# Patient Record
Sex: MALE | Race: AMERICAN INDIAN OR ALASKA NATIVE | NOT HISPANIC OR LATINO | URBAN - METROPOLITAN AREA
[De-identification: names, ages, dates, MRNs, and addresses within clinical notes are randomized per-mention and may not be internally consistent; named-entity substitution may affect disease eponyms.]

---

## 2020-01-01 ENCOUNTER — RECORDS - HEALTHEAST (OUTPATIENT)
Dept: MEDSURG UNIT | Facility: CLINIC | Age: 75
End: 2020-01-01

## 2020-01-01 ENCOUNTER — ANESTHESIA - HEALTHEAST (OUTPATIENT)
Dept: MEDSURG UNIT | Facility: CLINIC | Age: 75
End: 2020-01-01

## 2020-01-01 DIAGNOSIS — J12.82 PNEUMONIA DUE TO 2019 NOVEL CORONAVIRUS: ICD-10-CM

## 2020-01-01 DIAGNOSIS — N17.9 ACUTE KIDNEY INJURY (H): ICD-10-CM

## 2020-01-01 DIAGNOSIS — U07.1 PNEUMONIA DUE TO 2019 NOVEL CORONAVIRUS: ICD-10-CM

## 2020-01-01 DIAGNOSIS — U07.1 ACUTE RESPIRATORY DISTRESS SYNDROME (ARDS) DUE TO COVID-19 VIRUS (H): ICD-10-CM

## 2020-01-01 DIAGNOSIS — U07.1 2019 NOVEL CORONAVIRUS DISEASE (COVID-19): ICD-10-CM

## 2020-01-01 DIAGNOSIS — T14.8XXA ABRASION: ICD-10-CM

## 2020-01-01 DIAGNOSIS — J96.01 ACUTE RESPIRATORY FAILURE WITH HYPOXIA (H): ICD-10-CM

## 2020-01-01 DIAGNOSIS — J80 ACUTE RESPIRATORY DISTRESS SYNDROME (ARDS) DUE TO COVID-19 VIRUS (H): ICD-10-CM

## 2020-01-01 DIAGNOSIS — Z91.89 AT HIGH RISK FOR IMPAIRED SKIN INTEGRITY: ICD-10-CM

## 2020-01-01 LAB
ABO/RH(D): NORMAL
ABO/RH(D): NORMAL
AEROBIC BLOOD CULTURE BOTTLE: ABNORMAL
AEROBIC BLOOD CULTURE BOTTLE: NO GROWTH
ALBUMIN SERPL-MCNC: 1.9 G/DL (ref 3.5–5)
ALBUMIN SERPL-MCNC: 2 G/DL (ref 3.5–5)
ALBUMIN SERPL-MCNC: 2.2 G/DL (ref 3.5–5)
ALBUMIN SERPL-MCNC: 2.4 G/DL (ref 3.5–5)
ALBUMIN SERPL-MCNC: 2.5 G/DL (ref 3.5–5)
ALBUMIN SERPL-MCNC: 2.8 G/DL (ref 3.5–5)
ALBUMIN SERPL-MCNC: 3 G/DL (ref 3.5–5)
ALBUMIN UR-MCNC: NEGATIVE MG/DL
ALP SERPL-CCNC: 103 U/L (ref 45–120)
ALP SERPL-CCNC: 109 U/L (ref 45–120)
ALP SERPL-CCNC: 47 U/L (ref 45–120)
ALP SERPL-CCNC: 49 U/L (ref 45–120)
ALP SERPL-CCNC: 61 U/L (ref 45–120)
ALP SERPL-CCNC: 67 U/L (ref 45–120)
ALP SERPL-CCNC: 75 U/L (ref 45–120)
ALP SERPL-CCNC: 86 U/L (ref 45–120)
ALP SERPL-CCNC: 99 U/L (ref 45–120)
ALT SERPL W P-5'-P-CCNC: 182 U/L (ref 0–45)
ALT SERPL W P-5'-P-CCNC: 234 U/L (ref 0–45)
ALT SERPL W P-5'-P-CCNC: 268 U/L (ref 0–45)
ALT SERPL W P-5'-P-CCNC: 32 U/L (ref 0–45)
ALT SERPL W P-5'-P-CCNC: 37 U/L (ref 0–45)
ALT SERPL W P-5'-P-CCNC: 51 U/L (ref 0–45)
ALT SERPL W P-5'-P-CCNC: 62 U/L (ref 0–45)
ALT SERPL W P-5'-P-CCNC: 77 U/L (ref 0–45)
ALT SERPL W P-5'-P-CCNC: 84 U/L (ref 0–45)
AMMONIA PLAS-SCNC: 28 UMOL/L (ref 11–35)
ANAEROBIC BLOOD CULTURE BOTTLE: NO GROWTH
ANAEROBIC BLOOD CULTURE BOTTLE: NORMAL
ANION GAP SERPL CALCULATED.3IONS-SCNC: 10 MMOL/L (ref 5–18)
ANION GAP SERPL CALCULATED.3IONS-SCNC: 11 MMOL/L (ref 5–18)
ANION GAP SERPL CALCULATED.3IONS-SCNC: 12 MMOL/L (ref 5–18)
ANION GAP SERPL CALCULATED.3IONS-SCNC: 13 MMOL/L (ref 5–18)
ANION GAP SERPL CALCULATED.3IONS-SCNC: 14 MMOL/L (ref 5–18)
ANION GAP SERPL CALCULATED.3IONS-SCNC: 15 MMOL/L (ref 5–18)
ANION GAP SERPL CALCULATED.3IONS-SCNC: 16 MMOL/L (ref 5–18)
ANION GAP SERPL CALCULATED.3IONS-SCNC: 17 MMOL/L (ref 5–18)
ANION GAP SERPL CALCULATED.3IONS-SCNC: 7 MMOL/L (ref 5–18)
ANION GAP SERPL CALCULATED.3IONS-SCNC: 7 MMOL/L (ref 5–18)
ANION GAP SERPL CALCULATED.3IONS-SCNC: 8 MMOL/L (ref 5–18)
ANION GAP SERPL CALCULATED.3IONS-SCNC: 9 MMOL/L (ref 5–18)
ANTIBODY SCREEN: NEGATIVE
AORTIC VALVE MEAN VELOCITY: 124 CM/S
APPEARANCE UR: CLEAR
APTT PPP: 36 SECONDS (ref 24–37)
APTT PPP: 36 SECONDS (ref 24–37)
APTT PPP: 38 SECONDS (ref 24–37)
ARUP MISCELLANEOUS TEST: NORMAL
ARUP MISCELLANEOUS TEST: NORMAL
ASCENDING AORTA: 4.1 CM
ASCENDING AORTA: 4.2 CM
AST SERPL W P-5'-P-CCNC: 123 U/L (ref 0–40)
AST SERPL W P-5'-P-CCNC: 14 U/L (ref 0–40)
AST SERPL W P-5'-P-CCNC: 16 U/L (ref 0–40)
AST SERPL W P-5'-P-CCNC: 213 U/L (ref 0–40)
AST SERPL W P-5'-P-CCNC: 216 U/L (ref 0–40)
AST SERPL W P-5'-P-CCNC: 34 U/L (ref 0–40)
AST SERPL W P-5'-P-CCNC: 39 U/L (ref 0–40)
AST SERPL W P-5'-P-CCNC: 41 U/L (ref 0–40)
AST SERPL W P-5'-P-CCNC: 62 U/L (ref 0–40)
AT III ACT/NOR PPP CHRO: 96 % (ref 85–135)
ATRIAL RATE - MUSE: 101 BPM
ATRIAL RATE - MUSE: 124 BPM
ATRIAL RATE - MUSE: 147 BPM
ATRIAL RATE - MUSE: 52 BPM
ATRIAL RATE - MUSE: 58 BPM
ATRIAL RATE - MUSE: 80 BPM
ATRIAL RATE - MUSE: 96 BPM
AV DIMENSIONLESS INDEX VTI: 0.6
AV MEAN GRADIENT: 8 MMHG
AV PEAK GRADIENT: 15.1 MMHG
AV VELOCITY RATIO: 0.7
BACTERIA #/AREA URNS HPF: ABNORMAL HPF
BACTERIA SPEC CULT: ABNORMAL
BACTERIA SPEC CULT: NO GROWTH
BACTERIA SPEC CULT: NO GROWTH
BASE EXCESS BLDA CALC-SCNC: -0.8 MMOL/L
BASE EXCESS BLDA CALC-SCNC: -1 MMOL/L
BASE EXCESS BLDA CALC-SCNC: -1.4 MMOL/L
BASE EXCESS BLDA CALC-SCNC: -1.5 MMOL/L
BASE EXCESS BLDA CALC-SCNC: -2.1 MMOL/L
BASE EXCESS BLDA CALC-SCNC: -2.2 MMOL/L
BASE EXCESS BLDA CALC-SCNC: -2.4 MMOL/L
BASE EXCESS BLDA CALC-SCNC: -3.2 MMOL/L
BASE EXCESS BLDA CALC-SCNC: -3.5 MMOL/L
BASE EXCESS BLDA CALC-SCNC: -3.8 MMOL/L
BASE EXCESS BLDA CALC-SCNC: -3.8 MMOL/L
BASE EXCESS BLDA CALC-SCNC: -4 MMOL/L
BASE EXCESS BLDA CALC-SCNC: -4 MMOL/L
BASE EXCESS BLDA CALC-SCNC: -4.1 MMOL/L
BASE EXCESS BLDA CALC-SCNC: -4.1 MMOL/L
BASE EXCESS BLDA CALC-SCNC: -4.6 MMOL/L
BASE EXCESS BLDA CALC-SCNC: -4.7 MMOL/L
BASE EXCESS BLDA CALC-SCNC: -4.9 MMOL/L
BASE EXCESS BLDA CALC-SCNC: -5 MMOL/L
BASE EXCESS BLDA CALC-SCNC: -5 MMOL/L
BASE EXCESS BLDA CALC-SCNC: -5.3 MMOL/L
BASE EXCESS BLDA CALC-SCNC: -5.8 MMOL/L
BASE EXCESS BLDA CALC-SCNC: -5.9 MMOL/L
BASE EXCESS BLDA CALC-SCNC: -6.2 MMOL/L
BASE EXCESS BLDA CALC-SCNC: -6.4 MMOL/L
BASE EXCESS BLDA CALC-SCNC: -6.5 MMOL/L
BASE EXCESS BLDA CALC-SCNC: -6.6 MMOL/L
BASE EXCESS BLDA CALC-SCNC: -6.6 MMOL/L
BASE EXCESS BLDA CALC-SCNC: -6.7 MMOL/L
BASE EXCESS BLDA CALC-SCNC: -6.7 MMOL/L
BASE EXCESS BLDA CALC-SCNC: -6.8 MMOL/L
BASE EXCESS BLDA CALC-SCNC: -7.2 MMOL/L
BASE EXCESS BLDA CALC-SCNC: -7.3 MMOL/L
BASE EXCESS BLDA CALC-SCNC: -7.4 MMOL/L
BASE EXCESS BLDA CALC-SCNC: -7.5 MMOL/L
BASE EXCESS BLDA CALC-SCNC: -7.6 MMOL/L
BASE EXCESS BLDA CALC-SCNC: -7.7 MMOL/L
BASE EXCESS BLDA CALC-SCNC: -7.9 MMOL/L
BASE EXCESS BLDA CALC-SCNC: -8 MMOL/L
BASE EXCESS BLDA CALC-SCNC: -8.6 MMOL/L
BASE EXCESS BLDA CALC-SCNC: -8.6 MMOL/L
BASE EXCESS BLDA CALC-SCNC: -8.8 MMOL/L
BASE EXCESS BLDA CALC-SCNC: -9.5 MMOL/L
BASE EXCESS BLDA CALC-SCNC: 0 MMOL/L
BASE EXCESS BLDA CALC-SCNC: 1.6 MMOL/L
BASE EXCESS BLDA CALC-SCNC: 2.4 MMOL/L
BASE EXCESS BLDA CALC-SCNC: 2.8 MMOL/L
BASE EXCESS BLDA CALC-SCNC: 2.8 MMOL/L
BASE EXCESS BLDA CALC-SCNC: 3.3 MMOL/L
BASE EXCESS BLDA CALC-SCNC: 5.2 MMOL/L
BASE EXCESS BLDV CALC-SCNC: 4.1 MMOL/L
BASE EXCESS BLDV CALC-SCNC: 7.4 MMOL/L
BASO STIPL BLD QL SMEAR: ABNORMAL
BASOPHILS # BLD AUTO: 0 THOU/UL (ref 0–0.2)
BASOPHILS NFR BLD AUTO: 0 % (ref 0–2)
BILIRUB DIRECT SERPL-MCNC: 0.2 MG/DL
BILIRUB DIRECT SERPL-MCNC: 0.5 MG/DL
BILIRUB DIRECT SERPL-MCNC: 0.6 MG/DL
BILIRUB DIRECT SERPL-MCNC: 0.6 MG/DL
BILIRUB DIRECT SERPL-MCNC: 1.3 MG/DL
BILIRUB SERPL-MCNC: 0.5 MG/DL (ref 0–1)
BILIRUB SERPL-MCNC: 0.7 MG/DL (ref 0–1)
BILIRUB SERPL-MCNC: 0.7 MG/DL (ref 0–1)
BILIRUB SERPL-MCNC: 0.8 MG/DL (ref 0–1)
BILIRUB SERPL-MCNC: 0.9 MG/DL (ref 0–1)
BILIRUB SERPL-MCNC: 1 MG/DL (ref 0–1)
BILIRUB SERPL-MCNC: 1.8 MG/DL (ref 0–1)
BILIRUB UR QL STRIP: NEGATIVE
BSA FOR ECHO PROCEDURE: 2.35 M2
BSA FOR ECHO PROCEDURE: 2.39 M2
BUN SERPL-MCNC: 109 MG/DL (ref 8–28)
BUN SERPL-MCNC: 124 MG/DL (ref 8–28)
BUN SERPL-MCNC: 24 MG/DL (ref 8–28)
BUN SERPL-MCNC: 24 MG/DL (ref 8–28)
BUN SERPL-MCNC: 26 MG/DL (ref 8–28)
BUN SERPL-MCNC: 26 MG/DL (ref 8–28)
BUN SERPL-MCNC: 27 MG/DL (ref 8–28)
BUN SERPL-MCNC: 27 MG/DL (ref 8–28)
BUN SERPL-MCNC: 28 MG/DL (ref 8–28)
BUN SERPL-MCNC: 28 MG/DL (ref 8–28)
BUN SERPL-MCNC: 29 MG/DL (ref 8–28)
BUN SERPL-MCNC: 30 MG/DL (ref 8–28)
BUN SERPL-MCNC: 30 MG/DL (ref 8–28)
BUN SERPL-MCNC: 31 MG/DL (ref 8–28)
BUN SERPL-MCNC: 35 MG/DL (ref 8–28)
BUN SERPL-MCNC: 35 MG/DL (ref 8–28)
BUN SERPL-MCNC: 38 MG/DL (ref 8–28)
BUN SERPL-MCNC: 41 MG/DL (ref 8–28)
BUN SERPL-MCNC: 46 MG/DL (ref 8–28)
BUN SERPL-MCNC: 46 MG/DL (ref 8–28)
BUN SERPL-MCNC: 51 MG/DL (ref 8–28)
BUN SERPL-MCNC: 52 MG/DL (ref 8–28)
BUN SERPL-MCNC: 55 MG/DL (ref 8–28)
BUN SERPL-MCNC: 57 MG/DL (ref 8–28)
BUN SERPL-MCNC: 61 MG/DL (ref 8–28)
BUN SERPL-MCNC: 68 MG/DL (ref 8–28)
BUN SERPL-MCNC: 68 MG/DL (ref 8–28)
BUN SERPL-MCNC: 76 MG/DL (ref 8–28)
BUN SERPL-MCNC: 81 MG/DL (ref 8–28)
BUN SERPL-MCNC: 90 MG/DL (ref 8–28)
C DIFF TOX B STL QL: NEGATIVE
C REACTIVE PROTEIN LHE: 15.9 MG/DL (ref 0–0.8)
C REACTIVE PROTEIN LHE: 16.5 MG/DL (ref 0–0.8)
C REACTIVE PROTEIN LHE: 7.9 MG/DL (ref 0–0.8)
C REACTIVE PROTEIN LHE: 8.3 MG/DL (ref 0–0.8)
C REACTIVE PROTEIN LHE: 8.8 MG/DL (ref 0–0.8)
C REACTIVE PROTEIN LHE: 9 MG/DL (ref 0–0.8)
C REACTIVE PROTEIN LHE: 9.6 MG/DL (ref 0–0.8)
CALCIUM SERPL-MCNC: 7.3 MG/DL (ref 8.5–10.5)
CALCIUM SERPL-MCNC: 7.6 MG/DL (ref 8.5–10.5)
CALCIUM SERPL-MCNC: 7.9 MG/DL (ref 8.5–10.5)
CALCIUM SERPL-MCNC: 8 MG/DL (ref 8.5–10.5)
CALCIUM SERPL-MCNC: 8 MG/DL (ref 8.5–10.5)
CALCIUM SERPL-MCNC: 8.1 MG/DL (ref 8.5–10.5)
CALCIUM SERPL-MCNC: 8.2 MG/DL (ref 8.5–10.5)
CALCIUM SERPL-MCNC: 8.3 MG/DL (ref 8.5–10.5)
CALCIUM SERPL-MCNC: 8.4 MG/DL (ref 8.5–10.5)
CALCIUM SERPL-MCNC: 8.5 MG/DL (ref 8.5–10.5)
CALCIUM SERPL-MCNC: 8.6 MG/DL (ref 8.5–10.5)
CALCIUM SERPL-MCNC: 8.7 MG/DL (ref 8.5–10.5)
CALCIUM SERPL-MCNC: 8.8 MG/DL (ref 8.5–10.5)
CALCIUM SERPL-MCNC: 9.4 MG/DL (ref 8.5–10.5)
CALCIUM, IONIZED MEASURED: 1.03 MMOL/L (ref 1.11–1.3)
CALCIUM, IONIZED MEASURED: 1.03 MMOL/L (ref 1.11–1.3)
CALCIUM, IONIZED MEASURED: 1.04 MMOL/L (ref 1.11–1.3)
CALCIUM, IONIZED MEASURED: 1.05 MMOL/L (ref 1.11–1.3)
CALCIUM, IONIZED MEASURED: 1.06 MMOL/L (ref 1.11–1.3)
CALCIUM, IONIZED MEASURED: 1.07 MMOL/L (ref 1.11–1.3)
CALCIUM, IONIZED MEASURED: 1.07 MMOL/L (ref 1.11–1.3)
CALCIUM, IONIZED MEASURED: 1.08 MMOL/L (ref 1.11–1.3)
CALCIUM, IONIZED MEASURED: 1.1 MMOL/L (ref 1.11–1.3)
CALCIUM, IONIZED MEASURED: 1.11 MMOL/L (ref 1.11–1.3)
CALCIUM, IONIZED MEASURED: 1.12 MMOL/L (ref 1.11–1.3)
CALCIUM, IONIZED MEASURED: 1.13 MMOL/L (ref 1.11–1.3)
CALCIUM, IONIZED MEASURED: 1.15 MMOL/L (ref 1.11–1.3)
CALCIUM, IONIZED MEASURED: 1.17 MMOL/L (ref 1.11–1.3)
CHLORIDE BLD-SCNC: 100 MMOL/L (ref 98–107)
CHLORIDE BLD-SCNC: 102 MMOL/L (ref 98–107)
CHLORIDE BLD-SCNC: 103 MMOL/L (ref 98–107)
CHLORIDE BLD-SCNC: 104 MMOL/L (ref 98–107)
CHLORIDE BLD-SCNC: 105 MMOL/L (ref 98–107)
CHLORIDE BLD-SCNC: 106 MMOL/L (ref 98–107)
CHLORIDE BLD-SCNC: 107 MMOL/L (ref 98–107)
CHLORIDE BLD-SCNC: 110 MMOL/L (ref 98–107)
CHLORIDE BLD-SCNC: 113 MMOL/L (ref 98–107)
CHLORIDE BLD-SCNC: 113 MMOL/L (ref 98–107)
CHLORIDE BLD-SCNC: 114 MMOL/L (ref 98–107)
CHLORIDE BLD-SCNC: 115 MMOL/L (ref 98–107)
CHLORIDE BLD-SCNC: 115 MMOL/L (ref 98–107)
CHLORIDE BLD-SCNC: 116 MMOL/L (ref 98–107)
CHLORIDE BLD-SCNC: 116 MMOL/L (ref 98–107)
CHLORIDE BLD-SCNC: 96 MMOL/L (ref 98–107)
CHLORIDE BLD-SCNC: 97 MMOL/L (ref 98–107)
CHLORIDE BLD-SCNC: 99 MMOL/L (ref 98–107)
CHOLEST SERPL-MCNC: 111 MG/DL
CK SERPL-CCNC: 11 U/L (ref 30–190)
CK SERPL-CCNC: 355 U/L (ref 30–190)
CK SERPL-CCNC: 38 U/L (ref 30–190)
CK SERPL-CCNC: 706 U/L (ref 30–190)
CK SERPL-CCNC: 89 U/L (ref 30–190)
CO2 SERPL-SCNC: 16 MMOL/L (ref 22–31)
CO2 SERPL-SCNC: 17 MMOL/L (ref 22–31)
CO2 SERPL-SCNC: 18 MMOL/L (ref 22–31)
CO2 SERPL-SCNC: 19 MMOL/L (ref 22–31)
CO2 SERPL-SCNC: 20 MMOL/L (ref 22–31)
CO2 SERPL-SCNC: 21 MMOL/L (ref 22–31)
CO2 SERPL-SCNC: 22 MMOL/L (ref 22–31)
CO2 SERPL-SCNC: 23 MMOL/L (ref 22–31)
CO2 SERPL-SCNC: 23 MMOL/L (ref 22–31)
CO2 SERPL-SCNC: 24 MMOL/L (ref 22–31)
CO2 SERPL-SCNC: 24 MMOL/L (ref 22–31)
CO2 SERPL-SCNC: 25 MMOL/L (ref 22–31)
CO2 SERPL-SCNC: 28 MMOL/L (ref 22–31)
COHGB MFR BLD: 81.1 % (ref 95–96)
COHGB MFR BLD: 81.2 % (ref 95–96)
COHGB MFR BLD: 87.5 % (ref 95–96)
COHGB MFR BLD: 89 % (ref 95–96)
COHGB MFR BLD: 89.3 % (ref 95–96)
COHGB MFR BLD: 89.4 % (ref 95–96)
COHGB MFR BLD: 91.1 % (ref 95–96)
COHGB MFR BLD: 91.5 % (ref 95–96)
COHGB MFR BLD: 92 % (ref 95–96)
COHGB MFR BLD: 92.7 % (ref 95–96)
COHGB MFR BLD: 93.4 % (ref 95–96)
COHGB MFR BLD: 93.5 % (ref 95–96)
COHGB MFR BLD: 93.6 % (ref 95–96)
COHGB MFR BLD: 93.7 % (ref 95–96)
COHGB MFR BLD: 94.2 % (ref 95–96)
COHGB MFR BLD: 94.7 % (ref 95–96)
COHGB MFR BLD: 94.8 % (ref 95–96)
COHGB MFR BLD: 94.9 % (ref 95–96)
COHGB MFR BLD: 95 % (ref 95–96)
COHGB MFR BLD: 95.3 % (ref 95–96)
COHGB MFR BLD: 95.4 % (ref 95–96)
COHGB MFR BLD: 95.5 % (ref 95–96)
COHGB MFR BLD: 95.6 % (ref 95–96)
COHGB MFR BLD: 95.7 % (ref 95–96)
COHGB MFR BLD: 95.8 % (ref 95–96)
COHGB MFR BLD: 96 % (ref 95–96)
COHGB MFR BLD: 96.1 % (ref 95–96)
COHGB MFR BLD: 96.1 % (ref 95–96)
COHGB MFR BLD: 96.3 % (ref 95–96)
COHGB MFR BLD: 96.3 % (ref 95–96)
COHGB MFR BLD: 96.5 % (ref 95–96)
COHGB MFR BLD: 96.6 % (ref 95–96)
COHGB MFR BLD: 96.8 % (ref 95–96)
COHGB MFR BLD: 97 % (ref 95–96)
COHGB MFR BLD: 97.1 % (ref 95–96)
COHGB MFR BLD: 97.2 % (ref 95–96)
COHGB MFR BLD: 97.4 % (ref 95–96)
COHGB MFR BLD: 97.5 % (ref 95–96)
COHGB MFR BLD: 97.6 % (ref 95–96)
COHGB MFR BLD: 97.6 % (ref 95–96)
COHGB MFR BLD: 98.1 % (ref 95–96)
COHGB MFR BLD: 98.4 % (ref 95–96)
COHGB MFR BLD: 98.5 % (ref 95–96)
COHGB MFR BLD: 98.6 % (ref 95–96)
COHGB MFR BLD: 98.8 % (ref 95–96)
COHGB MFR BLD: 98.9 % (ref 95–96)
COHGB MFR BLD: 99 % (ref 95–96)
COHGB MFR BLD: 99.1 % (ref 95–96)
COHGB MFR BLD: 99.2 % (ref 95–96)
COHGB MFR BLD: 99.3 % (ref 95–96)
COHGB MFR BLD: 99.9 % (ref 95–96)
COHGB MFR BLD: >100 % (ref 95–96)
COLOR UR AUTO: ABNORMAL
CREAT SERPL-MCNC: 0.8 MG/DL (ref 0.7–1.3)
CREAT SERPL-MCNC: 0.85 MG/DL (ref 0.7–1.3)
CREAT SERPL-MCNC: 0.85 MG/DL (ref 0.7–1.3)
CREAT SERPL-MCNC: 0.91 MG/DL (ref 0.7–1.3)
CREAT SERPL-MCNC: 0.93 MG/DL (ref 0.7–1.3)
CREAT SERPL-MCNC: 0.94 MG/DL (ref 0.7–1.3)
CREAT SERPL-MCNC: 0.95 MG/DL (ref 0.7–1.3)
CREAT SERPL-MCNC: 0.95 MG/DL (ref 0.7–1.3)
CREAT SERPL-MCNC: 1 MG/DL (ref 0.7–1.3)
CREAT SERPL-MCNC: 1 MG/DL (ref 0.7–1.3)
CREAT SERPL-MCNC: 1.01 MG/DL (ref 0.7–1.3)
CREAT SERPL-MCNC: 1.02 MG/DL (ref 0.7–1.3)
CREAT SERPL-MCNC: 1.1 MG/DL (ref 0.7–1.3)
CREAT SERPL-MCNC: 1.11 MG/DL (ref 0.7–1.3)
CREAT SERPL-MCNC: 1.18 MG/DL (ref 0.7–1.3)
CREAT SERPL-MCNC: 1.22 MG/DL (ref 0.7–1.3)
CREAT SERPL-MCNC: 1.33 MG/DL (ref 0.7–1.3)
CREAT SERPL-MCNC: 1.48 MG/DL (ref 0.7–1.3)
CREAT SERPL-MCNC: 1.76 MG/DL (ref 0.7–1.3)
CREAT SERPL-MCNC: 1.96 MG/DL (ref 0.7–1.3)
CREAT SERPL-MCNC: 2.29 MG/DL (ref 0.7–1.3)
CREAT SERPL-MCNC: 2.59 MG/DL (ref 0.7–1.3)
CREAT SERPL-MCNC: 2.74 MG/DL (ref 0.7–1.3)
CREAT SERPL-MCNC: 2.9 MG/DL (ref 0.7–1.3)
CREAT SERPL-MCNC: 3.41 MG/DL (ref 0.7–1.3)
CREAT SERPL-MCNC: 3.82 MG/DL (ref 0.7–1.3)
CREAT SERPL-MCNC: 3.87 MG/DL (ref 0.7–1.3)
CREAT SERPL-MCNC: 4.15 MG/DL (ref 0.7–1.3)
CREAT SERPL-MCNC: 4.49 MG/DL (ref 0.7–1.3)
CREAT SERPL-MCNC: 5.21 MG/DL (ref 0.7–1.3)
CV BLOOD PRESSURE: NORMAL MMHG
CV BLOOD PRESSURE: NORMAL MMHG
CV ECHO HEIGHT: 74 IN
CV ECHO HEIGHT: 74 IN
CV ECHO WEIGHT: 201 LBS
CV ECHO WEIGHT: 240 LBS
D DIMER PPP FEU-MCNC: 0.61 FEU UG/ML
D DIMER PPP FEU-MCNC: 0.61 FEU UG/ML
D DIMER PPP FEU-MCNC: 0.64 FEU UG/ML
D DIMER PPP FEU-MCNC: 0.74 FEU UG/ML
D DIMER PPP FEU-MCNC: 0.76 FEU UG/ML
D DIMER PPP FEU-MCNC: 0.77 FEU UG/ML
D DIMER PPP FEU-MCNC: 0.77 FEU UG/ML
D DIMER PPP FEU-MCNC: 1.13 FEU UG/ML
DIASTOLIC BLOOD PRESSURE - MUSE: 68 MMHG
DIASTOLIC BLOOD PRESSURE - MUSE: NORMAL
DOP CALC AO PEAK VEL: 194 CM/S
DOP CALC AO VTI: 25.3 CM
DOP CALC LVOT PEAK VEL: 141 CM/S
DOP CALCLVOT PEAK VEL VTI: 14.1 CM
EJECTION FRACTION: 63 % (ref 55–75)
EJECTION FRACTION: 71 % (ref 55–75)
EOSINOPHIL # BLD AUTO: 0 THOU/UL (ref 0–0.4)
EOSINOPHIL COUNT (ABSOLUTE): 0 THOU/UL (ref 0–0.4)
EOSINOPHIL COUNT (ABSOLUTE): 0 THOU/UL (ref 0–0.4)
EOSINOPHIL NFR BLD AUTO: 0 % (ref 0–6)
ERYTHROCYTE [DISTWIDTH] IN BLOOD BY AUTOMATED COUNT: 12.3 % (ref 11–14.5)
ERYTHROCYTE [DISTWIDTH] IN BLOOD BY AUTOMATED COUNT: 12.4 % (ref 11–14.5)
ERYTHROCYTE [DISTWIDTH] IN BLOOD BY AUTOMATED COUNT: 12.4 % (ref 11–14.5)
ERYTHROCYTE [DISTWIDTH] IN BLOOD BY AUTOMATED COUNT: 12.5 % (ref 11–14.5)
ERYTHROCYTE [DISTWIDTH] IN BLOOD BY AUTOMATED COUNT: 12.6 % (ref 11–14.5)
ERYTHROCYTE [DISTWIDTH] IN BLOOD BY AUTOMATED COUNT: 12.8 % (ref 11–14.5)
ERYTHROCYTE [DISTWIDTH] IN BLOOD BY AUTOMATED COUNT: 13.1 % (ref 11–14.5)
ERYTHROCYTE [DISTWIDTH] IN BLOOD BY AUTOMATED COUNT: 13.1 % (ref 11–14.5)
ERYTHROCYTE [DISTWIDTH] IN BLOOD BY AUTOMATED COUNT: 13.2 % (ref 11–14.5)
ERYTHROCYTE [DISTWIDTH] IN BLOOD BY AUTOMATED COUNT: 14 % (ref 11–14.5)
ERYTHROCYTE [DISTWIDTH] IN BLOOD BY AUTOMATED COUNT: 14.3 % (ref 11–14.5)
ERYTHROCYTE [DISTWIDTH] IN BLOOD BY AUTOMATED COUNT: 14.5 % (ref 11–14.5)
ERYTHROCYTE [DISTWIDTH] IN BLOOD BY AUTOMATED COUNT: 14.5 % (ref 11–14.5)
ERYTHROCYTE [DISTWIDTH] IN BLOOD BY AUTOMATED COUNT: 14.6 % (ref 11–14.5)
ERYTHROCYTE [DISTWIDTH] IN BLOOD BY AUTOMATED COUNT: 14.6 % (ref 11–14.5)
ERYTHROCYTE [DISTWIDTH] IN BLOOD BY AUTOMATED COUNT: 14.7 % (ref 11–14.5)
ERYTHROCYTE [DISTWIDTH] IN BLOOD BY AUTOMATED COUNT: 14.7 % (ref 11–14.5)
ERYTHROCYTE [DISTWIDTH] IN BLOOD BY AUTOMATED COUNT: 14.8 % (ref 11–14.5)
ERYTHROCYTE [DISTWIDTH] IN BLOOD BY AUTOMATED COUNT: 14.8 % (ref 11–14.5)
ERYTHROCYTE [DISTWIDTH] IN BLOOD BY AUTOMATED COUNT: 15.1 % (ref 11–14.5)
ERYTHROCYTE [DISTWIDTH] IN BLOOD BY AUTOMATED COUNT: 16 % (ref 11–14.5)
ERYTHROCYTE [DISTWIDTH] IN BLOOD BY AUTOMATED COUNT: 17.2 % (ref 11–14.5)
ERYTHROCYTE [DISTWIDTH] IN BLOOD BY AUTOMATED COUNT: 17.6 % (ref 11–14.5)
ERYTHROCYTE [DISTWIDTH] IN BLOOD BY AUTOMATED COUNT: 18.8 % (ref 11–14.5)
ERYTHROCYTE [DISTWIDTH] IN BLOOD BY AUTOMATED COUNT: 19.2 % (ref 11–14.5)
ERYTHROCYTE [DISTWIDTH] IN BLOOD BY AUTOMATED COUNT: 19.3 % (ref 11–14.5)
ERYTHROCYTE [DISTWIDTH] IN BLOOD BY AUTOMATED COUNT: 19.7 % (ref 11–14.5)
FASTING STATUS PATIENT QL REPORTED: NO
FASTING STATUS PATIENT QL REPORTED: NO
FERRITIN SERPL-MCNC: 440 NG/ML (ref 27–300)
FERRITIN SERPL-MCNC: 554 NG/ML (ref 27–300)
FERRITIN SERPL-MCNC: 677 NG/ML (ref 27–300)
FIBRINOGEN PPP-MCNC: 475 MG/DL (ref 170–410)
FIBRINOGEN PPP-MCNC: 616 MG/DL (ref 170–410)
FIBRINOGEN PPP-MCNC: 665 MG/DL (ref 170–410)
FIBRINOGEN PPP-MCNC: 678 MG/DL (ref 170–410)
FIBRINOGEN PPP-MCNC: 690 MG/DL (ref 170–410)
FIBRINOGEN PPP-MCNC: 735 MG/DL (ref 170–410)
FRACTIONAL SHORTENING: 51.5 % (ref 28–44)
GALACTOMANNAN AG SERPL QL IA: NEGATIVE
GALACTOMANNAN AG SPEC IA-ACNC: 0.04
GFR SERPL CREATININE-BSD FRML MDRD: 11 ML/MIN/1.73M2
GFR SERPL CREATININE-BSD FRML MDRD: 13 ML/MIN/1.73M2
GFR SERPL CREATININE-BSD FRML MDRD: 14 ML/MIN/1.73M2
GFR SERPL CREATININE-BSD FRML MDRD: 15 ML/MIN/1.73M2
GFR SERPL CREATININE-BSD FRML MDRD: 15 ML/MIN/1.73M2
GFR SERPL CREATININE-BSD FRML MDRD: 18 ML/MIN/1.73M2
GFR SERPL CREATININE-BSD FRML MDRD: 21 ML/MIN/1.73M2
GFR SERPL CREATININE-BSD FRML MDRD: 23 ML/MIN/1.73M2
GFR SERPL CREATININE-BSD FRML MDRD: 24 ML/MIN/1.73M2
GFR SERPL CREATININE-BSD FRML MDRD: 28 ML/MIN/1.73M2
GFR SERPL CREATININE-BSD FRML MDRD: 33 ML/MIN/1.73M2
GFR SERPL CREATININE-BSD FRML MDRD: 38 ML/MIN/1.73M2
GFR SERPL CREATININE-BSD FRML MDRD: 46 ML/MIN/1.73M2
GFR SERPL CREATININE-BSD FRML MDRD: 52 ML/MIN/1.73M2
GFR SERPL CREATININE-BSD FRML MDRD: 58 ML/MIN/1.73M2
GFR SERPL CREATININE-BSD FRML MDRD: 60 ML/MIN/1.73M2
GFR SERPL CREATININE-BSD FRML MDRD: >60 ML/MIN/1.73M2
GLUCOSE BLD-MCNC: 117 MG/DL (ref 70–125)
GLUCOSE BLD-MCNC: 124 MG/DL (ref 70–125)
GLUCOSE BLD-MCNC: 126 MG/DL (ref 70–125)
GLUCOSE BLD-MCNC: 128 MG/DL (ref 70–125)
GLUCOSE BLD-MCNC: 133 MG/DL (ref 70–125)
GLUCOSE BLD-MCNC: 154 MG/DL (ref 70–125)
GLUCOSE BLD-MCNC: 154 MG/DL (ref 70–125)
GLUCOSE BLD-MCNC: 155 MG/DL (ref 70–125)
GLUCOSE BLD-MCNC: 155 MG/DL (ref 70–125)
GLUCOSE BLD-MCNC: 170 MG/DL (ref 70–125)
GLUCOSE BLD-MCNC: 170 MG/DL (ref 70–125)
GLUCOSE BLD-MCNC: 174 MG/DL (ref 70–125)
GLUCOSE BLD-MCNC: 186 MG/DL (ref 70–125)
GLUCOSE BLD-MCNC: 189 MG/DL (ref 70–125)
GLUCOSE BLD-MCNC: 195 MG/DL (ref 70–125)
GLUCOSE BLD-MCNC: 198 MG/DL (ref 70–125)
GLUCOSE BLD-MCNC: 204 MG/DL (ref 70–125)
GLUCOSE BLD-MCNC: 205 MG/DL (ref 70–125)
GLUCOSE BLD-MCNC: 211 MG/DL (ref 70–125)
GLUCOSE BLD-MCNC: 216 MG/DL (ref 70–125)
GLUCOSE BLD-MCNC: 217 MG/DL (ref 70–125)
GLUCOSE BLD-MCNC: 219 MG/DL (ref 70–125)
GLUCOSE BLD-MCNC: 227 MG/DL (ref 70–125)
GLUCOSE BLD-MCNC: 258 MG/DL (ref 70–125)
GLUCOSE BLD-MCNC: 269 MG/DL (ref 70–125)
GLUCOSE BLD-MCNC: 275 MG/DL (ref 70–125)
GLUCOSE BLD-MCNC: 282 MG/DL (ref 70–125)
GLUCOSE BLD-MCNC: 291 MG/DL (ref 70–125)
GLUCOSE BLD-MCNC: 333 MG/DL (ref 70–125)
GLUCOSE BLD-MCNC: 387 MG/DL (ref 70–125)
GLUCOSE BLDC GLUCOMTR-MCNC: 101 MG/DL (ref 70–139)
GLUCOSE BLDC GLUCOMTR-MCNC: 101 MG/DL (ref 70–139)
GLUCOSE BLDC GLUCOMTR-MCNC: 102 MG/DL (ref 70–139)
GLUCOSE BLDC GLUCOMTR-MCNC: 102 MG/DL (ref 70–139)
GLUCOSE BLDC GLUCOMTR-MCNC: 105 MG/DL (ref 70–139)
GLUCOSE BLDC GLUCOMTR-MCNC: 107 MG/DL (ref 70–139)
GLUCOSE BLDC GLUCOMTR-MCNC: 107 MG/DL (ref 70–139)
GLUCOSE BLDC GLUCOMTR-MCNC: 110 MG/DL (ref 70–139)
GLUCOSE BLDC GLUCOMTR-MCNC: 110 MG/DL (ref 70–139)
GLUCOSE BLDC GLUCOMTR-MCNC: 111 MG/DL (ref 70–139)
GLUCOSE BLDC GLUCOMTR-MCNC: 113 MG/DL (ref 70–139)
GLUCOSE BLDC GLUCOMTR-MCNC: 113 MG/DL (ref 70–139)
GLUCOSE BLDC GLUCOMTR-MCNC: 115 MG/DL (ref 70–139)
GLUCOSE BLDC GLUCOMTR-MCNC: 115 MG/DL (ref 70–139)
GLUCOSE BLDC GLUCOMTR-MCNC: 117 MG/DL (ref 70–139)
GLUCOSE BLDC GLUCOMTR-MCNC: 117 MG/DL (ref 70–139)
GLUCOSE BLDC GLUCOMTR-MCNC: 118 MG/DL (ref 70–139)
GLUCOSE BLDC GLUCOMTR-MCNC: 118 MG/DL (ref 70–139)
GLUCOSE BLDC GLUCOMTR-MCNC: 119 MG/DL (ref 70–139)
GLUCOSE BLDC GLUCOMTR-MCNC: 121 MG/DL (ref 70–139)
GLUCOSE BLDC GLUCOMTR-MCNC: 123 MG/DL (ref 70–139)
GLUCOSE BLDC GLUCOMTR-MCNC: 124 MG/DL (ref 70–139)
GLUCOSE BLDC GLUCOMTR-MCNC: 125 MG/DL (ref 70–139)
GLUCOSE BLDC GLUCOMTR-MCNC: 126 MG/DL (ref 70–139)
GLUCOSE BLDC GLUCOMTR-MCNC: 127 MG/DL (ref 70–139)
GLUCOSE BLDC GLUCOMTR-MCNC: 127 MG/DL (ref 70–139)
GLUCOSE BLDC GLUCOMTR-MCNC: 128 MG/DL (ref 70–139)
GLUCOSE BLDC GLUCOMTR-MCNC: 129 MG/DL (ref 70–139)
GLUCOSE BLDC GLUCOMTR-MCNC: 130 MG/DL (ref 70–139)
GLUCOSE BLDC GLUCOMTR-MCNC: 131 MG/DL (ref 70–139)
GLUCOSE BLDC GLUCOMTR-MCNC: 131 MG/DL (ref 70–139)
GLUCOSE BLDC GLUCOMTR-MCNC: 133 MG/DL (ref 70–139)
GLUCOSE BLDC GLUCOMTR-MCNC: 134 MG/DL (ref 70–139)
GLUCOSE BLDC GLUCOMTR-MCNC: 135 MG/DL (ref 70–139)
GLUCOSE BLDC GLUCOMTR-MCNC: 136 MG/DL (ref 70–139)
GLUCOSE BLDC GLUCOMTR-MCNC: 137 MG/DL (ref 70–139)
GLUCOSE BLDC GLUCOMTR-MCNC: 138 MG/DL (ref 70–139)
GLUCOSE BLDC GLUCOMTR-MCNC: 139 MG/DL (ref 70–139)
GLUCOSE BLDC GLUCOMTR-MCNC: 140 MG/DL (ref 70–139)
GLUCOSE BLDC GLUCOMTR-MCNC: 141 MG/DL (ref 70–139)
GLUCOSE BLDC GLUCOMTR-MCNC: 142 MG/DL (ref 70–139)
GLUCOSE BLDC GLUCOMTR-MCNC: 142 MG/DL (ref 70–139)
GLUCOSE BLDC GLUCOMTR-MCNC: 143 MG/DL (ref 70–139)
GLUCOSE BLDC GLUCOMTR-MCNC: 144 MG/DL (ref 70–139)
GLUCOSE BLDC GLUCOMTR-MCNC: 145 MG/DL (ref 70–139)
GLUCOSE BLDC GLUCOMTR-MCNC: 146 MG/DL (ref 70–139)
GLUCOSE BLDC GLUCOMTR-MCNC: 147 MG/DL (ref 70–139)
GLUCOSE BLDC GLUCOMTR-MCNC: 148 MG/DL (ref 70–139)
GLUCOSE BLDC GLUCOMTR-MCNC: 149 MG/DL (ref 70–139)
GLUCOSE BLDC GLUCOMTR-MCNC: 150 MG/DL (ref 70–139)
GLUCOSE BLDC GLUCOMTR-MCNC: 151 MG/DL (ref 70–139)
GLUCOSE BLDC GLUCOMTR-MCNC: 152 MG/DL (ref 70–139)
GLUCOSE BLDC GLUCOMTR-MCNC: 154 MG/DL (ref 70–139)
GLUCOSE BLDC GLUCOMTR-MCNC: 155 MG/DL (ref 70–139)
GLUCOSE BLDC GLUCOMTR-MCNC: 156 MG/DL (ref 70–139)
GLUCOSE BLDC GLUCOMTR-MCNC: 157 MG/DL (ref 70–139)
GLUCOSE BLDC GLUCOMTR-MCNC: 158 MG/DL (ref 70–139)
GLUCOSE BLDC GLUCOMTR-MCNC: 159 MG/DL (ref 70–139)
GLUCOSE BLDC GLUCOMTR-MCNC: 161 MG/DL (ref 70–139)
GLUCOSE BLDC GLUCOMTR-MCNC: 161 MG/DL (ref 70–139)
GLUCOSE BLDC GLUCOMTR-MCNC: 163 MG/DL (ref 70–139)
GLUCOSE BLDC GLUCOMTR-MCNC: 163 MG/DL (ref 70–139)
GLUCOSE BLDC GLUCOMTR-MCNC: 164 MG/DL (ref 70–139)
GLUCOSE BLDC GLUCOMTR-MCNC: 164 MG/DL (ref 70–139)
GLUCOSE BLDC GLUCOMTR-MCNC: 165 MG/DL (ref 70–139)
GLUCOSE BLDC GLUCOMTR-MCNC: 166 MG/DL (ref 70–139)
GLUCOSE BLDC GLUCOMTR-MCNC: 168 MG/DL (ref 70–139)
GLUCOSE BLDC GLUCOMTR-MCNC: 169 MG/DL (ref 70–139)
GLUCOSE BLDC GLUCOMTR-MCNC: 169 MG/DL (ref 70–139)
GLUCOSE BLDC GLUCOMTR-MCNC: 170 MG/DL (ref 70–139)
GLUCOSE BLDC GLUCOMTR-MCNC: 171 MG/DL (ref 70–139)
GLUCOSE BLDC GLUCOMTR-MCNC: 172 MG/DL (ref 70–139)
GLUCOSE BLDC GLUCOMTR-MCNC: 174 MG/DL (ref 70–139)
GLUCOSE BLDC GLUCOMTR-MCNC: 174 MG/DL (ref 70–139)
GLUCOSE BLDC GLUCOMTR-MCNC: 176 MG/DL (ref 70–139)
GLUCOSE BLDC GLUCOMTR-MCNC: 176 MG/DL (ref 70–139)
GLUCOSE BLDC GLUCOMTR-MCNC: 177 MG/DL (ref 70–139)
GLUCOSE BLDC GLUCOMTR-MCNC: 178 MG/DL (ref 70–139)
GLUCOSE BLDC GLUCOMTR-MCNC: 178 MG/DL (ref 70–139)
GLUCOSE BLDC GLUCOMTR-MCNC: 179 MG/DL (ref 70–139)
GLUCOSE BLDC GLUCOMTR-MCNC: 179 MG/DL (ref 70–139)
GLUCOSE BLDC GLUCOMTR-MCNC: 180 MG/DL (ref 70–139)
GLUCOSE BLDC GLUCOMTR-MCNC: 182 MG/DL (ref 70–139)
GLUCOSE BLDC GLUCOMTR-MCNC: 183 MG/DL (ref 70–139)
GLUCOSE BLDC GLUCOMTR-MCNC: 185 MG/DL (ref 70–139)
GLUCOSE BLDC GLUCOMTR-MCNC: 186 MG/DL (ref 70–139)
GLUCOSE BLDC GLUCOMTR-MCNC: 187 MG/DL (ref 70–139)
GLUCOSE BLDC GLUCOMTR-MCNC: 187 MG/DL (ref 70–139)
GLUCOSE BLDC GLUCOMTR-MCNC: 188 MG/DL (ref 70–139)
GLUCOSE BLDC GLUCOMTR-MCNC: 188 MG/DL (ref 70–139)
GLUCOSE BLDC GLUCOMTR-MCNC: 192 MG/DL (ref 70–139)
GLUCOSE BLDC GLUCOMTR-MCNC: 194 MG/DL (ref 70–139)
GLUCOSE BLDC GLUCOMTR-MCNC: 195 MG/DL (ref 70–139)
GLUCOSE BLDC GLUCOMTR-MCNC: 196 MG/DL (ref 70–139)
GLUCOSE BLDC GLUCOMTR-MCNC: 196 MG/DL (ref 70–139)
GLUCOSE BLDC GLUCOMTR-MCNC: 197 MG/DL (ref 70–139)
GLUCOSE BLDC GLUCOMTR-MCNC: 198 MG/DL (ref 70–139)
GLUCOSE BLDC GLUCOMTR-MCNC: 199 MG/DL (ref 70–139)
GLUCOSE BLDC GLUCOMTR-MCNC: 201 MG/DL (ref 70–139)
GLUCOSE BLDC GLUCOMTR-MCNC: 202 MG/DL (ref 70–139)
GLUCOSE BLDC GLUCOMTR-MCNC: 203 MG/DL (ref 70–139)
GLUCOSE BLDC GLUCOMTR-MCNC: 205 MG/DL (ref 70–139)
GLUCOSE BLDC GLUCOMTR-MCNC: 207 MG/DL (ref 70–139)
GLUCOSE BLDC GLUCOMTR-MCNC: 207 MG/DL (ref 70–139)
GLUCOSE BLDC GLUCOMTR-MCNC: 209 MG/DL (ref 70–139)
GLUCOSE BLDC GLUCOMTR-MCNC: 212 MG/DL (ref 70–139)
GLUCOSE BLDC GLUCOMTR-MCNC: 215 MG/DL (ref 70–139)
GLUCOSE BLDC GLUCOMTR-MCNC: 216 MG/DL (ref 70–139)
GLUCOSE BLDC GLUCOMTR-MCNC: 217 MG/DL (ref 70–139)
GLUCOSE BLDC GLUCOMTR-MCNC: 219 MG/DL (ref 70–139)
GLUCOSE BLDC GLUCOMTR-MCNC: 220 MG/DL (ref 70–139)
GLUCOSE BLDC GLUCOMTR-MCNC: 221 MG/DL (ref 70–139)
GLUCOSE BLDC GLUCOMTR-MCNC: 222 MG/DL (ref 70–139)
GLUCOSE BLDC GLUCOMTR-MCNC: 223 MG/DL (ref 70–139)
GLUCOSE BLDC GLUCOMTR-MCNC: 225 MG/DL (ref 70–139)
GLUCOSE BLDC GLUCOMTR-MCNC: 226 MG/DL (ref 70–139)
GLUCOSE BLDC GLUCOMTR-MCNC: 227 MG/DL (ref 70–139)
GLUCOSE BLDC GLUCOMTR-MCNC: 228 MG/DL (ref 70–139)
GLUCOSE BLDC GLUCOMTR-MCNC: 231 MG/DL (ref 70–139)
GLUCOSE BLDC GLUCOMTR-MCNC: 232 MG/DL (ref 70–139)
GLUCOSE BLDC GLUCOMTR-MCNC: 234 MG/DL (ref 70–139)
GLUCOSE BLDC GLUCOMTR-MCNC: 236 MG/DL (ref 70–139)
GLUCOSE BLDC GLUCOMTR-MCNC: 238 MG/DL (ref 70–139)
GLUCOSE BLDC GLUCOMTR-MCNC: 239 MG/DL (ref 70–139)
GLUCOSE BLDC GLUCOMTR-MCNC: 239 MG/DL (ref 70–139)
GLUCOSE BLDC GLUCOMTR-MCNC: 240 MG/DL (ref 70–139)
GLUCOSE BLDC GLUCOMTR-MCNC: 241 MG/DL (ref 70–139)
GLUCOSE BLDC GLUCOMTR-MCNC: 242 MG/DL (ref 70–139)
GLUCOSE BLDC GLUCOMTR-MCNC: 245 MG/DL (ref 70–139)
GLUCOSE BLDC GLUCOMTR-MCNC: 246 MG/DL (ref 70–139)
GLUCOSE BLDC GLUCOMTR-MCNC: 249 MG/DL (ref 70–139)
GLUCOSE BLDC GLUCOMTR-MCNC: 253 MG/DL (ref 70–139)
GLUCOSE BLDC GLUCOMTR-MCNC: 254 MG/DL (ref 70–139)
GLUCOSE BLDC GLUCOMTR-MCNC: 264 MG/DL (ref 70–139)
GLUCOSE BLDC GLUCOMTR-MCNC: 274 MG/DL (ref 70–139)
GLUCOSE BLDC GLUCOMTR-MCNC: 274 MG/DL (ref 70–139)
GLUCOSE BLDC GLUCOMTR-MCNC: 276 MG/DL (ref 70–139)
GLUCOSE BLDC GLUCOMTR-MCNC: 284 MG/DL (ref 70–139)
GLUCOSE BLDC GLUCOMTR-MCNC: 286 MG/DL (ref 70–139)
GLUCOSE BLDC GLUCOMTR-MCNC: 288 MG/DL (ref 70–139)
GLUCOSE BLDC GLUCOMTR-MCNC: 290 MG/DL (ref 70–139)
GLUCOSE BLDC GLUCOMTR-MCNC: 290 MG/DL (ref 70–139)
GLUCOSE BLDC GLUCOMTR-MCNC: 291 MG/DL (ref 70–139)
GLUCOSE BLDC GLUCOMTR-MCNC: 296 MG/DL (ref 70–139)
GLUCOSE BLDC GLUCOMTR-MCNC: 297 MG/DL (ref 70–139)
GLUCOSE BLDC GLUCOMTR-MCNC: 300 MG/DL (ref 70–139)
GLUCOSE BLDC GLUCOMTR-MCNC: 307 MG/DL (ref 70–139)
GLUCOSE BLDC GLUCOMTR-MCNC: 309 MG/DL (ref 70–139)
GLUCOSE BLDC GLUCOMTR-MCNC: 309 MG/DL (ref 70–139)
GLUCOSE BLDC GLUCOMTR-MCNC: 322 MG/DL (ref 70–139)
GLUCOSE BLDC GLUCOMTR-MCNC: 328 MG/DL (ref 70–139)
GLUCOSE BLDC GLUCOMTR-MCNC: 330 MG/DL (ref 70–139)
GLUCOSE BLDC GLUCOMTR-MCNC: 356 MG/DL (ref 70–139)
GLUCOSE BLDC GLUCOMTR-MCNC: 374 MG/DL (ref 70–139)
GLUCOSE BLDC GLUCOMTR-MCNC: 58 MG/DL (ref 70–139)
GLUCOSE BLDC GLUCOMTR-MCNC: 73 MG/DL (ref 70–139)
GLUCOSE BLDC GLUCOMTR-MCNC: 76 MG/DL (ref 70–139)
GLUCOSE BLDC GLUCOMTR-MCNC: 83 MG/DL (ref 70–139)
GLUCOSE BLDC GLUCOMTR-MCNC: 90 MG/DL (ref 70–139)
GLUCOSE BLDC GLUCOMTR-MCNC: 94 MG/DL (ref 70–139)
GLUCOSE BLDC GLUCOMTR-MCNC: 95 MG/DL (ref 70–139)
GLUCOSE BLDC GLUCOMTR-MCNC: 96 MG/DL (ref 70–139)
GLUCOSE BLDC GLUCOMTR-MCNC: 96 MG/DL (ref 70–139)
GLUCOSE BLDC GLUCOMTR-MCNC: 99 MG/DL (ref 70–139)
GLUCOSE UR STRIP-MCNC: NEGATIVE MG/DL
GRAM STAIN RESULT: ABNORMAL
HBA1C MFR BLD: 8.2 %
HBV SURFACE AG SERPL QL IA: NEGATIVE
HCO3, ARTERIAL CALC - HISTORICAL: 17.2 MMOL/L (ref 23–29)
HCO3, ARTERIAL CALC - HISTORICAL: 17.7 MMOL/L (ref 23–29)
HCO3, ARTERIAL CALC - HISTORICAL: 17.9 MMOL/L (ref 23–29)
HCO3, ARTERIAL CALC - HISTORICAL: 18 MMOL/L (ref 23–29)
HCO3, ARTERIAL CALC - HISTORICAL: 18.2 MMOL/L (ref 23–29)
HCO3, ARTERIAL CALC - HISTORICAL: 18.2 MMOL/L (ref 23–29)
HCO3, ARTERIAL CALC - HISTORICAL: 18.3 MMOL/L (ref 23–29)
HCO3, ARTERIAL CALC - HISTORICAL: 18.5 MMOL/L (ref 23–29)
HCO3, ARTERIAL CALC - HISTORICAL: 18.5 MMOL/L (ref 23–29)
HCO3, ARTERIAL CALC - HISTORICAL: 18.6 MMOL/L (ref 23–29)
HCO3, ARTERIAL CALC - HISTORICAL: 18.8 MMOL/L (ref 23–29)
HCO3, ARTERIAL CALC - HISTORICAL: 18.8 MMOL/L (ref 23–29)
HCO3, ARTERIAL CALC - HISTORICAL: 18.9 MMOL/L (ref 23–29)
HCO3, ARTERIAL CALC - HISTORICAL: 18.9 MMOL/L (ref 23–29)
HCO3, ARTERIAL CALC - HISTORICAL: 19 MMOL/L (ref 23–29)
HCO3, ARTERIAL CALC - HISTORICAL: 19.1 MMOL/L (ref 23–29)
HCO3, ARTERIAL CALC - HISTORICAL: 19.2 MMOL/L (ref 23–29)
HCO3, ARTERIAL CALC - HISTORICAL: 19.2 MMOL/L (ref 23–29)
HCO3, ARTERIAL CALC - HISTORICAL: 19.4 MMOL/L (ref 23–29)
HCO3, ARTERIAL CALC - HISTORICAL: 19.4 MMOL/L (ref 23–29)
HCO3, ARTERIAL CALC - HISTORICAL: 19.5 MMOL/L (ref 23–29)
HCO3, ARTERIAL CALC - HISTORICAL: 19.6 MMOL/L (ref 23–29)
HCO3, ARTERIAL CALC - HISTORICAL: 19.7 MMOL/L (ref 23–29)
HCO3, ARTERIAL CALC - HISTORICAL: 19.9 MMOL/L (ref 23–29)
HCO3, ARTERIAL CALC - HISTORICAL: 20 MMOL/L (ref 23–29)
HCO3, ARTERIAL CALC - HISTORICAL: 20 MMOL/L (ref 23–29)
HCO3, ARTERIAL CALC - HISTORICAL: 20.1 MMOL/L (ref 23–29)
HCO3, ARTERIAL CALC - HISTORICAL: 20.1 MMOL/L (ref 23–29)
HCO3, ARTERIAL CALC - HISTORICAL: 20.2 MMOL/L (ref 23–29)
HCO3, ARTERIAL CALC - HISTORICAL: 20.5 MMOL/L (ref 23–29)
HCO3, ARTERIAL CALC - HISTORICAL: 20.7 MMOL/L (ref 23–29)
HCO3, ARTERIAL CALC - HISTORICAL: 20.8 MMOL/L (ref 23–29)
HCO3, ARTERIAL CALC - HISTORICAL: 21 MMOL/L (ref 23–29)
HCO3, ARTERIAL CALC - HISTORICAL: 21 MMOL/L (ref 23–29)
HCO3, ARTERIAL CALC - HISTORICAL: 21.2 MMOL/L (ref 23–29)
HCO3, ARTERIAL CALC - HISTORICAL: 21.3 MMOL/L (ref 23–29)
HCO3, ARTERIAL CALC - HISTORICAL: 21.4 MMOL/L (ref 23–29)
HCO3, ARTERIAL CALC - HISTORICAL: 22.1 MMOL/L (ref 23–29)
HCO3, ARTERIAL CALC - HISTORICAL: 22.5 MMOL/L (ref 23–29)
HCO3, ARTERIAL CALC - HISTORICAL: 22.6 MMOL/L (ref 23–29)
HCO3, ARTERIAL CALC - HISTORICAL: 22.9 MMOL/L (ref 23–29)
HCO3, ARTERIAL CALC - HISTORICAL: 23.2 MMOL/L (ref 23–29)
HCO3, ARTERIAL CALC - HISTORICAL: 23.5 MMOL/L (ref 23–29)
HCO3, ARTERIAL CALC - HISTORICAL: 23.5 MMOL/L (ref 23–29)
HCO3, ARTERIAL CALC - HISTORICAL: 24 MMOL/L (ref 23–29)
HCO3, ARTERIAL CALC - HISTORICAL: 24.5 MMOL/L (ref 23–29)
HCO3, ARTERIAL CALC - HISTORICAL: 25.7 MMOL/L (ref 23–29)
HCO3, ARTERIAL CALC - HISTORICAL: 25.9 MMOL/L (ref 23–29)
HCO3, ARTERIAL CALC - HISTORICAL: 26.7 MMOL/L (ref 23–29)
HCO3, ARTERIAL CALC - HISTORICAL: 26.9 MMOL/L (ref 23–29)
HCO3, ARTERIAL CALC - HISTORICAL: 27.3 MMOL/L (ref 23–29)
HCO3, ARTERIAL CALC - HISTORICAL: 28.8 MMOL/L (ref 23–29)
HCO3, ARTERIAL CALC - HISTORICAL: ABNORMAL
HCO3, VENOUS, CALC - HISTORICAL: 26.6 MMOL/L (ref 24–30)
HCO3, VENOUS, CALC - HISTORICAL: 29.4 MMOL/L (ref 24–30)
HCT VFR BLD AUTO: 24.3 % (ref 40–54)
HCT VFR BLD AUTO: 25.1 % (ref 40–54)
HCT VFR BLD AUTO: 25.4 % (ref 40–54)
HCT VFR BLD AUTO: 27.7 % (ref 40–54)
HCT VFR BLD AUTO: 27.9 % (ref 40–54)
HCT VFR BLD AUTO: 29.4 % (ref 40–54)
HCT VFR BLD AUTO: 30.3 % (ref 40–54)
HCT VFR BLD AUTO: 30.8 % (ref 40–54)
HCT VFR BLD AUTO: 31.2 % (ref 40–54)
HCT VFR BLD AUTO: 31.6 % (ref 40–54)
HCT VFR BLD AUTO: 31.9 % (ref 40–54)
HCT VFR BLD AUTO: 33.3 % (ref 40–54)
HCT VFR BLD AUTO: 34.4 % (ref 40–54)
HCT VFR BLD AUTO: 34.8 % (ref 40–54)
HCT VFR BLD AUTO: 34.9 % (ref 40–54)
HCT VFR BLD AUTO: 35.3 % (ref 40–54)
HCT VFR BLD AUTO: 35.3 % (ref 40–54)
HCT VFR BLD AUTO: 35.5 % (ref 40–54)
HCT VFR BLD AUTO: 35.9 % (ref 40–54)
HCT VFR BLD AUTO: 35.9 % (ref 40–54)
HCT VFR BLD AUTO: 36.1 % (ref 40–54)
HCT VFR BLD AUTO: 36.1 % (ref 40–54)
HCT VFR BLD AUTO: 36.4 % (ref 40–54)
HCT VFR BLD AUTO: 36.6 % (ref 40–54)
HCT VFR BLD AUTO: 37.1 % (ref 40–54)
HCT VFR BLD AUTO: 38.1 % (ref 40–54)
HCT VFR BLD AUTO: 38.1 % (ref 40–54)
HCT VFR BLD AUTO: 38.2 % (ref 40–54)
HCT VFR BLD AUTO: 39.5 % (ref 40–54)
HDLC SERPL-MCNC: 25 MG/DL
HGB BLD-MCNC: 10 G/DL (ref 14–18)
HGB BLD-MCNC: 10.1 G/DL (ref 14–18)
HGB BLD-MCNC: 10.3 G/DL (ref 14–18)
HGB BLD-MCNC: 10.3 G/DL (ref 14–18)
HGB BLD-MCNC: 10.4 G/DL (ref 14–18)
HGB BLD-MCNC: 10.7 G/DL (ref 14–18)
HGB BLD-MCNC: 10.7 G/DL (ref 14–18)
HGB BLD-MCNC: 10.8 G/DL (ref 14–18)
HGB BLD-MCNC: 10.8 G/DL (ref 14–18)
HGB BLD-MCNC: 10.9 G/DL (ref 14–18)
HGB BLD-MCNC: 10.9 G/DL (ref 14–18)
HGB BLD-MCNC: 11 G/DL (ref 14–18)
HGB BLD-MCNC: 11 G/DL (ref 14–18)
HGB BLD-MCNC: 11.1 G/DL (ref 14–18)
HGB BLD-MCNC: 11.1 G/DL (ref 14–18)
HGB BLD-MCNC: 11.2 G/DL (ref 14–18)
HGB BLD-MCNC: 11.3 G/DL (ref 14–18)
HGB BLD-MCNC: 11.3 G/DL (ref 14–18)
HGB BLD-MCNC: 11.5 G/DL (ref 14–18)
HGB BLD-MCNC: 11.6 G/DL (ref 14–18)
HGB BLD-MCNC: 11.8 G/DL (ref 14–18)
HGB BLD-MCNC: 12 G/DL (ref 14–18)
HGB BLD-MCNC: 12.1 G/DL (ref 14–18)
HGB BLD-MCNC: 12.3 G/DL (ref 14–18)
HGB BLD-MCNC: 12.3 G/DL (ref 14–18)
HGB BLD-MCNC: 12.4 G/DL (ref 14–18)
HGB BLD-MCNC: 12.5 G/DL (ref 14–18)
HGB BLD-MCNC: 12.5 G/DL (ref 14–18)
HGB BLD-MCNC: 12.9 G/DL (ref 14–18)
HGB BLD-MCNC: 13.5 G/DL (ref 14–18)
HGB BLD-MCNC: 7.5 G/DL (ref 14–18)
HGB BLD-MCNC: 7.5 G/DL (ref 14–18)
HGB BLD-MCNC: 7.6 G/DL (ref 14–18)
HGB BLD-MCNC: 7.8 G/DL (ref 14–18)
HGB BLD-MCNC: 7.8 G/DL (ref 14–18)
HGB BLD-MCNC: 7.9 G/DL (ref 14–18)
HGB BLD-MCNC: 7.9 G/DL (ref 14–18)
HGB BLD-MCNC: 8.2 G/DL (ref 14–18)
HGB BLD-MCNC: 8.8 G/DL (ref 14–18)
HGB BLD-MCNC: 8.8 G/DL (ref 14–18)
HGB BLD-MCNC: 9 G/DL (ref 14–18)
HGB BLD-MCNC: 9.2 G/DL (ref 14–18)
HGB BLD-MCNC: 9.4 G/DL (ref 14–18)
HGB BLD-MCNC: 9.4 G/DL (ref 14–18)
HGB BLD-MCNC: 9.5 G/DL (ref 14–18)
HGB BLD-MCNC: 9.6 G/DL (ref 14–18)
HGB BLD-MCNC: 9.7 G/DL (ref 14–18)
HGB BLD-MCNC: 9.7 G/DL (ref 14–18)
HGB BLD-MCNC: 9.8 G/DL (ref 14–18)
HGB BLD-MCNC: 9.8 G/DL (ref 14–18)
HGB BLD-MCNC: 9.9 G/DL (ref 14–18)
HGB UR QL STRIP: ABNORMAL
IMM GRANULOCYTES # BLD: 0 THOU/UL
IMM GRANULOCYTES # BLD: 0.1 THOU/UL
IMM GRANULOCYTES NFR BLD: 1 %
IMMATURE GRANULOCYTE % - MAN (DIFF): 4 %
IMMATURE GRANULOCYTE % - MAN (DIFF): 7 %
IMMATURE GRANULOCYTE ABSOLUTE - MAN (DIFF): 1.3 THOU/UL
IMMATURE GRANULOCYTE ABSOLUTE - MAN (DIFF): 2.7 THOU/UL
INR PPP: 1.07 (ref 0.9–1.1)
INR PPP: 1.12 (ref 0.9–1.1)
INR PPP: 1.14 (ref 0.9–1.1)
INR PPP: 1.16 (ref 0.9–1.1)
INR PPP: 1.16 (ref 0.9–1.1)
INR PPP: 1.17 (ref 0.9–1.1)
INR PPP: 1.42 (ref 0.9–1.1)
INTERLEUKIN 6 BLOOD: 28.97 PG/ML
INTERLEUKIN 6 BLOOD: 77.39 PG/ML
INTERPRETATION ECG - MUSE: NORMAL
INTERVENTRICULAR SEPTUM IN END DIASTOLE: 1.3 CM (ref 0.6–1)
ION CA PH 7.4: 0.93 MMOL/L (ref 1.11–1.3)
ION CA PH 7.4: 0.97 MMOL/L (ref 1.11–1.3)
ION CA PH 7.4: 0.98 MMOL/L (ref 1.11–1.3)
ION CA PH 7.4: 1 MMOL/L (ref 1.11–1.3)
ION CA PH 7.4: 1.01 MMOL/L (ref 1.11–1.3)
ION CA PH 7.4: 1.02 MMOL/L (ref 1.11–1.3)
ION CA PH 7.4: 1.03 MMOL/L (ref 1.11–1.3)
ION CA PH 7.4: 1.05 MMOL/L (ref 1.11–1.3)
ION CA PH 7.4: 1.06 MMOL/L (ref 1.11–1.3)
ION CA PH 7.4: 1.07 MMOL/L (ref 1.11–1.3)
ION CA PH 7.4: 1.08 MMOL/L (ref 1.11–1.3)
ION CA PH 7.4: 1.12 MMOL/L (ref 1.11–1.3)
ION CA PH 7.4: 1.13 MMOL/L (ref 1.11–1.3)
IVS/PW RATIO: 1
KETONES UR STRIP-MCNC: NEGATIVE MG/DL
LA AREA 1: 17.6 CM2
LA AREA 2: 23.6 CM2
LAB AP CHARGES (HE HISTORICAL CONVERSION): NORMAL
LACTATE SERPL-SCNC: 1.1 MMOL/L (ref 0.7–2)
LACTATE SERPL-SCNC: 1.1 MMOL/L (ref 0.7–2)
LACTATE SERPL-SCNC: 1.2 MMOL/L (ref 0.7–2)
LACTATE SERPL-SCNC: 1.2 MMOL/L (ref 0.7–2)
LACTATE SERPL-SCNC: 1.3 MMOL/L (ref 0.7–2)
LACTATE SERPL-SCNC: 1.5 MMOL/L (ref 0.7–2)
LACTATE SERPL-SCNC: 1.8 MMOL/L (ref 0.7–2)
LACTATE SERPL-SCNC: 2.7 MMOL/L (ref 0.7–2)
LACTATE SERPL-SCNC: 2.7 MMOL/L (ref 0.7–2)
LDH SERPL L TO P-CCNC: 272 U/L (ref 125–220)
LDH SERPL L TO P-CCNC: 294 U/L (ref 125–220)
LDH SERPL L TO P-CCNC: 316 U/L (ref 125–220)
LDH SERPL L TO P-CCNC: 335 U/L (ref 125–220)
LDH SERPL L TO P-CCNC: 340 U/L (ref 125–220)
LDH SERPL L TO P-CCNC: 343 U/L (ref 125–220)
LDH SERPL L TO P-CCNC: 344 U/L (ref 125–220)
LDLC SERPL CALC-MCNC: 56 MG/DL
LEFT ATRIUM LENGTH: 6 CM
LEFT ATRIUM VOLUME INDEX: 24.6 ML/M2
LEFT ATRIUM VOLUME: 58.8 ML
LEFT VENTRICLE DIASTOLIC VOLUME INDEX: 32.7 CM3/M2 (ref 34–74)
LEFT VENTRICLE DIASTOLIC VOLUME INDEX: 40.1 CM3/M2 (ref 34–74)
LEFT VENTRICLE DIASTOLIC VOLUME: 78.2 CM3 (ref 62–150)
LEFT VENTRICLE DIASTOLIC VOLUME: 94.2 CM3 (ref 62–150)
LEFT VENTRICLE HEART RATE: 100 BPM
LEFT VENTRICLE HEART RATE: 109 BPM
LEFT VENTRICLE MASS INDEX: 60.2 G/M2
LEFT VENTRICLE SYSTOLIC VOLUME INDEX: 11.7 CM3/M2 (ref 11–31)
LEFT VENTRICLE SYSTOLIC VOLUME INDEX: 12.1 CM3/M2 (ref 11–31)
LEFT VENTRICLE SYSTOLIC VOLUME: 27.6 CM3 (ref 21–61)
LEFT VENTRICLE SYSTOLIC VOLUME: 28.9 CM3 (ref 21–61)
LEFT VENTRICULAR INTERNAL DIMENSION IN DIASTOLE: 3.3 CM (ref 4.2–5.8)
LEFT VENTRICULAR INTERNAL DIMENSION IN SYSTOLE: 1.6 CM (ref 2.5–4)
LEFT VENTRICULAR MASS: 141.6 G
LEFT VENTRICULAR OUTFLOW TRACT MEAN GRADIENT: 3 MMHG
LEFT VENTRICULAR OUTFLOW TRACT MEAN VELOCITY: 80.9 CM/S
LEFT VENTRICULAR OUTFLOW TRACT PEAK GRADIENT: 8 MMHG
LEFT VENTRICULAR POSTERIOR WALL IN END DIASTOLE: 1.3 CM (ref 0.6–1)
LEUKOCYTE ESTERASE UR QL STRIP: NEGATIVE
LIPASE SERPL-CCNC: 28 U/L (ref 0–52)
LIPASE SERPL-CCNC: 33 U/L (ref 0–52)
LYMPHOCYTES # BLD AUTO: 0.5 THOU/UL (ref 0.8–4.4)
LYMPHOCYTES # BLD AUTO: 0.6 THOU/UL (ref 0.8–4.4)
LYMPHOCYTES # BLD AUTO: 0.9 THOU/UL (ref 0.8–4.4)
LYMPHOCYTES # BLD AUTO: 1 THOU/UL (ref 0.8–4.4)
LYMPHOCYTES # BLD AUTO: 1.1 THOU/UL (ref 0.8–4.4)
LYMPHOCYTES NFR BLD AUTO: 10 % (ref 20–40)
LYMPHOCYTES NFR BLD AUTO: 10 % (ref 20–40)
LYMPHOCYTES NFR BLD AUTO: 3 % (ref 20–40)
LYMPHOCYTES NFR BLD AUTO: 3 % (ref 20–40)
LYMPHOCYTES NFR BLD AUTO: 4 % (ref 20–40)
LYMPHOCYTES NFR BLD AUTO: 7 % (ref 20–40)
LYMPHOCYTES NFR BLD AUTO: 9 % (ref 20–40)
MAGNESIUM SERPL-MCNC: 1.6 MG/DL (ref 1.8–2.6)
MAGNESIUM SERPL-MCNC: 1.8 MG/DL (ref 1.8–2.6)
MAGNESIUM SERPL-MCNC: 1.9 MG/DL (ref 1.8–2.6)
MAGNESIUM SERPL-MCNC: 2 MG/DL (ref 1.8–2.6)
MAGNESIUM SERPL-MCNC: 2.2 MG/DL (ref 1.8–2.6)
MAGNESIUM SERPL-MCNC: 2.3 MG/DL (ref 1.8–2.6)
MAGNESIUM SERPL-MCNC: 2.4 MG/DL (ref 1.8–2.6)
MAGNESIUM SERPL-MCNC: 2.5 MG/DL (ref 1.8–2.6)
MAGNESIUM SERPL-MCNC: 2.6 MG/DL (ref 1.8–2.6)
MAGNESIUM SERPL-MCNC: 2.6 MG/DL (ref 1.8–2.6)
MANUAL NRBC PER 100 CELLS: 5
MANUAL NRBC PER 100 CELLS: 5
MCH RBC QN AUTO: 29.4 PG (ref 27–34)
MCH RBC QN AUTO: 29.8 PG (ref 27–34)
MCH RBC QN AUTO: 29.9 PG (ref 27–34)
MCH RBC QN AUTO: 30 PG (ref 27–34)
MCH RBC QN AUTO: 30 PG (ref 27–34)
MCH RBC QN AUTO: 30.2 PG (ref 27–34)
MCH RBC QN AUTO: 30.3 PG (ref 27–34)
MCH RBC QN AUTO: 30.3 PG (ref 27–34)
MCH RBC QN AUTO: 30.4 PG (ref 27–34)
MCH RBC QN AUTO: 30.4 PG (ref 27–34)
MCH RBC QN AUTO: 30.5 PG (ref 27–34)
MCH RBC QN AUTO: 30.6 PG (ref 27–34)
MCH RBC QN AUTO: 30.7 PG (ref 27–34)
MCH RBC QN AUTO: 31.1 PG (ref 27–34)
MCH RBC QN AUTO: 31.3 PG (ref 27–34)
MCH RBC QN AUTO: 31.3 PG (ref 27–34)
MCH RBC QN AUTO: 31.4 PG (ref 27–34)
MCH RBC QN AUTO: 31.5 PG (ref 27–34)
MCH RBC QN AUTO: 31.5 PG (ref 27–34)
MCHC RBC AUTO-ENTMCNC: 29.9 G/DL (ref 32–36)
MCHC RBC AUTO-ENTMCNC: 30.4 G/DL (ref 32–36)
MCHC RBC AUTO-ENTMCNC: 30.7 G/DL (ref 32–36)
MCHC RBC AUTO-ENTMCNC: 31 G/DL (ref 32–36)
MCHC RBC AUTO-ENTMCNC: 31.2 G/DL (ref 32–36)
MCHC RBC AUTO-ENTMCNC: 31.3 G/DL (ref 32–36)
MCHC RBC AUTO-ENTMCNC: 31.5 G/DL (ref 32–36)
MCHC RBC AUTO-ENTMCNC: 31.6 G/DL (ref 32–36)
MCHC RBC AUTO-ENTMCNC: 31.7 G/DL (ref 32–36)
MCHC RBC AUTO-ENTMCNC: 31.7 G/DL (ref 32–36)
MCHC RBC AUTO-ENTMCNC: 31.8 G/DL (ref 32–36)
MCHC RBC AUTO-ENTMCNC: 31.8 G/DL (ref 32–36)
MCHC RBC AUTO-ENTMCNC: 32.1 G/DL (ref 32–36)
MCHC RBC AUTO-ENTMCNC: 32.1 G/DL (ref 32–36)
MCHC RBC AUTO-ENTMCNC: 32.3 G/DL (ref 32–36)
MCHC RBC AUTO-ENTMCNC: 32.3 G/DL (ref 32–36)
MCHC RBC AUTO-ENTMCNC: 32.5 G/DL (ref 32–36)
MCHC RBC AUTO-ENTMCNC: 32.7 G/DL (ref 32–36)
MCHC RBC AUTO-ENTMCNC: 32.7 G/DL (ref 32–36)
MCHC RBC AUTO-ENTMCNC: 32.8 G/DL (ref 32–36)
MCHC RBC AUTO-ENTMCNC: 33.4 G/DL (ref 32–36)
MCHC RBC AUTO-ENTMCNC: 33.6 G/DL (ref 32–36)
MCHC RBC AUTO-ENTMCNC: 33.7 G/DL (ref 32–36)
MCHC RBC AUTO-ENTMCNC: 33.7 G/DL (ref 32–36)
MCHC RBC AUTO-ENTMCNC: 33.9 G/DL (ref 32–36)
MCHC RBC AUTO-ENTMCNC: 33.9 G/DL (ref 32–36)
MCHC RBC AUTO-ENTMCNC: 34.1 G/DL (ref 32–36)
MCHC RBC AUTO-ENTMCNC: 34.2 G/DL (ref 32–36)
MCHC RBC AUTO-ENTMCNC: 34.3 G/DL (ref 32–36)
MCV RBC AUTO: 102 FL (ref 80–100)
MCV RBC AUTO: 89 FL (ref 80–100)
MCV RBC AUTO: 90 FL (ref 80–100)
MCV RBC AUTO: 91 FL (ref 80–100)
MCV RBC AUTO: 92 FL (ref 80–100)
MCV RBC AUTO: 95 FL (ref 80–100)
MCV RBC AUTO: 96 FL (ref 80–100)
MCV RBC AUTO: 97 FL (ref 80–100)
MCV RBC AUTO: 98 FL (ref 80–100)
MCV RBC AUTO: 98 FL (ref 80–100)
MCV RBC AUTO: 99 FL (ref 80–100)
MCV RBC AUTO: 99 FL (ref 80–100)
METAMYELOCYTES (ABSOLUTE): 0.2 THOU/UL
METAMYELOCYTES (ABSOLUTE): 1 THOU/UL
METAMYELOCYTES NFR BLD MANUAL: 1 %
METAMYELOCYTES NFR BLD MANUAL: 3 %
MISCELLANEOUS TEST DEPT. - HE HISTORICAL: NORMAL
MISCELLANEOUS TEST DEPT. - HE HISTORICAL: NORMAL
MITRAL VALVE DECELERATION SLOPE: 5970 MM/S2
MITRAL VALVE E/A RATIO: 0.7
MITRAL VALVE PRESSURE HALF-TIME: 34 MS
MONOCYTES # BLD AUTO: 0.7 THOU/UL (ref 0–0.9)
MONOCYTES # BLD AUTO: 0.8 THOU/UL (ref 0–0.9)
MONOCYTES # BLD AUTO: 0.8 THOU/UL (ref 0–0.9)
MONOCYTES # BLD AUTO: 0.9 THOU/UL (ref 0–0.9)
MONOCYTES # BLD AUTO: 1.1 THOU/UL (ref 0–0.9)
MONOCYTES # BLD AUTO: 2.6 THOU/UL (ref 0–0.9)
MONOCYTES # BLD AUTO: 2.7 THOU/UL (ref 0–0.9)
MONOCYTES NFR BLD AUTO: 10 % (ref 2–10)
MONOCYTES NFR BLD AUTO: 7 % (ref 2–10)
MONOCYTES NFR BLD AUTO: 7 % (ref 2–10)
MONOCYTES NFR BLD AUTO: 8 % (ref 2–10)
MUCOUS THREADS #/AREA URNS LPF: ABNORMAL LPF
MV AVERAGE E/E' RATIO: 10.2 CM/S
MV DECELERATION TIME: 116 MS
MV E'TISSUE VEL-LAT: 7.94 CM/S
MV E'TISSUE VEL-MED: 5.66 CM/S
MV LATERAL E/E' RATIO: 8.7
MV MEDIAL E/E' RATIO: 12.3
MV PEAK A VELOCITY: 102 CM/S
MV PEAK E VELOCITY: 69.4 CM/S
MV VALVE AREA PRESSURE 1/2 METHOD: 6.5 CM2
MYELOCYTES (ABSOLUTE): 1.1 THOU/UL
MYELOCYTES (ABSOLUTE): 1.6 THOU/UL
MYELOCYTES NFR BLD MANUAL: 3 %
MYELOCYTES NFR BLD MANUAL: 4 %
NEUTROPHILS # BLD AUTO: 7.2 THOU/UL (ref 2–7.7)
NEUTROPHILS # BLD AUTO: 7.9 THOU/UL (ref 2–7.7)
NEUTROPHILS # BLD AUTO: 8.2 THOU/UL (ref 2–7.7)
NEUTROPHILS # BLD AUTO: 8.3 THOU/UL (ref 2–7.7)
NEUTROPHILS # BLD AUTO: 9.3 THOU/UL (ref 2–7.7)
NEUTROPHILS NFR BLD AUTO: 79 % (ref 50–70)
NEUTROPHILS NFR BLD AUTO: 80 % (ref 50–70)
NEUTROPHILS NFR BLD AUTO: 82 % (ref 50–70)
NEUTROPHILS NFR BLD AUTO: 84 % (ref 50–70)
NEUTROPHILS NFR BLD AUTO: 87 % (ref 50–70)
NITRATE UR QL: NEGATIVE
NUC REST DIASTOLIC VOLUME INDEX: 3220.48 LBS
NUC REST DIASTOLIC VOLUME INDEX: 3834.24 LBS
NUC REST SYSTOLIC VOLUME INDEX: 74 IN
NUC REST SYSTOLIC VOLUME INDEX: 74 IN
O2/TOTAL GAS SETTING VFR VENT: 0.45 %
O2/TOTAL GAS SETTING VFR VENT: 0.6 %
O2/TOTAL GAS SETTING VFR VENT: 0.8 %
O2/TOTAL GAS SETTING VFR VENT: 0.8 %
O2/TOTAL GAS SETTING VFR VENT: 0.9 %
O2/TOTAL GAS SETTING VFR VENT: 0.9 %
O2/TOTAL GAS SETTING VFR VENT: 100 %
O2/TOTAL GAS SETTING VFR VENT: 40 %
O2/TOTAL GAS SETTING VFR VENT: 40 %
O2/TOTAL GAS SETTING VFR VENT: 45 %
O2/TOTAL GAS SETTING VFR VENT: 50 %
O2/TOTAL GAS SETTING VFR VENT: 55 %
O2/TOTAL GAS SETTING VFR VENT: 60 %
O2/TOTAL GAS SETTING VFR VENT: 65 %
O2/TOTAL GAS SETTING VFR VENT: 70 %
O2/TOTAL GAS SETTING VFR VENT: 75 %
O2/TOTAL GAS SETTING VFR VENT: 80 %
O2/TOTAL GAS SETTING VFR VENT: ABNORMAL %
O2/TOTAL GAS SETTING VFR VENT: ABNORMAL %
OVALOCYTES: ABNORMAL
OXYHEMOGLOBIN (VBG) - HISTORICAL: 55.3 % (ref 70–75)
OXYHEMOGLOBIN (VBG) - HISTORICAL: 62.8 % (ref 70–75)
OXYHEMOGLOBIN - HISTORICAL: 79.5 % (ref 95–96)
OXYHEMOGLOBIN - HISTORICAL: 79.7 % (ref 95–96)
OXYHEMOGLOBIN - HISTORICAL: 86.2 % (ref 95–96)
OXYHEMOGLOBIN - HISTORICAL: 87.6 % (ref 95–96)
OXYHEMOGLOBIN - HISTORICAL: 88.3 % (ref 95–96)
OXYHEMOGLOBIN - HISTORICAL: 88.3 % (ref 95–96)
OXYHEMOGLOBIN - HISTORICAL: 89.5 % (ref 95–96)
OXYHEMOGLOBIN - HISTORICAL: 90.4 % (ref 95–96)
OXYHEMOGLOBIN - HISTORICAL: 90.8 % (ref 95–96)
OXYHEMOGLOBIN - HISTORICAL: 91.6 % (ref 95–96)
OXYHEMOGLOBIN - HISTORICAL: 91.7 % (ref 95–96)
OXYHEMOGLOBIN - HISTORICAL: 91.8 % (ref 95–96)
OXYHEMOGLOBIN - HISTORICAL: 92 % (ref 95–96)
OXYHEMOGLOBIN - HISTORICAL: 92.4 % (ref 95–96)
OXYHEMOGLOBIN - HISTORICAL: 92.8 % (ref 95–96)
OXYHEMOGLOBIN - HISTORICAL: 93.1 % (ref 95–96)
OXYHEMOGLOBIN - HISTORICAL: 93.2 % (ref 95–96)
OXYHEMOGLOBIN - HISTORICAL: 93.3 % (ref 95–96)
OXYHEMOGLOBIN - HISTORICAL: 93.6 % (ref 95–96)
OXYHEMOGLOBIN - HISTORICAL: 93.7 % (ref 95–96)
OXYHEMOGLOBIN - HISTORICAL: 93.9 % (ref 95–96)
OXYHEMOGLOBIN - HISTORICAL: 94.2 % (ref 95–96)
OXYHEMOGLOBIN - HISTORICAL: 94.3 % (ref 95–96)
OXYHEMOGLOBIN - HISTORICAL: 94.4 % (ref 95–96)
OXYHEMOGLOBIN - HISTORICAL: 94.4 % (ref 95–96)
OXYHEMOGLOBIN - HISTORICAL: 94.6 % (ref 95–96)
OXYHEMOGLOBIN - HISTORICAL: 94.7 % (ref 95–96)
OXYHEMOGLOBIN - HISTORICAL: 94.7 % (ref 95–96)
OXYHEMOGLOBIN - HISTORICAL: 94.8 % (ref 95–96)
OXYHEMOGLOBIN - HISTORICAL: 94.8 % (ref 95–96)
OXYHEMOGLOBIN - HISTORICAL: 94.9 % (ref 95–96)
OXYHEMOGLOBIN - HISTORICAL: 95.1 % (ref 95–96)
OXYHEMOGLOBIN - HISTORICAL: 95.5 % (ref 95–96)
OXYHEMOGLOBIN - HISTORICAL: 95.7 % (ref 95–96)
OXYHEMOGLOBIN - HISTORICAL: 95.9 % (ref 95–96)
OXYHEMOGLOBIN - HISTORICAL: 96.1 % (ref 95–96)
OXYHEMOGLOBIN - HISTORICAL: 96.1 % (ref 95–96)
OXYHEMOGLOBIN - HISTORICAL: 96.3 % (ref 95–96)
OXYHEMOGLOBIN - HISTORICAL: 96.5 % (ref 95–96)
OXYHEMOGLOBIN - HISTORICAL: 96.6 % (ref 95–96)
OXYHEMOGLOBIN - HISTORICAL: 97 % (ref 95–96)
OXYHEMOGLOBIN - HISTORICAL: 97 % (ref 95–96)
OXYHEMOGLOBIN - HISTORICAL: 97.4 % (ref 95–96)
OXYHEMOGLOBIN - HISTORICAL: 97.5 % (ref 95–96)
OXYHEMOGLOBIN - HISTORICAL: 97.5 % (ref 95–96)
OXYHEMOGLOBIN - HISTORICAL: 97.6 % (ref 95–96)
OXYHEMOGLOBIN - HISTORICAL: 97.9 % (ref 95–96)
OXYHEMOGLOBIN - HISTORICAL: 97.9 % (ref 95–96)
OXYHEMOGLOBIN - HISTORICAL: 98 % (ref 95–96)
OXYHEMOGLOBIN - HISTORICAL: 98 % (ref 95–96)
OXYHEMOGLOBIN - HISTORICAL: 98.5 % (ref 95–96)
OXYHGB MFR BLDV: 56.2 % (ref 70–75)
OXYHGB MFR BLDV: 64 % (ref 70–75)
P AXIS - MUSE: 50 DEGREES
P AXIS - MUSE: 53 DEGREES
P AXIS - MUSE: 79 DEGREES
P AXIS - MUSE: NORMAL
PATH REPORT.COMMENTS IMP SPEC: NORMAL
PATH REPORT.COMMENTS IMP SPEC: NORMAL
PATH REPORT.FINAL DX SPEC: NORMAL
PATH REPORT.MICROSCOPIC SPEC OTHER STN: ABNORMAL
PATH REPORT.MICROSCOPIC SPEC OTHER STN: NORMAL
PATH REPORT.RELEVANT HX SPEC: NORMAL
PCO2 BLD: 30 MM HG (ref 35–45)
PCO2 BLD: 31 MM HG (ref 35–45)
PCO2 BLD: 32 MM HG (ref 35–45)
PCO2 BLD: 32 MM HG (ref 35–45)
PCO2 BLD: 33 MM HG (ref 35–45)
PCO2 BLD: 34 MM HG (ref 35–45)
PCO2 BLD: 35 MM HG (ref 35–45)
PCO2 BLD: 36 MM HG (ref 35–45)
PCO2 BLD: 37 MM HG (ref 35–45)
PCO2 BLD: 38 MM HG (ref 35–45)
PCO2 BLD: 38 MM HG (ref 35–45)
PCO2 BLD: 39 MM HG (ref 35–45)
PCO2 BLD: 40 MM HG (ref 35–45)
PCO2 BLD: 41 MM HG (ref 35–45)
PCO2 BLD: 42 MM HG (ref 35–45)
PCO2 BLD: 42 MM HG (ref 35–45)
PCO2 BLD: 43 MM HG (ref 35–45)
PCO2 BLD: 44 MM HG (ref 35–45)
PCO2 BLD: 48 MM HG (ref 35–45)
PCO2 BLD: 48 MM HG (ref 35–45)
PCO2 BLD: 49 MM HG (ref 35–45)
PCO2 BLD: 50 MM HG (ref 35–45)
PCO2 BLD: 51 MM HG (ref 35–45)
PCO2 BLD: 51 MM HG (ref 35–45)
PCO2 BLD: 52 MM HG (ref 35–45)
PCO2 BLD: 52 MM HG (ref 35–45)
PCO2 BLD: 57 MM HG (ref 35–45)
PCO2 BLD: 61 MM HG (ref 35–45)
PCO2 BLDV: 44 MM HG (ref 35–50)
PCO2 BLDV: 47 MM HG (ref 35–50)
PEEP: 10 CM H2O
PEEP: 10 CM H2O
PEEP: 12 CM H2O
PEEP: 14 CM H2O
PEEP: 16 CM H2O
PEEP: 18 CM H2O
PEEP: 18 CM H2O
PEEP: 8 CM H2O
PERFORMING LAB: NORMAL
PERFORMING LAB: NORMAL
PH BLD: 7.15 [PH] (ref 7.37–7.44)
PH BLD: 7.15 [PH] (ref 7.37–7.44)
PH BLD: 7.21 [PH] (ref 7.37–7.44)
PH BLD: 7.22 [PH] (ref 7.37–7.44)
PH BLD: 7.23 [PH] (ref 7.37–7.44)
PH BLD: 7.24 [PH] (ref 7.37–7.44)
PH BLD: 7.26 [PH] (ref 7.37–7.44)
PH BLD: 7.28 [PH] (ref 7.37–7.44)
PH BLD: 7.29 [PH] (ref 7.37–7.44)
PH BLD: 7.3 [PH] (ref 7.37–7.44)
PH BLD: 7.3 [PH] (ref 7.37–7.44)
PH BLD: 7.31 [PH] (ref 7.37–7.44)
PH BLD: 7.33 [PH] (ref 7.37–7.44)
PH BLD: 7.34 [PH] (ref 7.37–7.44)
PH BLD: 7.35 [PH] (ref 7.37–7.44)
PH BLD: 7.36 [PH] (ref 7.37–7.44)
PH BLD: 7.37 [PH] (ref 7.37–7.44)
PH BLD: 7.38 [PH] (ref 7.37–7.44)
PH BLD: 7.39 [PH] (ref 7.37–7.44)
PH BLD: 7.41 [PH] (ref 7.37–7.44)
PH BLD: 7.41 [PH] (ref 7.37–7.44)
PH BLD: 7.42 [PH] (ref 7.37–7.44)
PH BLD: 7.42 [PH] (ref 7.37–7.44)
PH BLD: 7.43 [PH] (ref 7.37–7.44)
PH BLD: 7.43 [PH] (ref 7.37–7.44)
PH BLD: 7.44 [PH] (ref 7.37–7.44)
PH BLD: 7.45 [PH] (ref 7.37–7.44)
PH BLD: 7.46 [PH] (ref 7.37–7.44)
PH BLD: 7.47 [PH] (ref 7.37–7.44)
PH BLD: 7.49 [PH] (ref 7.37–7.44)
PH BLDV: 7.4 [PH] (ref 7.35–7.45)
PH BLDV: 7.46 [PH] (ref 7.35–7.45)
PH UR STRIP: 5.5 [PH] (ref 4.5–8)
PH: 7.21 (ref 7.35–7.45)
PH: 7.21 (ref 7.35–7.45)
PH: 7.22 (ref 7.35–7.45)
PH: 7.22 (ref 7.35–7.45)
PH: 7.23 (ref 7.35–7.45)
PH: 7.23 (ref 7.35–7.45)
PH: 7.25 (ref 7.35–7.45)
PH: 7.26 (ref 7.35–7.45)
PH: 7.26 (ref 7.35–7.45)
PH: 7.28 (ref 7.35–7.45)
PH: 7.29 (ref 7.35–7.45)
PH: 7.3 (ref 7.35–7.45)
PH: 7.31 (ref 7.35–7.45)
PH: 7.31 (ref 7.35–7.45)
PH: 7.33 (ref 7.35–7.45)
PH: 7.34 (ref 7.35–7.45)
PH: 7.34 (ref 7.35–7.45)
PH: 7.35 (ref 7.35–7.45)
PH: 7.38 (ref 7.35–7.45)
PH: 7.39 (ref 7.35–7.45)
PH: 7.4 (ref 7.35–7.45)
PHOSPHATE SERPL-MCNC: 2.4 MG/DL (ref 2.5–4.5)
PHOSPHATE SERPL-MCNC: 2.8 MG/DL (ref 2.5–4.5)
PHOSPHATE SERPL-MCNC: 2.8 MG/DL (ref 2.5–4.5)
PHOSPHATE SERPL-MCNC: 2.9 MG/DL (ref 2.5–4.5)
PHOSPHATE SERPL-MCNC: 3 MG/DL (ref 2.5–4.5)
PHOSPHATE SERPL-MCNC: 3.4 MG/DL (ref 2.5–4.5)
PHOSPHATE SERPL-MCNC: 3.5 MG/DL (ref 2.5–4.5)
PHOSPHATE SERPL-MCNC: 4.2 MG/DL (ref 2.5–4.5)
PHOSPHATE SERPL-MCNC: 4.2 MG/DL (ref 2.5–4.5)
PHOSPHATE SERPL-MCNC: 4.5 MG/DL (ref 2.5–4.5)
PHOSPHATE SERPL-MCNC: 4.6 MG/DL (ref 2.5–4.5)
PHOSPHATE SERPL-MCNC: 4.8 MG/DL (ref 2.5–4.5)
PHOSPHATE SERPL-MCNC: 4.9 MG/DL (ref 2.5–4.5)
PHOSPHATE SERPL-MCNC: 5 MG/DL (ref 2.5–4.5)
PHOSPHATE SERPL-MCNC: 5.1 MG/DL (ref 2.5–4.5)
PHOSPHATE SERPL-MCNC: 5.1 MG/DL (ref 2.5–4.5)
PHOSPHATE SERPL-MCNC: 5.2 MG/DL (ref 2.5–4.5)
PHOSPHATE SERPL-MCNC: 5.3 MG/DL (ref 2.5–4.5)
PHOSPHATE SERPL-MCNC: 5.4 MG/DL (ref 2.5–4.5)
PHOSPHATE SERPL-MCNC: 5.4 MG/DL (ref 2.5–4.5)
PHOSPHATE SERPL-MCNC: 5.5 MG/DL (ref 2.5–4.5)
PHOSPHATE SERPL-MCNC: 5.6 MG/DL (ref 2.5–4.5)
PHOSPHATE SERPL-MCNC: 5.8 MG/DL (ref 2.5–4.5)
PHOSPHATE SERPL-MCNC: 5.9 MG/DL (ref 2.5–4.5)
PHOSPHATE SERPL-MCNC: 6.2 MG/DL (ref 2.5–4.5)
PLAT MORPH BLD: NORMAL
PLAT MORPH BLD: NORMAL
PLATELET # BLD AUTO: 208 THOU/UL (ref 140–440)
PLATELET # BLD AUTO: 215 THOU/UL (ref 140–440)
PLATELET # BLD AUTO: 220 THOU/UL (ref 140–440)
PLATELET # BLD AUTO: 221 THOU/UL (ref 140–440)
PLATELET # BLD AUTO: 222 THOU/UL (ref 140–440)
PLATELET # BLD AUTO: 223 THOU/UL (ref 140–440)
PLATELET # BLD AUTO: 225 THOU/UL (ref 140–440)
PLATELET # BLD AUTO: 226 THOU/UL (ref 140–440)
PLATELET # BLD AUTO: 228 THOU/UL (ref 140–440)
PLATELET # BLD AUTO: 233 THOU/UL (ref 140–440)
PLATELET # BLD AUTO: 236 THOU/UL (ref 140–440)
PLATELET # BLD AUTO: 238 THOU/UL (ref 140–440)
PLATELET # BLD AUTO: 239 THOU/UL (ref 140–440)
PLATELET # BLD AUTO: 240 THOU/UL (ref 140–440)
PLATELET # BLD AUTO: 241 THOU/UL (ref 140–440)
PLATELET # BLD AUTO: 244 THOU/UL (ref 140–440)
PLATELET # BLD AUTO: 254 THOU/UL (ref 140–440)
PLATELET # BLD AUTO: 259 THOU/UL (ref 140–440)
PLATELET # BLD AUTO: 262 THOU/UL (ref 140–440)
PLATELET # BLD AUTO: 262 THOU/UL (ref 140–440)
PLATELET # BLD AUTO: 263 THOU/UL (ref 140–440)
PLATELET # BLD AUTO: 268 THOU/UL (ref 140–440)
PLATELET # BLD AUTO: 269 THOU/UL (ref 140–440)
PLATELET # BLD AUTO: 270 THOU/UL (ref 140–440)
PLATELET # BLD AUTO: 276 THOU/UL (ref 140–440)
PLATELET # BLD AUTO: 277 THOU/UL (ref 140–440)
PLATELET # BLD AUTO: 278 THOU/UL (ref 140–440)
PLATELET # BLD AUTO: 279 THOU/UL (ref 140–440)
PLATELET # BLD AUTO: 284 THOU/UL (ref 140–440)
PLATELET # BLD AUTO: 284 THOU/UL (ref 140–440)
PLATELET # BLD AUTO: 286 THOU/UL (ref 140–440)
PLATELET # BLD AUTO: 290 THOU/UL (ref 140–440)
PLATELET # BLD AUTO: 290 THOU/UL (ref 140–440)
PLATELET # BLD AUTO: 292 THOU/UL (ref 140–440)
PLATELET # BLD AUTO: 295 THOU/UL (ref 140–440)
PLATELET # BLD AUTO: 301 THOU/UL (ref 140–440)
PLATELET # BLD AUTO: 302 THOU/UL (ref 140–440)
PLATELET # BLD AUTO: 302 THOU/UL (ref 140–440)
PLATELET # BLD AUTO: 310 THOU/UL (ref 140–440)
PLATELET # BLD AUTO: 310 THOU/UL (ref 140–440)
PLATELET # BLD AUTO: 313 THOU/UL (ref 140–440)
PLATELET # BLD AUTO: 321 THOU/UL (ref 140–440)
PLATELET # BLD AUTO: 330 THOU/UL (ref 140–440)
PLATELET # BLD AUTO: 335 THOU/UL (ref 140–440)
PLATELET # BLD AUTO: 336 THOU/UL (ref 140–440)
PLATELET # BLD AUTO: 343 THOU/UL (ref 140–440)
PLATELET # BLD AUTO: 347 THOU/UL (ref 140–440)
PLATELET # BLD AUTO: 369 THOU/UL (ref 140–440)
PLATELET # BLD AUTO: 377 THOU/UL (ref 140–440)
PMV BLD AUTO: 10 FL (ref 8.5–12.5)
PMV BLD AUTO: 10.1 FL (ref 8.5–12.5)
PMV BLD AUTO: 10.1 FL (ref 8.5–12.5)
PMV BLD AUTO: 10.3 FL (ref 8.5–12.5)
PMV BLD AUTO: 10.4 FL (ref 8.5–12.5)
PMV BLD AUTO: 10.4 FL (ref 8.5–12.5)
PMV BLD AUTO: 10.5 FL (ref 8.5–12.5)
PMV BLD AUTO: 10.5 FL (ref 8.5–12.5)
PMV BLD AUTO: 10.7 FL (ref 8.5–12.5)
PMV BLD AUTO: 10.7 FL (ref 8.5–12.5)
PMV BLD AUTO: 11 FL (ref 8.5–12.5)
PMV BLD AUTO: 11.2 FL (ref 8.5–12.5)
PMV BLD AUTO: 11.4 FL (ref 8.5–12.5)
PMV BLD AUTO: 11.6 FL (ref 8.5–12.5)
PMV BLD AUTO: 11.8 FL (ref 8.5–12.5)
PMV BLD AUTO: 11.8 FL (ref 8.5–12.5)
PMV BLD AUTO: 11.9 FL (ref 8.5–12.5)
PMV BLD AUTO: 12 FL (ref 8.5–12.5)
PMV BLD AUTO: 12 FL (ref 8.5–12.5)
PMV BLD AUTO: 12.1 FL (ref 8.5–12.5)
PMV BLD AUTO: 12.3 FL (ref 8.5–12.5)
PMV BLD AUTO: 12.3 FL (ref 8.5–12.5)
PMV BLD AUTO: 12.4 FL (ref 8.5–12.5)
PMV BLD AUTO: 12.5 FL (ref 8.5–12.5)
PMV BLD AUTO: 9.9 FL (ref 8.5–12.5)
PO2 BLD: 107 MM HG (ref 75–85)
PO2 BLD: 108 MM HG (ref 75–85)
PO2 BLD: 108 MM HG (ref 75–85)
PO2 BLD: 117 MM HG (ref 75–85)
PO2 BLD: 118 MM HG (ref 75–85)
PO2 BLD: 121 MM HG (ref 75–85)
PO2 BLD: 124 MM HG (ref 75–85)
PO2 BLD: 132 MM HG (ref 75–85)
PO2 BLD: 145 MM HG (ref 75–85)
PO2 BLD: 147 MM HG (ref 75–85)
PO2 BLD: 163 MM HG (ref 75–85)
PO2 BLD: 179 MM HG (ref 75–85)
PO2 BLD: 259 MM HG (ref 75–85)
PO2 BLD: 46 MM HG (ref 75–85)
PO2 BLD: 47 MM HG (ref 75–85)
PO2 BLD: 54 MM HG (ref 75–85)
PO2 BLD: 54 MM HG (ref 75–85)
PO2 BLD: 55 MM HG (ref 75–85)
PO2 BLD: 58 MM HG (ref 75–85)
PO2 BLD: 59 MM HG (ref 75–85)
PO2 BLD: 59 MM HG (ref 75–85)
PO2 BLD: 62 MM HG (ref 75–85)
PO2 BLD: 64 MM HG (ref 75–85)
PO2 BLD: 64 MM HG (ref 75–85)
PO2 BLD: 66 MM HG (ref 75–85)
PO2 BLD: 66 MM HG (ref 75–85)
PO2 BLD: 70 MM HG (ref 75–85)
PO2 BLD: 70 MM HG (ref 75–85)
PO2 BLD: 71 MM HG (ref 75–85)
PO2 BLD: 72 MM HG (ref 75–85)
PO2 BLD: 74 MM HG (ref 75–85)
PO2 BLD: 76 MM HG (ref 75–85)
PO2 BLD: 77 MM HG (ref 75–85)
PO2 BLD: 78 MM HG (ref 75–85)
PO2 BLD: 78 MM HG (ref 75–85)
PO2 BLD: 79 MM HG (ref 75–85)
PO2 BLD: 81 MM HG (ref 75–85)
PO2 BLD: 82 MM HG (ref 75–85)
PO2 BLD: 84 MM HG (ref 75–85)
PO2 BLD: 84 MM HG (ref 75–85)
PO2 BLD: 85 MM HG (ref 75–85)
PO2 BLD: 86 MM HG (ref 75–85)
PO2 BLD: 87 MM HG (ref 75–85)
PO2 BLD: 90 MM HG (ref 75–85)
PO2 BLD: 92 MM HG (ref 75–85)
PO2 BLD: 97 MM HG (ref 75–85)
PO2 BLD: 97 MM HG (ref 75–85)
PO2 BLD: 99 MM HG (ref 75–85)
PO2 BLDV: 29 MM HG (ref 25–47)
PO2 BLDV: 33 MM HG (ref 25–47)
POLYCHROMASIA BLD QL SMEAR: ABNORMAL
POLYCHROMASIA BLD QL SMEAR: ABNORMAL
POTASSIUM BLD-SCNC: 3.5 MMOL/L (ref 3.5–5)
POTASSIUM BLD-SCNC: 3.6 MMOL/L (ref 3.5–5)
POTASSIUM BLD-SCNC: 3.8 MMOL/L (ref 3.5–5)
POTASSIUM BLD-SCNC: 4 MMOL/L (ref 3.5–5)
POTASSIUM BLD-SCNC: 4.1 MMOL/L (ref 3.5–5)
POTASSIUM BLD-SCNC: 4.2 MMOL/L (ref 3.5–5)
POTASSIUM BLD-SCNC: 4.2 MMOL/L (ref 3.5–5)
POTASSIUM BLD-SCNC: 4.3 MMOL/L (ref 3.5–5)
POTASSIUM BLD-SCNC: 4.4 MMOL/L (ref 3.5–5)
POTASSIUM BLD-SCNC: 4.5 MMOL/L (ref 3.5–5)
POTASSIUM BLD-SCNC: 4.6 MMOL/L (ref 3.5–5)
POTASSIUM BLD-SCNC: 4.7 MMOL/L (ref 3.5–5)
POTASSIUM BLD-SCNC: 4.8 MMOL/L (ref 3.5–5)
POTASSIUM BLD-SCNC: 4.9 MMOL/L (ref 3.5–5)
POTASSIUM BLD-SCNC: 4.9 MMOL/L (ref 3.5–5)
POTASSIUM BLD-SCNC: 5 MMOL/L (ref 3.5–5)
POTASSIUM BLD-SCNC: 5.1 MMOL/L (ref 3.5–5)
POTASSIUM BLD-SCNC: 5.1 MMOL/L (ref 3.5–5)
PR INTERVAL - MUSE: 272 MS
PR INTERVAL - MUSE: 314 MS
PR INTERVAL - MUSE: 334 MS
PR INTERVAL - MUSE: 338 MS
PR INTERVAL - MUSE: NORMAL
PROCALCITONIN SERPL-MCNC: 0.05 NG/ML (ref 0–0.49)
PROCALCITONIN SERPL-MCNC: 0.12 NG/ML (ref 0–0.49)
PROCALCITONIN SERPL-MCNC: 0.25 NG/ML (ref 0–0.49)
PROCALCITONIN SERPL-MCNC: 0.27 NG/ML (ref 0–0.49)
PROT SERPL-MCNC: 5.8 G/DL (ref 6–8)
PROT SERPL-MCNC: 6 G/DL (ref 6–8)
PROT SERPL-MCNC: 6 G/DL (ref 6–8)
PROT SERPL-MCNC: 6.2 G/DL (ref 6–8)
PROT SERPL-MCNC: 6.2 G/DL (ref 6–8)
PROT SERPL-MCNC: 6.7 G/DL (ref 6–8)
PROT SERPL-MCNC: 7.2 G/DL (ref 6–8)
PSV (LAB BLOOD GAS): 12 CM H2O
QRS DURATION - MUSE: 104 MS
QRS DURATION - MUSE: 110 MS
QRS DURATION - MUSE: 110 MS
QRS DURATION - MUSE: 114 MS
QRS DURATION - MUSE: 116 MS
QRS DURATION - MUSE: 116 MS
QRS DURATION - MUSE: 132 MS
QT - MUSE: 272 MS
QT - MUSE: 366 MS
QT - MUSE: 366 MS
QT - MUSE: 380 MS
QT - MUSE: 396 MS
QT - MUSE: 430 MS
QT - MUSE: 466 MS
QTC - MUSE: 390 MS
QTC - MUSE: 422 MS
QTC - MUSE: 422 MS
QTC - MUSE: 433 MS
QTC - MUSE: 475 MS
QTC - MUSE: 495 MS
QTC - MUSE: 528 MS
R AXIS - MUSE: -28 DEGREES
R AXIS - MUSE: -29 DEGREES
R AXIS - MUSE: -32 DEGREES
R AXIS - MUSE: -51 DEGREES
R AXIS - MUSE: -62 DEGREES
R AXIS - MUSE: -68 DEGREES
R AXIS - MUSE: 254 DEGREES
RATE: 12 RR/MIN
RATE: 22 RR/MIN
RATE: 22 RR/MIN
RATE: 24 RR/MIN
RATE: 26 RR/MIN
RATE: 28 RR/MIN
RATE: 30 RR/MIN
RATE: 30 RR/MIN
RATE: 32 RR/MIN
RBC # BLD AUTO: 2.48 MILL/UL (ref 4.4–6.2)
RBC # BLD AUTO: 2.52 MILL/UL (ref 4.4–6.2)
RBC # BLD AUTO: 2.6 MILL/UL (ref 4.4–6.2)
RBC # BLD AUTO: 2.83 MILL/UL (ref 4.4–6.2)
RBC # BLD AUTO: 2.87 MILL/UL (ref 4.4–6.2)
RBC # BLD AUTO: 3.02 MILL/UL (ref 4.4–6.2)
RBC # BLD AUTO: 3.07 MILL/UL (ref 4.4–6.2)
RBC # BLD AUTO: 3.17 MILL/UL (ref 4.4–6.2)
RBC # BLD AUTO: 3.27 MILL/UL (ref 4.4–6.2)
RBC # BLD AUTO: 3.29 MILL/UL (ref 4.4–6.2)
RBC # BLD AUTO: 3.36 MILL/UL (ref 4.4–6.2)
RBC # BLD AUTO: 3.48 MILL/UL (ref 4.4–6.2)
RBC # BLD AUTO: 3.58 MILL/UL (ref 4.4–6.2)
RBC # BLD AUTO: 3.64 MILL/UL (ref 4.4–6.2)
RBC # BLD AUTO: 3.69 MILL/UL (ref 4.4–6.2)
RBC # BLD AUTO: 3.7 MILL/UL (ref 4.4–6.2)
RBC # BLD AUTO: 3.74 MILL/UL (ref 4.4–6.2)
RBC # BLD AUTO: 3.77 MILL/UL (ref 4.4–6.2)
RBC # BLD AUTO: 3.8 MILL/UL (ref 4.4–6.2)
RBC # BLD AUTO: 3.9 MILL/UL (ref 4.4–6.2)
RBC # BLD AUTO: 3.97 MILL/UL (ref 4.4–6.2)
RBC # BLD AUTO: 4.02 MILL/UL (ref 4.4–6.2)
RBC # BLD AUTO: 4.04 MILL/UL (ref 4.4–6.2)
RBC # BLD AUTO: 4.05 MILL/UL (ref 4.4–6.2)
RBC # BLD AUTO: 4.07 MILL/UL (ref 4.4–6.2)
RBC # BLD AUTO: 4.16 MILL/UL (ref 4.4–6.2)
RBC # BLD AUTO: 4.17 MILL/UL (ref 4.4–6.2)
RBC # BLD AUTO: 4.21 MILL/UL (ref 4.4–6.2)
RBC # BLD AUTO: 4.41 MILL/UL (ref 4.4–6.2)
RBC #/AREA URNS AUTO: ABNORMAL HPF
RETICS # AUTO: 0.04 MILL/UL (ref 0.01–0.11)
RETICS # AUTO: 0.05 MILL/UL (ref 0.01–0.11)
RETICS/RBC NFR AUTO: 0.95 % (ref 0.8–2.7)
RETICS/RBC NFR AUTO: 0.98 % (ref 0.8–2.7)
RETICS/RBC NFR AUTO: 1.01 % (ref 0.8–2.7)
RETICS/RBC NFR AUTO: 1.16 % (ref 0.8–2.7)
RETICS/RBC NFR AUTO: 1.18 % (ref 0.8–2.7)
RIBOTYPE 027/NAP1/BI: NORMAL
SODIUM SERPL-SCNC: 132 MMOL/L (ref 136–145)
SODIUM SERPL-SCNC: 133 MMOL/L (ref 136–145)
SODIUM SERPL-SCNC: 134 MMOL/L (ref 136–145)
SODIUM SERPL-SCNC: 135 MMOL/L (ref 136–145)
SODIUM SERPL-SCNC: 136 MMOL/L (ref 136–145)
SODIUM SERPL-SCNC: 137 MMOL/L (ref 136–145)
SODIUM SERPL-SCNC: 138 MMOL/L (ref 136–145)
SODIUM SERPL-SCNC: 139 MMOL/L (ref 136–145)
SODIUM SERPL-SCNC: 140 MMOL/L (ref 136–145)
SODIUM SERPL-SCNC: 140 MMOL/L (ref 136–145)
SODIUM SERPL-SCNC: 141 MMOL/L (ref 136–145)
SODIUM SERPL-SCNC: 142 MMOL/L (ref 136–145)
SODIUM SERPL-SCNC: 142 MMOL/L (ref 136–145)
SODIUM SERPL-SCNC: 143 MMOL/L (ref 136–145)
SODIUM SERPL-SCNC: 144 MMOL/L (ref 136–145)
SODIUM SERPL-SCNC: 146 MMOL/L (ref 136–145)
SODIUM SERPL-SCNC: 146 MMOL/L (ref 136–145)
SODIUM SERPL-SCNC: 147 MMOL/L (ref 136–145)
SP GR UR STRIP: 1.01 (ref 1–1.03)
SPECIMEN STATUS: NORMAL
SPECIMEN STATUS: NORMAL
SQUAMOUS #/AREA URNS AUTO: ABNORMAL LPF
SYSTOLIC BLOOD PRESSURE - MUSE: 153 MMHG
SYSTOLIC BLOOD PRESSURE - MUSE: NORMAL
T AXIS - MUSE: 100 DEGREES
T AXIS - MUSE: 106 DEGREES
T AXIS - MUSE: 49 DEGREES
T AXIS - MUSE: 62 DEGREES
T AXIS - MUSE: 83 DEGREES
T AXIS - MUSE: 85 DEGREES
T AXIS - MUSE: 90 DEGREES
TEMPERATURE: 37 DEGREES C
TEST NAME: NORMAL
TEST NAME: NORMAL
TOTAL NEUTROPHILS-ABS(DIFF): 32.5 THOU/UL (ref 2–7.7)
TOTAL NEUTROPHILS-ABS(DIFF): 34.6 THOU/UL (ref 2–7.7)
TOTAL NEUTROPHILS-REL(DIFF): 85 % (ref 50–70)
TOTAL NEUTROPHILS-REL(DIFF): 87 % (ref 50–70)
TRICUSPID REGURGITATION PEAK PRESSURE GRADIENT: 38.7 MMHG
TRICUSPID VALVE ANULAR PLANE SYSTOLIC EXCURSION: 1.8 CM
TRICUSPID VALVE PEAK REGURGITANT VELOCITY: 311 CM/S
TRIGL SERPL-MCNC: 136 MG/DL
TRIGL SERPL-MCNC: 148 MG/DL
TROPONIN I SERPL-MCNC: 0.01 NG/ML (ref 0–0.29)
TROPONIN I SERPL-MCNC: 0.02 NG/ML (ref 0–0.29)
TROPONIN I SERPL-MCNC: 0.1 NG/ML (ref 0–0.29)
TROPONIN I SERPL-MCNC: 0.32 NG/ML (ref 0–0.29)
TROPONIN I SERPL-MCNC: 0.34 NG/ML (ref 0–0.29)
TROPONIN I SERPL-MCNC: 0.37 NG/ML (ref 0–0.29)
TROPONIN I SERPL-MCNC: 0.38 NG/ML (ref 0–0.29)
TROPONIN I SERPL-MCNC: <0.01 NG/ML (ref 0–0.29)
TSH SERPL DL<=0.005 MIU/L-ACNC: 0.97 UIU/ML (ref 0.3–5)
UFH PPP CHRO-ACNC: 0.15 IU/ML
UFH PPP CHRO-ACNC: 0.2 IU/ML
UFH PPP CHRO-ACNC: 0.22 IU/ML
UFH PPP CHRO-ACNC: 0.25 IU/ML
UFH PPP CHRO-ACNC: 0.26 IU/ML
UFH PPP CHRO-ACNC: 0.31 IU/ML
UFH PPP CHRO-ACNC: 0.32 IU/ML
UFH PPP CHRO-ACNC: 0.34 IU/ML
UFH PPP CHRO-ACNC: 0.34 IU/ML
UFH PPP CHRO-ACNC: 0.35 IU/ML
UFH PPP CHRO-ACNC: 0.36 IU/ML
UFH PPP CHRO-ACNC: 0.37 IU/ML
UFH PPP CHRO-ACNC: 0.4 IU/ML
UFH PPP CHRO-ACNC: 0.42 IU/ML
UFH PPP CHRO-ACNC: 0.51 IU/ML
UFH PPP CHRO-ACNC: 0.51 IU/ML
UFH PPP CHRO-ACNC: 0.53 IU/ML
UFH PPP CHRO-ACNC: 0.56 IU/ML
UFH PPP CHRO-ACNC: 0.56 IU/ML
UFH PPP CHRO-ACNC: 0.57 IU/ML
UFH PPP CHRO-ACNC: 0.63 IU/ML
UFH PPP CHRO-ACNC: 0.67 IU/ML
UFH PPP CHRO-ACNC: 0.73 IU/ML
UFH PPP CHRO-ACNC: 0.74 IU/ML
UFH PPP CHRO-ACNC: 0.77 IU/ML
UFH PPP CHRO-ACNC: 0.78 IU/ML
UFH PPP CHRO-ACNC: 0.83 IU/ML
UFH PPP CHRO-ACNC: <0.1 IU/ML
UROBILINOGEN UR STRIP-ACNC: ABNORMAL
VANCOMYCIN SERPL-MCNC: 25.2 UG/ML
VENTILATION MODE: ABNORMAL
VENTILATION MODE: AC
VENTILATOR TIDAL VOLUME: 480 ML
VENTILATOR TIDAL VOLUME: 500 ML
VENTILATOR TIDAL VOLUME: 520 ML
VENTILATOR TIDAL VOLUME: 610 ML
VENTRICULAR RATE- MUSE: 124 BPM
VENTRICULAR RATE- MUSE: 125 BPM
VENTRICULAR RATE- MUSE: 52 BPM
VENTRICULAR RATE- MUSE: 58 BPM
VENTRICULAR RATE- MUSE: 80 BPM
VENTRICULAR RATE- MUSE: 94 BPM
VENTRICULAR RATE- MUSE: 94 BPM
WBC #/AREA URNS AUTO: ABNORMAL HPF
WBC: 10.3 THOU/UL (ref 4–11)
WBC: 10.3 THOU/UL (ref 4–11)
WBC: 10.7 THOU/UL (ref 4–11)
WBC: 10.9 THOU/UL (ref 4–11)
WBC: 12.9 THOU/UL (ref 4–11)
WBC: 13 THOU/UL (ref 4–11)
WBC: 14.1 THOU/UL (ref 4–11)
WBC: 14.8 THOU/UL (ref 4–11)
WBC: 16 THOU/UL (ref 4–11)
WBC: 16.3 THOU/UL (ref 4–11)
WBC: 16.6 THOU/UL (ref 4–11)
WBC: 16.7 THOU/UL (ref 4–11)
WBC: 17.2 THOU/UL (ref 4–11)
WBC: 18.9 THOU/UL (ref 4–11)
WBC: 19.3 THOU/UL (ref 4–11)
WBC: 20 THOU/UL (ref 4–11)
WBC: 20.7 THOU/UL (ref 4–11)
WBC: 20.9 THOU/UL (ref 4–11)
WBC: 22.2 THOU/UL (ref 4–11)
WBC: 24.2 THOU/UL (ref 4–11)
WBC: 28.6 THOU/UL (ref 4–11)
WBC: 32.2 THOU/UL (ref 4–11)
WBC: 35.1 THOU/UL (ref 4–11)
WBC: 35.8 THOU/UL (ref 4–11)
WBC: 37.6 THOU/UL (ref 4–11)
WBC: 37.8 THOU/UL (ref 4–11)
WBC: 40.9 THOU/UL (ref 4–11)
WBC: 8.6 THOU/UL (ref 4–11)
WBC: 9.6 THOU/UL (ref 4–11)

## 2020-01-01 RX ORDER — ALBUTEROL SULFATE 90 UG/1
AEROSOL, METERED RESPIRATORY (INHALATION)
Status: SHIPPED | COMMUNITY
Start: 2020-01-01

## 2020-01-01 RX ORDER — GLIPIZIDE 10 MG/1
10 TABLET ORAL
Status: SHIPPED | COMMUNITY
Start: 2020-01-01

## 2020-01-01 RX ORDER — AMLODIPINE BESYLATE 10 MG/1
10 TABLET ORAL DAILY
Status: SHIPPED | COMMUNITY
Start: 2020-01-01

## 2020-01-01 RX ORDER — POTASSIUM CHLORIDE 750 MG/1
10 TABLET, EXTENDED RELEASE ORAL
Status: SHIPPED | COMMUNITY
Start: 2020-01-01

## 2020-01-01 RX ORDER — ZINC GLUCONATE 50 MG
50 TABLET ORAL DAILY
Status: SHIPPED | COMMUNITY
Start: 2020-01-01

## 2020-01-01 RX ORDER — HYDROCODONE BITARTRATE AND ACETAMINOPHEN 10; 325 MG/1; MG/1
1 TABLET ORAL 4 TIMES DAILY PRN
Status: SHIPPED | COMMUNITY
Start: 2020-01-01

## 2020-01-01 RX ORDER — FINASTERIDE 5 MG/1
5 TABLET, FILM COATED ORAL DAILY
Status: SHIPPED | COMMUNITY
Start: 2019-06-11

## 2020-01-01 RX ORDER — DOXAZOSIN 8 MG/1
8 TABLET ORAL AT BEDTIME
Status: SHIPPED | COMMUNITY
Start: 2020-01-01

## 2020-01-01 RX ORDER — ATENOLOL 100 MG/1
100 TABLET ORAL DAILY
Status: SHIPPED | COMMUNITY
Start: 2020-01-01

## 2020-01-01 RX ORDER — LISINOPRIL 20 MG/1
1 TABLET ORAL EVERY EVENING
Status: SHIPPED | COMMUNITY
Start: 2020-01-01

## 2020-01-01 RX ORDER — LISINOPRIL AND HYDROCHLOROTHIAZIDE 20; 25 MG/1; MG/1
1 TABLET ORAL EVERY MORNING
Status: SHIPPED | COMMUNITY
Start: 2020-01-01

## 2020-01-01 ASSESSMENT — MIFFLIN-ST. JEOR
SCORE: 1838.75
SCORE: 1893.75
SCORE: 1832.75
SCORE: 1707.75
SCORE: 1737.75
SCORE: 1833.75
SCORE: 1742.75
SCORE: 1931.29
SCORE: 1739.75
SCORE: 1893.75
SCORE: 1704.75
SCORE: 1737.75
SCORE: 1833.75
SCORE: 1855.88
SCORE: 1951.42
SCORE: 1787.75
SCORE: 1958.75
SCORE: 1915.75
SCORE: 1663.75
SCORE: 1915.75
SCORE: 1860.42
SCORE: 1953.69
SCORE: 1953.24
SCORE: 1881.75
SCORE: 1857.75
SCORE: 1796.75
SCORE: 1742.75
SCORE: 1871.75
SCORE: 1857.75
SCORE: 1693.75
SCORE: 1798.75
SCORE: 1881.75
SCORE: 1707.75
SCORE: 1832.75
SCORE: 1890.75
SCORE: 1871.75

## 2021-01-01 ENCOUNTER — RECORDS - HEALTHEAST (OUTPATIENT)
Dept: ADMINISTRATIVE | Facility: OTHER | Age: 76
End: 2021-01-01

## 2021-01-01 ENCOUNTER — SURGERY - HEALTHEAST (OUTPATIENT)
Dept: GASTROENTEROLOGY | Facility: HOSPITAL | Age: 76
End: 2021-01-01

## 2021-01-01 LAB
ABO/RH(D): NORMAL
ALBUMIN SERPL-MCNC: 1.8 G/DL (ref 3.5–5)
ALP SERPL-CCNC: 48 U/L (ref 45–120)
ALT SERPL W P-5'-P-CCNC: <9 U/L (ref 0–45)
ANION GAP SERPL CALCULATED.3IONS-SCNC: 13 MMOL/L (ref 5–18)
ANION GAP SERPL CALCULATED.3IONS-SCNC: 14 MMOL/L (ref 5–18)
ANION GAP SERPL CALCULATED.3IONS-SCNC: 14 MMOL/L (ref 5–18)
ANION GAP SERPL CALCULATED.3IONS-SCNC: 15 MMOL/L (ref 5–18)
ANION GAP SERPL CALCULATED.3IONS-SCNC: 15 MMOL/L (ref 5–18)
ANION GAP SERPL CALCULATED.3IONS-SCNC: 17 MMOL/L (ref 5–18)
ANTIBODY SCREEN: NEGATIVE
APTT PPP: 25 SECONDS (ref 24–37)
AST SERPL W P-5'-P-CCNC: 17 U/L (ref 0–40)
BASE EXCESS BLDA CALC-SCNC: -1.4 MMOL/L
BASE EXCESS BLDA CALC-SCNC: -2.3 MMOL/L
BASE EXCESS BLDA CALC-SCNC: -2.4 MMOL/L
BASE EXCESS BLDA CALC-SCNC: -4 MMOL/L
BASE EXCESS BLDA CALC-SCNC: -4.6 MMOL/L
BASE EXCESS BLDA CALC-SCNC: -5.9 MMOL/L
BASE EXCESS BLDA CALC-SCNC: -6.3 MMOL/L
BASE EXCESS BLDA CALC-SCNC: -6.9 MMOL/L
BASE EXCESS BLDA CALC-SCNC: -7.8 MMOL/L
BASE EXCESS BLDA CALC-SCNC: -8.9 MMOL/L
BILIRUB DIRECT SERPL-MCNC: 0.2 MG/DL
BILIRUB SERPL-MCNC: 0.5 MG/DL (ref 0–1)
BLD PROD TYP BPU: NORMAL
BLOOD TYPE: 6200
BUN SERPL-MCNC: 102 MG/DL (ref 8–28)
BUN SERPL-MCNC: 110 MG/DL (ref 8–28)
BUN SERPL-MCNC: 117 MG/DL (ref 8–28)
BUN SERPL-MCNC: 136 MG/DL (ref 8–28)
BUN SERPL-MCNC: 145 MG/DL (ref 8–28)
BUN SERPL-MCNC: 166 MG/DL (ref 8–28)
CALCIUM SERPL-MCNC: 7.3 MG/DL (ref 8.5–10.5)
CALCIUM SERPL-MCNC: 7.5 MG/DL (ref 8.5–10.5)
CALCIUM SERPL-MCNC: 7.6 MG/DL (ref 8.5–10.5)
CALCIUM SERPL-MCNC: 7.7 MG/DL (ref 8.5–10.5)
CHLORIDE BLD-SCNC: 101 MMOL/L (ref 98–107)
CHLORIDE BLD-SCNC: 102 MMOL/L (ref 98–107)
CHLORIDE BLD-SCNC: 102 MMOL/L (ref 98–107)
CHLORIDE BLD-SCNC: 104 MMOL/L (ref 98–107)
CHLORIDE BLD-SCNC: 104 MMOL/L (ref 98–107)
CHLORIDE BLD-SCNC: 97 MMOL/L (ref 98–107)
CK SERPL-CCNC: 45 U/L (ref 30–190)
CO2 SERPL-SCNC: 16 MMOL/L (ref 22–31)
CO2 SERPL-SCNC: 17 MMOL/L (ref 22–31)
CO2 SERPL-SCNC: 18 MMOL/L (ref 22–31)
CO2 SERPL-SCNC: 19 MMOL/L (ref 22–31)
CO2 SERPL-SCNC: 19 MMOL/L (ref 22–31)
CO2 SERPL-SCNC: 20 MMOL/L (ref 22–31)
CODING SYSTEM: NORMAL
COHGB MFR BLD: 95.2 % (ref 95–96)
COHGB MFR BLD: 97.4 % (ref 95–96)
COHGB MFR BLD: 97.4 % (ref 95–96)
COHGB MFR BLD: 98.6 % (ref 95–96)
COHGB MFR BLD: 98.7 % (ref 95–96)
COHGB MFR BLD: 99 % (ref 95–96)
COHGB MFR BLD: 99.2 % (ref 95–96)
COHGB MFR BLD: 99.2 % (ref 95–96)
COHGB MFR BLD: 99.3 % (ref 95–96)
COHGB MFR BLD: 99.6 % (ref 95–96)
COMPONENT (HISTORICAL CONVERSION): NORMAL
CREAT SERPL-MCNC: 4.67 MG/DL (ref 0.7–1.3)
CREAT SERPL-MCNC: 4.77 MG/DL (ref 0.7–1.3)
CREAT SERPL-MCNC: 5 MG/DL (ref 0.7–1.3)
CREAT SERPL-MCNC: 5.75 MG/DL (ref 0.7–1.3)
CREAT SERPL-MCNC: 6.3 MG/DL (ref 0.7–1.3)
CREAT SERPL-MCNC: 6.59 MG/DL (ref 0.7–1.3)
CROSSMATCH: NORMAL
ERYTHROCYTE [DISTWIDTH] IN BLOOD BY AUTOMATED COUNT: 19.6 % (ref 11–14.5)
ERYTHROCYTE [DISTWIDTH] IN BLOOD BY AUTOMATED COUNT: 19.8 % (ref 11–14.5)
ERYTHROCYTE [DISTWIDTH] IN BLOOD BY AUTOMATED COUNT: 19.9 % (ref 11–14.5)
ERYTHROCYTE [DISTWIDTH] IN BLOOD BY AUTOMATED COUNT: 20.4 % (ref 11–14.5)
ERYTHROCYTE [DISTWIDTH] IN BLOOD BY AUTOMATED COUNT: 21 % (ref 11–14.5)
ERYTHROCYTE [DISTWIDTH] IN BLOOD BY AUTOMATED COUNT: 21.1 % (ref 11–14.5)
ERYTHROCYTE [DISTWIDTH] IN BLOOD BY AUTOMATED COUNT: 21.2 % (ref 11–14.5)
FIBRINOGEN PPP-MCNC: 312 MG/DL (ref 170–410)
GFR SERPL CREATININE-BSD FRML MDRD: 10 ML/MIN/1.73M2
GFR SERPL CREATININE-BSD FRML MDRD: 11 ML/MIN/1.73M2
GFR SERPL CREATININE-BSD FRML MDRD: 12 ML/MIN/1.73M2
GFR SERPL CREATININE-BSD FRML MDRD: 12 ML/MIN/1.73M2
GFR SERPL CREATININE-BSD FRML MDRD: 8 ML/MIN/1.73M2
GFR SERPL CREATININE-BSD FRML MDRD: 9 ML/MIN/1.73M2
GLUCOSE BLD-MCNC: 120 MG/DL (ref 70–125)
GLUCOSE BLD-MCNC: 128 MG/DL (ref 70–125)
GLUCOSE BLD-MCNC: 134 MG/DL (ref 70–125)
GLUCOSE BLD-MCNC: 174 MG/DL (ref 70–125)
GLUCOSE BLD-MCNC: 249 MG/DL (ref 70–125)
GLUCOSE BLD-MCNC: 76 MG/DL (ref 70–125)
GLUCOSE BLDC GLUCOMTR-MCNC: 100 MG/DL (ref 70–139)
GLUCOSE BLDC GLUCOMTR-MCNC: 107 MG/DL (ref 70–139)
GLUCOSE BLDC GLUCOMTR-MCNC: 116 MG/DL (ref 70–139)
GLUCOSE BLDC GLUCOMTR-MCNC: 118 MG/DL (ref 70–139)
GLUCOSE BLDC GLUCOMTR-MCNC: 118 MG/DL (ref 70–139)
GLUCOSE BLDC GLUCOMTR-MCNC: 120 MG/DL (ref 70–139)
GLUCOSE BLDC GLUCOMTR-MCNC: 121 MG/DL (ref 70–139)
GLUCOSE BLDC GLUCOMTR-MCNC: 123 MG/DL (ref 70–139)
GLUCOSE BLDC GLUCOMTR-MCNC: 126 MG/DL (ref 70–139)
GLUCOSE BLDC GLUCOMTR-MCNC: 128 MG/DL (ref 70–139)
GLUCOSE BLDC GLUCOMTR-MCNC: 131 MG/DL (ref 70–139)
GLUCOSE BLDC GLUCOMTR-MCNC: 132 MG/DL (ref 70–139)
GLUCOSE BLDC GLUCOMTR-MCNC: 133 MG/DL (ref 70–139)
GLUCOSE BLDC GLUCOMTR-MCNC: 146 MG/DL (ref 70–139)
GLUCOSE BLDC GLUCOMTR-MCNC: 149 MG/DL (ref 70–139)
GLUCOSE BLDC GLUCOMTR-MCNC: 154 MG/DL (ref 70–139)
GLUCOSE BLDC GLUCOMTR-MCNC: 154 MG/DL (ref 70–139)
GLUCOSE BLDC GLUCOMTR-MCNC: 157 MG/DL (ref 70–139)
GLUCOSE BLDC GLUCOMTR-MCNC: 159 MG/DL (ref 70–139)
GLUCOSE BLDC GLUCOMTR-MCNC: 181 MG/DL (ref 70–139)
GLUCOSE BLDC GLUCOMTR-MCNC: 189 MG/DL (ref 70–139)
GLUCOSE BLDC GLUCOMTR-MCNC: 191 MG/DL (ref 70–139)
GLUCOSE BLDC GLUCOMTR-MCNC: 192 MG/DL (ref 70–139)
GLUCOSE BLDC GLUCOMTR-MCNC: 200 MG/DL (ref 70–139)
GLUCOSE BLDC GLUCOMTR-MCNC: 225 MG/DL (ref 70–139)
GLUCOSE BLDC GLUCOMTR-MCNC: 232 MG/DL (ref 70–139)
GLUCOSE BLDC GLUCOMTR-MCNC: 241 MG/DL (ref 70–139)
GLUCOSE BLDC GLUCOMTR-MCNC: 243 MG/DL (ref 70–139)
GLUCOSE BLDC GLUCOMTR-MCNC: 266 MG/DL (ref 70–139)
GLUCOSE BLDC GLUCOMTR-MCNC: 55 MG/DL (ref 70–139)
GLUCOSE BLDC GLUCOMTR-MCNC: 57 MG/DL (ref 70–139)
GLUCOSE BLDC GLUCOMTR-MCNC: 75 MG/DL (ref 70–139)
GLUCOSE BLDC GLUCOMTR-MCNC: 87 MG/DL (ref 70–139)
GLUCOSE BLDC GLUCOMTR-MCNC: 94 MG/DL (ref 70–139)
GLUCOSE BLDC GLUCOMTR-MCNC: 97 MG/DL (ref 70–139)
HCO3, ARTERIAL CALC - HISTORICAL: 17.7 MMOL/L (ref 23–29)
HCO3, ARTERIAL CALC - HISTORICAL: 18.5 MMOL/L (ref 23–29)
HCO3, ARTERIAL CALC - HISTORICAL: 19.3 MMOL/L (ref 23–29)
HCO3, ARTERIAL CALC - HISTORICAL: 19.7 MMOL/L (ref 23–29)
HCO3, ARTERIAL CALC - HISTORICAL: 20 MMOL/L (ref 23–29)
HCO3, ARTERIAL CALC - HISTORICAL: 21.1 MMOL/L (ref 23–29)
HCO3, ARTERIAL CALC - HISTORICAL: 21.4 MMOL/L (ref 23–29)
HCO3, ARTERIAL CALC - HISTORICAL: 22.7 MMOL/L (ref 23–29)
HCO3, ARTERIAL CALC - HISTORICAL: 22.8 MMOL/L (ref 23–29)
HCO3, ARTERIAL CALC - HISTORICAL: 23.7 MMOL/L (ref 23–29)
HCT VFR BLD AUTO: 20.2 % (ref 40–54)
HCT VFR BLD AUTO: 21.7 % (ref 40–54)
HCT VFR BLD AUTO: 22.6 % (ref 40–54)
HCT VFR BLD AUTO: 23.2 % (ref 40–54)
HCT VFR BLD AUTO: 24.4 % (ref 40–54)
HCT VFR BLD AUTO: 24.7 % (ref 40–54)
HCT VFR BLD AUTO: 25.8 % (ref 40–54)
HGB BLD-MCNC: 6.7 G/DL (ref 14–18)
HGB BLD-MCNC: 7 G/DL (ref 14–18)
HGB BLD-MCNC: 7.2 G/DL (ref 14–18)
HGB BLD-MCNC: 7.4 G/DL (ref 14–18)
HGB BLD-MCNC: 7.6 G/DL (ref 14–18)
HGB BLD-MCNC: 7.7 G/DL (ref 14–18)
HGB BLD-MCNC: 7.8 G/DL (ref 14–18)
HGB BLD-MCNC: 8 G/DL (ref 14–18)
HGB BLD-MCNC: 8.6 G/DL (ref 14–18)
HGB BLD-MCNC: 8.7 G/DL (ref 14–18)
HGB BLD-MCNC: 9.2 G/DL (ref 14–18)
INR PPP: 1.09 (ref 0.9–1.1)
ISSUE DATE AND TIME: NORMAL
LACTATE SERPL-SCNC: 0.7 MMOL/L (ref 0.7–2)
MAGNESIUM SERPL-MCNC: 1.8 MG/DL (ref 1.8–2.6)
MAGNESIUM SERPL-MCNC: 2 MG/DL (ref 1.8–2.6)
MAGNESIUM SERPL-MCNC: 2.2 MG/DL (ref 1.8–2.6)
MAGNESIUM SERPL-MCNC: 2.3 MG/DL (ref 1.8–2.6)
MCH RBC QN AUTO: 31 PG (ref 27–34)
MCH RBC QN AUTO: 31.2 PG (ref 27–34)
MCH RBC QN AUTO: 31.6 PG (ref 27–34)
MCH RBC QN AUTO: 31.7 PG (ref 27–34)
MCH RBC QN AUTO: 31.7 PG (ref 27–34)
MCH RBC QN AUTO: 32.1 PG (ref 27–34)
MCH RBC QN AUTO: 32.1 PG (ref 27–34)
MCHC RBC AUTO-ENTMCNC: 32 G/DL (ref 32–36)
MCHC RBC AUTO-ENTMCNC: 32.3 G/DL (ref 32–36)
MCHC RBC AUTO-ENTMCNC: 32.4 G/DL (ref 32–36)
MCHC RBC AUTO-ENTMCNC: 32.7 G/DL (ref 32–36)
MCHC RBC AUTO-ENTMCNC: 33.2 G/DL (ref 32–36)
MCHC RBC AUTO-ENTMCNC: 33.2 G/DL (ref 32–36)
MCHC RBC AUTO-ENTMCNC: 33.7 G/DL (ref 32–36)
MCV RBC AUTO: 100 FL (ref 80–100)
MCV RBC AUTO: 92 FL (ref 80–100)
MCV RBC AUTO: 96 FL (ref 80–100)
MCV RBC AUTO: 97 FL (ref 80–100)
MCV RBC AUTO: 97 FL (ref 80–100)
MCV RBC AUTO: 98 FL (ref 80–100)
MCV RBC AUTO: 98 FL (ref 80–100)
MYCO F BLOOD CULTURE BOTTLE - HISTORICAL: NO GROWTH
MYCO F BLOOD CULTURE BOTTLE - HISTORICAL: NO GROWTH
O2/TOTAL GAS SETTING VFR VENT: 0.4 %
O2/TOTAL GAS SETTING VFR VENT: 0.45 %
O2/TOTAL GAS SETTING VFR VENT: 40 %
O2/TOTAL GAS SETTING VFR VENT: 50 %
OXYHEMOGLOBIN - HISTORICAL: 92.7 % (ref 95–96)
OXYHEMOGLOBIN - HISTORICAL: 95.1 % (ref 95–96)
OXYHEMOGLOBIN - HISTORICAL: 95.2 % (ref 95–96)
OXYHEMOGLOBIN - HISTORICAL: 96.3 % (ref 95–96)
OXYHEMOGLOBIN - HISTORICAL: 96.3 % (ref 95–96)
OXYHEMOGLOBIN - HISTORICAL: 96.5 % (ref 95–96)
OXYHEMOGLOBIN - HISTORICAL: 96.7 % (ref 95–96)
OXYHEMOGLOBIN - HISTORICAL: 96.8 % (ref 95–96)
OXYHEMOGLOBIN - HISTORICAL: 96.9 % (ref 95–96)
OXYHEMOGLOBIN - HISTORICAL: 97.4 % (ref 95–96)
PCO2 BLD: 29 MM HG (ref 35–45)
PCO2 BLD: 30 MM HG (ref 35–45)
PCO2 BLD: 31 MM HG (ref 35–45)
PCO2 BLD: 32 MM HG (ref 35–45)
PCO2 BLD: 33 MM HG (ref 35–45)
PCO2 BLD: 34 MM HG (ref 35–45)
PCO2 BLD: 35 MM HG (ref 35–45)
PCO2 BLD: 36 MM HG (ref 35–45)
PEEP: 10 CM H2O
PEEP: 8 CM H2O
PH BLD: 7.29 [PH] (ref 7.37–7.44)
PH BLD: 7.34 [PH] (ref 7.37–7.44)
PH BLD: 7.36 [PH] (ref 7.37–7.44)
PH BLD: 7.4 [PH] (ref 7.37–7.44)
PH BLD: 7.41 [PH] (ref 7.37–7.44)
PH BLD: 7.41 [PH] (ref 7.37–7.44)
PH BLD: 7.42 [PH] (ref 7.37–7.44)
PH BLD: 7.43 [PH] (ref 7.37–7.44)
PH BLD: 7.44 [PH] (ref 7.37–7.44)
PH BLD: 7.48 [PH] (ref 7.37–7.44)
PHOSPHATE SERPL-MCNC: 4.2 MG/DL (ref 2.5–4.5)
PHOSPHATE SERPL-MCNC: 5.4 MG/DL (ref 2.5–4.5)
PHOSPHATE SERPL-MCNC: 5.6 MG/DL (ref 2.5–4.5)
PHOSPHATE SERPL-MCNC: 5.9 MG/DL (ref 2.5–4.5)
PHOSPHATE SERPL-MCNC: 5.9 MG/DL (ref 2.5–4.5)
PHOSPHATE SERPL-MCNC: 6.9 MG/DL (ref 2.5–4.5)
PLATELET # BLD AUTO: 206 THOU/UL (ref 140–440)
PLATELET # BLD AUTO: 236 THOU/UL (ref 140–440)
PLATELET # BLD AUTO: 243 THOU/UL (ref 140–440)
PLATELET # BLD AUTO: 253 THOU/UL (ref 140–440)
PLATELET # BLD AUTO: 264 THOU/UL (ref 140–440)
PLATELET # BLD AUTO: 266 THOU/UL (ref 140–440)
PLATELET # BLD AUTO: 272 THOU/UL (ref 140–440)
PMV BLD AUTO: 11.2 FL (ref 8.5–12.5)
PMV BLD AUTO: 11.4 FL (ref 8.5–12.5)
PMV BLD AUTO: 11.5 FL (ref 8.5–12.5)
PMV BLD AUTO: 11.5 FL (ref 8.5–12.5)
PMV BLD AUTO: 11.8 FL (ref 8.5–12.5)
PMV BLD AUTO: 11.9 FL (ref 8.5–12.5)
PMV BLD AUTO: 12 FL (ref 8.5–12.5)
PO2 BLD: 100 MM HG (ref 75–85)
PO2 BLD: 100 MM HG (ref 75–85)
PO2 BLD: 107 MM HG (ref 75–85)
PO2 BLD: 113 MM HG (ref 75–85)
PO2 BLD: 75 MM HG (ref 75–85)
PO2 BLD: 77 MM HG (ref 75–85)
PO2 BLD: 85 MM HG (ref 75–85)
PO2 BLD: 90 MM HG (ref 75–85)
PO2 BLD: 94 MM HG (ref 75–85)
PO2 BLD: 98 MM HG (ref 75–85)
POTASSIUM BLD-SCNC: 3.3 MMOL/L (ref 3.5–5)
POTASSIUM BLD-SCNC: 3.8 MMOL/L (ref 3.5–5)
POTASSIUM BLD-SCNC: 3.8 MMOL/L (ref 3.5–5)
POTASSIUM BLD-SCNC: 4.1 MMOL/L (ref 3.5–5)
POTASSIUM BLD-SCNC: 4.3 MMOL/L (ref 3.5–5)
POTASSIUM BLD-SCNC: 4.7 MMOL/L (ref 3.5–5)
PROT SERPL-MCNC: 4 G/DL (ref 6–8)
RATE: 28 RR/MIN
RBC # BLD AUTO: 2.09 MILL/UL (ref 4.4–6.2)
RBC # BLD AUTO: 2.18 MILL/UL (ref 4.4–6.2)
RBC # BLD AUTO: 2.34 MILL/UL (ref 4.4–6.2)
RBC # BLD AUTO: 2.43 MILL/UL (ref 4.4–6.2)
RBC # BLD AUTO: 2.5 MILL/UL (ref 4.4–6.2)
RBC # BLD AUTO: 2.52 MILL/UL (ref 4.4–6.2)
RBC # BLD AUTO: 2.81 MILL/UL (ref 4.4–6.2)
SODIUM SERPL-SCNC: 131 MMOL/L (ref 136–145)
SODIUM SERPL-SCNC: 134 MMOL/L (ref 136–145)
SODIUM SERPL-SCNC: 135 MMOL/L (ref 136–145)
SODIUM SERPL-SCNC: 135 MMOL/L (ref 136–145)
SODIUM SERPL-SCNC: 136 MMOL/L (ref 136–145)
SODIUM SERPL-SCNC: 136 MMOL/L (ref 136–145)
STATUS (HISTORICAL CONVERSION): NORMAL
TEMPERATURE: 37 DEGREES C
UFH PPP CHRO-ACNC: 0.2 IU/ML
UFH PPP CHRO-ACNC: 0.21 IU/ML
UFH PPP CHRO-ACNC: 0.21 IU/ML
UFH PPP CHRO-ACNC: 0.29 IU/ML
UFH PPP CHRO-ACNC: 0.32 IU/ML
UFH PPP CHRO-ACNC: 0.34 IU/ML
UFH PPP CHRO-ACNC: 0.38 IU/ML
UFH PPP CHRO-ACNC: 0.38 IU/ML
UFH PPP CHRO-ACNC: 0.48 IU/ML
UFH PPP CHRO-ACNC: 0.55 IU/ML
UFH PPP CHRO-ACNC: 0.64 IU/ML
UFH PPP CHRO-ACNC: <0.1 IU/ML
UNIT ABO/RH (HISTORICAL CONVERSION): NORMAL
UNIT NUMBER: NORMAL
VENTILATION MODE: AC
VENTILATOR TIDAL VOLUME: 520 ML
WBC: 10 THOU/UL (ref 4–11)
WBC: 10.7 THOU/UL (ref 4–11)
WBC: 11.8 THOU/UL (ref 4–11)
WBC: 12.7 THOU/UL (ref 4–11)
WBC: 12.8 THOU/UL (ref 4–11)
WBC: 14.4 THOU/UL (ref 4–11)
WBC: 14.8 THOU/UL (ref 4–11)

## 2021-01-01 ASSESSMENT — MIFFLIN-ST. JEOR
SCORE: 1816.75
SCORE: 1838.02
SCORE: 1840.75
SCORE: 1748.75
SCORE: 1836.75
SCORE: 1823.75
SCORE: 1840.75
SCORE: 1819.75
SCORE: 1829.75
SCORE: 1840.75
SCORE: 1807.75
SCORE: 1851.63
SCORE: 1845.75
SCORE: 1811.71
SCORE: 1841.75
SCORE: 1981.75
SCORE: 1835.75
SCORE: 1797.2
SCORE: 1813.08
SCORE: 1710.75
SCORE: 1776.75
SCORE: 1807.75
SCORE: 1854.75
SCORE: 1844.75
SCORE: 1845.75
SCORE: 1847.75
SCORE: 1981.75
SCORE: 1971.75
SCORE: 1790.85
SCORE: 1944.75
SCORE: 1713.75
SCORE: 1860.7

## 2021-06-05 VITALS — HEIGHT: 74 IN | BODY MASS INDEX: 32.54 KG/M2 | WEIGHT: 253.53 LBS

## 2021-06-05 VITALS — BODY MASS INDEX: 29.62 KG/M2 | WEIGHT: 230.82 LBS | HEIGHT: 74 IN

## 2021-06-13 NOTE — PLAN OF CARE
Neuro: Patient currently paralyzed and sedated on nimbex, fentanyl and versed. See flowsheet for titrations, titrated for RASS -5. Pupils equal and reactive, CPOT 0. Unable to move extremities purposefully related to paralytic and sedation. Plan to wean as tolerating.      Resp: 8.5 ET tube, 26 at the teeth. PEEP 16, , FiO2 at 65%. Suction and mouth care q 2 hourly, moderate amounts tan secretions suctioned orally and nasally. Moderate thick, tan secretions from ET tube. Bicarb gtt continues.      CV: Afib with PVCs, heartrate variable, BP 90-150s systolic, titrating Levophed  for MAPs >65. Extremities cool, with weak palpable pulses throughout. Vasopressin maxed at 2.4 units/min, Phenylepherine started this morning for additional blood pressure support per MD orders.      GI: Enteroflex remains in right nare, bridle for securement. Tube feeds currently on hold due to gastric retention. OGT placed on previous shift and to suction. BS hypoactive in all quadrants, bowel regimen in place. [facility] in place.     : Patient anuric. CRRT continues. HD catheter remains positional. See CRRT flowsheet for details. Filter changed at beginning of shift due to filter clogging alarm. At 0620, CRRT alarmed filter clotted. Blood unable to be returned to patient and set changed. MD declined further coagulation labs at this time.      Endo:  -150s,  Acu-checks q1H, insulin gtt continues.     Skin: Face edematous due to proning. Areas of denuded skin on left cheek. Wound care consulted. Turn and reposition for comfort as patient tolerates. Mepilex remains in place to coccyx area.      Problem: Pain  Goal: Patient's pain/discomfort is manageable  Outcome: Progressing     Problem: Safety  Goal: Patient will be injury free during hospitalization  Outcome: Progressing     Problem: Daily Care  Goal: Daily care needs are met  Outcome: Progressing     Problem: Psychosocial Needs  Goal: Demonstrates ability to cope with  hospitalization/illness  Outcome: Progressing     Problem: Glucose Imbalance  Goal: Achieve optimal glucose control  Outcome: Progressing     Problem: Potential for Compromised Skin Integrity  Goal: Skin integrity is maintained or improved  Outcome: Progressing     Problem: Urinary Incontinence  Goal: Perineal skin integrity is maintained or improved  Outcome: Progressing     Problem: Inadequate Gas Exchange  Goal: Patient will achieve/maintain normal respiratory rate/effort  Outcome: Progressing     Problem: Ineffective Airway Clearance  Goal: Maintain airway patency  Outcome: Progressing     Problem: Impaired Gas Exchange  Goal: Demonstrate improved ventilation and adequate oxygenation of tissues as evidenced by absence of respiratory distress  Outcome: Progressing     Problem: Breathing  Goal: Patient will maintain patent airway  Outcome: Progressing     Problem: Risk for Infection  Goal: Identify and demonstrate techniques, lifestyle changes to prevent/reduce risk of infection and promote safe environment  Outcome: Progressing     Problem: Potential for Falls  Goal: Patient will remain free of falls  Outcome: Progressing     Problem: Decreased Mental Status Causing Increased Need for Safety  Goal: Provide a safe environment for patient (Implement appropriate elements in plan of care)  Outcome: Progressing  Goal: Decrease patient's symptoms  Outcome: Progressing  Goal: To ensure patient safety, patient will abstain from any threats or actions to harm self or others  Outcome: Progressing     Problem: Mechanical Ventilation  Goal: Patient will maintain patent airway  Outcome: Progressing  Goal: Oral health is maintained or improved  Outcome: Progressing  Goal: Respiratory status - ventilation  Description: Movement of air in and out of the lungs.    Liberate from ventilator  Outcome: Progressing  Goal: ET tube will be managed safely  Outcome: Progressing  Goal: Mobility/activity is maintained at optimum level for  patient  Outcome: Progressing     Problem: Potential for Compromised Skin Integrity  Goal: Nutritional status is improving  Outcome: Not Progressing     Problem: Breathing  Goal: Patient will utilize incentive spirometer  Outcome: Not Progressing     Problem: Cognitive Impairment or Disorientation  Goal: Patient will maintain or return to normal baseline cognitive function  Outcome: Not Progressing     Problem: Mechanical Ventilation  Goal: Ability to express needs and understand communication  Outcome: Not Progressing     Problem: Psychosocial Needs  Goal: Collaborate with patient/family/caregiver to identify patient specific goals for this hospitalization  Outcome: Not Applicable this Shift     Problem: Discharge Barriers  Goal: Patient's discharge needs are met  Outcome: Not Applicable this Shift     Problem: Knowledge Deficit  Goal: Patient/family/caregiver demonstrates understanding of disease process, treatment plan, medications, and discharge instructions  Outcome: Not Applicable this Shift     Problem: Knowlegde Deficit  Goal: Verbalize understanding of condition/disease process and treatment, participate in lifestyle changes and treatment regimen  Outcome: Not Applicable this Shift

## 2021-06-13 NOTE — PLAN OF CARE
Problem: Pain  Goal: Patient's pain/discomfort is manageable  Outcome: Progressing     Problem: Safety  Goal: Patient will be injury free during hospitalization  Outcome: Progressing     Problem: Daily Care  Goal: Daily care needs are met  Outcome: Progressing     Problem: Glucose Imbalance  Goal: Achieve optimal glucose control  Outcome: Progressing     Problem: Potential for Compromised Skin Integrity  Goal: Skin integrity is maintained or improved  Outcome: Progressing  Goal: Nutritional status is improving  Outcome: Progressing     Problem: Urinary Incontinence  Goal: Perineal skin integrity is maintained or improved  Outcome: Progressing     Problem: Ineffective Airway Clearance  Goal: Maintain airway patency  Outcome: Progressing     Problem: Breathing  Goal: Patient will maintain patent airway  Outcome: Progressing     Problem: Potential for Falls  Goal: Patient will remain free of falls  Outcome: Progressing     Problem: Decreased Mental Status Causing Increased Need for Safety  Goal: Provide a safe environment for patient (Implement appropriate elements in plan of care)  Outcome: Progressing     Problem: Mechanical Ventilation  Goal: Patient will maintain patent airway  Outcome: Progressing  Goal: Oral health is maintained or improved  Outcome: Progressing  Goal: ET tube will be managed safely  Outcome: Progressing

## 2021-06-13 NOTE — PROGRESS NOTES
12/07/20 2230   Oxygen Therapy/Pulse Ox   O2 Device OxyMask   O2 Flow Rate (L/min) 9 L/min   O2 Therapy Oxygen   $ Oxygen Charge / Day Yes   SpO2 93 %   SpO2 Activity  O2 at rest   Pt has dry nonproductive cough, BS clear to dim, pt awake and alert sf in bed RR 24 on 9L oximask, HHNC on stand by outside of room will cont to monitor to wean O2 down if jp well     Rosangela Peterson, LRT

## 2021-06-13 NOTE — PROCEDURES
PIGTAIL CATHETER INSERTION  12/20/2020    The procedure was done as an emergency as the patient had a small right pneumothorax, on high PPV and hypoxic.   A time was performed prior to the procedure.   The right site was marked, prepped and draped in usual sterile fashion. 4 ml of 1% Lidocaine was used to anesthetize the skin down to the rib and along the proposed insertion path for the tube.  An 18 gauge needle with syringe attached was inserted into the pleural space with aspiration of air  to verify placement. A guide wire was advanced into the pleural space and the needle was withdrawn. A 0.5cm incision was made through the skin and the subcutaneous tissues were dilated. The 14Fr Arrow pigtail chest drain was inserted into the pleural space. The trochar and wire were subsequently removed. The drain was then immediately connected to a Pleur-evac. However, CXR reveals a pigtail in the subcutaneous tissue and was thus removed. On second attempt, the above name steps were repeated and adequate placement was confirmed by CXR.   The tube was sutured in place and dressing applied. The patient tolerated the procedure well.       EBL 5 ccs.     CXR imaging was ordered and confirmed adequate placement.     Mary De La Mater

## 2021-06-13 NOTE — PLAN OF CARE
Per chart review and discussion with OT and RN, Plan to Hold PT eval and treat order at this time d/t patient's increased O2 needs. OT will continue to check with RN and medical team in order to perform evaluation when medically stable.

## 2021-06-13 NOTE — PLAN OF CARE
Pt supinated around 1145, ABGs improved with 1545 draw.  Remains paralyzed, however paralytic switched from jarod to nimbex for worsening renal function.  Train 0/4, bis in 10s-30s.  No neurological response obtained and pt is synchronis with the vent.    Rectal tube inserted for multiple loose bowel movements between 5966-9155.  UO 200mL for 12 hours shift.  Funk flushed and bladder scan shows 0 mL.  MD aware.  Neph consulted today, see note.  TF started at 1330 per RD recommendations.  Tolerating at this time.  Heparin gtt started today for VTE/DVT prevention.  Started on insulin gtt due to hyperglycemia.  Restraints dc'd.

## 2021-06-13 NOTE — PROGRESS NOTES
12/11/20 1950   Patient Data   Vt Exp (mL) 472 mL   Minute Ventilation (L/min) 11.9 L/min   Total Resp Rate  24 BPM   PIP Observed (cm H2O) 26 cm H2O   MAP (cm H2O) 18   Plateau Pressure (cm H2O) 25 cm H2O   SpO2 97 %

## 2021-06-13 NOTE — ED NOTES
IV fluids running without difficulty, pt. Repositioned, resp. Even/unlabored, updated on plan. WCTM

## 2021-06-13 NOTE — PROGRESS NOTES
12/18/20 0726   Patient Data   Vt Exp (mL) 491 mL   Minute Ventilation (L/min) 11.6 L/min   Total Resp Rate  24 BPM   PIP Observed (cm H2O) 30 cm H2O   MAP (cm H2O) 19   Auto/Intrinsic PEEP Observed (cm H2O) 0.8 cm H2O   Plateau Pressure (cm H2O) 26 cm H2O   Dynamic Compliance (L/cm H2O) 30 L/cm H2O   Airway Resistance 11   SpO2 95 %   Heart Rate 87

## 2021-06-13 NOTE — PROGRESS NOTES
Care Management Follow Up Note    Length of Stay (days) 3     Patient plan of care discussed at Interdisciplinary Rounds: yes                Expected Discharge Date: (TBD)  Concerns to be Addressed / Barriers to Discharge:  on High flow O2    Anticipated Discharge Disposition:  TBD  Anticipated Discharge Services:      Selected Continued Care - Admitted Since 12/6/2020    No services have been selected for the patient.        Anticipated Discharge DME:      Plan:  Chart review.  Patient is  and lives independent.  CM will continue to follow.    MARCI Arias LICSW 12/9/2020 9:58 AM

## 2021-06-13 NOTE — PROGRESS NOTES
PM ABG with PF ratio of 132 so will prone.,  But also acute rise in Cr to 2.3 from 0.9 this morning associated with decrease in uo from 2.9 L to < 1 L today. Bicarb lower also.  K ok      P: prone with vecuronium   1.5 L LR over 2 hrs and assess uo and BMP in AM  Cont versed at 8.  Plus fentanyl.    Also glucose 280--if does not improve with sliding scale insulin will start drip.      Also, will stop enoxaparin b/c NICKY and start low intensity heparin drip but last dose of enoxaparin was at 10 pm so doesn't need drip started for several hours and will check Xa level to guide start time.,

## 2021-06-13 NOTE — PROGRESS NOTES
12/08/20 1914   Oxygen Therapy/Pulse Ox   O2 Device High-flow NC   O2 Flow Rate (L/min) 50 L/min   FiO2 (%) 80 %   SpO2 95 %

## 2021-06-13 NOTE — PROGRESS NOTES
12/10/20 0148   Non-Invasive    Mode S/T   IPAP 10 cmH2O   EPAP 5 cmH2O   Resp Rate (Set) 10   FiO2 (%) 60 %   Monitoring   Resp Rate Observed 26   Tidal (Observed) 631 mL   Minute Ventilation (L/min) 14.3 L/min   PIP Observed (cm H2O) 11 cm H2O   Ti/Ttot 31   NPPV Other   SpO2 96 %   Heart Rate (!) 54

## 2021-06-13 NOTE — ED NOTES
Blood work sent to lab, pt. Ready for XR. 2nd set of bloods cultures done via straight stick by Writer to VENKATESH Bird, pt. Tolerated well, x 1 attempt. Site is dry and intact.

## 2021-06-13 NOTE — PROGRESS NOTES
Attempted to draw arterial blood gas from right radial artery twice but was unable to achieve blood return. Patient did not tolerate the stick well and was unable to hold still. Patient has significant scarring on both wrists from previous surgical procedures.     ABG successfully drawn by RT Li. Blood sent to lab.

## 2021-06-13 NOTE — PROGRESS NOTES
12/09/20 0154   Non-Invasive    Mode S/T   IPAP 10 cmH2O   EPAP 5 cmH2O   Resp Rate (Set) 10   Insp Time (sec) 1 sec   FiO2 (%) 60 %   Monitoring   Resp Rate Observed 32   Tidal (Observed) 672 mL   Minute Ventilation (L/min) 22.1 L/min   PIP Observed (cm H2O) 11 cm H2O   Ti/Ttot 40   NPPV Other   SpO2 94 %   Heart Rate (!) 55

## 2021-06-13 NOTE — PROCEDURES
FIBEROPTIC BRONCHOSCOPY PROCEDURE NOTE (NON-OR)    Date of Procedure: 12/19/2020  Performing Provider: Mary Dove  Pre-Procedure Diagnosis:   Septic shock, ARDS  Post-Procedure Diagnosis:  Same as Pre-Procedure Diagnosis  Procedure:  Diagnostic Flexible Fiberoptic Bronchoscopy   Indications:  Felipe Cruz is a 74 y.o. male with COVID-19 ARDS and septic shock, with new persistent hypothermia  Preop evaluation:  Procedure:  Intravenous Sedation.    Expected level:  Moderate sedation  ASA Class:  ASA 4 - Patient with severe systemic disease that is a constant threat to life  Mallampati:  intubated.  Anesthesia:  Ongoing Midazolam, Fentanyl and Cisatracurium infusions  Specimen:  BAL  Estimated Blood Loss:  Minimal ml  Complications:  none    Findings:    Distal Trachea  Normal    Nishi  Normal    Right Bronchial Tree  Normal    Left Bronchial Tree  Normal    Clear airways with minimal secretions  BAL  Obtained from lingula, blood tinged    Procedure Details:   The patient was seen and the risks, benefits, complications, treatment options, and expected outcomes were discussed with WIFE   . The risks and potential complications of their problem and proposed treatment include but are not limited to infection, bleeding, pain, adverse drug reaction, pulmonary aspiration, the need for additional procedures, failure to diagnose a condition, creating a complication requiring transfusion or operation, and complication secondary to the anesthetic.  The patient/alternate (see above) concurred with the proposed plan, giving informed consent.  The patient was identified as Felipe Cruz with Date of Birth 1945 and the procedure verified as Diagnostic Flexible Fiberoptic Bronchoscopy .  A Time Out was held and the above information confirmed.    The patient was hyperoxygenated prior to the procedure, Vts 450 and PIP 28 noted. The patient was placed in the supine position and the bronchoscope was passed through the ETT.     Findings and specimen details recorded above.  The scope was advanced into the right main bronchus and then into the RUL, RML, and RLL bronchi. The scope was then retracted and sequentially passed into the left main and then left upper and lower bronchi. A BAL specimen was obtained from the lingula, 40 ml injected, 10 ml returned to trap.   Findings and specimen details recorded above.     Additional Procedures: None    The patient tolerated the procedure well. SpO2 remained 100%, PIP 27 and VTs 470  Post procedure.       Maryoralia Dove, 12/19/2020, 2:00 PM  Dr. Massey supervised the procedure from insertion to removal of the scope.     Referring Physician: * No referring provider recorded for this case *  Attending Physician: Nicci Gilbert MD  Primary Care Physician: Wero Nolasco MD

## 2021-06-13 NOTE — PROGRESS NOTES
"Clinical Nutrition Therapy follow Up Note    Reason for Assessment:   Felipe Cruz is a 74 y.o. male assessed by the registered Dietitian for protocol/pathway order ICU  Presents with NICKY  Hx DM2. Chronic pain, HTN    Pt intermittently on Bipap/Highflow  Current Nutrition Prescription:   Diet: Diabetic  Supplement: 1 boost GC daily  IV dextrose or Fluids:      Current Nutrition Intake:  Pt unable to tolerate high flow long enough to eat breakfast this am. Ate 100% and 50% yesterday. Eating % overall since admit    Pt is meeting < 75% of his estimated needs x 5 days during stay    Anthropometrics:  Height: 6' 2\" (188 cm)  Admission weight: 255 lb-stated  Weight: 218 lb 14.7 oz (99.3 kg) 12/10, trending down, 3 lb past 2 days  Weight discrepancy 255 to 221 lb 12/8- New bed with ICU transfer vs bed not zeroed  BMI: 32.7  BMI indication: 30-34.9 obesity (class 1)  Ideal body weight 190 lb  Weight History:  Wt Readings from Last 10 Encounters:   12/10/20 218 lb 14.7 oz (99.3 kg)     All weights from the last year are 252 lb, so likely stated. His wife said he has been steadily losing weight over the last three years. Some of it has been intentional and some has occurred due to decreased PO in older age. She thinks he has lost weight in the last 2-3 weeks but is not sure how much or what his UBW is. He did talk about having to tighten his belt PTA.    RD Nutrition Focused Physical Exam:  The patient has the following physical signs which could indicate malnutrition: Deferred due to social distancing policy    GI Status/Output:   BM 12/8 per chart.    Skin/Wound:   Mode Scale Score: 19    Medications:  SSI, lantus  K+ replacement, iv lasix today. Vit C 1000 mg, vit D, miralax daily, Zn-guconate added today. Remdesivir dc'd. Decadron, lantus bid and carb dosed insulin added.     Labs:  Lab Results   Component Value Date/Time    ALBUMIN 3.0 (L) 12/06/2020 04:36 PM     12/10/2020 03:52 AM    K 3.5 12/10/2020 " 03:52 AM    BUN 27 12/10/2020 03:52 AM    CREATININE 0.85 12/10/2020 03:52 AM     (H) 12/10/2020 03:52 AM    PHOS 2.8 12/10/2020 03:52 AM    MG 1.8 12/10/2020 03:52 AM   Na improved  BG  mg/dl past 24 hours. More elevated today on steroid. Insulin was added in response      Estimated Nutrition Needs:- May need to reassess estimated needs pending accurate weight  Assessment weight is 94 kg, adjusted weight    Energy Needs: 7436-7011 kcals daily, 25-30 kcal/kg  Protein Needs: 110-140 g daily, 1.2-1.5 g/kg  Fluid Needs: 8108-5865 mls daily, 25-30 mls/kg    Malnutrition: Patient does not meet 2 diagnostic criteria for malnutrition     Nutrition Risk Level: high risk    Nutrition dx:   Increased nutrient needs r/t illness as evidenced by Covid.   Inadequate oral intake r/t increased respiratory needs evidenced by dependence on bipap    Goal:   Maintain weight- unable to determine d/t weight discrepancies  PO intake > 75% of estimated nutrition needs - not progressing d/t increased O2 needs    Intervention:   Request bed to be zeroed  Add mvi with minerals to help meet micronutrient needs d/t intermittent ability to take p.o and prior decreased intake    Monitoring/Evaluation:   PO, weight, labs    Nutrition History:  Information from family/caregiver and chart.  Diet prior to admission: Regular. He was eating ~50% of usual intake for 1-2 days PTA and slightly less than normal for a few days before that. He typically eats foods like sandwiches, eggs and soup with crackers and drinks diet pop, Gatorade and water.  Recent food/fluid intake: SHAD-no PO recorded  COVID GI sx of occasional nausea and poor appetite    Electronically signed by:  Elli Hurley, SELINA

## 2021-06-13 NOTE — PROGRESS NOTES
Clinical Nutrition Therapy Follow Up Note      Current Nutrition Prescription:   Diet: Tube Feeding, No Tray  Formula: Peptamen Intense VHP @ 70ml/hr  Flushes: 60ml water every 4 hours  Diet Supplements: 1 pkt prosource daily  IV dextrose or Fluids:     dexmedetomidine infusion orderable (PRECEDEX), Last Rate: 0.599 mcg/kg/hr (12/13/20 1600)       dextrose 10%       fentaNYL 2500 mcg/50 mL (50 mcg/mL), Last Rate: 200 mcg/hr (12/13/20 1600)      Current Nutrition Intake:  Enteral nutrition access is a nasal duodenal tube, with a placement date of 12/11/20. The current tube feeding order will provide 1680 kcals, 155 grams protein, 7 grams fiber, 128 grams carbohydrate, 1411mls free water from formula, 360 mls from fluid flush, for a total of 1771 mls free water daily.     Prosource brings daily totals to 1740kcals and 170g protein daily    Anthropometrics:  Admission weight: 255 lb-stated  Weight: (!) 247 lb 2.2 oz (112.1 kg) nonpitting edema LEs, per I/Os fluid down 4.2L (about 9.2lbs)    GI Status/Output:   GI symptoms include: Diarrhea per nurse  Bowel Sounds present per nurse  Last BM: 12/13/20 per nurse    Skin/Wound:  Shin abrasion and bruising was noted.    Medications:  Medications reviewed: Prosource, 1000 mg vitamin c (toxicity may cause diarrhea), famotidine, pepcid, novolog, lantus, miralax, phos-nak, decadron     Labs:  Labs reviewed: -307, Bun 55, Phos 2.4, rbc 3.80, hgb 11.6, hematocrit 34.4    Estimated Nutrition Needs:     Energy Needs: 0617-4070 kcals daily, 14-17kcal/kg current weight of 115.9 kg  Protein Needs: >172 g daily, >2 g/kg IBW 86.3 kg  Fluid Needs: 1 ml/kcal or per Md    Malnutrition: Not noted    Nutrition Risk Level: high risk    Nutrition dx:  Impaired swallow function r/t increased respiratory needs evidenced by NPO/intubation    Goal Status:  Regular bowels - progressing  Nutrition intake > 75% of estimated needs- met  jp TF at goal. - progressing    Intervention:  Increased  TF to 80ml/hr to better meet protein needs    At goal, Peptamen Intense VHP will provide 1920kcals, 177g protein, 146g CHO, 73g fat, 8g fiber, 1613ml free water, 360ml water from flushes for a total of 1973ml fluid daily.     Monitoring/Evaluation:  TF tolerance, wt, labs, stooling

## 2021-06-13 NOTE — ED TRIAGE NOTES
Pt reports developing COVID symptoms last weekend and last Monday he went to his clinic. Pt was prescribed Z-pack, prednisone and albuterol MDI for a respiratory infection and was tested for COVID, which resulted positive on Wednesday. Pt's wife also has COVID. Pt reports ongoing cough, fever, shortness of breath and today his O2 saturation at home was in the mid-80s so he called his clinic and was referred to ED.

## 2021-06-13 NOTE — PLAN OF CARE
Neuro: Patient currently paralyzed and sedated on veceronium, fentanyl and versed. See flowsheet for titrations, titrated for RASS -5. Pupils equal and reactive, CPOT 0. Unable to move extremities purposefully related to paralytic and sedation. Plan to wean as tolerating.      Resp: 8.5 ET tube, 26 at the teeth, tube withdrawn back from 28 early in shift per MD orders. PEEP 16, , FiO2 at 80%. Suction and mouth care q 2 hourly, moderate amounts tan secretions suctioned orally and nasally. Moderate thick, tan secretions from ET tube. Arterial blood gases remain throughout shift had critical results. MD notified. 3 amps of sodium bicarbonate given over shift. This am Bicarb gtt started. See MAR for times and dosages.     CV: Afib with PVCs, heartrate variable ,BP 90-150s systolic, titrating Levophed  for MAPs >65. Extremities cool, with weak palpable pulses throughout. Vasopressin maxed at 2.4 units/min, Phenylepherine started this morning for additional blood pressure support per MD orders.      GI: Enteroflex remains in right nare, bridle for securement. Tube feeds currently at goal of 35mL/hr. BS hypoactive in all quadrants, bowel regimen in place. [facility] in place.     : Patient anuric. CRRT continues. HD catheter remains positional. See CRRT flowsheet for details. Currently removing no additional fluid per MD orders.     Endo:  -150s,  Acu-checks q1H, insulin gtt continues.     Skin: Face edematous due to proning. Areas of denuded skin on left cheek. Wound care consulted. Turn and reposition for comfort as able. Mepilex remains in place to coccyx area.      Problem: Pain  Goal: Patient's pain/discomfort is manageable  Outcome: Progressing     Problem: Safety  Goal: Patient will be injury free during hospitalization  Outcome: Progressing     Problem: Daily Care  Goal: Daily care needs are met  Outcome: Progressing     Problem: Psychosocial Needs  Goal: Demonstrates ability to cope with  hospitalization/illness  Outcome: Progressing  Goal: Collaborate with patient/family/caregiver to identify patient specific goals for this hospitalization  Outcome: Progressing     Problem: Glucose Imbalance  Goal: Achieve optimal glucose control  Outcome: Progressing     Problem: Potential for Compromised Skin Integrity  Goal: Nutritional status is improving  Outcome: Progressing     Problem: Urinary Incontinence  Goal: Perineal skin integrity is maintained or improved  Outcome: Progressing     Problem: Ineffective Airway Clearance  Goal: Maintain airway patency  Outcome: Progressing     Problem: Breathing  Goal: Patient will maintain patent airway  Outcome: Progressing     Problem: Knowlegde Deficit  Goal: Verbalize understanding of condition/disease process and treatment, participate in lifestyle changes and treatment regimen  Outcome: Progressing     Problem: Risk for Infection  Goal: Identify and demonstrate techniques, lifestyle changes to prevent/reduce risk of infection and promote safe environment  Outcome: Progressing     Problem: Potential for Falls  Goal: Patient will remain free of falls  Outcome: Progressing     Problem: Decreased Mental Status Causing Increased Need for Safety  Goal: Provide a safe environment for patient (Implement appropriate elements in plan of care)  Outcome: Progressing  Goal: Decrease patient's symptoms  Outcome: Progressing  Goal: To ensure patient safety, patient will abstain from any threats or actions to harm self or others  Outcome: Progressing     Problem: Mechanical Ventilation  Goal: Patient will maintain patent airway  Outcome: Progressing  Goal: Oral health is maintained or improved  Outcome: Progressing  Goal: ET tube will be managed safely  Outcome: Progressing  Goal: Mobility/activity is maintained at optimum level for patient  Outcome: Progressing     Problem: Potential for Compromised Skin Integrity  Goal: Skin integrity is maintained or improved  Outcome: Not  Progressing     Problem: Inadequate Gas Exchange  Goal: Patient will achieve/maintain normal respiratory rate/effort  Outcome: Not Progressing     Problem: Impaired Gas Exchange  Goal: Demonstrate improved ventilation and adequate oxygenation of tissues as evidenced by absence of respiratory distress  Outcome: Not Progressing     Problem: Cognitive Impairment or Disorientation  Goal: Patient will maintain or return to normal baseline cognitive function  Outcome: Not Progressing     Problem: Mechanical Ventilation  Goal: Respiratory status - ventilation  Description: Movement of air in and out of the lungs.    Liberate from ventilator  Outcome: Not Progressing  Goal: Ability to express needs and understand communication  Outcome: Not Progressing     Problem: Discharge Barriers  Goal: Patient's discharge needs are met  Outcome: Not Applicable this Shift     Problem: Knowledge Deficit  Goal: Patient/family/caregiver demonstrates understanding of disease process, treatment plan, medications, and discharge instructions  Outcome: Not Applicable this Shift     Problem: Breathing  Goal: Patient will utilize incentive spirometer  Outcome: Not Applicable this Shift

## 2021-06-13 NOTE — PROGRESS NOTES
12/06/20 2134   Oxygen Therapy/Pulse Ox   O2 Device OxyMask   O2 Flow Rate (L/min) 10 L/min   O2 Therapy Oxygen   $ Oxygen Start (RT) Yes   $ Oxygen Charge / Day Yes   SpO2 92 %   SpO2 Activity  O2 at rest     Oxygen need increased but respiratory distress not note. Deep breath encouraged but pt is shallow breathing due to spontaneous coughing. RT will monitor.    ROBER JohnsonT

## 2021-06-13 NOTE — PROGRESS NOTES
"Clinical Nutrition Therapy Follow Up Note      Current Nutrition Prescription:   Diet:TF: Replete No fiber restarted 12/17, titrating to goal 80 ml/hr. Current rate at 40 ml/hr   Flush: 100 ml H20 q 4 hours  IV dextrose or Fluids:     cisatracurium (NIMBEX) infusion, Last Rate: 1 mcg/kg/min (12/18/20 0753)       dextrose 10%       epoprostenol, Last Rate: 20 ng/kg/min (12/18/20 0312)       fentaNYL citrate (PF), Last Rate: 100 mcg/hr (12/18/20 0700)       heparin, Last Rate: 900 Units/hr (12/18/20 0700)       insulin infusion (1 unit/mL), Last Rate: 4 Units/hr (12/18/20 0857)       midazolam, Last Rate: 5 mg/hr (12/18/20 0700)       norepinephrine, Last Rate: 0.04 mcg/kg/min (12/18/20 0858)       sodium chloride 0.9%, Last Rate: 10 mL/hr (12/18/20 0700)       vasopressin, Last Rate: Stopped (12/18/20 0640)      Current Nutrition Intake:  Enteral nutrition access is a nasal gastric tube, with a placement date of 12/11/20.   Enteral nutrition at goal provides: 1920 ml/kcal, 119 g protein, 238 g cho, 0 fiber, 1792 ml H20 total.     Received 1320 ml in IVF carriers yesterday including 500 ml LR bolus    Pt had been NPO x 3 days prior to TF restart  Pt is now meeting 50% of estimated nutrition needs from all sources with TF at current rate.     Anthropometrics:  Height: 6'2\"  Admission weight: 255 lb  Weight: (!) 237 lb 7 oz (107.7 kg)(1 pillow, 1 sheet, 1chux. )12/18, trending down, stable with day prior, trending down 21 lb x 1 week.   +1 generalized edema per nsg, increased. I>O yesterday, net neg 3.6L with improved UOP  Bed zeroed at 256 lb 12/10  BMI: 30.5  BMI indication: 30-34.9 obesity (class 1)  Ideal body weight 190 lb  7% weight loss x 1 week as of 12/18     GI Status/Output:   Bowel Sounds hypoactive per nurse  Rectal tube placed last night, 50 ml OP- mucous, green  On 1-2 pressors    Physical Assessment:   Seen through the window: Mild temporal and orbital fat and muscle loss    Skin/Wound:  No wound noted " per WOC     Medications:  Medications reviewed:  1000 mg vitamin c , prilosec/pantoprazole,  phos-nak, oxycodone q 4 hours, colace bid, senna bid  Iv albumin added today. Iv abx, iv lasix q 8 hr, NaHCO3 tid  added. K+ replacement yesterday. Miralax, phos-nak dc'd. SSI and lantus changed to insulin drip    Labs:  Lab Results   Component Value Date/Time    ALBUMIN 1.9 (L) 2020 09:14 PM     (H) 2020 05:19 AM    K 4.3 2020 05:19 AM    BUN 81 (H) 2020 05:19 AM    CREATININE 4.49 (H) 2020 05:19 AM     (H) 2020 05:19 AM    PHOS 4.8 (H) 2020 05:19 AM    MG 2.3 2020 05:19 AM   BUN/CR increasing  Na, phos improved  B-152 mg/dl past 24 hours.  In good control    Estimated Nutrition Needs:     Energy Needs: 4854-0303 kcals daily, 14-17kcal/kg current weight of 107.7 kg  Protein Needs: 103-129g daily, 1.2-1.5 g/kg IBW 86.3 kg- recalculated for NICKY  Fluid Needs: 1 ml/kcal or per Md     Malnutrition: Patient meets diagnostic criteria for moderate protein calorie malnutrition in the context of acute illness as evidenced by  unintentional weight loss, subcutaneous fat loss, muscle loss and fluid accumulation    Nutrition Risk Level: high risk    Nutrition dx:  Impaired swallow function r/t increased respiratory needs evidenced by NPO/intubation    Malnutrition r/t acute illness evidenced by 7% weight loss x 1 week, mild fluid accumulation, mild fat and muscle loss    Altered nutrient utilization r/t NICKY evidenced by increased renal labs, fluid retention, decrease UOP    Goal Status:  Regular bowels - progressing  Nutrition intake > 75% of estimated needs- progressing  jp TF at goal. -progressing  No increase in renal labs d/t intake - new    Intervention:  Change TF formula to Novasource renal to meet needs with increasing renal failure @35 ml/hr + 2 no carb prosource = 840 ml, 1800 kcal, 106 g protein, 0 fiber, 153 g cho, 1196 ml H20 total.      Monitoring/Evaluation:  TF tolerance, wt, labs, stooling    Nutrition History:  Information from family/caregiver and chart.  Diet prior to admission: Regular. He was eating ~50% of usual intake for 1-2 days PTA and slightly less than normal for a few days before that. He typically eats foods like sandwiches, eggs and soup with crackers and drinks diet pop, Gatorade and water.  Recent food/fluid intake: SHAD-no PO recorded  COVID GI sx of occasional nausea and poor appetite

## 2021-06-13 NOTE — PROGRESS NOTES
12/22/20 0736   Patient Data   Vt Exp (mL) 515 mL   Minute Ventilation (L/min) 14.6 L/min   Total Resp Rate  32 BPM   PIP Observed (cm H2O) 32 cm H2O   MAP (cm H2O) 22   Auto/Intrinsic PEEP Observed (cm H2O) 0.9 cm H2O   Plateau Pressure (cm H2O) 28 cm H2O   Dynamic Compliance (L/cm H2O) 39.2 L/cm H2O   Airway Resistance 7.5   SpO2 98 %

## 2021-06-13 NOTE — PROGRESS NOTES
[facility]    ICU PROGRESS NOTE:  DOS:  12/21/2020    Patient Summary:   Felipe Cruz is a 74 year old male with a PMH of CAD, HTN, HLD, DM2, and BPH who was admitted to [facility] on 12/6/2020.  He was admitted to the general care floor. He had increased oxygenation needs, started on HFNC, and transferred to the ICU on 12/8. He was intubated for worsening hypoxia on 12/10. He self extubated on 12/10, required reintubation, and again self-extubated on 12/14. He was reintubated 12/16 for worsening hypoxia. Course complicated by anuric NICKY requiring CRRT and now profound shock requiring three vasopressors.    Major Changes for Today    Add on hepatic profile  Give additional 1 gram Calcium Chloride  Decrease enteral feedings to trophic  Clarify GOC with spouse    Assessment/Plan:    NEUROLOGIC:  # acute pain  # agitation/anxiety  # delirium  # encephalopathy   Did not tolerate Precedex and Propofol previously   - Fentanyl and versed drips with PRN boluses available  - Paralyzed with Cisatracurium. Leave on given tenuous status   - Seroquel 50mg two times a day.    -RASS goal -5 while paralyzed.     HEMODYNAMICS/CV:  # Shock, septic  12/16: Echo: EF > 63%, Normal RV And LV fn, normal valves. No Tamponade   Escalating doses of vasopressors overnight. CRRT I=O  - Continue norepinephrine, vasopressin and phenylephrine for MAP > 65  - Add on lactate  - Stress dose steroids restarted 12/20     RESPIRATORY:  # Acute hypoxic respiratory failure   # ARDS 2nd to COVID19  # Klebsiella PNA   # Right apical pneumothorax s/p pigtail insertion 12/20  Inhaled Velitri discontinued 12/20  - intubated 12/10, self-extubated 12/14, reintubated 12/16  - VC 32/480/14/   - Titrate FiO2 to maintain SpO2 >92%  - Ventilator bundle  - Leave prone this AM given tenuous status (unable to even tolerate head turns)  - Leave CT to suction, continuous air leak noted    GI:  # Severe protein calorie  malnutrition  # Constipation, now resolved   Last BM 12/20  - RD following. RD managing TF's- decrease to trophic given high pressor requirement  - protein BID  - bowel regimen: miralax, senna, docusate sodium daily     RENAL:  # Anuric NICKY   # Mixed metabolic and respiratory acidosis  Started on CRRT 12/18  - Continue to keep I&O even today given profound hypotension.   - Electrolytes balanced  - Persistently acidemic, bicarb drip started overnight, continue  - On enteral soium bicarb  - Bladder scan PRN and straight cath if PVR > 250.      ID:  # COVID 19   # CAP with COVID19  # Klebsiella VAP  # Septic shock  # Severe Hypothermia  - WBC:   16-->22K-->28K  - Hypothermia 12/19 93 degrees--> now normothermic  - 12/19 fungitell, galactomannan antigen, fungal BC, BC x2 pending. A BAL with GS, AFB, KOH, fungal culture were ordered; however, the microbiology lab does not currently have the sample. Very minimal secretions on bronch 12/19 (see my note), will not repeat at this time.   - ABX: started zosyn 12/16, changed to Ceftriaxone 12/17. 12/19 Vancomycin, Meropenem and Fluconazole started.   - cultures:               - 12/6 blood: staph epi, contaminant                - 12/11 blood cultures: NGTD                - 12/11 urine: NGTD               - 12/12 sputum: Klebsiella pneumoniae               - 12/12  Blood cultures- NGTD  - completed remdesivir course   - decadron 6 mg IV X 10 days      HEME:  # anemia of critical illness  # coagulopathy due to COVID -19  # Leukocytosis WBC:   16-->22K-->28K  - Hemoglobin stable 10.8  - Continue with Heparin gtt at low intensity   - ID management as above     ENDOCRINE:  # DM II  # Stress and steroid induced hyperglycemia  HgbA1C on admit 8.2  - PTA Lantus and oral agents on hold  - goal glucose < 180 for optimal healing  - continue with insulin infusion    MSK:  # acute critical care weakness/deconditioning   - hold OOB activity while prone/paralyzed. ROM     SOCIAL:  Discussed  "patient's condition and grave prognosis with wife over phone today. Would like to proceed with aggressive cares including full code at this time at the direction of their previous discussions prior to hospitalization. Palliative care offered and was declined. Will continue to have ongoing discussions.    PROPHYLAXIS:   DVT proph:Yes. Heparin gtt  GI proph: Yes.  Requires solorzano for strict I&O: Yes  Restraints required for safety: Yes     DISPOSITION:  - ICU    LINES/TUBES/DRAINS:  - R PICC, placed 12/9  - L art line, placed 12/10  - R fem dialysis line 12/19  - Right pigtail chest tube     Total Critical Care Time : 45 Minutes exclusive of procedures     Morenita Barahona    Overnight events:   Physical Exam:  Vent settings for last 24 hours:  Vent Mode: AC  FiO2 (%):  [75 %-100 %] 75 %  S RR:  [32] 32  S VT:  [80 mL-520 mL] 520 mL  PEEP/CPAP (cm H2O):  [14 cm H2O-18 cm H2O] 16 cm H2O  Minute Ventilation (L/min):  [15 L/min-15.8 L/min] 15.1 L/min  PIP:  [32 cm H2O-36 cm H2O] 36 cm H2O  MAP (cm H2O):  [22-29] 29    /50 (Patient Position: Semi-mueller)   Pulse (!) 126   Temp 96.9  F (36.1  C) (Esophageal)   Resp (!) 29   Ht 6' 2\" (1.88 m)   Wt (!) 234 lb 5.6 oz (106.3 kg)   SpO2 98%   BMI 30.09 kg/m      Intake/Output last 3 shifts:  I/O last 3 completed shifts:  In: 5470.2 [I.V.:3860.2; NG/GT:1310; IV Piggyback:300]  Out: 4952 [Other:4852; Stool:50; Chest Tube:50]  Intake/Output this shift:  I/O this shift:  In: 1009.4 [I.V.:839.4; NG/GT:170]  Out: 1162 [Other:1077; Chest Tube:85]    Physical Exam  Neuro: chemically sedated, paralyzed.  HEENT:  PERRLA. Pupils 2. ETT present and secured. NJ present and TF's infusing   RESP: BBS, lungs diminished throughout, no wheezes. Synchronous with ventilator  CV: extremities warm, pulses palpable. Prone.   GI: prone, not examined  : no urine seen  Extremities: trace edema, pulses 2+ Cap refill brisk.     LAB:  Results from last 7 days   Lab Units 12/21/20  0754 " 12/21/20  0405   LN-WHITE BLOOD CELL COUNT thou/uL  --  28.6*   LN-HEMOGLOBIN g/dL 10.9* 10.8*   LN-HEMATOCRIT %  --  35.5*   LN-PLATELET COUNT thou/uL 302 301     Results from last 7 days   Lab Units 12/21/20  0754 12/21/20  0405 12/20/20  2355 12/20/20  0557 12/20/20  0557 12/19/20  0758 12/19/20  0758 12/19/20  0430 12/16/20 2114 12/16/20 2114   LN-SODIUM mmol/L 140 137 138   < > 137  137   < > 140 141   < > 147*   LN-POTASSIUM mmol/L 4.6 5.1* 4.7   < > 5.0  5.1*   < > 4.4 4.3   < > 4.9   LN-CHLORIDE mmol/L 102 103 103   < > 105  105   < > 107 107   < > 113*   LN-CO2 mmol/L 22 18* 19*   < > 18*  18*   < > 19* 20*   < > 17*   LN-BLOOD UREA NITROGEN mg/dL  --  35*  --   --  38*  --   --  57*   < > 61*   LN-CREATININE mg/dL  --  1.96*  --   --  2.59*  --   --  3.41*   < > 2.29*   LN-CALCIUM mg/dL 8.3* 8.3* 8.2*   < > 8.4*  8.4*   < > 8.3* 8.4*   < > 8.6   LN-PROTEIN TOTAL g/dL  --   --   --   --  6.7  --  6.7  --   --  6.2   LN-BILIRUBIN TOTAL mg/dL  --   --   --   --  0.8  --  0.9  --   --  1.8*   LN-ALKALINE PHOSPHATASE U/L  --   --   --   --  75  --  61  --   --  67   LN-ALT (SGPT) U/L  --   --   --   --  84*  --  62*  --   --  51*   LN-AST (SGOT) U/L  --   --   --   --  39  --  34  --   --  16    < > = values in this interval not displayed.

## 2021-06-13 NOTE — PROGRESS NOTES
0034:  Patient proned. Sedation bolus and drips titrated and paralytic initiated prior to proning

## 2021-06-13 NOTE — PROGRESS NOTES
[facility] COVID RT PROGRESS NOTE    DATA:    Patient was placed on a pressure support trial this morning. He was appearing comfortable on the weaning trial until he self-extubated in the early afternoon. He was placed on bipap which is he still currently wearing. Settings have been IPAP- 12 cmh2o and EPAP - 8 cmh2o. Latest ABG was obtained on 65% fio2 on bipap :    Results for GISELLE VILLANUEVA (MRN [id number]) as of 12/14/2020 17:19   Ref. Range 12/14/2020 16:15   pH, Arterial Latest Ref Range: 7.37 - 7.44  7.44   pCO2, Arterial Latest Ref Range: 35 - 45 mm Hg 34 (L)   pO2, Arterial Latest Ref Range: 75 - 85 mm Hg 54 (L)   Bicarbonate, Arterial Calc Latest Ref Range: 23.0 - 29.0 mmol/L 23.5   O2 Sat, Arterial Latest Ref Range: 95.0 - 96.0 % 89.0 (L)       Fio2 was then increased to 80%. Oxygen sats on the monitor are high 90's. Breath sounds are coarse. Respiratory will continue to monitor.

## 2021-06-13 NOTE — PLAN OF CARE
Neuro: Patient currently sedated on fentanyl and versed. See flowsheet for titrations, titrated for RASS -1/2. Pupils equal and reactive, CPOT 0. Able to move extremities purposefully. Plan to wean as tolerating.      Resp: 8.0 ET tube, 24 at the teeth. PEEP 10, , Fi02 continues at 40%, weaning as tolerating.  See blood gas for results. Suction and mouth care q 2 hourly, small thin secretions noted. Scant tan secretions from ET tube.     CV: SR with 1st degree AVB 50-60s, -150s systolic, goal for MAPs 65-75. Extremities warm and well perfused, palpable pulses throughout.      GI: Enteroflex remains in right nare, bridle for securement. Tube feeds currently running at 70cc, which is goal. 30cc free water q 4 hourly. BS active in all quadrants, multiple liquid stools today.      : Funk patent with judith urine. UO adequate. Funk care provided.     Endo:  BS 100s-200s,  Acu-checks q 4, insulin given according to sliding scale.  Lantus given per order.      ID: Afebrile with WBC trending down. At beginning of shift sputum and blood cultures obtained.     Skin: Turn and reposition q 2 hourly.       Problem: Pain  Goal: Patient's pain/discomfort is manageable  Outcome: Progressing     Problem: Safety  Goal: Patient will be injury free during hospitalization  Outcome: Progressing     Problem: Daily Care  Goal: Daily care needs are met  Outcome: Progressing     Problem: Psychosocial Needs  Goal: Demonstrates ability to cope with hospitalization/illness  Outcome: Progressing  Goal: Collaborate with patient/family/caregiver to identify patient specific goals for this hospitalization  Outcome: Progressing     Problem: Glucose Imbalance  Goal: Achieve optimal glucose control  Outcome: Progressing     Problem: Potential for Compromised Skin Integrity  Goal: Skin integrity is maintained or improved  Outcome: Progressing  Goal: Nutritional status is improving  Outcome: Progressing     Problem: Urinary  Incontinence  Goal: Perineal skin integrity is maintained or improved  Outcome: Progressing     Problem: Inadequate Gas Exchange  Goal: Patient will achieve/maintain normal respiratory rate/effort  Outcome: Progressing     Problem: Ineffective Airway Clearance  Goal: Maintain airway patency  Outcome: Progressing     Problem: Impaired Gas Exchange  Goal: Demonstrate improved ventilation and adequate oxygenation of tissues as evidenced by absence of respiratory distress  Outcome: Progressing     Problem: Breathing  Goal: Patient will maintain patent airway  Outcome: Progressing     Problem: Risk for Infection  Goal: Identify and demonstrate techniques, lifestyle changes to prevent/reduce risk of infection and promote safe environment  Outcome: Progressing     Problem: Potential for Falls  Goal: Patient will remain free of falls  Outcome: Progressing     Problem: Risk of Injury Due to Unsafe Behavior  Goal: Patient will remain safe while in restraint; physical/psychological needs will be met  Outcome: Progressing  Goal: Alternatives to restraint will be continually assessed with use of least restrictive device and discontinuation as soon as possible  Outcome: Progressing  Goal: Patient/Family will be able to communicate reason for restraint and steps for restraint application and removal  Outcome: Progressing     Problem: Knowledge Deficit  Goal: Patient/family/caregiver demonstrates understanding of disease process, treatment plan, medications, and discharge instructions  Outcome: Not Applicable this Shift     Problem: Breathing  Goal: Patient will utilize incentive spirometer  Outcome: Not Applicable this Shift     Problem: Knowlegde Deficit  Goal: Verbalize understanding of condition/disease process and treatment, participate in lifestyle changes and treatment regimen  Outcome: Not Applicable this Shift     Problem: Breathing  Goal: Patient is able to maintain stable respiratory status with long term   tracheostomy  Outcome: Completed     Problem: Knowlegde Deficit  Goal: Demonstrate technique for CPAP/BiPAP  Outcome: Completed

## 2021-06-13 NOTE — PROGRESS NOTES
"[facility]    Hospitalist Progress Note    Name: Felipe Cruz    MRN: [id number]  YOB: 1945    Age: 74 y.o.  Date of Admission:  12/6/2020  Primary Provider:  Wero Nolasco MD, [phone]  Physician:  Raúl Machado    Assessment and Plan:  Felipe Cruz is a 74 y.o. male  With PMH of OA, DM, HTN, BPH who presented to the [facility] ED for evaluation for hypoxia on 12/6/2020.  Pt reported onset of symptoms on Thanksgiving day, including severe cough, shortness of breath with activity, poor appetite, fatigue and occasional nausea. He went to his clinic at [facility] 11/30/2020 and was prescribed prednisone, azithromycin and albuterol for treatment of URI.  He was tested for COVID, which was resulted positive on 12/2/2020.  He presented to [facility] ED 12/6/2020 with worsening symptoms and hypoxemia at home, and was subsequently admitted.     Confirmed COVID-19 infection  Duration of illness PTA:  1 week  Date of diagnosis on 11/30/2020.      Study drugs/protocol: None     Acute Hypoxic Respiratory Failure secondary to COVID-19 infection  Viral Pneumonia secondary to COVID-19 infection    Initial CXR showed bilateral interstitial and ground-glass opacities.   He has required fairly high oxygen flow since admission, 9 to 10 L per oxygen mask until early this morning, at which point he was changed to high flow nasal cannula.  This morning, his high flow nasal cannula flow was 50 L/min, and FiO2 0.70.  Spite worsening oxygen needs, he does not have worsening dyspnea.  His dyspnea at rest is only mild.  He has not been up and out of bed much to test dyspnea on exertion.  Cough is his most bothersome symptom.  He says that he coughs \"constantly\".  Cough is nonproductive.  He has not had fever.  Orders were written to transfer the patient to the ICU this morning due to his increasing oxygen needs.  However, he is in no distress, and I think that we might have some room to decrease " FiO2, so we are going to continue treating him on the floor and observing how he does for the next few hours.     -There is not convincing evidence of concurrent bacterial superinfection.  Vancomycin and Zosyn were started empirically 12/7/2020.  However, the patient is afebrile, has a plausible explanation for pneumonia (COVID-19), and has a procalcitonin of 0.22.  As a result, I am to stop empiric antibiotics.  - Continue care on the medical floor with continuous pulse oximetry under droplet precautions, minimized lab draws, and care consolidation  - Oxygen support with HFNC 50 L/min and FiO2 0.70, titrate to keep SPO2 > 90% and no higher than 96%  - Labs: CRP 16.5 --> 15.9 --> 9.6,  --> 316 -> 272, total , ferritin 554, D-dimer 0.76 --> 0.61 --> 0.61, fibrinogen 735 --> 690 --> 616.  The trend is toward improvement in inflammatory markers.  Will trend labs until patient reaches clinical stability.   - No imaging necessary at this time  - Avoid nebulizers in favor of MDIs;   - IV fluids are not needed as PO intake is good.   - Dexamethasone 6 mg po daily, first dose 12/7/2020, plan for 10 day course or until hospital discharge, and Remdesevir first dose 12/6/2020, final dose 12/10/2020.  - Research study protocol:  none.     Mild acute kidney injury:  Baseline GFR is greater than 60.  Admission GFR was 52.  He received some IV fluids, for which GFR normalized.    -Resolved.     Nausea without emesis  Secondary to COVID-19.  This has improved, and the patient is tolerating a regular diet without difficulty.  -Resolved.     Elevated lactate  Lactate was 2.7 on admission, likely due to an element of volume depletion.  The patient received IV fluids, and lactic acid was normal within 4 or 5 hours.  -Resolved.    Hypomagnesemia  Replaced with protocol, normal 12/8/2020.     Diabetes mellitus type 2  Managed prior to admission with metformin 1000 mg twice daily, glipizide 10 mg daily, and glargine 35 units  subcu nightly.  Here, glipizide and Metformin have not been resumed.  He continues on arginine 35 units subcu nightly, as well as a NovoLog low resistant sliding scale and NovoLog prandial 1 unit per 15 g carbohydrate 3 times daily AC.  This regimen, the patient is consistently hyperglycemic, glucose 236-290 over the last 4 checks.  Hyperglycemia is exacerbated by dexamethasone.  Hemoglobin A1c was 8.2 on 12/7/2020.  -Resume preadmission metformin as renal function is now normal and oral intake is good.  -Will not resume glipizide due to the risk of hypoglycemia if a meal is missed.  -Increase glargine to 45 units nightly.  -Continue prandial Novolog 1:15 carbs three times a day AC.  -Increase NovoLog sliding scale to medium resistance.  -Continue diabetic diet.     Essential hypertension  Managed prior to admission with lisinopril/hydrochlorothiazide, amlodipine 10 mg daily, and atenolol 100 mg daily.  Amlodipine and atenolol have continued, the lisinopril/hydrochlorothiazide was stopped upon admission due to acute kidney injury, and has not been resumed.  Blood pressure control has been good on amlodipine and atenolol alone.  -Continue amlodipine and atenolol.  -Will not resume lisinopril/hydrochlorothiazide until needed for hypertension.     Mild hyponatremia  Sodium was 133 upon admission.  The patient has received IV fluid, and preadmission lisinopril/hydrochlorothiazide remains on hold.  Sodium has normalized.  -Resolved.     Chronic, stable Medical Problems:    BPH  Uses doxazosin and finasteride as an outpatient.    -Continue doxazosin and finasteride.     FEN:  - PO as tolerated. Avoid volume overload.  We will discontinue IV fluids which were started overnight.    DVT Prophylaxis:   At high risk of thrombotic complications due to COVID-19 disease. D-dimer on admission is pending  - enoxaparin 40 mg subcutaneous Q24H.     At discharge consider one of the following for 30 days and until the patient has  "returned to normal mobility:  - apixaban (Eliquis) 2.5 mg two times a day  - rivaroxaban (Xarelto) 10 mg once daily  All COVID-19  patients should be educated about the symptoms of DVT (leg swelling, pain, redness, warmth) and PE (dyspnea, chest pain, palpitations, cough, hemoptysis).     Advanced care planning:  Patient identifies   Name Home Phone Work Phone Mobile Phone Relationship Lgl GIANNI Blackmon     [phone] Spouse      as health care proxy.   -Code Status: FULL CODE. Discussed with Patient.     Disposition: Expected discharge in 4 to 5 days to home once oxygen needs stabilized, home oxygen arranged if needed, and symptoms are manageable.    Raúl Machado MD  [facility] Hospitalist  =============================   Subjective:  Care team notes reviewed.     Reports only mild dyspnea at rest.  Dyspnea becomes more severe after a cough paroxysm.  He has not been out of bed much to test dyspnea on exertion.  Cough is his most bothersome symptom.  He says that he coughs \"constantly\".  Cough is nonproductive.  He has no respiratory chest pain.  Admission nausea has resolved, and he is tolerating a general diet well.  He has no abdominal pain or diarrhea.  Denies chest pain or palpitations.  Denies musculoskeletal pain.    Bedside iPad was not used as part of this encounter because it was not medically appropriate to do so.    Exam:  Vital Signs with Ranges  Temp:  [97.6  F (36.4  C)-98.9  F (37.2  C)] 97.8  F (36.6  C)  Heart Rate:  [58-70] 65  Resp:  [20-28] 24  BP: (105-146)/(49-75) 105/49  FiO2 (%):  [60 %-70 %] 70 %    /49 (Patient Position: Semi-mueller)   Pulse 65   Temp 97.8  F (36.6  C) (Oral)   Resp 24   Ht 6' 2\" (1.88 m)   Wt (!) 255 lb 6.4 oz (115.8 kg)   SpO2 93%   BMI 32.79 kg/m    Vitals:    12/06/20 1613 12/07/20 1741 12/08/20 0423   Weight: (!) 255 lb (115.7 kg) (!) 255 lb (115.7 kg) (!) 255 lb 6.4 oz (115.8 kg)       GENERAL: Pleasant man, lying propped up in bed.  He " appears comfortable.  EYES: Eyes grossly normal to inspection, extraocular movements intact  HENT: Deferred, as the patient was wearing a paper mask for my visit.  NECK: Trachea midline, no stridor.   RESP: No accessory muscle use.  Frequent cough throughout visit.  Lungs clear throughout on inspiration and expiration.  Expiration not prolonged, no wheeze.  CV: Regular rate and rhythm, non-tachycardic.  Normal S1 S2, no murmur or extra sound.  No lower extremity edema.  ABDOMEN: Soft, non-tender, no guarding.  Liver and spleen not enlarged, no masses palpable.  Bowel sounds positive.  MS: No red or inflamed joints.  SKIN: Warm and dry, no rashes.  NEURO: Alert, oriented, conversant.  Cranial nerves III - XII grossly intact.  No gross motor or sensory deficits.    PSYCH: Calm, alert, conversant.  Able to articulate logical thoughts, no tangential thoughts, no hallucinations or delusions.  Affect normal.        Medications:  Were Reviewed    amLODIPine  10 mg Oral DAILY     aspirin  81 mg Oral DAILY     atenoloL  100 mg Oral DAILY     dexAMETHasone  6 mg Oral DAILY     doxazosin  8 mg Oral QHS     enoxaparin ANTICOAGULANT  0.5 mg/kg Subcutaneous BID     finasteride  5 mg Oral DAILY     insulin aspart (NovoLOG) injection   Subcutaneous TID with meals     insulin aspart (NovoLOG) injection   Subcutaneous TID with meals     insulin glargine  35 Units Subcutaneous QHS     piperacillin-tazobactam  3.375 g Intravenous Q8H     remdesivir  100 mg Intravenous Q24H     sodium chloride  10 mL Intravenous Line Care     sodium chloride  2.5 mL Intravenous Line Care     vancomycin  1,750 mg Intravenous Q12H       Data reviewed today: I reviewed all new labs and imaging reports over the last 24 hours.    Raúl Machado MD  [facility] Hospitalist

## 2021-06-13 NOTE — PROGRESS NOTES
12/11/20 2343   Vent Settings   FiO2 (%) 45 %   Heater Temperature 98.4  F (36.9  C)   Resp Rate (Set) 24   Insp Time (sec) 0.9 sec   Vt (Set, mL) 500 mL   PEEP/CPAP (cm H2O) 14 cm H2O   Trigger Sensitivity Flow (L/min) 2 L/min   I:E Ratio 1:1.8   Humidification Heater   Patient Data   Vt Exp (mL) 486 mL   Minute Ventilation (L/min) 11.9 L/min   Total Resp Rate  24 BPM   PIP Observed (cm H2O) 26 cm H2O   MAP (cm H2O) 18   Plateau Pressure (cm H2O) 23 cm H2O   SpO2 99 %

## 2021-06-13 NOTE — PROGRESS NOTES
Vent Mode: AC  FiO2 (%):  [40 %-60 %] 40 %  S RR:  [24] 24  S VT:  [500 mL] 500 mL  PEEP/CPAP (cm H2O):  [12 cm H2O-14 cm H2O] 12 cm H2O  Minute Ventilation (L/min):  [11.5 L/min-13.2 L/min] 13.2 L/min  PIP:  [26 cm H2O-27 cm H2O] 27 cm H2O  MAP (cm H2O):  [17-18] 17   PLAT: 20

## 2021-06-13 NOTE — PROGRESS NOTES
12/21/20 1125   Patient Data   Vt Exp (mL) 520 mL   Minute Ventilation (L/min) 15.2 L/min   Total Resp Rate  32 BPM   PIP Observed (cm H2O) 30 cm H2O   Plateau Pressure (cm H2O) 27 cm H2O   SpO2 98 %   Heart Rate (!) 122

## 2021-06-13 NOTE — PROGRESS NOTES
"Clinical Nutrition Therapy Assessment Note    Reason for Assessment:   Felipe Cruz is a 74 y.o. male assessed by the registered Dietitian for nutrition risk screen, positive for weight loss (2-13 lbs) and decreased PO.  Hx of Covid, pneumonia, NICKY, DM2, HTN. MD noted occasional nausea and poor appetite as Covid symptoms.    Nutrition History:  Information from family/caregiver and chart.  Diet prior to admission: Regular. He was eating ~50% of usual intake for 1-2 days PTA and slightly less than normal for a few days before that. He typically eats foods like sandwiches, eggs and soup with crackers and drinks diet pop, Gatorade and water.  Recent food/fluid intake: SHAD-no PO recorded  Food allergies or intolerances: NKFA    Current Nutrition Prescription:   Diet: Diabetic  IV dextrose or Fluids:     sodium chloride 0.9%, Last Rate: 75 mL/hr (12/07/20 1000)    Current Nutrition Intake:  SHAD-no PO recorded.    NaCl at 75 mL/hour provides 1800 mL fluid per day.    Anthropometrics:  Height: 6' 2\" (188 cm)  Admission weight: 255 lb-stated  Weight: (!) 255 lb (115.7 kg)  BMI: 32.7  BMI indication: 30-34.9 obesity (class 1)  Ideal body weight 190 lb  % Ideal body weight 134%  Weight History:  Wt Readings from Last 10 Encounters:   12/06/20 (!) 255 lb (115.7 kg)     All weights from the last year are 252 lb, so likely stated. His wife said he has been steadily losing weight over the last three years. Some of it has been intentional and some has occurred due to decreased PO in older age. She thinks he has lost weight in the last 2-3 weeks but is not sure how much or what his UBW is. He did talk about having to tighten his belt PTA.    RD Nutrition Focused Physical Exam:  The patient has the following physical signs which could indicate malnutrition: Deferred due to social distancing policy    GI Status/Output:   BM 12/7 per chart.    Skin/Wound:   Mode Scale Score: 18    No wound was noted.    Medications:  Novolog, lantus, " remdesivir, Mg replacement protocol    Labs:  Na 133  Mg 1.6  , 236    Estimated Nutrition Needs:  Assessment weight is 94 kg, adjusted weight    Energy Needs: 1388-8028 kcals daily, 25-30 kcal/kg  Protein Needs: 110-140 g daily, 1.2-1.5 g/kg  Fluid Needs: 7993-4223 mls daily, 25-30 mls/kg    Malnutrition: Patient does not meet 2 diagnostic criteria for malnutrition     Nutrition Risk Level: moderate risk    Nutrition dx:   Increased nutrient needs r/t illness as evidenced by Covid.     Goal:   Maintain weight, Meet estimated nutrition needs and PO intake > 75%    Intervention:   Continue current diet    Ordered daily weights since only stated weight is available    Will write note to nursing to record all PO    Will order Boost GC daily with meal to help meet needs.    Monitoring/Evaluation:   PO, weight, labs    Electronically signed by:  Alycia Hernández RD

## 2021-06-13 NOTE — PROGRESS NOTES
[facility] COVID RT PROGRESS NOTE    DATA:    VENT DAY#  5    CURRENT VENT SETTINGS      Vent Mode: AC  FiO2 (%):  [40 %-50 %] 45 %  S RR:  [24] 24  S VT:  [500 mL] 500 mL  PEEP/CPAP (cm H2O):  [8 cm H2O-10 cm H2O] 8 cm H2O  Minute Ventilation (L/min):  [10.1 L/min-15.4 L/min] 15.4 L/min  PIP:  [21 cm H2O-32 cm H2O] 32 cm H2O  VT SUP:  [12 cm H20] 12 cm H20  MAP (cm H2O):  [12-16] 16      PATIENT PARAMETERS:    PIP 32  Pplat:  29  Pmean:  16  SBT: N/A  SECRETIONS:  Tan and Yellow  02 SATS:  93%    ETT SIZE 8.0 ,Secured at  23 cm at teeth    Respiratory Medications: None     ABG:   Results for GISELLE VILLANUEVA (MRN [id number]) as of 12/14/2020 05:09   Ref. Range 12/14/2020 05:00   pH, Arterial Latest Ref Range: 7.37 - 7.44  7.38   pCO2, Arterial Latest Ref Range: 35 - 45 mm Hg 40   pO2, Arterial Latest Ref Range: 75 - 85 mm Hg 62 (L)   Bicarbonate, Arterial Calc Latest Ref Range: 23.0 - 29.0 mmol/L 23.2   O2 Sat, Arterial Latest Ref Range: 95.0 - 96.0 % 92.0 (L)     NOTE / PLAN:  Patient remain on full vent support. Respiratory will continue to monitor.

## 2021-06-13 NOTE — PROGRESS NOTES
[facility] COVID RT PROGRESS NOTE     DATA:     VENT DAY# 8     CURRENT SETTINGS:  VENT SETTINGS   AC 30/480/+14              FIO2:  65%    PATIENT PARAMETERS:     PIP 28  Pplat:  24  Pmean: 20   SBT: NO  SECRETIONS: small pink tinged tan  02 SATS:  97%     ETT SIZE  8.5 @ 26     Securing device changed / tube re-taped date 2020     Respiratory Medications: Veletri         AB     NOTE / PLAN:   Pt still requiring full ventilatory support. Supinated at 1130. [place] placed and prepped for bedside bronch. Veletri weaned to half strenth. Now running at 2.5ml per hour. Assisted with bedside bronchoscopy without any complications. Sample sent to lab. RR increased to 30. Pt proned at 1740 w/o complications.

## 2021-06-13 NOTE — PLAN OF CARE
Problem: Ineffective Airway Clearance  Goal: Maintain airway patency  Outcome: Not Progressing     Problem: Impaired Gas Exchange  Goal: Demonstrate improved ventilation and adequate oxygenation of tissues as evidenced by absence of respiratory distress  Outcome: Not Progressing     Problem: Breathing  Goal: Patient will maintain patent airway  12/19/2020 0849 by Miladis Machado LRT  Outcome: Not Progressing  12/19/2020 0848 by Miladis Machado LRT  Outcome: Not Progressing  Goal: Patient will utilize incentive spirometer  Outcome: Not Applicable this Shift  Goal: Patient performs or directs assisted coughing  Outcome: Not Applicable this Shift     Problem: Mechanical Ventilation  Goal: Patient will maintain patent airway  12/19/2020 0849 by Miladis Machado LRT  Outcome: Not Progressing  12/19/2020 0848 by Miladis Machado LRT  Outcome: Not Progressing  Goal: Oral health is maintained or improved  Outcome: Progressing  Goal: Respiratory status - ventilation  Description: Movement of air in and out of the lungs.    Liberate from ventilator  Outcome: Not Progressing  Goal: ET tube will be managed safely  Outcome: Progressing  Goal: Ability to express needs and understand communication  Outcome: Not Applicable this Shift  Goal: Mobility/activity is maintained at optimum level for patient  Outcome: Not Applicable this Shift

## 2021-06-13 NOTE — PROGRESS NOTES
Results for GISELLE VILLANUEVA (MRN [id number]) as of 12/16/2020 22:53   Ref. Range 12/16/2020 21:14   Creatinine Latest Ref Range: 0.70 - 1.30 mg/dL 2.29 (H)     MD notified of result.

## 2021-06-13 NOTE — PLAN OF CARE
Problem: Psychosocial Needs  Goal: Demonstrates ability to cope with hospitalization/illness  Outcome: Progressing     Problem: Inadequate Gas Exchange  Goal: Patient will achieve/maintain normal respiratory rate/effort  Outcome: Progressing     Red was alert, oriented, and able to make his needs known.  VSS.  Afebrile.  No shortness of breath.  10 LPM via NC.  Lower 90s saturations. Frequent non-productive cough.  He was involved in cares and had questions about his progression.

## 2021-06-13 NOTE — PROGRESS NOTES
[facility] COVID RT PROGRESS NOTE    DATA:    VENT DAY#  3    CURRENT VENT SETTINGS     Vent Mode: AC  FiO2 (%):  [45 %-60 %] 45 %  S RR:  [22-24] 24  S VT:  [500 mL] 500 mL  PEEP/CPAP (cm H2O):  [12 cm H2O-14 cm H2O] 14 cm H2O  Minute Ventilation (L/min):  [11.5 L/min-11.9 L/min] 11.9 L/min  PIP:  [26 cm H2O-28 cm H2O] 27 cm H2O  MAP (cm H2O):  [18-19] 18      PATIENT PARAMETERS:    PIP 27  Pplat:  22  Pmean:  18  SBT: N/A  SECRETIONS:  Clear  02 SATS:  99%    ETT SIZE 8.0  , Secured at  24 cm at teeth,    Respiratory Medications: None     ABG:   Results for GISELLE VILLANUEVA (MRN [id number]) as of 12/12/2020 06:07   Ref. Range 12/12/2020 06:05   pH, Arterial Latest Ref Range: 7.37 - 7.44  7.42   pCO2, Arterial Latest Ref Range: 35 - 45 mm Hg 40   pO2, Arterial Latest Ref Range: 75 - 85 mm Hg 86 (H)   Bicarbonate, Arterial Calc Latest Ref Range: 23.0 - 29.0 mmol/L 25.7   O2 Sat, Arterial Latest Ref Range: 95.0 - 96.0 % 97.2 (H)     NOTE / PLAN:  Patient remain on full vent support and tolerated well throughout the night. Respiratory will continue to monitor.

## 2021-06-13 NOTE — PROGRESS NOTES
"Pharmacy Consult: Vancomycin Dosing    Pharmacist consulted to dose vancomycin for Felipe Cruz, a 74 y.o. male.    Ordering provider:  Mary Harrell CNP (Critical Care)    Indication for vancomycin therapy: Sepsis    Goal Trough Range:  15-20 mcg/mL based on indication    Other current antimicrobials              meropenem 1 g in NaCl 0.9 % 50 mL (MINI-BAG Plus) (MERREM)  Every 8 hours          vancomycin 1,000 mg in NaCl 0.9 % 100 mL (MINI-BAG Plus) (VANCOCIN)  Every 12 hours                   Subjective/Objective:    Patient was admitted for Pneumonia due to 2019 novel coronavirus on 12/6/2020    Height: 6' 2\" (1.88 m)    Actual Body Weight (ABW): (!) 106.6 kg (234 lb 15 oz)    Ideal body weight: 82.2 kg (181 lb 3.5 oz)  Adjusted ideal body weight: 91.9 kg (202 lb 11.3 oz)    BMI: Body mass index is 30.16 kg/m .    No Known Allergies    Patient Active Problem List   Diagnosis     Pneumonia due to 2019 novel coronavirus     Acute respiratory failure with hypoxia (H)     Acute kidney injury (H)     BPH with urinary obstruction     Chronic pain disorder     Diabetes mellitus (H)     Hypertension     Primary osteoarthritis of right hip     Primary osteoarthritis of right knee     Acute respiratory distress syndrome (ARDS) due to COVID-19 virus (H)    History reviewed. No pertinent past medical history.     Temp Readings from Current Encounter:     12/18/20 0400 12/18/20 0615 12/18/20 0800   Temp: 97.7  F (36.5  C) 98  F (36.7  C) 98.3  F (36.8  C)     Net Intake/Output (last 24 hours):  I/O last 3 completed shifts:  In: 2151.8 [I.V.:798.8; NG/GT:1253; IV Piggyback:100]  Out: 3726 [Urine:985; Other:2641; Stool:100]    Recent Labs     12/16/20  1826 12/16/20  2114 12/17/20  0451 12/17/20  0930 12/17/20  1951 12/18/20  0519 12/19/20  0430 12/19/20  0458 12/19/20  0758 12/19/20  0906   WBC  --   --  20.9* 18.9*  --  16.3*  --  16.6*  --   --    LACTICACID 1.8  --   --   --   --   --   --   --   --  1.2   PROCAL 0.27 "  --   --   --   --   --   --   --   --   --    CRP  --   --   --   --   --   --   --   --  7.9*  --    BUN  --  61* 68*  --  76* 81* 57*  --   --   --    CREATININE  --  2.29* 2.90*  --  3.82* 4.49* 3.41*  --   --   --      Estimated Creatinine Clearance: 24 mL/min (A) (by C-G formula based on SCr of 3.41 mg/dL (H)).    Recent Labs     12/16/20 2114 12/16/20 2236   CULTURE No Growth Usual Funmilayo  1+ Yeast*       Results for orders placed or performed during the hospital encounter of 12/06/20   Sputum culture    Collection Time: 12/16/20 10:36 PM    Specimen: Respiratory   Result Value Status    Culture Usual Funmilayo Final    Culture 1+ Yeast (!) Final   Urine culture    Collection Time: 12/16/20  9:14 PM    Specimen: Urine   Result Value Status    Culture No Growth Final   Sputum culture    Collection Time: 12/12/20  8:45 PM    Specimen: Respiratory   Result Value Status    Culture 4+ Klebsiella pneumoniae (!) Final   Urine culture    Collection Time: 12/11/20  8:55 AM    Specimen: Urine   Result Value Status    Culture No Growth Final   Culture, Blood Positive Work-up    Collection Time: 12/06/20  4:36 PM    Specimen: Vein, Peripheral; Blood   Result Value Status    Culture Staphylococcus epidermidis (!) Final       No results for input(s): VANCOMYCIN in the last 168 hours.    Vancomycin administrations: (last 120 hours)     None          Assessment/Plan:    Pharmacist consulted to dose vancomycin for Sepsis, goal trough range 15-20 mcg/mL.  1. Initiate vancomycin 1,500mg (15 mg/kg loading dose), followed by 1,000 IV every 12 hours (10 mg/kg actual body weight).   - dose is 10mg/kg with a frequency of Q12H for CRRT (CVVHDF)  2. No vancomycin level available for assessment.  3. Pharmacist will plan to check a vancomycin trough level at steady state or sooner if clinically indicated.   4. Pharmacist will continue to follow.    Thank you for the consult.  Elli Alcocer, PharmD 12/19/2020 11:13 AM

## 2021-06-13 NOTE — PLAN OF CARE
Neuro: Patient  oriented to self only. Patient intermittently requires sedation and becomes very agitated and restless with stimulation. Precedex gtt continues. Pupils equal and reactive, Speech unclear. Able to move extremities purposefully. Plan to wean as tolerating.      Resp: Bipap  at 12/8, FIO2 at 90%. See blood gas for results. Tolerating Bipap. Suction and mouth care q 2 hourly, small amount of thick white/clear secretions suctioned orally.     CV: SR/SB with 1st degree AVB, heart rate 60s, Extremities warm and well perfused, palpable pulses throughout. Patient intermittently hypertensive through shift, receiving scheduled and PRN medications.      GI: Enteroflex remains in right nare, bridle for securement. Tube feeds currently on hold. BS active in all quadrants. Bowel regimen continues, given PRN Miralax     : Funk patent with judith urine. UO adequate. Funk care provided.      Endo:  BS controlled with subcutaneous insulin sliding scale.      ID: Afebrile with temperatures 97-98. Face noted to be flushed.     Skin: Turn and reposition q 2 hourly. No new breakdown noted. Hand mitts in place to prevent patient from pulling on lines and tubes. Skin assessed a minimum of every 2 hours and ROM provided.      Problem: Pain  Goal: Patient's pain/discomfort is manageable  Outcome: Progressing     Problem: Safety  Goal: Patient will be injury free during hospitalization  Outcome: Progressing     Problem: Daily Care  Goal: Daily care needs are met  Outcome: Progressing     Problem: Glucose Imbalance  Goal: Achieve optimal glucose control  Outcome: Progressing     Problem: Potential for Compromised Skin Integrity  Goal: Skin integrity is maintained or improved  Outcome: Progressing     Problem: Urinary Incontinence  Goal: Perineal skin integrity is maintained or improved  Outcome: Progressing     Problem: Inadequate Gas Exchange  Goal: Patient will achieve/maintain normal respiratory rate/effort  Outcome:  Progressing     Problem: Ineffective Airway Clearance  Goal: Maintain airway patency  Outcome: Progressing     Problem: Breathing  Goal: Patient will maintain patent airway  Outcome: Progressing     Problem: Risk for Infection  Goal: Identify and demonstrate techniques, lifestyle changes to prevent/reduce risk of infection and promote safe environment  Outcome: Progressing     Problem: Potential for Falls  Goal: Patient will remain free of falls  Outcome: Progressing     Problem: Risk of Injury Due to Unsafe Behavior  Goal: Patient will remain safe while in restraint; physical/psychological needs will be met  Outcome: Progressing  Goal: Alternatives to restraint will be continually assessed with use of least restrictive device and discontinuation as soon as possible  Outcome: Progressing  Goal: Patient/Family will be able to communicate reason for restraint and steps for restraint application and removal  Outcome: Progressing     Problem: Cognitive Impairment or Disorientation  Goal: Patient will maintain or return to normal baseline cognitive function  Outcome: Progressing     Problem: Decreased Mental Status Causing Increased Need for Safety  Goal: Provide a safe environment for patient (Implement appropriate elements in plan of care)  Outcome: Progressing  Goal: Decrease patient's symptoms  Outcome: Progressing  Goal: To ensure patient safety, patient will abstain from any threats or actions to harm self or others  Outcome: Progressing     Problem: Psychosocial Needs  Goal: Demonstrates ability to cope with hospitalization/illness  Outcome: Not Progressing     Problem: Potential for Compromised Skin Integrity  Goal: Nutritional status is improving  Outcome: Not Progressing     Problem: Impaired Gas Exchange  Goal: Demonstrate improved ventilation and adequate oxygenation of tissues as evidenced by absence of respiratory distress  Outcome: Not Progressing     Problem: Breathing  Goal: Patient will utilize  incentive spirometer  Outcome: Not Progressing     Problem: Psychosocial Needs  Goal: Collaborate with patient/family/caregiver to identify patient specific goals for this hospitalization  Outcome: Not Applicable this Shift     Problem: Discharge Barriers  Goal: Patient's discharge needs are met  Outcome: Not Applicable this Shift     Problem: Knowledge Deficit  Goal: Patient/family/caregiver demonstrates understanding of disease process, treatment plan, medications, and discharge instructions  Outcome: Not Applicable this Shift     Problem: Knowlegde Deficit  Goal: Verbalize understanding of condition/disease process and treatment, participate in lifestyle changes and treatment regimen  Outcome: Not Applicable this Shift

## 2021-06-13 NOTE — PROGRESS NOTES
Pt. Extubated self, restraints on, pt awakend suddenly and bent over to restrained hands and pulled tube and feeding tube out. Sedation stopped,  And CRNA notified Oxy  Mask on 0115 Pt intubated 8.5, 24 at the teeth. See flow sheet.

## 2021-06-13 NOTE — PLAN OF CARE
No acute changes noted. VSS. See flowsheets and MAR for events of the shift will continue to monitor and report to oncoming shift.    Problem: Pain  Goal: Patient's pain/discomfort is manageable  Outcome: Progressing     Problem: Safety  Goal: Patient will be injury free during hospitalization  Outcome: Progressing     Problem: Daily Care  Goal: Daily care needs are met  Outcome: Progressing     Problem: Psychosocial Needs  Goal: Demonstrates ability to cope with hospitalization/illness  Outcome: Progressing  Goal: Collaborate with patient/family/caregiver to identify patient specific goals for this hospitalization  Outcome: Progressing     Problem: Discharge Barriers  Goal: Patient's discharge needs are met  Outcome: Progressing     Problem: Glucose Imbalance  Goal: Achieve optimal glucose control  Outcome: Progressing     Problem: Knowledge Deficit  Goal: Patient/family/caregiver demonstrates understanding of disease process, treatment plan, medications, and discharge instructions  Outcome: Progressing     Problem: Potential for Compromised Skin Integrity  Goal: Skin integrity is maintained or improved  Outcome: Progressing  Goal: Nutritional status is improving  Outcome: Progressing     Problem: Urinary Incontinence  Goal: Perineal skin integrity is maintained or improved  Outcome: Progressing     Problem: Inadequate Gas Exchange  Goal: Patient will achieve/maintain normal respiratory rate/effort  Outcome: Progressing     Problem: Ineffective Airway Clearance  Goal: Maintain airway patency  Outcome: Progressing     Problem: Impaired Gas Exchange  Goal: Demonstrate improved ventilation and adequate oxygenation of tissues as evidenced by absence of respiratory distress  Outcome: Progressing     Problem: Breathing  Goal: Patient will maintain patent airway  Outcome: Progressing  Goal: Patient is able to maintain stable respiratory status with long term  tracheostomy  Outcome: Progressing  Goal: Patient will utilize  incentive spirometer  Outcome: Progressing     Problem: Knowlegde Deficit  Goal: Verbalize understanding of condition/disease process and treatment, participate in lifestyle changes and treatment regimen  Outcome: Progressing  Goal: Demonstrate technique for CPAP/BiPAP  Outcome: Progressing     Problem: Risk for Infection  Goal: Identify and demonstrate techniques, lifestyle changes to prevent/reduce risk of infection and promote safe environment  Outcome: Progressing     Problem: Potential for Falls  Goal: Patient will remain free of falls  Outcome: Progressing

## 2021-06-13 NOTE — PROGRESS NOTES
12/13/20 0234   Patient Data   Vt Exp (mL) 499 mL   Minute Ventilation (L/min) 12 L/min   Total Resp Rate  24 BPM   PIP Observed (cm H2O) 23 cm H2O   MAP (cm H2O) 14   Plateau Pressure (cm H2O) 19 cm H2O   SpO2 98 %   Heart Rate 62   PEEP 10 40%

## 2021-06-13 NOTE — PROGRESS NOTES
[facility] COVID RT PROGRESS NOTE    DATA:    VENT DAY#  4    CURRENT SETTINGS:  VENT SETTINGS   AC 24 500 +10         FIO2:   40    PATIENT PARAMETERS:    PIP 23-25  Pplat:  19-21  Pmean:  14-15  SECRETIONS:  Small pale yellow sputum cx sent to lab  02 SATS:  High 90s    ETT SIZE 8  Secured at  24 cm at teeth    Securing device changed / tube re-taped date 12/11/20     ABG:Results for GISELLE VILLANUEVA (MRN [id number]) as of 12/13/2020 05:03   Ref. Range 12/13/2020 04:14   pH, Arterial Latest Ref Range: 7.37 - 7.44  7.47 (H)   pCO2, Arterial Latest Ref Range: 35 - 45 mm Hg 37   pO2, Arterial Latest Ref Range: 75 - 85 mm Hg 70 (L)   Bicarbonate, Arterial Calc Latest Ref Range: 23.0 - 29.0 mmol/L 26.9   O2 Sat, Arterial Latest Ref Range: 95.0 - 96.0 % 95.7   Oxyhemoglobin Latest Ref Range: 95.0 - 96.0 % 94.2 (L)   POC Base Excess Calc Latest Units: mmol/L 2.8        NOTE / PLAN:   Respiratory Care will continue to follow and monitor this patient.   Erin Graff, GERALDO, RRT, RRT-NPS  12/13/20

## 2021-06-13 NOTE — PROGRESS NOTES
12/21/20 1415   Patient Data   Vt Exp (mL) 511 mL   Minute Ventilation (L/min) 15 L/min   Total Resp Rate  32 BPM   PIP Observed (cm H2O) 31 cm H2O   Plateau Pressure (cm H2O) 27 cm H2O   Dynamic Compliance (L/cm H2O) 42 L/cm H2O   Airway Resistance 9   SpO2 97 %  (Simultaneous filing. User may not have seen previous data.)   Heart Rate (!) 121  (Simultaneous filing. User may not have seen previous data.)

## 2021-06-13 NOTE — PROGRESS NOTES
Care Management Follow Up Note    Length of Stay (days) 10     Patient plan of care discussed at Interdisciplinary Rounds: yes                Expected Discharge Date: TBD  Concerns to be Addressed / Barriers to Discharge:  on Bipap    Anticipated Discharge Disposition:  TBD  Anticipated Discharge Services:      Selected Continued Care - Admitted Since 12/6/2020    No services have been selected for the patient.        Anticipated Discharge DME:      Plan:  Chart review. Called and spoke with wife Yuridia.  She has questions about FMLA.  Told her the best person for her to get the correct answer about it is to contact her employer.  She is also concern about patient returning home since they live in apartment building with no elevators, and has three flights of steps to go up.  Explain to her that while he is here, and when he is able to participate PT would see him to get a better idea of what services he would need and if he could go up the stairs.  We discussed home care and TCU as options.  Wife also has concerns about that they only have one bathroom. Explained to her that if patient does go home that should not be a problem.  CM to continue to follow.    MARCI [facility] 12/16/2020 9:36 AM

## 2021-06-13 NOTE — CONSULTS
NEPHROLOGY CONSULTATION    CC: ARF    REASON FOR CONSULTATION: We are asked to see pt by Dr. Gilbert    HISTORY OF PRESENT ILLNESS:74 y.o. male with normal underlying renal function; here being treated for COVID 19 infection with resp failure; CR noted to be elevated the last two days; no toxic meds or contrast  Pt was undergoing some diuresis and had some lower bp; also had been given ACE-I for a couple of days  Admitted on 12/6; on vent and has been extubated twice      REVIEW OF SYSTEMS:  ROS was completely reviewed and otherwise negative and non-contributory    History reviewed. No pertinent past medical history.    Social History     Socioeconomic History     Marital status:      Spouse name: Not on file     Number of children: Not on file     Years of education: Not on file     Highest education level: Not on file   Occupational History     Not on file   Social Needs     Financial resource strain: Not on file     Food insecurity     Worry: Not on file     Inability: Not on file     Transportation needs     Medical: Not on file     Non-medical: Not on file   Tobacco Use     Smoking status: Not on file   Substance and Sexual Activity     Alcohol use: Not on file     Drug use: Not on file     Sexual activity: Not on file   Lifestyle     Physical activity     Days per week: Not on file     Minutes per session: Not on file     Stress: Not on file   Relationships     Social connections     Talks on phone: Not on file     Gets together: Not on file     Attends Worship service: Not on file     Active member of club or organization: Not on file     Attends meetings of clubs or organizations: Not on file     Relationship status: Not on file     Intimate partner violence     Fear of current or ex partner: Not on file     Emotionally abused: Not on file     Physically abused: Not on file     Forced sexual activity: Not on file   Other Topics Concern     Not on file   Social History Narrative     Not on file        History reviewed. No pertinent family history.    No Known Allergies    MEDICATIONS:    ascorbic acid (vitamin C)  1,000 mg Enteral Tube DAILY     aspirin  81 mg Enteral Tube DAILY     cefTRIAXone  2 g Intravenous Q24H     chlorhexidine  15 mL Topical Q12H     docusate sodium (COLACE) 50 mg/5 mL oral liquid  100 mg Oral BID     metoclopramide  10 mg Intravenous Once     omeprazole  20 mg Oral QAM AC    Or     omeprazole  20 mg Enteral Tube QAM AC    Or     pantoprazole  40 mg Intravenous QAM AC     QUEtiapine  50 mg Oral Q12H     senna (SENOKOT) syrup  8.8 mg Oral BID     sodium chloride  10-30 mL Intravenous Q8H FIXED TIMES     vecuronium         white petrolatum-mineral oiL  1 application Both Eyes Q8H         PHYSICAL EXAM    Vitals:    12/17/20 1145   BP:    Pulse: 100   Resp: 24   Temp: 98.1  F (36.7  C)   SpO2: 100%         Intake/Output Summary (Last 24 hours) at 12/17/2020 1157  Last data filed at 12/17/2020 1048  Gross per 24 hour   Intake 1879.46 ml   Output 660 ml   Net 1219.46 ml     Intubated and sedated; some facial edema  HEENT NC/AT; perrla; OP clear without lesions; mmm  Neck supple without LAD, TM  CV; RRR without rub or murmur  Lung: coarse anteriorly  Ab: soft and NT; not distended; normal bs  Ext: some edema and well perfused  Skin; no rash  Neuro; grossly intact    LABORATORIES    Results from last 7 days   Lab Units 12/17/20  0930 12/17/20  0451 12/16/20  0421   LN-WHITE BLOOD CELL COUNT thou/uL 18.9* 20.9* 14.1*   LN-HEMOGLOBIN g/dL 11.0* 11.1* 12.5*   LN-HEMATOCRIT % 35.3* 34.9* 38.1*   LN-PLATELET COUNT thou/uL 336 377 290     Results from last 7 days   Lab Units 12/17/20  0451 12/16/20  2114 12/16/20  0421 12/12/20  0559 12/12/20  0559   LN-SODIUM mmol/L 146* 147* 147*   < > 137   LN-POTASSIUM mmol/L 4.6 4.9 3.6   < > 4.1   LN-CHLORIDE mmol/L 116* 113* 115*   < > 106   LN-CO2 mmol/L 19* 17* 22   < > 24   LN-BLOOD UREA NITROGEN mg/dL 68* 61* 46*   < > 41*   LN-CREATININE mg/dL 2.90*  2.29* 0.91   < > 1.01   LN-CALCIUM mg/dL 7.9* 8.6 8.7   < > 8.2*   LN-PROTEIN TOTAL g/dL  --  6.2  --   --  6.2   LN-BILIRUBIN TOTAL mg/dL  --  1.8*  --   --  0.5   LN-ALKALINE PHOSPHATASE U/L  --  67  --   --  47   LN-ALT (SGPT) U/L  --  51*  --   --  32   LN-AST (SGOT) U/L  --  16  --   --  14    < > = values in this interval not displayed.     Results from last 7 days   Lab Units 12/17/20  0930 12/10/20  1813   LN-INR   --  1.42*   LN-PARTIAL THROMBOPLASTIN TIME seconds 36 36     Results from last 7 days   Lab Units 12/10/20  1813   LN-FERRITIN ng/mL 440*           I reviewed all labs    ASSESSMENT/PLAN:  74 y.o. male    1. ARF: normal underlying renal function; renal function has been normal here until last two days; seems related to hemodynamics with lower bp and ACE -I and diuresis; up further today but not anephric and making some urine; no indication for HD today; manage expectantly  2. Volume status; some overload but not as much in vascular space; will not aggressive diurese now; use prn diuretics based on poor urine output  3. COVID 19 infection with resp failure; on vent with full support; on abx to cover any bacterial infection (klebsiella in sputum); on veletri  4. Hypernatremia; mild; provide more free water  5. Acidosis; primarily metabolic today; will give some oral bicarb  6. Anemia; no active loss; following hgb  7. HoTN; on pressors  8. Nutrition; on TF  9. DVT prophylaxis; on heparin gtt  10. CAD  11. hyperlipid  12. DM; on insulin gtt  13. BPH      Krystal Licea MD  Nephrology

## 2021-06-13 NOTE — PROGRESS NOTES
[facility] COVID RT PROGRESS NOTE     DATA:     VENT DAY# 11     CURRENT SETTINGS:  VENT SETTINGS   AC 32 520 +16              FIO2:  65%     PATIENT PARAMETERS:     PIP: 33   Pplat: 26   Pmean: 22   SBT: NO  SECRETIONS: large, thick, yellow, tan  02 SATS: 96%     ETT SIZE  8.5 @ 26     Securing device changed / tube re-taped date 12/21/2020     Respiratory Medications: Veletri. Syringe changed @ 2157     ABG:     Results for GISELLE VILLANUEVA (MRN [id number]) as of 12/22/2020 05:15   Ref. Range 12/22/2020 04:14   pH, Arterial Latest Ref Range: 7.37 - 7.44  7.28 (L)   pCO2, Arterial Latest Ref Range: 35 - 45 mm Hg 49 (H)   pO2, Arterial Latest Ref Range: 75 - 85 mm Hg 84   Bicarbonate, Arterial Calc Latest Ref Range: 23.0 - 29.0 mmol/L 21.0 (L)   O2 Sat, Arterial Latest Ref Range: 95.0 - 96.0 % 96.3 (H)   Oxyhemoglobin Latest Ref Range: 95.0 - 96.0 % 94.7 (L)   POC Base Excess Calc Latest Units: mmol/L -4.0   Ventilation Mode Unknown AC   Peep Latest Units: cm H2O 16   Sample Stabilized Temperature Latest Units: degrees C 37.0   Rate Latest Units: rr/min 32   FIO2 Unknown 65.00          12/22/20 0345   Vent Settings   FiO2 (%) 65 %   Heater Temperature 98.6  F (37  C)   Resp Rate (Set) 32   Insp Time (sec) 0.7 sec   Vt (Set, mL) 520 mL   PEEP/CPAP (cm H2O) 16 cm H2O   Trigger Sensitivity Flow (L/min) 2 L/min   I:E Ratio 1:1.7   Humidification Heater   Patient Data   Vt Exp (mL) 561 mL   Minute Ventilation (L/min) 15.1 L/min   Total Resp Rate  32 BPM   PIP Observed (cm H2O) 33 cm H2O   MAP (cm H2O) 22   Auto/Intrinsic PEEP Observed (cm H2O) 0.9 cm H2O   Plateau Pressure (cm H2O) 26 cm H2O   Dynamic Compliance (L/cm H2O) 43.1 L/cm H2O   Airway Resistance 10          NOTE / PLAN:       Patient proned. Head turns done as tolerated. Will continue current therapy and continue to monitor.

## 2021-06-13 NOTE — PROGRESS NOTES
[facility] COVID RT PROGRESS NOTE     DATA:     VENT DAY# 7     CURRENT SETTINGS:  VENT SETTINGS   AC 24/500/+14              FIO2:  70%    PATIENT PARAMETERS:     PIP 29  Pplat:  27  Pmean: 19   SBT: NO  SECRETIONS: moderate tan   02 SATS:  92%     ETT SIZE  8.5@26     Securing device changed / tube re-taped date 2020     Respiratory Medications: Veletri 4.9ml per hour     AB.29/44/71/20.1/94.2  PF ratio 101     NOTE / PLAN:   Pt continues to require full ventilatory support.  Fio2 increased to 70%.  Pt re-proned at 1430.

## 2021-06-13 NOTE — ED NOTES
Pt. Was evaluated by ED Provider, Pt. VSS at this time, lying in bed, HOB elevated, resp. Even/unlabored, skin w/p/d, call bell at hand. WCTM.

## 2021-06-13 NOTE — PROGRESS NOTES
Patient arrived via stretcher to room 4722. On O2 at 4L via nc. Patient is awake and alert and is able to stand for transfer from stretcher to bed. Currently sitting on edge of bed. Weight, height, vs obtained and orientation to room, call light , and remotes and also visitation. Patient is oriented and able to verbalize understanding. O2 is 90% when patient is not coughing. 87% during his coughing. Denies pain at this time.

## 2021-06-13 NOTE — PROGRESS NOTES
Events of day include: remains intubated, sedated, paralyzed, and on CRRT.    Tolerated sedation of  Fentanyl and Versed. Paralyzed on Nimbex. Neuro exam per flowsheets.     Respiratory status progressing with vent support AC PEEP 16/FiO2 75% , and RR 32.     Vital signs tenuous. Drips weaned per specified parameters, see eMar.     TF on hold due to emesis. OG placed to LIS with 1L output thus far.    Family updated and all questions answered; wife able to video conference with patient.

## 2021-06-13 NOTE — PROGRESS NOTES
12/19/20 0400   Vitals   Temperature Probe 93.2  F (34  C)  (warming blanke replacedt, line warmer remains in place)   blanket warmer flow alarm, unable to rectify with trouble shooting, unit replaced

## 2021-06-13 NOTE — PROGRESS NOTES
Vent Mode: AC  FiO2 (%):  [45 %-60 %] 45 %  S RR:  [22-24] 24  S VT:  [500 mL] 500 mL  PEEP/CPAP (cm H2O):  [12 cm H2O-14 cm H2O] 14 cm H2O  Minute Ventilation (L/min):  [11.5 L/min-11.9 L/min] 11.9 L/min  PIP:  [26 cm H2O-28 cm H2O] 27 cm H2O  MAP (cm H2O):  [18-19] 18   PLAT: 25

## 2021-06-13 NOTE — PROGRESS NOTES
12/19/20 0750   Patient Data   Vt Exp (mL) 502 mL   Minute Ventilation (L/min) 12.1 L/min   Total Resp Rate  24 BPM   PIP Observed (cm H2O) 29 cm H2O   MAP (cm H2O) 20   Auto/Intrinsic PEEP Observed (cm H2O) 0.6 cm H2O   Plateau Pressure (cm H2O) 27 cm H2O   Dynamic Compliance (L/cm H2O) 38.2 L/cm H2O   Airway Resistance 12.1   SpO2 99 %   Heart Rate 97

## 2021-06-13 NOTE — PROGRESS NOTES
Care Management Follow Up Note    Length of Stay (days) 2    Patient plan of care discussed at Interdisciplinary Rounds: yes                Expected Discharge Date: (TBD)      Concerns to be Addressed / Barriers to Discharge: Alteration in respiratory status due to hypoxic respiratory failure, secondary to COVID-19, requiring IV Remdesivir and supplemental oxygen at 80% and 50 liters.       Follow up from Rounds: Not medically ready for transfer or discharge at this time.     Anticipated Discharge Disposition: Discharge goal is home pending response to treatment, medical needs and mobility closer to discharge.   Anticipated Discharge Services:  To be determined by destination, patient/family preferences, medical needs and mobility status closer to the time of discharge.  Anticipated Discharge DME: To be determined.     Plan:  Patient is  and independent with activities of daily living at baseline. CM will continue to monitor progression of care, review team recommendations and provide discharge planning assist as needed.      Ana Duncan RN    Abbreviation  Code:  Material [facility] Wheelchair ([facility] WC), [facility] Stretcher ([facility] STR), Patient (pt), Transitional Care Unit (TCU), Skilled Nursing Facility (SNF), Long Term Care (LTC), Assisted Living ([facility] or AL), Care Management (CM), Physical Therapy (PT), Occupational Therapy (OT), Speech Therapy (ST), Respiratory Therapy (RT).

## 2021-06-13 NOTE — PROGRESS NOTES
[facility] COVID RT PROGRESS NOTE    DATA:    Patient has been on bipap all shift at IPAP- 12 , EPAP- 8 and 80-90 % fio2. His oxygen saturation has been around 95%. Breath sounds are coarse. He has been agitated at times and taken his bipap mask off. He is quick to desat during these episodes. When wearing the mask his respiratory rate and volumes are stable. Respiratory will continue to follow.

## 2021-06-13 NOTE — PROGRESS NOTES
12/08/20 0256   Oxygen Therapy/Pulse Ox   O2 Device High-flow NC   O2 Flow Rate (L/min) 30 L/min  (lowered flow for pt comft, jp well)   FiO2 (%) 60 %   O2 Therapy Oxygen humidified   SpO2 95 %   Patient transferred from 4700 earlier in the shift on 10L oxymask.  Shortness of breath and dry cough noted.  BS diminished with expiratory wheezing.  After desaturation episodes a verbal order was given by the hospitalist to place patient on HFNC.  Initially the patient was placed on 50L 60%.  He requested the flow be decreased dramatically and it was changed to 30L.  Additionally, inhaler instruct with a spacer was performed and PRN albuterol was given.  Increased aeration after the inhaler.  Will continue to monitor.

## 2021-06-13 NOTE — PLAN OF CARE
Pt denies nor expressed pain and on oxymask with shortness of breath with activities. Pt alert and oriented x4 and able to make needs known. Pt cooperated with care and treatment and no behavior concern during the evening shift and pt transferred to 3500 and reported given to receiving nurse.

## 2021-06-13 NOTE — PROGRESS NOTES
12/17/20 0707   Patient Data   Vt Exp (mL) 459 mL   Minute Ventilation (L/min) 11.7 L/min   Total Resp Rate  24 BPM   PIP Observed (cm H2O) 28 cm H2O   MAP (cm H2O) 19   Auto/Intrinsic PEEP Observed (cm H2O) 1 cm H2O   Plateau Pressure (cm H2O) 25 cm H2O   Dynamic Compliance (L/cm H2O) 42 L/cm H2O   Airway Resistance 8   SpO2 100 %   Heart Rate 81

## 2021-06-13 NOTE — PROGRESS NOTES
[facility]    ICU PROGRESS NOTE:  DOS:  12/18/2020    Patient Summary:   Felipe Cruz is a 74 year old male with a PMH of CAD, HTN, HLD, DM2, and BPH who was admitted to [facility] on 12/6/2020.  He was admitted to the general care floor. He had increased oxygenation needs, started on HFNC, and transferred to the ICU on 12/8. He was intubated for worsening hypoxia on 12/10. He self extubated on 12/10, required reintubation, and again self-extubated on 12/14. He was reintubated 12/16 for worsening hypoxia on despite bipap use.     Major Changes for Today    Place HD line today   Plan for CRRT per discussions with Nephrology   Recheck gas this am, per RT-- am gas? Accuracy   RD to change TF to Renal formula   KEEP insulin gtt for now, transition to subcutaneous insulin when TF's are at goal and able to determine pt's 24 hour needs.     13:57: Wife called and updated. Questions answered     Assessment/Plan:  HEMODYNAMICS/CV:  # Shock, distributive infection vs. vasoplegia secondary to sedation   # CAD, HTN, HLD  # Tachycardia, new onset following intubation.   - discontinue scheduled antihypertensives   - 12/16: Echo: EF > 63%, Normal RV And LV fn, normal valves. No Tamponade     Pressors:   - Norepinephrine infusion   - Vasopression if Norepi needs > 0.2  - Goal to keep MAP > 65     NEUROLOGIC:  # acute pain  # agitation/anxiety  # delirium  # encephalopathy      pain/sedation infusions:  - Trialled precedex without response  - did not tolerate propofol due to hypotension.   - Fentanyl and versed drips with PRN boluses available  - delirium: scheduled seroquel 50 two times a day,  PRN Haldol available for agitation.    -RASS goal -4 while proning therapy      RESPIRATORY:  # Acute hypoxic respiratory failure   # ARDS 2nd to COVID19  # Klebsiella PNA   - intubated 12/10, self-extubated 12/14, reintubated 12/16  - Full strength veletri for now      Vent Mode: AC  FiO2 (%):  [80 %-100 %] 80 %  S  RR:  [24] 24  S VT:  [500 mL] 500 mL  PEEP/CPAP (cm H2O):  [14 cm H2O] 14 cm H2O  Minute Ventilation (L/min):  [11 L/min-14.9 L/min] 11.7 L/min  PIP:  [28 cm H2O-35 cm H2O] 28 cm H2O  MAP (cm H2O):  [19-20] 19   - Vent and HOB elevation.      RENAL:  # NICKY   # Metabolic acidosis  # Hypernatremia Na 146, free water deficit ~ 1Liter.   - Creat worsening. Creat 2.9 today  - Renal consult.   - stop D5 infusion-- previously going at 10cc/hr   - increase free water enterally. Free water increase to 60 every 4 hours       ID:  # COVID 19   # CAP with COVID19  # HAP with klebsiella pneumonia   - WBC:  20.9 (14.1K)   - Repeat cultures 12/16-->pending   - cultures:               - 12/6 blood: staph epi, contaminant                - 12/11 blood cultures: NGTD                - 12/11 urine: NGTD               - 12/12 sputum: Klebsiella pneumoniae               - 12/12  Blood cultures- NGTD               - PCT L 0.12 on 12/14, procal 12/16 -->0.27  - completed remdesivir course   - decadron 6 mg IV X 10 days   - ABX: started zosyn 12/16, changed to Ceftriaxone 12/17 plan for 8 days today of therapy        HEME:  # anemia of critical illness  # coagulopathy due to COVID   # leukocytosis   - no concerns for bleeding  - Continue with Heparin gtt at low intensity given NICKY and monitoring labs      GI:  # severe protein calorie malnutrition  # Constipation, now resolved.   - RD following. RD managing TF's.   - protein BID  - bowel regimen: miralax, senna, docusate sodium daily     ENDOCRINE:  # DM 2 with superimposed hyperglycemia   - PTA Lantus and oral agents   - goal glucose < 180 for optimal healing  - HgbA1C on admit 8.2  - continue with insulin infusion. Keep insulin gtt for now until TF's at goal and BG's stable.      MSK:  # acute critical care weakness/deconditioning   - OT/PT consulted   - hold OOB activity while proning      PROPHYLAXIS:   DVT proph:Yes. Heparin gtt  GI proph: Yes.  Requires solorzano for strict  "I&O: Yes  Restraints required for safety: Yes     DISPOSITION:  - ICU    LINES/TUBES/DRAINS:  - R PICC, placed 12/9  - L art line, placed 12/10     Total Critical Care Time : 45 Minutes     Oscar Holley PA-C    Overnight events: Course reviewed. Worsening kidney function overnight. Creatinine continues to increase overnight, UOP with diuresis. Not able to perform ROS due to chemical sedation and paralysis.     Physical Exam:  Vent settings for last 24 hours:  Vent Mode: AC  FiO2 (%):  [50 %-70 %] 70 %  S RR:  [24] 24  S VT:  [500 mL] 500 mL  PEEP/CPAP (cm H2O):  [14 cm H2O] 14 cm H2O  Minute Ventilation (L/min):  [11.6 L/min-12 L/min] 11.6 L/min  PIP:  [26 cm H2O-30 cm H2O] 30 cm H2O  TX SUP:  [8 cm H20] 8 cm H20  MAP (cm H2O):  [18-19] 19    /54   Pulse 94   Temp 98.3  F (36.8  C) (Axillary)   Resp (!) 0   Ht 6' 2\" (1.88 m)   Wt (!) 237 lb 7 oz (107.7 kg) Comment: 1 pillow, 1 sheet, 1chux.   SpO2 93%   BMI 30.48 kg/m      Intake/Output last 3 shifts:  I/O last 3 completed shifts:  In: 2205.1 [I.V.:1320.1; NG/GT:885]  Out: 1350 [Urine:1300; Stool:50]  Intake/Output this shift:  I/O this shift:  In: 298 [NG/GT:298]  Out: 225 [Urine:225]    Physical Exam  Neuro: chemically sedated. Not able to perform commands  HEENT:  PERRLA. Pupils 3. ETT present and secured. NJ present and TF's infusing   RESP: BBS, lungs coarse, no wheezes.   CV: S1, S2  GI: abdomen soft and compressible. No masses  : voiding per solorzano, urine clear and yellow   MSK: Extremities: calves soft and compressible, no swelling, pulses 2+ Cap refill brisk.     LAB:  Results from last 7 days   Lab Units 12/18/20  0519   LN-WHITE BLOOD CELL COUNT thou/uL 16.3*   LN-HEMOGLOBIN g/dL 11.2*   LN-HEMATOCRIT % 35.3*   LN-PLATELET COUNT thou/uL 310     Results from last 7 days   Lab Units 12/18/20  0519 12/17/20  1951 12/17/20  0451 12/16/20  2114 12/12/20  0559 12/12/20  0559   LN-SODIUM mmol/L 146* 147* 146* 147*   < > 137   LN-POTASSIUM mmol/L " 4.3 3.6 4.6 4.9   < > 4.1   LN-CHLORIDE mmol/L 115* 116* 116* 113*   < > 106   LN-CO2 mmol/L 20* 20* 19* 17*   < > 24   LN-BLOOD UREA NITROGEN mg/dL 81* 76* 68* 61*   < > 41*   LN-CREATININE mg/dL 4.49* 3.82* 2.90* 2.29*   < > 1.01   LN-CALCIUM mg/dL 8.1* 8.1* 7.9* 8.6   < > 8.2*   LN-PROTEIN TOTAL g/dL  --   --   --  6.2  --  6.2   LN-BILIRUBIN TOTAL mg/dL  --   --   --  1.8*  --  0.5   LN-ALKALINE PHOSPHATASE U/L  --   --   --  67  --  47   LN-ALT (SGPT) U/L  --   --   --  51*  --  32   LN-AST (SGOT) U/L  --   --   --  16  --  14    < > = values in this interval not displayed.

## 2021-06-13 NOTE — PROGRESS NOTES
"Clinical Nutrition Therapy Follow Up Note    Per Care Rounds: Pt on increasing doses of pressors - decrease TF rate to trophic 10 ml/hr per CNP. Started on CRRT    Current Nutrition Prescription:   Diet:TF: Novasource renal goal 35 ml/hr. Has been at goal rate - reduced to trophic for increased pressors now.   Modular: 1 no carb prosource bid  Flush: 100 ml H20 q 4 hours  IV dextrose or Fluids:     amiodarone 900 mg/500 ml standard 24 hour infusion, Last Rate: 0.5 mg/min (12/21/20 1007)       cisatracurium (NIMBEX) infusion, Last Rate: 4.995 mcg/kg/min (12/21/20 1000)       Phoxillum BK 4/2.5, Last Rate: 1,700 mL/hr (12/21/20 0806)       Phoxillum BK 4/2.5, Last Rate: 300 mL/hr (12/20/20 1930)       Phoxillum BK 4/2.5, Last Rate: 1,500 mL/hr (12/21/20 0844)       dextrose 10%       epoprostenol, Last Rate: 20 ng/kg/min (12/21/20 1127)       fentaNYL citrate (PF), Last Rate: 100 mcg/hr (12/21/20 1000)       heparin, Last Rate: 1,350 Units/hr (12/21/20 1000)       insulin infusion (1 unit/mL), Last Rate: 7 Units/hr (12/21/20 1114)       midazolam, Last Rate: 4 mg/hr (12/21/20 1000)       norepinephrine, Last Rate: 0.37 mcg/kg/min (12/21/20 1139)       phenylephrine, Last Rate: 1.5 mcg/kg/min (12/21/20 1200)       sodium bicarbonate IV infusion in D5W, Last Rate: 150 mL/hr at 12/21/20 1000       sodium chloride 0.9%, Last Rate: 10 mL/hr (12/21/20 1000)       vasopressin, Last Rate: 2.4 Units/hr (12/21/20 1000)      Current Nutrition Intake:  Enteral nutrition access is a nasal gastric tube, with a placement date of 12/11/20.   Enteral nutrition at goal provides: 840 ml, 1800 kcal, 106 g protein, 0 fiber, 153 g cho, 1196 ml H20 total.     Received 4160 ml in IVF carriers yesterday    Pt had been meeting estimated nutrition needs when TF at goal. Currently meeting 30% of estimated needs while running trophic    Anthropometrics:  Height: 6'2\"  Admission weight: 255 lb  Weight: (!) 234 lb 5.6 oz (106.3 kg)12/21, stable x 3 " days.  +1 extremity edema per nsg, stable. I>O yesterday, net +4.7L  Bed zeroed at 256 lb 12/10  BMI: 30.5  BMI indication: 30-34.9 obesity (class 1)  Ideal body weight 190 lb  8% weight loss x 1 week as of      GI Status/Output:   Bowel Sounds hypoactive per nurse  Rectal tube OP: 50 ml yesterday, watery mucous  On 3 pressors    Physical Assessment:   Seen through the window: Mild temporal and orbital fat and muscle loss    Medications:  Medications reviewed:  1000 mg vitamin c , prilosec/pantoprazole,   oxycodone q 4 hours, colace bid, senna bid, insulin drip, iv abx, iv lasix 8 hr, NaHCO3 tid  Iv hydrocortisone added    Labs:  Lab Results   Component Value Date/Time    ALBUMIN 2.5 (L) 2020 05:57 AM     2020 07:54 AM    K 4.6 2020 07:54 AM    BUN 35 (H) 2020 04:05 AM    CREATININE 1.96 (H) 2020 04:05 AM     (H) 2020 04:05 AM    PHOS 6.2 (H) 2020 07:54 AM    MG 2.3 2020 07:54 AM   K+, phos increased  B-179 mg/dl past 24 hours.  In good control on insulin drip and steroid    Estimated Nutrition Needs:     Energy Needs: 3798-6535 kcals daily, 14-17kcal/kg current weight of 107.7 kg  Protein Needs: 103-129g daily, 1.2-1.5 g/kg IBW 86.3 kg- recalculated for NICKY  Fluid Needs: 1 ml/kcal or per Md     Malnutrition: Noted previously Moderate    Nutrition Risk Level: high risk    Nutrition dx:  Impaired swallow function r/t increased respiratory needs evidenced by NPO/intubation    Malnutrition r/t acute illness evidenced by 7% weight loss x 1 week, mild fluid accumulation, mild fat and muscle loss    Altered nutrient utilization r/t NICKY evidenced by increased renal labs, fluid retention, decrease UOP    Goal Status:  Regular bowels - progressing  Nutrition intake > 75% of estimated needs- not progressing - increased pressor use limiting ability to provide adequate nutrition via gut  jp TF at goal. -met -was tolerating at goal   No increase in renal  labs d/t intake - progressing    Intervention:  Add nephrovite to help meet micronutrient needs while TF at trophic and increased needs r/t COVId    Monitoring/Evaluation:  TF tolerance, wt, labs, stooling, pressor use and ability to increase TF back to goal rate    Nutrition History:  Information from family/caregiver and chart.  Diet prior to admission: Regular. He was eating ~50% of usual intake for 1-2 days PTA and slightly less than normal for a few days before that. He typically eats foods like sandwiches, eggs and soup with crackers and drinks diet pop, Gatorade and water.  Recent food/fluid intake: SHAD-no PO recorded  COVID GI sx of occasional nausea and poor appetite

## 2021-06-13 NOTE — PROGRESS NOTES
[facility] COVID RT PROGRESS NOTE    VENT DAY #1    CURRENT SETTINGS:     Vent Mode: AC  FiO2 (%):  [60 %-100 %] 100 %  S RR:  [20-22] 22  S VT:  [380 mL-500 mL] 500 mL  PEEP/CPAP (cm H2O):  [10 cm H2O-12 cm H2O] 12 cm H2O  Minute Ventilation (L/min):  [6.4 L/min] 6.4 L/min  PIP:  [22 cm H2O] 22 cm H2O  MAP (cm H2O):  [13] 13    PATIENT PARAMETERS:     PIP - 22  PLAT - 19  MEAN - 13  SBT - N/A   O2 SATS - 95%    8.5 ETT 24 @ Teeth     ABG Pending     RESPIRATORY MEDICATIONS: Albuterol PRN     NOTE: Patient was intubated and placed on the ventilator today. No signs of respiratory distress. RT will continue to follow.     Jaylyn Lester RRT

## 2021-06-13 NOTE — PLAN OF CARE
Problem: Ineffective Airway Clearance  Goal: Maintain airway patency  Outcome: Not Progressing     Problem: Impaired Gas Exchange  Goal: Demonstrate improved ventilation and adequate oxygenation of tissues as evidenced by absence of respiratory distress  Outcome: Not Progressing     Problem: Breathing  Goal: Patient will maintain patent airway  Outcome: Not Progressing  Goal: Patient will utilize incentive spirometer  Outcome: Not Applicable this Shift  Goal: Patient performs or directs assisted coughing  Outcome: Not Applicable this Shift

## 2021-06-13 NOTE — PROGRESS NOTES
Care Management Follow Up Note    Length of Stay (days) 12     Patient plan of care discussed at Interdisciplinary Rounds: yes                Expected Discharge Date: (TBD Intubated)  Concerns to be Addressed / Barriers to Discharge: on   Bipap    Anticipated Discharge Disposition:  TBD  Anticipated Discharge Services:    TBD  Selected Continued Care - Admitted Since 12/6/2020    No services have been selected for the patient.        Anticipated Discharge DME:    Plan  Chat reviewed 12/18/2020. Pt intubated in ICU. Pt from home with spouse Yuridia,  independent at  Baseline. Pt spouse goal to return home. Spouse  Likely to  Transport.  Spouse Yuridia phone number is  [phone] and she is  main . MARCI HILL called Pt spouse and  Spoke with her and she states  she is stay at home and work, she  thinking about FMLA but not now when Pt will be home that time she will be FMLA apply bu not  now.MARCI HILL to continue  follow     MARCI Calhoun /BREANNA 12/18/2020

## 2021-06-13 NOTE — PROGRESS NOTES
12/09/20 2245   Non-Invasive    Mode S/T   IPAP 10 cmH2O   EPAP 5 cmH2O   Resp Rate (Set) 10   FiO2 (%) 60 %   Monitoring   Resp Rate Observed 21   Tidal (Observed) 966 mL   Minute Ventilation (L/min) 20.6 L/min   PIP Observed (cm H2O) 11 cm H2O   Ti/Ttot 25

## 2021-06-13 NOTE — PROGRESS NOTES
[facility] COVID RT PROGRESS NOTE    VENT DAY #3     CURRENT SETTINGS:     Vent Mode: AC  FiO2 (%):  [40 %-45 %] 40 %  S RR:  [24] 24  S VT:  [500 mL] 500 mL  PEEP/CPAP (cm H2O):  [10 cm H2O-14 cm H2O] 10 cm H2O  Minute Ventilation (L/min):  [11.9 L/min-13.2 L/min] 12.2 L/min  PIP:  [26 cm H2O-27 cm H2O] 26 cm H2O  MAP (cm H2O):  [15-18] 15     PATIENT PARAMETERS:    PIP - 26  PLAT - 22  MEAN - 15  SBT - N/A  SECRETIONS - small normal clear   O2 SATS - 98%    8.0 ETT 24 @ Teeth     RESPIRATORY MEDICATIONS: None    12/12/2020 0605 -- Arterial Blood Gas result:  pH 7.42; pCO2 40; pO2 86; HCO3 25.7, %O2 Sat 97.2.    PLAN: Patient remains mechanically ventilated. PEEP weaned throughout the day and he tolerated very well. RT will continue to monitor.     Jaylyn Lester RRT

## 2021-06-13 NOTE — PROGRESS NOTES
[facility] COVID RT PROGRESS NOTE    DATA:    VENT DAY#  1    CURRENT SETTINGS:  VENT SETTINGS  . 24. Peep 14        FIO2:  100    PATIENT PARAMETERS:    PIP 35  Pplat:  29  Pmean:  20    SECRETIONS:  None  02 SATS:  96    ETT SIZE  8 Secured at  25 cm at teeth    Securing device changed / tube re-taped date 12/16    Respiratory Medications: Veletri    ABG:   Results for GISELLE VILLANUEVA (MRN [id number]) as of 12/16/2020 15:47   Ref. Range 12/16/2020 15:00   pH, Arterial Latest Ref Range: 7.37 - 7.44  7.28 (L)   pCO2, Arterial Latest Ref Range: 35 - 45 mm Hg 43   pO2, Arterial Latest Ref Range: 75 - 85 mm Hg 92 (H)   Bicarbonate, Arterial Calc Latest Ref Range: 23.0 - 29.0 mmol/L 19.2 (L)   O2 Sat, Arterial Latest Ref Range: 95.0 - 96.0 % 96.3 (H)   Oxyhemoglobin Latest Ref Range: 95.0 - 96.0 % 94.7 (L)   POC Base Excess Calc Latest Units: mmol/L -6.6   Ventilation Mode Unknown AC   Peep Latest Units: cm H2O 14   Sample Stabilized Temperature Latest Units: degrees C 37.0   Rate Latest Units: rr/min 26   FIO2 Unknown 100.00       NOTE / PLAN:  Patient declined today from Bipap to HFNC to intubation. No problems occurred. Bilateral breath sounds with a positive entitle Co2. Veletri is running full strength. Waiting on ABG to make vent setting. Sats remain 96%. Contiunue to monitor on full vent support.

## 2021-06-13 NOTE — PROGRESS NOTES
Renal progress note  CC: ARF  Assessment and Plan:  74 y.o. male     1. ARF: pt has normal underlying renal function; now hemodynamic ARF/ATN and has progressive rise in CR; continues to be acidotic and renal function is not leveling off as hoped so will pursue dialysis; for this critically ill pt with HoTN and variable fluid status, started CRRT yesterday  2. Volume status; has been problematic; on diuretics and then giving fluid; will be able to keep more on the dry side to optimize his lungs once on CRRT; will try some gentle UF; continue diuretics for now  3. COVID 19 infection with resp failure; on vent with full support; on abx to cover any bacterial infection (klebsiella in sputum); on veletri  4. Hypernatremia; mild; stable  5. Acidosis; primarily metabolic today;onoral bicarb--HD/CRRT will correct  6. Anemia; no active loss; following hgb  7. HoTN; on pressors  8. Nutrition; on TF; renal formula  9. DVT prophylaxis; on heparin gtt  10. CAD, hyperlipid; stable co-morbitdities  11. DM; on insulin gtt      Subjective  On CRRT for about 24 hours and that is been going well.  Metabolic parameters significantly improved.  Still requiring low-dose norepinephrine    Acidosis with pH 7.24 Which is essentially stable.  Will give CRRT another day but adjust formula if not improving        Objective    Vital signs in last 24 hours  Temp:  [96.6  F (35.9  C)] 96.6  F (35.9  C)  Heart Rate:  [] 104  Resp:  [24-28] 28  Arterial Line BP: ()/(41-74) 115/48  FiO2 (%):  [65 %-70 %] 65 %  Weight:   (!) 234 lb 15 oz (106.6 kg)    Intake/Output last 3 shifts  I/O last 3 completed shifts:  In: 2721.8 [I.V.:1161; NG/GT:1371; IV Piggyback:189.8]  Out: 4625 [Urine:500; Other:4125]  Intake/Output this shift:  No intake/output data recorded.    Physical Exam  Viewed through a doorway but not examined  Exams of other providers reviewed discussed with CRRT nurse      Pertinent Labs   Lab Results   Component Value Date    WBC  16.6 (H) 12/19/2020    HGB 11.3 (L) 12/19/2020    HCT 36.4 (L) 12/19/2020    MCV 97 12/19/2020     12/19/2020     Lab Results   Component Value Date    CREATININE 3.41 (H) 12/19/2020    BUN 57 (H) 12/19/2020     12/19/2020    K 4.4 12/19/2020     12/19/2020    CO2 19 (L) 12/19/2020       Lab Results   Component Value Date    ALBUMIN 2.8 (L) 12/19/2020     Lab Results   Component Value Date    CALCIUM 8.3 (L) 12/19/2020    PHOS 5.4 (H) 12/19/2020     I reviewed all lab results  Eren Hutchison

## 2021-06-13 NOTE — PROGRESS NOTES
Renal progress note  CC: ARF  Assessment and Plan:  74 y.o. male     1. ARF: pt has normal underlying renal function; now hemodynamic ARF/ATN and has progressive rise in CR; continues to be acidotic and renal function is not leveling off as hoped so will pursue dialysis; for this critically ill pt with HoTN and variable fluid status, started CRRT yesterday  2. Volume status; has been problematic; on diuretics and then giving fluid; will be able to keep more on the dry side to optimize his lungs once on CRRT; will try some gentle UF; continue diuretics for now  3. COVID 19 infection with resp failure; on vent with full support; on abx to cover any bacterial infection (klebsiella in sputum); on veletri  4. Hypernatremia; mild; stable  5. Acidosis; primarily metabolic today;onoral bicarb--HD/CRRT will correct  6. Anemia; no active loss; following hgb  7. HoTN; on pressors  8. Nutrition; on TF; renal formula  9. DVT prophylaxis; on heparin gtt  10. CAD, hyperlipid; stable co-morbitdities  11. DM; on insulin gtt      Subjective    Remains on ventilator. Requiring some norepinephrine for pressure support    CRRT has been going well technically.  UF of about 4 L past 24 hours.  However continues to have significant acidemia with pH in the low 7.2's.  Today I increased his dialysate flow to 1500 mils per hour.  He is already on pretty high pre and post filter infusion rates but we potentially could go higher.  I am also giving him 100 mEq of sodium bicarb to get his pH up and hopefully we can maintain it there with CRRT      Objective    Vital signs in last 24 hours  Temp:  [96.6  F (35.9  C)-99.4  F (37.4  C)] 97.5  F (36.4  C)  Heart Rate:  [] 120  Resp:  [28-32] 32  Arterial Line BP: ()/(35-63) 102/47  FiO2 (%):  [65 %] 65 %  Weight:   (!) 233 lb 15 oz (106.1 kg)    Intake/Output last 3 shifts  I/O last 3 completed shifts:  In: 4183.5 [I.V.:2048.7; NG/GT:1495; IV Piggyback:639.8]  Out: 4207 [Urine:5;  Other:4192; Stool:10]  Intake/Output this shift:  I/O this shift:  In: 1591.3 [I.V.:1151.3; NG/GT:440]  Out: 1281 [Other:1281]    Physical Exam  Viewed through a doorway but not examined  Exams of other providers reviewed discussed with CRRT nurse      Pertinent Labs   Lab Results   Component Value Date    WBC 22.2 (H) 12/20/2020    HGB 11.1 (L) 12/20/2020    HCT 37.1 (L) 12/20/2020    MCV 98 12/20/2020     12/20/2020     Lab Results   Component Value Date    CREATININE 2.59 (H) 12/20/2020    BUN 38 (H) 12/20/2020     12/20/2020     12/20/2020    K 5.1 (H) 12/20/2020    K 5.0 12/20/2020     12/20/2020     12/20/2020    CO2 18 (L) 12/20/2020    CO2 18 (L) 12/20/2020       Lab Results   Component Value Date    ALBUMIN 2.5 (L) 12/20/2020     Lab Results   Component Value Date    CALCIUM 8.4 (L) 12/20/2020    CALCIUM 8.4 (L) 12/20/2020    PHOS 4.8 (H) 12/20/2020     I reviewed all lab results  Eren Hutchison

## 2021-06-13 NOTE — PROGRESS NOTES
Care Management Follow Up Note    Length of Stay (days) 4     Patient plan of care discussed at Interdisciplinary Rounds: yes                Expected Discharge Date: (TBD)  Concerns to be Addressed / Barriers to Discharge:  Bipap    Anticipated Discharge Disposition:  Home  Anticipated Discharge Services:      Selected Continued Care - Admitted Since 12/6/2020    No services have been selected for the patient.        Anticipated Discharge DME:      Plan:  Chart review.  Called and spoke with wife.  She is concern about him being able to go home. They live in 2 bedroom apartment.  She was worried that it might not be clean enough for him to come home to and that they share a bed.   She also had questions about the FMLA, she still works.  She plans on returning to work soon, but want to be able to help patient when needed.  Explained to her that she will need to talk to her employer about the steps.  Patient does have 2 adult children.  Wife needed to end call, to take another call.  CM to continue to follow.    MARCI Arias LICSW 12/10/2020 11:46 AM

## 2021-06-13 NOTE — PROGRESS NOTES
Pt self extubated around 0100, oxymask was applied at 15LPM.  Pt kept alert to maintain sats in low 80's until anesthesia came to reintubated.  Intubation went well.  8.5 ETT was placed 24 at the teeth.  Equal diminished bilateral breath sounds, good color change on ETCO2 colormetrics.

## 2021-06-13 NOTE — PROGRESS NOTES
"Pharmacy Consult: Vancomycin Dosing    Pharmacist consulted to dose vancomycin for Felipe Cruz, a 74 y.o. male.    Ordering provider: Nunu Umanzor CNP (Critical Care)     Indication for vancomycin therapy: Sepsis    Goal Trough Range:  15-20 mcg/mL based on indication    Other current antimicrobials              vancomycin 1,750 mg in sodium chloride 0.9% 500 mL 1,750 mg (VANCOCIN)  Every 18 hours          piperacillin-tazobactam 3.375 g in NaCl 0.9 % 50 mL (MINI-BAG Plus) (ZOSYN)  Every 8 hours                   Subjective/Objective:    Patient was admitted for Pneumonia due to 2019 novel coronavirus on 12/6/2020    Height: 6' 2\" (1.88 m)    Actual Body Weight (ABW): (!) 108.7 kg (239 lb 10.2 oz)    Ideal body weight: 82.2 kg (181 lb 3.5 oz)  Adjusted ideal body weight: 92.8 kg (204 lb 9.4 oz)    BMI: Body mass index is 30.77 kg/m .    No Known Allergies    Patient Active Problem List   Diagnosis     Pneumonia due to 2019 novel coronavirus     Acute respiratory failure with hypoxia (H)     Acute kidney injury (H)     BPH with urinary obstruction     Chronic pain disorder     Diabetes mellitus (H)     Hypertension     Primary osteoarthritis of right hip     Primary osteoarthritis of right knee     Acute respiratory distress syndrome (ARDS) due to COVID-19 virus (H)    History reviewed. No pertinent past medical history.     Temp Readings from Current Encounter:     12/16/20 1200 12/16/20 1600 12/16/20 1844   Temp: 97.9  F (36.6  C) 99.3  F (37.4  C) (!) 102.7  F (39.3  C)     Net Intake/Output (last 24 hours):  I/O last 3 completed shifts:  In: 1961 [I.V.:1261; NG/GT:550; IV Piggyback:150]  Out: 1850 [Urine:1850]    Recent Labs     12/14/20  0457 12/14/20  0903 12/15/20  0357 12/16/20  0421 12/16/20  1826   WBC 16.0*  --  17.2* 14.1*  --    LACTICACID  --   --   --   --  1.8   PROCAL  --  0.12  --   --  0.27   BUN 52*  --  46* 46*  --    CREATININE 1.00  --  0.85 0.91  --      Estimated Creatinine Clearance: 90.6 " mL/min (by C-G formula based on SCr of 0.91 mg/dL).    No results for input(s): CULTURE in the last 72 hours.    Results for orders placed or performed during the hospital encounter of 12/06/20   Sputum culture    Collection Time: 12/12/20  8:45 PM    Specimen: Respiratory   Result Value Status    Culture 4+ Klebsiella pneumoniae (!) Final   Urine culture    Collection Time: 12/11/20  8:55 AM    Specimen: Urine   Result Value Status    Culture No Growth Final   Culture, Blood Positive Work-up    Collection Time: 12/06/20  4:36 PM    Specimen: Vein, Peripheral; Blood   Result Value Status    Culture Staphylococcus epidermidis (!) Final       No results for input(s): VANCOMYCIN in the last 168 hours.    Vancomycin administrations: (last 120 hours)     None          Assessment/Plan:    Pharmacist consulted to dose vancomycin for Sepsis, goal trough range 15-20 mcg/mL.  1. Initiate vancomycin 1,750mg IV every 18 hours (~15mg/kg actual body weight).  2. No vancomycin level available for assessment.  3. Pharmacist will plan to check a vancomycin trough level at steady state or sooner if clinically indicated.  4. Pharmacist will continue to follow.    Thank you for the consult.  Elli Alcocer, PharmD 12/16/2020 8:02 PM

## 2021-06-13 NOTE — PROGRESS NOTES
Care Management Follow Up Note    Length of Stay (days) 8     Patient plan of care discussed at Interdisciplinary Rounds: yes     Expected Discharge Date: (TBD)     Concerns to be Addressed / Barriers to Discharge:  COVID-19 infection, ICU cares, Intubated (day 5), IV medications, increasing WBC, worsening oxygenation     Anticipated Discharge Disposition:  TBD- pend therapy evals    Anticipated Discharge Services:      Selected Continued Care - Admitted Since 12/6/2020    No services have been selected for the patient.        Anticipated Discharge DME:      Plan:  Chart Reviewed 12/14, no discharge updates to provide family. Medical team will provide medical update. From home with spouse in two bedroom apartment; independent at baseline. Main family contact is spouse who was last updated on 12/10. Per previous care manager note patient has concerns about patient returning home as it might not be clean enough. Goal is home at discharge if patient is able. Will watch for therapy evals when appropriate. Does have 2 adult children. Self extubated and was then reintubated.     Care manager to follow.     Rosangela Spear RN Care Manager

## 2021-06-13 NOTE — PROGRESS NOTES
12/12/20 0246   Vent Settings   FiO2 (%) 45 %   Heater Temperature 94.8  F (34.9  C)   Resp Rate (Set) 24   Insp Time (sec) 0.9 sec   Vt (Set, mL) 500 mL   PEEP/CPAP (cm H2O) 14 cm H2O   Trigger Sensitivity Flow (L/min) 2 L/min   I:E Ratio 1:1.8   Humidification Heater   Patient Data   Vt Exp (mL) 514 mL   Minute Ventilation (L/min) 11.9 L/min   Total Resp Rate  24 BPM   PIP Observed (cm H2O) 27 cm H2O   MAP (cm H2O) 18   Plateau Pressure (cm H2O) 22 cm H2O   SpO2 99 %

## 2021-06-13 NOTE — PROGRESS NOTES
[facility] COVID RT PROGRESS NOTE     DATA:     VENT DAY# 7     CURRENT SETTINGS:  VENT SETTINGS   AC 24/500/+14              FIO2:  70%     PATIENT PARAMETERS:     PIP 29  Pplat:  25  Pmean: 19   SBT: NO  SECRETIONS: Thick tan  02 SATS:  93%     ETT SIZE  8.5 @ 26     Securing device changed / tube re-taped date 12/17/2020     Respiratory Medications: Veletri and Albuterol Q6PRN       PLAN:     Will continue current therapy and continue to monitor.

## 2021-06-13 NOTE — PROCEDURES
Insert arterial line    Date/Time: 12/10/2020 5:34 PM  Performed by: Shaggy Benavidez MD  Authorized by: Shaggy Benavidez MD       Universal Protocol    Site marked: NA    Prior images obtained and reviewed: NA    Required items: required blood products, implants, devices, and special equipment available    Patient identity confirmed: arm band    Reevaluation: Patient was reevaluated immediately before administering moderate or deep sedation or anesthesia    Confirmation checklist: patient's identity using two indicators, procedure was appropriate and matched the consent or emergent situation and correct equipment/implants were available    Time out: Immediately prior to procedure a time out was called to verify the correct patient, procedure, equipment, support staff and site/side marked as required    Universal Protocol: Joint Commission Universal Protocol was followed    Preparation: Patient was prepped and draped in the usual sterile fashion    ESBL (mL): 1    Indications: multiple ABGs, respiratory failure and hemodynamic monitoring  Location: left axillary         Anesthesia  Local anesthesia used?: No    Sedation    Patient sedation: Yes    Sedation: see MAR for details    Vital signs: Vital signs monitored during sedation    Procedure Details   Seldinger technique: Seldinger technique used  Number of attempts: 1  Post-procedure: line sutured and dressing applied  Post-procedure CMS: normal      Post-procedure    Patient tolerance: patient tolerated the procedure well with no immediate complications   Length of time physician present for 1:1 monitoring during sedation: 10

## 2021-06-13 NOTE — PROGRESS NOTES
12/14/20 1330   Visit Specifics   Bed/Tabs/Pad Alarm Applied Not applicable   General   Onset date 12/06/20   Additional Pertinent History per MD note: 74 year old male with a PMH of CAD, HTN, HLD, DM2, and BPH who was admitted to [facility] on 12/6/2020.  He was admitted to the general care floor.  He had increased oxygenation needs, started on HFNC, and transferred to the ICU on 12/8.  He was intubated for worsening hypoxia on 12/10.     Chart Reviewed Yes   PT/OT Patient/Caregiver Stated Goals did not state   Family/Caregiver Present No   Home Living   Additional Comments Pt unable to provide information. Limited information in medical chart at this time.    Prior Status   Lives With Spouse   ADL   Grooming All grooming   All Grooming Dependent/Total assist   Bathing Upper body   Upper Body Dependent/Total assist   Balance   Sitting Balance Dependent/total assist   Bed Mobility   Bed Mobility Rolling   Rolling Dependent   Cognition   Overall Cognitive Status Impaired   Arousal/Alertness Delayed responses to stimuli;Inconsistent responses to stimuli   Attention Span <1 min   Following Commands Inconsistently;Unable to follow commands   RUE Assessment   Comments AAROM limited at shld, elbow flex/exten WFL, MMT 1-2/5   LUE Assessment   Comments AAROM limited at shld, elbow flex/exten WFL, MMT 1-2/5   Hand Function   Gross Grasp Impaired   Gross Motor Coordination Impaired   Fine Motor Coordination Impaired   Assessment   Assessment Decreased ADL status;Decreased funtional mobility;Decreased cognition;Decreased UE ROM;Decreased UE strength;Decreased endurance   Prognosis Fair   Treatment/Interventions ADL training;Functional transfer training;Cognitive training  (activity tolerance building)   OT Frequency 5x/week   Goal Formulation Other (comment)  (pt unable to state)   Recommendations   OT Discharge Recommendation Acute rehab facility-patient can tolerate 3 hours of therapy  daily;TCU  (pending progress)   Treatment Suggestions for Next Session 12/14: cotx w/PT- ana to EOB-sitting tolerance, ana to chair as able, following directions, RICHIE   OT Summary Pt seen for OT eval this date. Pt had extubated self prior to OT arrival. Now on BiPAP at 75%, SATs 94%, //65. Limited following directions, noting some resistance with shld AAROM, no tracking, completed face wash with hand over hand (R only), dep bed mobility. Approp for skilled OT to see for early mobilization and ADL skills.    Pt to meet goals by: 12/30/20 to maximize IND for safe d/c to next level of care    -Pt to demo UB dress with min A  -Pt to demo basic trf skills with min A, VSS  -Pt to demo bed mobility, from flat bed, with CGA w/demo PLB  -Pt to demo grooming task from sitting EOB for 5 minutes with CGA  -Pt to participate in 8 minutes of intermittent exercise/activity with VSS  -Pt to participate in cognitive assessments to assist with treatment and safe d/c planning.

## 2021-06-13 NOTE — PROGRESS NOTES
[facility] COVID RT PROGRESS NOTE    VENT DAY #2    CURRENT SETTINGS:     Vent Mode: AC  FiO2 (%):  [45 %-70 %] 45 %  S RR:  [22-24] 24  S VT:  [500 mL] 500 mL  PEEP/CPAP (cm H2O):  [12 cm H2O-14 cm H2O] 14 cm H2O  Minute Ventilation (L/min):  [10.5 L/min-11.6 L/min] 11.5 L/min  PIP:  [24 cm H2O-28 cm H2O] 27 cm H2O  MAP (cm H2O):  [16-19] 18    PATIENT PARAMETERS:     PIP - 27  PLAT - 26  MEAN - 18  SBT - N/A  SECRETIONS - small clear  O2 SATS - 98%  PF RATIO - 178    8.5 ETT 24 @ Teeth     RESPIRATORY MEDICATIONS: Albuterol PRN     12/11/2020 0800 -- Arterial Blood Gas result: pH 7.46; pCO2 38; pO2 107; HCO3 27.3, %O2 Sat 98.8.    PLAN: Patient remains mechanically ventilated. No weaning trials at this time. RT will continue to follow.     Jaylyn Lester RRT

## 2021-06-13 NOTE — PROGRESS NOTES
12/20/20 0737   Patient Data   PIP Observed (cm H2O) 29 cm H2O   MAP (cm H2O) 19   Plateau Pressure (cm H2O) 26 cm H2O

## 2021-06-13 NOTE — PROGRESS NOTES
12/20/20 1438   Patient Data   PIP Observed (cm H2O) 32 cm H2O   MAP (cm H2O) 22   Plateau Pressure (cm H2O) 30 cm H2O

## 2021-06-13 NOTE — PLAN OF CARE
Problem: Breathing  Goal: Patient will maintain patent airway  Outcome: Not Progressing     Problem: Mechanical Ventilation  Goal: Patient will maintain patent airway  Outcome: Not Progressing  Goal: Oral health is maintained or improved  Outcome: Progressing  Goal: Respiratory status - ventilation  Description: Movement of air in and out of the lungs.    Liberate from ventilator  Outcome: Not Progressing  Goal: ET tube will be managed safely  Outcome: Progressing  Goal: Ability to express needs and understand communication  Outcome: Not Applicable this Shift  Goal: Mobility/activity is maintained at optimum level for patient  Outcome: Not Applicable this Shift

## 2021-06-13 NOTE — PLAN OF CARE
Problem: Ineffective Airway Clearance  Goal: Maintain airway patency  Outcome: Not Progressing

## 2021-06-13 NOTE — PROGRESS NOTES
RN to RN report received and pt arrived to unit @1830 via wheelchair on oxy mask 15L. RT switched pt back over to HFNC 50L/80% with sats > 94%. Stable gait noted when pt voided in the bathroom toilet with standby assist. Denies having shortness of breath with activity. Dry cough noted. Pt states he's had the dry cough for a week now. A/o x4. Able to use the urinal at the bedside. MD say patient at the bed side. Initial orders not yet received at time of note.

## 2021-06-13 NOTE — PROGRESS NOTES
Pt, remains on ventilator, ABG's complete PO2 78, PCO2 48, Versed titrated 10 mg/hr. Increase in Fentanyl gtt 200 mcg. Nurse in close attendance,

## 2021-06-13 NOTE — PROGRESS NOTES
12/18/20 1427   Patient Data   Vt Exp (mL) 497 mL   Minute Ventilation (L/min) 11.4 L/min   Total Resp Rate  24 BPM   PIP Observed (cm H2O) 29 cm H2O   MAP (cm H2O) 19   Auto/Intrinsic PEEP Observed (cm H2O) 0.6 cm H2O   Plateau Pressure (cm H2O) 27 cm H2O   Dynamic Compliance (L/cm H2O) 37 L/cm H2O   Airway Resistance 9   SpO2 92 %   Heart Rate (!) 124     Pt proned w/o complicattions

## 2021-06-13 NOTE — PROGRESS NOTES
"Wound Ostomy  [facility] Assessment - Virtual Visit        Allergies:  No Known Allergies    Diagnosis:   Patient Active Problem List    Diagnosis Date Noted     Acute respiratory distress syndrome (ARDS) due to COVID-19 virus (H) 12/10/2020     Acute respiratory failure with hypoxia (H)      Acute kidney injury (H)      Pneumonia due to 2019 novel coronavirus 12/06/2020     Diabetes mellitus (H) 06/24/2019     Hypertension 06/24/2019     Chronic pain disorder 03/27/2019     Primary osteoarthritis of right hip 01/26/2018     Primary osteoarthritis of right knee 01/26/2018     BPH with urinary obstruction 11/15/2017       Height:  6' 2\" (1.88 m)    Weight:   (!) 239 lb 10.2 oz (108.7 kg)    Labs:  Recent Labs     12/16/20 2114 12/16/20 2114 12/17/20  0930   HGB  --    < > 11.0*   ALBUMIN 1.9*  --   --     < > = values in this interval not displayed.       Mode:  Mode Scale Score: 10    Specialty Bed:  Sekiu    Wound culture obtained: No    Edema:  Not assessed today    Anatomic Site/Laterality: Body    Reason for ongoing care:   Skin integrity and plan of care    Encounter Type:  Initial Wound Type:   Body    Chart reviewed. Patient is being placed in prone position prn. No wound LDA noted and skin assessment shows bruising on abdomen and blanchable erythema to scrotum.    Prone Positioning recommendations:    Prior  Apply Mepilex sacral dressings for protection if pt has bony prominences (shoulders, iliac crest) - may leave in place when not proned.  Ensure tongue is in the mouth, remove bite block for pressure injury prevention.  Ensure no lines/tubes/drains (as much as able) will be under patient when prone.    Prone position  Use pillows or Z maxx pads to off load pressure to bony prominences.  Position head so that ears are not folded and so that head/neck are aligned.  Reposition head every one hour.  Use swim position per protocol for positioning when supine.    Prone Skin Folds  Use Interdry under " pannus/breasts prior to placing patient in prone position. This needs to lay flat (do not bunch up under fold)   If an antifungal is needed: obtain order for miconazole powder from provider and use flat pillowcase in place of Interdry.     Mepilex dressings to cheeks when in prone position - may leave in place when not proned. Change every 5 days or sooner if soiled.    Prevention measures for mucosal integrity:  1. No bite block when proning. (rationale - the hard plastic will leave the patient at greater risk for pressure injury)  2. Hourly head reposition and moisturizer to exposed portion of tongue (moisturizer which comes with the oral care kits). (rationale - remove pressure to tongue/lips and face and maintain mucosal integrity for the tongue)  3. Use tongue blade when repositioning ETT, so tongue doesn t move along with the tube. (rationale - to ensure pressure is not on the same area of the tongue, even though the ETT is technically repositioned)     Nursing care provided was - virtual visit.    Discussed plan of care with - virtual visit.    Outcomes and treatment recommendations are to promote skin integrity.    Actions taken by WOC RN: Chart reviewed and orders entered.    Planned Follow Up: Weekly.    Plan for next visit: Reassess skin integrity.

## 2021-06-13 NOTE — PROGRESS NOTES
Discussed K of 3.6, diminished urine output, and ordered diuretics with MD. Replacing K with 40 MEQ.

## 2021-06-13 NOTE — PROGRESS NOTES
Pt remained on BiPAP 12/8 and 90% overnight with no weaning. He appeared to be breathing comfortably for most of the night and tolerating BiPAP well, however when agitated his breathing is much more labored and the BiPAP interface is difficult to maintain. ABG 7.42 37 64 24.5.

## 2021-06-13 NOTE — PLAN OF CARE
Problem: Occupational Therapy  Goal: OT Goals  Note: Pt to meet goals by: 12/30/20 to maximize IND for safe d/c to next level of care    -Pt to demo UB dress with min A  -Pt to demo basic trf skills with min A, VSS  -Pt to demo bed mobility, from flat bed, with CGA w/demo PLB  -Pt to demo grooming task from sitting EOB for 5 minutes with CGA  -Pt to participate in 8 minutes of intermittent exercise/activity with VSS  -Pt to participate in cognitive assessments to assist with treatment and safe d/c planning.     Brianna Erazo, OTRL/WENDYIS

## 2021-06-13 NOTE — PROGRESS NOTES
12/21/20 0714   Patient Data   Vt Exp (mL) 480 mL   Minute Ventilation (L/min) 15.1 L/min   Total Resp Rate  32 BPM   PIP Observed (cm H2O) 36 cm H2O   MAP (cm H2O) 29   Auto/Intrinsic PEEP Observed (cm H2O) 5.3 cm H2O   Plateau Pressure (cm H2O) 34 cm H2O   Dynamic Compliance (L/cm H2O) 43 L/cm H2O   Airway Resistance 8   SpO2 97 %   Heart Rate (!) 109

## 2021-06-13 NOTE — PROGRESS NOTES
Arrived in the Patients room with his ETT pulled out. Bagged the patient on 100% Fio2. Patient was not following commands. No distress noted. Placed on Bipap. Will continue to monitor for decompensation.

## 2021-06-13 NOTE — PROGRESS NOTES
"Clinical Nutrition Therapy Follow Up Note      Current Nutrition Prescription:   Diet: NPO day 2 today - Pt self extubated and tenuous for reintubation  IV dextrose or Fluids:     dexmedetomidine infusion orderable (PRECEDEX), Last Rate: 1.401 mcg/kg/hr (12/15/20 0800)       dextrose 10%       sodium chloride 0.9%, Last Rate: 10 mL/hr (12/15/20 0800)      Current Nutrition Intake:  Enteral nutrition access is a nasal gastric tube, with a placement date of 20.     Received 1200 ml in IVF carriers yesterday    Pt is not meeting his estimated nutrition needs x 24 hours    Anthropometrics:  Height: 6'2\"  Admission weight: 255 lb  Weight: (!) 242 lb 4.6 oz (109.9 kg)12/15, stable with day prior, trending down from admit.  nonpitting edema LEs. I=O yesterday, net neg 4.6L  Bed zeroed at 256 lb 12/10  BMI: 31.1  BMI indication: 30-34.9 obesity (class 1)  Ideal body weight 190 lb  5% weight loss x 1 week as of 12/15 -consistent with fluid weight loss    GI Status/Output:   GI symptoms include: Diarrhea per nurse  Bowel Sounds present per nurse  Last BM: 1 small BM today. 3 small yesterday    Skin/Wound:  Shin abrasion and bruising was noted.    Medications:  Medications reviewed:  1000 mg vitamin c , famotidine/ pepcid, SSI, lantus, miralax, phos-nak  Decadron completed. Colace BID, senna bid added yesterday. Iv lasix yesterday  Oxycodone q 4 hr added today. Miralax dc'd    Labs:  Lab Results   Component Value Date/Time    ALBUMIN 2.2 (L) 2020 05:59 AM     12/15/2020 03:57 AM    K 4.1 12/15/2020 03:57 AM    BUN 46 (H) 12/15/2020 03:57 AM    CREATININE 0.85 12/15/2020 03:57 AM     (H) 12/15/2020 03:57 AM    PHOS 2.9 12/15/2020 03:57 AM    MG 2.0 12/15/2020 03:57 AM     B-143 mg/dl past 24 hours with 1 at 264 at 2341. In good control    Estimated Nutrition Needs:     Energy Needs: 4178-0094 kcals daily, 14-17kcal/kg current weight of 115.9 kg  Protein Needs: >172 g daily, >2 g/kg IBW 86.3 " kg  Fluid Needs: 1 ml/kcal or per Md    Malnutrition: Not noted    Nutrition Risk Level: high risk    Nutrition dx:  Impaired swallow function r/t increased respiratory needs evidenced by NPO/intubation    Inadequate nutrition intake r/t tenuous respiratory needs evidenced by recent self extubation, dependence on Bipap    Goal Status:  Regular bowels - progressing  Nutrition intake > 75% of estimated needs- not progressing  jp TF at goal. -on hold    Intervention:  Monitor POC  If unable to start a diet, recommend restarting TF by 12/19 to prevent malnutrition    Monitoring/Evaluation:  TF tolerance, wt, labs, stooling

## 2021-06-13 NOTE — PROGRESS NOTES
CRITICAL CARE  PROGRESS NOTE:  Date of service: 12/14/2020    Felipe Cruz is a 74 year old male with a PMH of CAD, HTN, HLD, DM2, and BPH who was admitted to [facility] on 12/6/2020.  He was admitted to the general care floor.  He had increased oxygenation needs, started on HFNC, and transferred to the ICU on 12/8.  He was intubated for worsening hypoxia on 12/10.  He remains intubated and sedated.     MAIN CHANGES FOR TODAY:  - SBT  - change sedation to precedex    PLAN BY SYSTEMS:    HEMODYNAMICS/CV:  # CAD, HTN, HLD     - continue ASA 81 mg PO daily, metoprolol 12.5 mg PO two times a day  - labetalol PRN, hydralazine PRN for SBP > 160  - holding home amlodipine, atenolol, lisinopril  PLAN: no changes     NEUROLOGIC:  # acute pain  # agitation/anxiety  # delirium  # encephalopathy      - pain/sedation infusions: versed 8, fentanyl 200   - delirium: no current pharmalogical interventions ordered, delirium prevention, daily SAT  - 12/12 triglycerides: 136  PLAN: daily sedation vacation, add precedex    RESPIRATORY:  # acute hypoxic respiratory failure   # ARDS 2nd to COVID19  # CAP    -  Vent Mode: CPAP/PSV  FiO2 (%):  [45 %-75 %] 75 %  S RR:  [24] 24  S VT:  [500 mL] 500 mL  PEEP/CPAP (cm H2O):  [8 cm H2O] 8 cm H2O  Minute Ventilation (L/min):  [12.2 L/min-15.4 L/min] 13.4 L/min  PIP:  [18 cm H2O-32 cm H2O] 18 cm H2O  VT SUP:  [8 cm H20] 8 cm H20  MAP (cm H2O):  [11-16] 11    - intubated 12/10  - Self extubated this am. Currently on bipap 12/8 75%  - continue aggressive pulmonary hygiene  PLAN: ABG on bipap, monitoring closely.      RENAL:  # NICKY, improved     - I/O: - 4.4 L sine admission; -70 mL over the past 24 hours  - UOP: 3.3 L over the last 24 hours  - patient appears euvolemic, goal is net even today   PLAN: maintain euvolemia, follow urine output, electrolytes, and renal function closely      ID:  # COVID 19   # CAP with COVID19  - Tmax: 98.4  - WBC: 16.0 increased from 10 on 12/13  -  "cultures:               - 12/6 blood: staph epi, contaminant                - 12/11 blood cultures: pending               - 12/11 urine: NGTD    -12/12 sputum: Gram stain with GPC and GNB, yeast    - 12/12  Blood cultures- pending   - PCT  0.12  - ABX: none  - completed remdesivir course   PLAN: continue to follow fever curve and WBC, follow up cultures results, hold on antibiotics for now, continue steroid course     HEME:  # anemia of critical illness  # coagulopathy   # leukocytosis      - Hgb: 12.9  - Plts: 313  - no concerns for bleeding  - continue enoxaparin 50 mg two times a day  PLAN: follow CBC, continue prophylaxis      GI:  # severe protein calorie malnutrition     - nutrition: tube feeds @ 70 (goal)  - protein BID  - bowel regimen: miralax daily     ENDOCRINE:  # hyperglycemia 2nd to critical illness  # DM 2     - insulin drip initiated overnight   - goal glucose < 180 for optimal healing  - holding home oral DM 2 meds  - HgbA1C on admit 8.2     MSK:  # acute critical care weakness/deconditioning      - mobilize  - OT/PT consulted      PROPHYLAXIS:   DVT proph:Yes.  GI proph: Yes.  Requires solorzano for strict I&O: Yes  Restraints required for safety: Yes     DISPOSITION:  - ICU  - decision maker: wife, Yuridia  - family updated via phone by myself today      LINES/TUBES/DRAINS:  - R PICC, placed 12/9  - L art line, placed 12/10    SUBJECTIVE/INTERVAL EVENTS:  - patient seen and examined, chart reviewed  - discussed patient on multidisciplinary rounds  - no acute events overnight    OBJECTIVE:  /55 (Patient Position: Semi-mueller)   Pulse (!) 104   Temp 97.7  F (36.5  C) (Oral)   Resp (!) 38   Ht 6' 2\" (1.88 m)   Wt (!) 241 lb 10 oz (109.6 kg)   SpO2 (!) 71%   BMI 31.02 kg/m      Intake/Output last 3 shifts:  I/O last 3 completed shifts:  In: 3229.9 [I.V.:889.9; NG/GT:2340]  Out: 3300 [Urine:3300]  Intake/Output this shift:  I/O this shift:  In: 944.4 [I.V.:284.4; NG/GT:660]  Out: 850 " [Urine:850]    ABG:  Recent Labs     12/14/20  0500   PHART 7.38   OXYHB 90.8*   BEARTCALC -1.4   POCPEEP 8   TEMP 37.0   POCRATE 24     GEN: no acute distress, intubated, sedated   NEURO: sedated but followed simple commands, POLLOCK, no focal deficit  HEENT: ETT secure, normocephalic, atraumatic   PULM: diminished without rhonchi, crackles or wheeze  CV/COR: diminished RRR  no murmur  GI: obese, round, soft, nontender,  no guarding  :  Funk, urine yellow and clear  EXT:  peripheral pulses 2 +, no edema noted  SKIN: no obvious rash, warm and dry    Total Critical Care Time : 30 Minutes    DANIEL Freire, CNP

## 2021-06-13 NOTE — PROGRESS NOTES
S[facility] COVID RT PROGRESS NOTE     DATA:     VENT DAY# 6     CURRENT SETTINGS:  VENT SETTINGS   AC 24/500/+14              FIO2:  50%    PATIENT PARAMETERS:     PIP 26  Pplat:  24  Pmean: 18   SBT: NO  SECRETIONS: Thick urbina  02 SATS:  100%     ETT SIZE  8.5 @ 26     Securing device changed / tube re-taped date 2020     Respiratory Medications: VELETRI and Albuterol Q6PRN     AB.30/40/179/19.4/99%     NOTE / PLAN:   Pt continues to require full ventilatory support. Proning and paralyzed. Weaned FIO2 to 50%.

## 2021-06-13 NOTE — PLAN OF CARE
Problem: Ineffective Airway Clearance  Goal: Maintain airway patency  Outcome: Not Progressing     Problem: Impaired Gas Exchange  Goal: Demonstrate improved ventilation and adequate oxygenation of tissues as evidenced by absence of respiratory distress  Outcome: Not Progressing     Problem: Breathing  Goal: Patient will maintain patent airway  Outcome: Not Progressing  Goal: Patient will utilize incentive spirometer  Outcome: Not Applicable this Shift  Goal: Patient performs or directs assisted coughing  Outcome: Not Applicable this Shift     Problem: Mechanical Ventilation  Goal: Patient will maintain patent airway  Outcome: Not Progressing

## 2021-06-13 NOTE — PROGRESS NOTES
12/13/20 0713   Vent Information   Vent Mode    Patient Ventilator Status On   Respiratory Assessment   Respiratory Pattern Regular   Bilateral Breath Sounds Inspiratory wheezes   Vent Settings   FiO2 (%) 40 %   Resp Rate (Set) 24   Insp Time (sec) 0.9 sec   Vt (Set, mL) 500 mL   PEEP/CPAP (cm H2O) 10 cm H2O   Trigger Sensitivity Flow (L/min) 2 L/min   I:E Ratio 1:1.8   Patient Data   Vt Exp (mL) 483 mL   Minute Ventilation (L/min) 11.9 L/min   Total Resp Rate  24 BPM   PIP Observed (cm H2O) 25 cm H2O   MAP (cm H2O) 15   Auto/Intrinsic PEEP Observed (cm H2O) 0.4 cm H2O   Plateau Pressure (cm H2O) 21 cm H2O   Dynamic Compliance (L/cm H2O) 42 L/cm H2O   Airway Resistance 10   SpO2 95 %   Heart Rate 63   Airway Suctioning/Secretions   Suction Device  Inline   Secretion Amount Small   Secretion Color White;Yellow   Secretion Consistency Thick

## 2021-06-13 NOTE — PLAN OF CARE
Patient arrived to unit and was on O2 at 4L via NC. 90% O2 sat when at rest and 87% when coughing or exerting self. Increased O2 to 5L and sat increased to 91-92%, again dropping with exertion to 86-87%. RT arrived and increased to 6L. Gradual increase to oximask at 10L.   Patient denies pain. Strong non-productive cough.  Denies N/V/D.  Standby assist to use urinal and Bedside commode. Bilateral lower extremity weakness.  Received first dose of Remdesivir and receiving IV fluids of normal saline at 75ml/hr via peripheral line.   Refused Lantus insulin tonight.   Problem: Glucose Imbalance  Goal: Achieve optimal glucose control  Outcome: Progressing     Problem: Pain  Goal: Patient's pain/discomfort is manageable  Outcome: Progressing     Problem: Safety  Goal: Patient will be injury free during hospitalization  Outcome: Progressing

## 2021-06-13 NOTE — PROGRESS NOTES
Nutrition update:    Pt re-intubated and approved to re start TF per CNP.     Lab Results   Component Value Date/Time    ALBUMIN 1.9 (L) 12/16/2020 09:14 PM     (H) 12/17/2020 04:51 AM    K 4.6 12/17/2020 04:51 AM    BUN 68 (H) 12/17/2020 04:51 AM    CREATININE 2.90 (H) 12/17/2020 04:51 AM     (H) 12/17/2020 04:51 AM    PHOS 5.3 (H) 12/17/2020 04:51 AM    MG 2.2 12/17/2020 04:51 AM     Rapid increase in renal labs and decreased UOP with no improvement after IVF    Estimated Nutrition Needs:     Energy Needs: 9669-4722 kcals daily, 14-17kcal/kg current weight of 115.9 kg  Protein Needs: 103-129g daily, 1.2-1.5 g/kg IBW 86.3 kg- recalculated for NICKY  Fluid Needs: 1 ml/kcal or per Md    Pt was NPO x 3 days - mild to moderate refeeding risk    Will Initiate Replete No fiber at 20 ml/hr, increase by 10 ml/hr q 8 hr to goal 80 ml/hr and 30 ml H20 q 4 hr = 1920 ml/kcal, 119 g protein, 238 g cho, 0 fiber, 1792 ml H20 total.     Monitor renal function for potential need for more concentrated formula.   Order phos/mag for tomorrow to monitor for s/s refeeding  Add nephrovite to help replete micronutrient needs with prior NPO status

## 2021-06-13 NOTE — PROGRESS NOTES
[facility] COVID RT PROGRESS NOTE     DATA:     VENT DAY# 9     CURRENT SETTINGS:  VENT SETTINGS   AC 32/480/+14              FIO2:  65%     PATIENT PARAMETERS:     PIP: 28  Pplat: 26    Pmean: 19   SBT: NO  SECRETIONS: small, pink, thk  02 SATS:  99%     ETT SIZE  8.5 @ 26     Securing device changed / tube re-taped date 12/19/2020     Respiratory Medications: Veletri and Albuterol Q6PRN       PLAN:     Patient was proned 12/19 during the day shift. Patient not tolerating head turns at this time. Will continue current therapy and continue to monitor.

## 2021-06-13 NOTE — PROGRESS NOTES
Critical Care Progress Note      12/11/2020    Name: Felipe Cruz MRN#: [id number]   Age: 74 y.o. YOB: 1945     Hsptl Day# 5  ICU DAY # 3    MV DAY # 2             Problem List:   Principal Problem:    Pneumonia due to 2019 novel coronavirus  Active Problems:    Acute respiratory failure with hypoxia (H)    Acute kidney injury (H)    Acute respiratory distress syndrome (ARDS) due to COVID-19 virus (H)           Summary/Hospital Course:     Unable to obtain history directly from patient due to intubated/ventilated status.    Felipe Cruz is a 74 y.o. male with a PMH significant for hypertension, diabetes mellitus, BPH, arthritis and chronic pain syndrome who presented to [facility] with shortness of breath and hypoxia on 12/6. He was admitted to the general medicine floor on supplemental oxygen via oxymask, which progressed to HFNC on 12/8, prompting his transfer to the ICU later that evening. He was intubated for worsening hypoxia on 12/10.           Assessment and plan :     Felipe Cruz IS a 74 y.o. male admitted on 12/6/2020 for COVID19 infection.   I have personally reviewed the daily labs, imaging studies, cultures and discussed the case with referring physician and consulting physicians.   My assessment and plan by system for this patient is as follows:    NEURO:  # Sedation and analgesia needs with mechanical ventilation  # acute pain, myalgias   # hx of chronic pain from OA (joints and low back)   # Hx of anxiety  - Fentanyl drip for pain with PRN boluses available   - Propofol, versed added as an adjunct to achieve adequate vent synchrony  - Goal RASS -3 to -4   - PTA medications include Norco for osteoarthritis      CVS:  #  Hx of hypertension   #  Shock, distributive,  likely secondary to vasoplegia from sedation   # Bradycardia, intermittent   - Levophed to maintain MAP > 65  - EKG   - Metoprolol PO 12.5 two times a day, hold for HR < 60.  - PRN hydralazine, labetalol  for SBP >  160  PTA medications: lisinopril/hydrochlorothiazide, amlodipine 10 mg daily, and atenolol 100 mg daily, 8mg doxazosin     RESP:  # Acute Respiratory Distress Syndrome (ARDS) 2/2 COVID  # Acute hypoxic respiratory failure    # Viral pneumonia due to COVID 19   # History of childhood asthma   - Intubated on 12/10  - Vent Mode: AC  FiO2 (%):  [60 %-100 %] 60 %  S RR:  [20-24] 24  S VT:  [380 mL-500 mL] 500 mL  PEEP/CPAP (cm H2O):  [10 cm H2O-14 cm H2O] 14 cm H2O  Minute Ventilation (L/min):  [6.4 L/min-11.6 L/min] 11.6 L/min  PIP:  [22 cm H2O-28 cm H2O] 28 cm H2O  MAP (cm H2O):  [13-19] 19   - PEEP increased 12-> 14  - Repeat ABG  - consider prone this afternoon pending ABG   -PRN albuterol available for dyspnea, wheezing   - CXR 12/10 shows worsening bilateral infiltrates      GI:  # Concern for Protein Calorie Malnutrition   # Peptic ulcer prophylaxis   - Feeding tube placed overnight post pyloric  - Initiate tube feeds per RD recommendations  - Daily omeprazole  - Scheduled bowel regimen. Miralax, senna, colace. Last BM 12/10     RENAL :  # Volume status, appears euvolemic  #hyponatremia, resolved. Na 137 today  # BPH  # Acute kidney injury, Creatinine 1.1 today from 0.8 on 12/10  - Daily evaluation for diuretic   - monitor I/O's closely  - BMP daily   - continue with BPH medications   - Avoid nephrotoxic medications as able   - Electrolytes replaced per protocol      ID:  #  COVID  19  # Concern for secondary bacterial infection   - COVID Dx on 11/30, confirmed on 12/2.   - Empiric abx stopped 12/7/20.  12/6/20: Procal 0.25. WBC normal.             -12/6: 1/2 Blood cultures coag (-) staph  - Repeat blood cultures sent 12/9 NGTD  - PCT 0.05 this am  - Cont with Decadron for total of 10 days.   - Completed Remdesivir  5 days      HEMATOLOGIC:  # Coagulopathy due to COVID-19  #Anemia in setting of critical illness Hgb stable  # Leukocytosis. Mild. Suspect elevated in the setting of steroid therapy   - Lovenox 50 two  times a day for category B prophylaxis   - Daily CBC   - Inflammatory markers mildly elevated. Trend      ENDOCRINE:  # Diabetes mellitus   - Lantus 25 units q am, expect needs will increase as tube feeds are started.   - Continue s/s insulin correction scale.   - goal to keep BG <180   -Hold PTA medications: metformin 1000 mg twice daily, glipizide 10 mg daily, and glargine 35 units subcu nightly.  - hgb A1C on admit 8.2    MSK:  # hx of OA  # hx of low back pain with Right sided sciatica and joint pains   # Weakness/Deconditioning   - prn medications   - frequent turns/repositioning for comfort   - PT/OT consults     ICU Prophylaxis:   1. DVT: Lovenox Sub-q SCDs  2. VAP: HOB 30 degrees, chlorhexidine rinse  3. Stress Ulcer: PPI   4. Restraints: Yes   5. Wound care - per unit routine   6. Feeding - TF per RD recommendations   7. Family Update:spoke with spouse Yuridia per phone   8. Disposition - ICU    Interval Events:  Intubated last evening. Pt self-extubated overnight requiring re-intubation.     Lines:  PIV X 1  PICC placed 12/9  Arterial line placed 12/10  Funk catheter placed 12/10      Plan of care discussed at rounds with interdisciplinary team and Dr. Benavidez.        Physical Examination:   Temp:  [97.4  F (36.3  C)-100  F (37.8  C)] 97.4  F (36.3  C)  Heart Rate:  [52-85] 54  Resp:  [8-45] 24  BP: (141-167)/(64-77) 141/64  Arterial Line BP: ()/() 149/60  FiO2 (%):  [60 %-100 %] 60 %    Intake/Output Summary (Last 24 hours) at 12/11/2020 1100  Last data filed at 12/11/2020 1027  Gross per 24 hour   Intake 287.56 ml   Output 1315 ml   Net -1027.44 ml     Wt Readings from Last 4 Encounters:   12/11/20 (!) 255 lb 8 oz (115.9 kg)        Vent Mode: AC  FiO2 (%):  [60 %-100 %] 60 %  S RR:  [20-24] 24  S VT:  [380 mL-500 mL] 500 mL  PEEP/CPAP (cm H2O):  [10 cm H2O-14 cm H2O] 14 cm H2O  Minute Ventilation (L/min):  [6.4 L/min-11.6 L/min] 11.6 L/min  PIP:  [22 cm H2O-28 cm H2O] 28 cm H2O  MAP (cm H2O):   [13-19] 19    GEN: Lying in bed, intubated, sedated, NAD    HEENT: head ncat, PERRL  sclera anicteric, neck supple, without lymphadenopathy  PULM: Coarse and diminished in bases anteriorly, no wheezes, no crackles, equal chest rise   CV/COR: RRR on monitor  S1S2 no gallop, No rub, no murmur  ABD: Round, soft, nontender, hypoactive bowel sounds, no mass  EXT:  warm x4, brisk cap refill, 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   SKIN: no obvious rash  LINES: clean, dry intact         Data:     Labs personally reviewed/interpreted: see a/p.          Billing: This patient is critically ill: Yes. Total critical care time today 45 min, excluding procedures.     Histories:    Current Medications              ascorbic acid (vitamin C) tablet 1,000 mg (VITAMIN C)  DAILY          metoprolol tartrate tablet 12.5 mg (LOPRESSOR)  2 times daily          insulin glargine injection 25 Units (LANTUS)  Every morning          polyethylene glycol packet 17 g (MIRALAX)  2 times daily          aspirin chewable tablet 81 mg  DAILY          insulin aspart U-100 injection pen (NovoLOG)  Every 4 hours scheduled     Question Answer Comment   Scale? Standard    BG < 70 mg/dL Treat, and call MD    BG  mg/dL None - 0 Units    -180 mg/dL 2 units    -220 mg/dL 4 units    -260 mg/dL 6 units    -300 mg/dL 8 units    -340 mg/dL 10 units    -400 mg/dL 12 units    BG > 400 mg/dL 14 units and call MD            omeprazole capsule 20 mg (PriLOSEC)  Daily before breakfast          omeprazole suspension 20 mg (PriLOSEC)  Daily before breakfast          pantoprazole 40 mg injection  Daily before breakfast          chlorhexidine 0.12 % solution 15 mL (PERIDEX)  Every 12 hours          midazolam 100 mg/100 mL (1 mg/mL) infusion (VERSED)  Continuous          norepinephrine 4 mg/250 mL in NS (16 mcg/mL) - CENTRAL   Continuous          midazolam (PF) injection 2 mg (VERSED)  Every 2 hours PRN          fentaNYL 2500 mcg/50 mL CADD infusion (50 mcg/mL)  Continuous          famotidine 20 mg injection  2 times daily          famotidine tablet 20 mg (PEPCID)  2 times daily          propofoL injection (DIPRIVAN)  Continuous          fentaNYL pf injection 25-50 mcg (SUBLIMAZE)  Every 1 hour prn          bisacodyL suppository 10 mg (DULCOLAX)  Daily PRN          magnesium hydroxide suspension 30 mL (MILK OF MAG)  Daily PRN          polyvinyl alcohol 1.4 % ophthalmic solution 1-2 drop (LIQUIFILM TEARS)  Every 1 hour prn          dexAMETHasone injection 6 mg (DECADRON)  DAILY          alteplase injection 1 mg (CATHFLO; TPA)  Once     Question:  type of catheter?  Answer:  PICC        alteplase injection 1 mg (CATHFLO; TPA)  Once     Question:  type of catheter?  Answer:  PICC        alteplase injection 1 mg (CATHFLO; TPA)  Once     Question:  type of catheter?  Answer:  PICC        labetaloL injection 20 mg (NORMODYNE,TRANDATE)  Every 6 hours PRN          HYDROmorphone injection 0.2 mg (DILAUDID)  Every 6 hours PRN          hydrALAZINE injection 10-20 mg (APRESOLINE)  Every 6 hours PRN          sodium chloride flush 10-30 mL (NS)  Every 8 hours scheduled          bacitracin ointment packet 1 packet  Once as needed     Question:  Apply to:  Answer:  PICC insertion site        sodium chloride flush 20 mL (NS)  As needed          sodium chloride flush 10-30 mL (NS)  As needed          sodium chloride bacteriostatic 0.9 % injection 1-5 mL  Once as needed          enoxaparin ANTICOAGULANT syringe 50 mg (LOVENOX)  2 times daily          naloxone injection 0.2 mg (NARCAN)  Every 2 min PRN          naloxone injection 0.2 mg (NARCAN)  Every 2 min PRN          naloxone injection 0.4 mg (NARCAN)  Every 2 min PRN          naloxone injection 0.4 mg (NARCAN)  Every 2 min PRN          dextrose 50 % (D50W) syringe 20-50 mL  Every 15 min PRN           glucagon (human recombinant) injection 1 mg  Every 15 min PRN          ondansetron injection 4 mg (ZOFRAN)  Every 4 hours PRN          ondansetron tablet 8 mg (ZOFRAN)  Every 8 hours PRN          bisacodyL suppository 10 mg (DULCOLAX)  Daily PRN          albuterol inhaler 2 puff (PROAIR HFA;PROVENTIL HFA;VENTOLIN HFA)  Every 4 hours PRN                     ROS:  Unable to obtain secondary to intubation, sedation.

## 2021-06-13 NOTE — ANESTHESIA PROCEDURE NOTES
Emergent Intubation    Date/Time: 12/16/2020 12:30 PM    CRNA: Linn Castellanos CRNA  Indications: respiratory distress  Medication administration time:12/16/2020 12:30 PMTechnique: fiberoptic assisted  Laryngoscope size: glidescope 4.  Tube size: 8.5 mm  Tube type: cuffed  Cuff inflated: yes  Level of Difficulty: 0ETT to lip: 25 cm  Tube secured with: ETT arellano  ETCO2 = Yes  Breath sounds: equal and reduced on right    Sign out given. CXR and sedation per primary care team.

## 2021-06-13 NOTE — PROGRESS NOTES
[facility]    ICU PROGRESS NOTE:  DOS:  12/22/2020    Patient Summary:   Felipe Cruz is a 74 year old male with a PMH of CAD, HTN, HLD, DM2, and BPH who was admitted to [facility] on 12/6/2020.  He was admitted to the general care floor. He had increased oxygenation needs, started on HFNC, and transferred to the ICU on 12/8. He was intubated for worsening hypoxia on 12/10. He self extubated on 12/10, required reintubation, and again self-extubated on 12/14. He was reintubated 12/16 for worsening hypoxia. Course complicated by anuric NICKY requiring CRRT and now profound shock requiring two to three vasopressors.    Major Changes for Today    Pan culture  Send c.diff  Place new central line  Supinate  CXR/abdominal xray  Increase heparin to high intensity drip    Assessment/Plan:    NEUROLOGIC:  # acute pain  # agitation/anxiety  # delirium  # encephalopathy   Did not tolerate Precedex and Propofol previously   - Fentanyl and versed drips with PRN boluses available  - Paralyzed with Cisatracurium. Consider holiday if tolerating supination  - Seroquel 50mg two times a day.    -RASS goal -5 while paralyzed.     HEMODYNAMICS/CV:  # Shock, septic  12/16: Echo: EF > 63%, Normal RV And LV fn, normal valves. No Tamponade   Off of phenylephrine late yesterday, tolerating some fluid removal overnight  I=O for now  - Continue norepinephrine, vasopressin for MAP > 65   -Phenylephrine also available  - Stress dose steroids restarted 12/20  - ID as below     RESPIRATORY:  # Acute hypoxic respiratory failure and ARDS 2/2 COVID19  # Klebsiella PNA   # Right apical pneumothorax s/p pigtail insertion 12/20  Inhaled Velitri discontinued 12/20  - intubated 12/10, self-extubated 12/14, reintubated 12/16  - VC 32/520/+16   - Titrate FiO2 to maintain SpO2 >92%  - Ventilator bundle  - Supinate today and obtain CXR  - Leave CT to suction, continuous air leak noted  - CXR today  - ABG q6h    GI:  # Severe  protein calorie malnutrition  # Constipation, now resolved   # Diarrhea  # Concern for ileus  Last BM 12/22  Emesis afternoon 12/21- OG placed with 1.5 liters returned; enteral feeds held  - RD following. RD managing TF's- resume at trophic given high pressor requirement after abdominal xray  - protein BID  - bowel regimen: miralax, senna, docusate sodium daily  - Obtain cdiff given marked leukocytosis  - Abdominal xray     RENAL:  # Anuric NICKY   # Mixed metabolic and respiratory acidosis  Started on CRRT 12/18  - Continue to keep I&O even today given hypotension.   - Electrolytes balanced  - Persistently acidemic, improving, continue bicarb drip  - On enteral soium bicarb  - Bladder scan PRN and straight cath if PVR > 250.      ID:  # COVID 19   # CAP with COVID19  # Klebsiella VAP  # Septic shock  # Severe Hypothermia  - WBC:   16-->22K-->28K-->35K  - Hypothermia 12/19 93 degrees--> now normothermic  - 12/19 fungitell, galactomannan antigen, fungal BC, BC x2 pending. A BAL with GS, AFB, KOH, fungal culture were ordered; however, the microbiology lab does not currently have the sample. Very minimal secretions on bronch 12/19 (see my note), will not repeat at this time.   - ABX: started zosyn 12/16, changed to Ceftriaxone 12/17. 12/19 Vancomycin, Meropenem and Fluconazole started.   - cultures:               - 12/6 blood: staph epi, contaminant                - 12/11 blood cultures: NGTD                - 12/11 urine: NGTD               - 12/12 sputum: Klebsiella pneumoniae               - 12/12  Blood cultures- NGTD  - completed remdesivir course   - decadron 6 mg IV X 10 days   - Pan culture today including c.diff given marked leukocytosis     HEME:  # anemia of critical illness  # coagulopathy due to COVID -19  # Leukocytosis WBC:   16-->22K-->28K-->35K  Clotted CRRT filter twice overnight as well as PICC line  - Hemoglobin stable 10.7  - Continue with Heparin gtt, increase to high intensity  - ID management as  "above     ENDOCRINE:  # DM II  # Stress and steroid induced hyperglycemia  HgbA1C on admit 8.2  - PTA Lantus and oral agents on hold  - goal glucose < 180 for optimal healing  - continue with insulin infusion    MSK:  # acute critical care weakness/deconditioning   - hold OOB activity while prone/paralyzed. ROM     SOCIAL:  Discussed patient's condition and grave prognosis with wife over phone 12/21. Would like to proceed with aggressive cares including full code at this time at the direction of their previous discussions prior to hospitalization. Palliative care offered and was declined. Will continue to have ongoing discussions.    PROPHYLAXIS:   DVT proph:Yes. Heparin gtt  GI proph: Yes.  Requires solorzano for strict I&O: Yes  Restraints required for safety: Yes     DISPOSITION:  - ICU    LINES/TUBES/DRAINS:  - Right internal jugular TLC 12/22  - L art line, placed 12/10  - R fem dialysis line 12/19  - Right pigtail chest tube     Total Critical Care Time : 45 Minutes exclusive of procedures     Morenita Barahona    Overnight events: Course reviewed. PICC line clotted overnight as well as CRRT circuit x2.      Physical Exam:  Vent settings for last 24 hours:  Vent Mode: AC  FiO2 (%):  [65 %-75 %] 65 %  S RR:  [32] 32  S VT:  [520 mL] 520 mL  PEEP/CPAP (cm H2O):  [16 cm H2O] 16 cm H2O  Minute Ventilation (L/min):  [14.7 L/min-15.2 L/min] 15.1 L/min  PIP:  [30 cm H2O-33 cm H2O] 33 cm H2O  MAP (cm H2O):  [22-27] 22    /49 (Patient Position: Lying) Comment (Patient Position): prone  Pulse 97   Temp 97  F (36.1  C)   Resp (!) 32   Ht 6' 2\" (1.88 m)   Wt (!) 230 lb 2.6 oz (104.4 kg)   SpO2 99%   BMI 29.55 kg/m      Intake/Output last 3 shifts:  I/O last 3 completed shifts:  In: 7068 [I.V.:6238; NG/GT:480; IV Piggyback:350]  Out: 9378 [Emesis/NG output:1550; Other:7565; Stool:100; Chest Tube:163]  Intake/Output this shift:  No intake/output data recorded.    Physical Exam  Neuro: chemically sedated, " paralyzed.  HEENT:  PERRLA. Pupils 2. ETT present and secured. NJ and OG present with bile output  RESP: BBS, lungs diminished throughout, no wheezes. Synchronous with ventilator  CV: extremities warm, pulses palpable. Prone.   GI: prone, not examined; rectal tube in place  : no urine seen  Extremities: mild edema, pulses 2+ Cap refill brisk.     LAB:  Results from last 7 days   Lab Units 12/22/20  0450   LN-WHITE BLOOD CELL COUNT thou/uL 35.8*   LN-HEMOGLOBIN g/dL 10.7*   LN-HEMATOCRIT % 33.3*   LN-PLATELET COUNT thou/uL 277     Results from last 7 days   Lab Units 12/22/20  0450 12/22/20  0007 12/21/20  1524 12/21/20  1122 12/21/20  0405 12/21/20  0405 12/20/20  0557 12/20/20  0557 12/19/20  0758 12/19/20  0758   LN-SODIUM mmol/L 136  135* 135* 139  138  --    < > 137   < > 137  137   < > 140   LN-POTASSIUM mmol/L 4.3  4.3 4.3 4.3  4.2  --    < > 5.1*   < > 5.0  5.1*   < > 4.4   LN-CHLORIDE mmol/L 100  100 99 103  103  --    < > 103   < > 105  105   < > 107   LN-CO2 mmol/L 20*  20* 21* 22  23  --    < > 18*   < > 18*  18*   < > 19*   LN-BLOOD UREA NITROGEN mg/dL 26  --   --   --   --  35*  --  38*  --   --    LN-CREATININE mg/dL 1.48*  --   --   --   --  1.96*  --  2.59*  --   --    LN-CALCIUM mg/dL 8.6  8.6 8.2* 9.4  --    < > 8.3*   < > 8.4*  8.4*   < > 8.3*   LN-PROTEIN TOTAL g/dL  --   --   --  6.7  --   --   --  6.7  --  6.7   LN-BILIRUBIN TOTAL mg/dL  --   --   --  0.8  --   --   --  0.8  --  0.9   LN-ALKALINE PHOSPHATASE U/L  --   --   --  86  --   --   --  75  --  61   LN-ALT (SGPT) U/L  --   --   --  77*  --   --   --  84*  --  62*   LN-AST (SGOT) U/L  --   --   --  62*  --   --   --  39  --  34    < > = values in this interval not displayed.

## 2021-06-13 NOTE — PLAN OF CARE
Neuro: Patient self-extubated on previous shift. Increased agitation despite multiple medications given, see MAR for times and dosages. Patient  oriented to self only. Patient calmed and able to rest and tolerate BiPap following dose of Ativan. Patient continues to become agitated with stimulation. Precedex gtt continues. Pupils equal and reactive, CPOT 0-1. Able to move extremities purposefully. Plan to wean as tolerating.      Resp: Bipap  at 12/8, FIO2 at 80%. See blood gas for results on pms. After patient adequately sedated, SPO2 improved as well as tolerance of Bipap. Suction and mouth care q 2 hourly, small amount of thick white/clear secretions suctioned orally.     CV: SR with 1st degree AVB, heart rate 60s, Extremities warm and well perfused, palpable pulses throughout. Patient intermittently hypertensive through shift, receiving scheduled and PRN medications.      GI: Enteroflex remains in right nare, bridle for securement. Tube feeds currently on hold. BS active in all quadrants.     : Funk patent with judith urine. UO adequate. Funk care provided.      Endo:  BS controlled with subcutaneous insulin sliding scale. Insulin gtt stopped by previous shift.      ID: Afebrile.     Skin: Turn and reposition q 2 hourly. No new breakdown noted. Hand mitts in place to prevent patient from pulling on lines and tubes. Skin assessed a minimum of every 2 hours and ROM provided.      Problem: Pain  Goal: Patient's pain/discomfort is manageable  Outcome: Progressing     Problem: Safety  Goal: Patient will be injury free during hospitalization  Outcome: Progressing     Problem: Daily Care  Goal: Daily care needs are met  Outcome: Progressing     Problem: Psychosocial Needs  Goal: Collaborate with patient/family/caregiver to identify patient specific goals for this hospitalization  Outcome: Progressing     Problem: Glucose Imbalance  Goal: Achieve optimal glucose control  Outcome: Progressing     Problem: Potential for  Compromised Skin Integrity  Goal: Skin integrity is maintained or improved  Outcome: Progressing     Problem: Urinary Incontinence  Goal: Perineal skin integrity is maintained or improved  Outcome: Progressing     Problem: Inadequate Gas Exchange  Goal: Patient will achieve/maintain normal respiratory rate/effort  Outcome: Progressing     Problem: Ineffective Airway Clearance  Goal: Maintain airway patency  Outcome: Progressing     Problem: Impaired Gas Exchange  Goal: Demonstrate improved ventilation and adequate oxygenation of tissues as evidenced by absence of respiratory distress  Outcome: Progressing     Problem: Breathing  Goal: Patient will maintain patent airway  Outcome: Progressing     Problem: Risk for Infection  Goal: Identify and demonstrate techniques, lifestyle changes to prevent/reduce risk of infection and promote safe environment  Outcome: Progressing     Problem: Potential for Falls  Goal: Patient will remain free of falls  Outcome: Progressing     Problem: Risk of Injury Due to Unsafe Behavior  Goal: Patient will remain safe while in restraint; physical/psychological needs will be met  Outcome: Progressing  Goal: Alternatives to restraint will be continually assessed with use of least restrictive device and discontinuation as soon as possible  Outcome: Progressing  Goal: Patient/Family will be able to communicate reason for restraint and steps for restraint application and removal  Outcome: Progressing     Problem: Decreased Mental Status Causing Increased Need for Safety  Goal: Provide a safe environment for patient (Implement appropriate elements in plan of care)  Outcome: Progressing  Goal: Decrease patient's symptoms  Outcome: Progressing  Goal: To ensure patient safety, patient will abstain from any threats or actions to harm self or others  Outcome: Progressing     Problem: Psychosocial Needs  Goal: Demonstrates ability to cope with hospitalization/illness  Outcome: Not Progressing      Problem: Potential for Compromised Skin Integrity  Goal: Nutritional status is improving  Outcome: Not Progressing     Problem: Breathing  Goal: Patient will utilize incentive spirometer  Outcome: Not Progressing     Problem: Cognitive Impairment or Disorientation  Goal: Patient will maintain or return to normal baseline cognitive function  Outcome: Not Progressing     Problem: Discharge Barriers  Goal: Patient's discharge needs are met  Outcome: Not Applicable this Shift     Problem: Knowledge Deficit  Goal: Patient/family/caregiver demonstrates understanding of disease process, treatment plan, medications, and discharge instructions  Outcome: Not Applicable this Shift     Problem: Knowlegde Deficit  Goal: Verbalize understanding of condition/disease process and treatment, participate in lifestyle changes and treatment regimen  Outcome: Not Applicable this Shift

## 2021-06-13 NOTE — PROGRESS NOTES
Brief ICU Note     1420  Called to the bedside to evaluate Mr. Cruz for acute desaturation low mid 80s, tachycardia and hypotension. MAP >65 on moderate dose of norepinephrine, oxygen saturations now recovered to 100% on 100% FiO2, weaned down again to 70%. Veletri off since 0900, Supine since 1100.     General: Remains paralyzed, sedated, afebrile  S1 S2: atrial fibrillation, rate 130s, extremities warm  Resp: rhonchus breath sounds, more diminished in the apices.   Abdomen: round and soft    Bedside US:  Lung: Sliding lung signs present x4 points bilaterally  CV: No pericardial effusion, no RV enlargement, EF appears normal. IVC 2.0 with minimal variation.     - Loaded with 150 mg Amiodarone to be followed by gtt per protocol  - Add Vasopressin with intent to decrease  Norepinephrine requirements and in turn beta agonism  - ABG 7.28/51/85/22 (65%)  - Plan to reprone this evening  - Still reasonable to proceed with trial off paralytic    Mary De la Mater  Additional 20 minutes of critical care time

## 2021-06-13 NOTE — PROGRESS NOTES
12/11/20 0134   Vent Information   Interface Invasive   Patient Data   Vt Exp (mL) 467 mL   Minute Ventilation (L/min) 10.8 L/min   Total Resp Rate  22 BPM   PIP Observed (cm H2O) 24 cm H2O   MAP (cm H2O) 16   Plateau Pressure (cm H2O) 21 cm H2O

## 2021-06-13 NOTE — PROGRESS NOTES
[facility] COVID RT PROGRESS NOTE     DATA:     VENT DAY# 9     CURRENT SETTINGS:  VENT SETTINGS   AC 32/300/+12              FIO2:  60%    PATIENT PARAMETERS:     PIP 31  Pplat:  27  Pmean: 27   SBT: no  SECRETIONS: Large yellow tan green  02 SATS:  97%     ETT SIZE  8.5 @ 26     Securing device changed / tube re-taped date 2020     Respiratory Medications: Veletri full strengh     AB.26/52/90/21.2/97%     NOTE / PLAN:   Pt continues to require full ventilatory support. Pt remained proned this AM. Head turns put on hold by MD Barahona. One head turn done today. Hernando placed to secure ETT. Copius amounts of oral and nasal secretions. Tape not holding.

## 2021-06-13 NOTE — PROGRESS NOTES
12/09/20 2022   Oxygen Therapy/Pulse Ox   O2 Device High-flow NC   O2 Flow Rate (L/min) 50 L/min   FiO2 (%) 60 %   SpO2 96 %

## 2021-06-13 NOTE — PROGRESS NOTES
"CLINICAL UPDATE - HIGH O2 NEEDS    Advised by the patient's RN that his oxygen needs have increased a bit since morning.  He remains on 50 L/min per high flow cannula, but FiO2 has been titrated up to 0.80.  Patient does not have any worsened dyspnea.  He is not labored, with a respiratory rate of 20-22.  Remaining vital signs are stable.    At charge nurse request, I contacted the intensivist and the [facility] ICU, Dr Holley.  She and I discussed the case in detail.  She is agreeable to accept the patient in ICU transfer, though at present there are no beds available.  There are likely to be some ICU stepdowns later in the day, so a bed is likely to open up.    In the meantime, we will retain the patient in his present room.  Should he become more distressed or require additional significant increases in oxygen flows, we will arrange for ICU transfer.    I discussed this with the patient's RN.  She says that the patient had a good urine response to furosemide 40 mg IV x1 this morning, and said that he felt \"better\" after receiving it.  Will repeat furosemide 40 milligrams x1 now.    Raúl Machado MD  [facility] Hospitalist  "

## 2021-06-13 NOTE — PLAN OF CARE
Problem: Daily Care  Goal: Daily care needs are met  Outcome: Progressing     Problem: Inadequate Gas Exchange  Goal: Patient will achieve/maintain normal respiratory rate/effort  Outcome: Progressing   Pt is alert and oriented x4. Pt was on 10L of O2 with oxymask. Pt is weaned off to 7L oxymask with O2 sat of 92-95%. Using incentive spirometer upto 1500. Pt denied pain. Continue to monitor.

## 2021-06-13 NOTE — PROGRESS NOTES
12/16/20 0734   Monitoring   Resp Rate Observed 20   Tidal (Observed) 881 mL   Minute Ventilation (L/min) 17 L/min   PIP Observed (cm H2O) 12 cm H2O   Ti/Ttot 37   Pt Leak 63   Switched to HHF 60lt 100%. Sats remain mid 80's but coming up slowly. Will continue to monitor for distress.  HHN given but sats still remained low. Place  Back on Bipip

## 2021-06-13 NOTE — PROGRESS NOTES
Upon intubation today, 12/16, a yellow/gold color band with a clear stone (ring), and an all yellow/gold colored band (ring) were removed due to finger swelling.  They were both placed in a specimen cup and the cup was placed inside of the patients shoe in the closet.  GABE Mayfield notified.

## 2021-06-13 NOTE — PROGRESS NOTES
Care Management Follow Up Note    Length of Stay (days) 5     Patient plan of care discussed at Interdisciplinary Rounds: yes                Expected Discharge Date: (TBD)  Concerns to be Addressed / Barriers to Discharge:  Continues to require ICU level of care due to intubation, ventilatory support, and IV medications.     Anticipated Discharge Disposition:  TBD  Anticipated Discharge Services:    TBD  Selected Continued Care - Admitted Since 12/6/2020    No services have been selected for the patient.        Anticipated Discharge DME:  TBD    Plan:  Chart Reviewed. From home with spouse in two bedroom apartment; independent at baseline. Main family contact is spouse who was last updated on 12/10. Per previous care manager note patient has concerns about patient returning home as it might not be clean enough. Goal is home at discharge if patient is able. Does have 2 adult children. Self extubated and was then reintubated. Care manager to follow.     Katie Orlando RN

## 2021-06-13 NOTE — PROGRESS NOTES
CRITICAL CARE  PROGRESS NOTE:  Date of service: 12/16/2020    Felipe Cruz is a 74 year old male with a PMH of CAD, HTN, HLD, DM2, and BPH who was admitted to [facility] on 12/6/2020.  He was admitted to the general care floor.  He had increased oxygenation needs, started on HFNC, and transferred to the ICU on 12/8.  He was intubated for worsening hypoxia on 12/10. He self extubated on 12/10, required reintubation, and again self-extubated on 12/14. He was reintubated 12/16 for worsening hypoxia on despite bipap.     MAIN CHANGES FOR TODAY:  - Intubation   - Veletri inhaled at full strength   - Hold antihypertensive regimen     PLAN BY SYSTEMS:    HEMODYNAMICS/CV:  # Shock, distributive infection vs. vasoplegia secondary to sedation   # CAD, HTN, HLD  # Tachycardia, new onset following intubation.   - discontinue scheduled antihypertensives   - Levophed to keep MAP > 65  - Echo at bedside show normal EF, normal valve function, no WMA  - BP responsive to straight leg raise, Will give bolus of 500 mL LR     PLAN:- add vasopressin if levo dose > 0.2 mcg/kg/min     NEUROLOGIC:  # acute pain  # agitation/anxiety  # delirium  # encephalopathy      - pain/sedation infusions: Discontinue precedex for lack of pt response, did not tolerate propofol due to hypotension. Initiate Fentanyl and versed drips with PRN boluses available   - delirium: scheduled seroquel 50 two times a day,  PRN Haldol available for agitation.     PLAN: RASS goal -4    RESPIRATORY:  # acute hypoxic respiratory failure   # ARDS 2nd to COVID19  # Klebsiella PNA     - intubated 12/10, self-extubated 12/14, reintubated 12/16  - Full strength veletri  - CXR following intubation   - Currently on AC 24/500/14 100%  - ABG post intubation 7.28/43/92/19 100%   - See ID section     PLAN: Repeat ABG at 2000 after uptitrating sedation, obtaining RASS goal      RENAL:  # NICKY, improved  # Metabolic acidosis  # Hypernatremia Na 147  - I/O: - 5 L  since admission; -1 L over the past 24 hours  - UOP: 2.9 L over the last 24 hours  - 5oo mL LR fluid bolus  - Electrolyte replacements per protocol     PLAN: Repeat BMP at 2000. Repeat ABG at 2000     ID:  # COVID 19   # CAP with COVID19  # HAP with klebsiella pneumonia   - Tmax: 98  - WBC: 14.1 downtrending   - Repeat cultures this am   - cultures:               - 12/6 blood: staph epi, contaminant                - 12/11 blood cultures: NGTD                - 12/11 urine: NGTD    - 12/12 sputum: Klebsiella pneumoniae    - 12/12  Blood cultures- NGTD   - PCT  0.12 on 12/14, trend in am   - ABX: Initiate zosyn   - completed remdesivir course   - decadron 6 mg IV X 10 days   PLAN: Continue zosyn for pan-sensitive klebsiella pneumoniae in sputum given pt's prolonged hospitalization. Fever curve,  leukocytosis improving     HEME:  # anemia of critical illness  # coagulopathy   # leukocytosis      - Hgb: 12.5  - Plts: 290  - no concerns for bleeding  - continue enoxaparin 50 mg two times a day  PLAN: follow CBC, continue prophylaxis      GI:  # severe protein calorie malnutrition  # Constipation  - Intiate tube feeds via nasoenteric feeding tube in stomach  - RD following   - protein BID  - bowel regimen: miralax, senna, docusate sodium daily     ENDOCRINE:  # hyperglycemia 2nd to critical illness  # DM 2     - Sliding scale insulin   - Start lantus 8 units   - goal glucose < 180 for optimal healing  - holding home oral DM 2 meds  - HgbA1C on admit 8.2     MSK:  # acute critical care weakness/deconditioning      - mobilize  - OT/PT consulted      PROPHYLAXIS:   DVT proph:Yes.  GI proph: Yes.  Requires solorzano for strict I&O: Yes  Restraints required for safety: Yes     DISPOSITION:  - ICU  - decision maker: wife, Yuridia  - family updated via phone by myself today      LINES/TUBES/DRAINS:  - R PICC, placed 12/9  - L art line, placed 12/10    SUBJECTIVE/INTERVAL EVENTS:  - No acute events overnight   - intubated this am for  "worsening hypoxia and concern for airway protection     OBJECTIVE:  /64 (Patient Position: Semi-mueller)   Pulse (!) 109   Temp 97.9  F (36.6  C) (Axillary)   Resp (!) 43   Ht 6' 2\" (1.88 m)   Wt (!) 239 lb 10.2 oz (108.7 kg)   SpO2 96%   BMI 30.77 kg/m      Intake/Output last 3 shifts:  I/O last 3 completed shifts:  In: 1961 [I.V.:1261; NG/GT:550; IV Piggyback:150]  Out: 1850 [Urine:1850]  Intake/Output this shift:  I/O this shift:  In: 225.8 [I.V.:75.8; NG/GT:150]  Out: 100 [Urine:100]    ABG:  Recent Labs     12/16/20  0409 12/16/20  1500   PHART 7.42 7.28*   OXYHB 92.4* 94.7*   BEARTCALC 0.0 -6.6   POCPEEP 8 14   TEMP 37.0 37.0   POCRATE 12 26   PSV 12  --      GEN: lying in bed, bipap in place    NEURO: Opens eyes to sternal rub, no tracking, no following commands   HEENT: Normocephalic, atraumatic, neck supple without lymphadenopathy  PULM: coarse bilaterally without wheezes, rubs  CV/COR: distant heart tones, RRR  no murmur  GI: obese, round, soft, nontender,  no guarding, hypoactive BS   :  Funk, urine yellow and clear  EXT:  peripheral pulses 2 +, trace edema noted  SKIN: no obvious rash, warm and dry    Total Critical Care Time : 55 Minutes    Nunu Umanzor APRN, CNP    Plan of care discussed at interdisciplinary rounds. Staffed with Dr. Gilbert       "

## 2021-06-13 NOTE — PROGRESS NOTES
"Wound Ostomy  WOC Assessment        Allergies:  No Known Allergies    Diagnosis:   Patient Active Problem List    Diagnosis Date Noted     Secondary spontaneous pneumothorax      Acute respiratory distress syndrome (ARDS) due to COVID-19 virus (H) 12/10/2020     Acute respiratory failure with hypoxia (H)      Acute kidney injury (H)      Pneumonia due to 2019 novel coronavirus 12/06/2020     Diabetes mellitus (H) 06/24/2019     Hypertension 06/24/2019     Chronic pain disorder 03/27/2019     Primary osteoarthritis of right hip 01/26/2018     Primary osteoarthritis of right knee 01/26/2018     BPH with urinary obstruction 11/15/2017       Height:  6' 2\" (1.88 m)    Weight:   (!) 234 lb 5.6 oz (106.3 kg)    Labs:  Recent Labs     12/19/20  0758 12/19/20  0758 12/21/20  0754 12/21/20  1122   CRP 7.9*  --   --   --    HGB  --    < > 10.9*  --    ALBUMIN 2.8*   < >  --  2.4*    < > = values in this interval not displayed.       Mode:  Mode Scale Score: 11    Specialty Bed:  [facility]    Wound culture obtained: No    Edema:  Not assessed today    Anatomic Site/Laterality: Body    Reason for ongoing care:   Skin integrity and plan of care    Encounter Type:  Subsequent Encounter Wound Type:   Body    Patient is currently placed in prone position with right side of face exposed; therefore unable to see left side which has an abrasion per documentation. Sacral/coccyx area assessed and there are no noted areas injury.    Prone Positioning recommendations:    Prior  Apply Mepilex sacral dressings for protection if pt has bony prominences (shoulders, iliac crest) - may leave in place when not proned.  Ensure tongue is in the mouth, remove bite block for pressure injury prevention.  Ensure no lines/tubes/drains (as much as able) will be under patient when prone.    Prone position  Use pillows or Z maxx pads to off load pressure to bony prominences.  Position head so that ears are not folded and so that head/neck are " aligned.  Reposition head every one hour.  Use swim position per protocol for positioning when supine.    Prone Skin Folds  Use Interdry under pannus/breasts prior to placing patient in prone position. This needs to lay flat (do not bunch up under fold)   If an antifungal is needed: obtain order for miconazole powder from provider and use flat pillowcase in place of Interdry.     Mepilex dressings to cheeks when in prone position - may leave in place when not proned. Change every 5 days or sooner if soiled.    Prevention measures for mucosal integrity:  1. No bite block when proning. (rationale - the hard plastic will leave the patient at greater risk for pressure injury)  2. Hourly head reposition and moisturizer to exposed portion of tongue (moisturizer which comes with the oral care kits). (rationale - remove pressure to tongue/lips and face and maintain mucosal integrity for the tongue)  3. Use tongue blade when repositioning ETT, so tongue doesn t move along with the tube. (rationale - to ensure pressure is not on the same area of the tongue, even though the ETT is technically repositioned)     Nursing care provided was - none at this time.    Discussed plan of care with patient as able.    Outcomes and treatment recommendations are to promote skin integrity.    Actions taken by WOC RN: Chart reviewed and orders entered.    Planned Follow Up: Weekly.    Plan for next visit: Reassess skin integrity.

## 2021-06-13 NOTE — PLAN OF CARE
[id number]: Pt was transferred to [facility], room 67 at 1830 on 80% FiO2 HFNC at 50L, sating at 92-95%. Very fatigued this morning, and pt was short of breath. Good response to 40mg Lasix x 2. Continuous dry, cough. Cough syrup with codeine given x 2 helped. Wife notified about transfer.     Problem: Glucose Imbalance  Goal: Achieve optimal glucose control  Outcome: Not Progressing     Problem: Impaired Gas Exchange  Goal: Demonstrate improved ventilation and adequate oxygenation of tissues as evidenced by absence of respiratory distress  Outcome: Not Progressing     Problem: Breathing  Goal: Patient will maintain patent airway  Outcome: Progressing

## 2021-06-13 NOTE — PROGRESS NOTES
Critical Care Progress Note      12/09/2020    Name: Felipe Cruz MRN#: [id number]   Age: 74 y.o. YOB: 1945     Hsptl Day# 3  ICU DAY # 1    MV DAY # 0             Problem List:   Principal Problem:    Pneumonia due to 2019 novel coronavirus  Active Problems:    Acute respiratory failure with hypoxia (H)    Acute kidney injury (H)           Summary/Hospital Course:     Unable to obtain history directly from patient due to intubated/ventilated status.    Felipe Cruz is a 74 y.o. male with a PMH significant for hypertension, diabetes mellitus, BPH, arthritis and chronic pain syndrome who presented to [facility] with shortness of breath and hypoxia on 12/6. He was admitted to the general medicine floor on supplemental oxygen via oxymask, which progressed to HFNC on 12/8, prompting his transfer to the ICU later that evening.           Assessment and plan :     Felipe Cruz IS a 74 y.o. male admitted on 12/6/2020 for COVID19 infection.   I have personally reviewed the daily labs, imaging studies, cultures and discussed the case with referring physician and consulting physicians.   My assessment and plan by system for this patient is as follows:    NEURO:  # acute pain, myalgias   # hx of chronic pain from OA (joints and low back)   - cont with prn pain medications  - PTA medications include Norco for osteoarthritis      CVS:  #  Hx of hypertension   - PTA medications:lisinopril/hydrochlorothiazide, amlodipine 10 mg daily, and atenolol 100 mg daily  - Amlodipine and atenolol have continued, the lisinopril/hydrochlorothiazide was stopped upon admission due to acute kidney injury, and has not been resumed.  -  Blood pressure stable on 2 agents prior to transfer      RESP:  # Acute Respiratory Distress Syndrome (ARDS) 2/2 COVID  # Acute hypoxic respiratory failure    # Viral pneumonia due to COVID 19   - cont with HFNC and alternating with BIPAP as he tolerates.   - BiPAP while asleep   -PRN albuterol  available for dyspnea, wheezing      GI:  #  Concern for Protein Calorie Malnutrition   - previously tolerated diabetic diet on floor. Will hold if respiratory status worsening.      RENAL :  #  Concern for volume overload   #hyponatremia, resolved. Na 138 today  # BPH  # Acute kidney injury   # hypomagnesia Mg 2.0 this am   - treated with IVF at time of admission for elevated lactic of 2.7, now resolved.  -received lasix 40mg x2 on 12/8 for concerns of volume overload with adequate response.   - monitor I/O's for now   - BMP daily   - continue with BPH medications    - Baseline GFR 60, now >60  - monitor, hold hydrochlorothiazide and lisinopril for now   - Electrolytes replaced per protocol      ID:  #  COVID  19  - Dx on 11/30, confirmed on 12/2.   - CXR 12/6:  Consistent with COVID pneumonia infection.   - Empiric abx stopped 12/7/20.  12/6/20: Procal 0.25. WBC normal.             -12/6: 1/2 Blood cultures coag (-) staph  - Repeat blood cultures sent today   - Cont with Decadron for total of 10 days.   - Completed Remdesivir  5 days      HEMATOLOGIC:  # Coagulopathy due to COVID-19  - Lovenox 50 two times a day for category B prophylaxis      ENDOCRINE:  # Diabetes mellitus   - PTA medications: metformin 1000 mg twice daily, glipizide 10 mg daily, and glargine 35 units subcu nightly.  - hgb A1C on admit 8.2  - Discontinue metformin in setting of ICU   - Lantus 10units q am, Lantus 15 units q HS   - Carb-meal coverage and s/s insulin correction scale.   - goal to keep BG <180     MSK:  # hx of OA  # hx of low back pain with Right sided sciatica and joint pains   - prn medications   - frequent turns/repositioning for comfort   - PT/OT consults     ICU Prophylaxis:   1. DVT: Lovenox Sub-q SCDs  2. VAP: HOB 30 degrees, chlorhexidine rinse  3. Stress Ulcer: PPI  4. Restraints: N/A  5. Wound care - per unit routine   6. Feeding - Diabetic diet   7. Family Update:spoke with spouse Yuridia per phone   8. Disposition -  ICU    Lines:  PIV X 1  PICC placed 12/9         Physical Examination:   Temp:  [97.5  F (36.4  C)-98.2  F (36.8  C)] 98.2  F (36.8  C)  Heart Rate:  [54-78] 59  Resp:  [20-45] 33  BP: (137-180)/(71-87) 137/85  FiO2 (%):  [60 %-80 %] 70 %    Intake/Output Summary (Last 24 hours) at 12/9/2020 1555  Last data filed at 12/9/2020 0600  Gross per 24 hour   Intake --   Output 1075 ml   Net -1075 ml     Wt Readings from Last 4 Encounters:   12/09/20 220 lb 14.4 oz (100.2 kg)        FiO2 (%):  [60 %-80 %] 70 %    GEN: Sitting upright in bed, NAD   HEENT: head ncat, sclera anicteric, neck supple, without lymphadenopathy  PULM: unlabored synchronous with vent, clear anteriorly, diminished in bases, no wheezes, no crackles   CV/COR: RRR S1S2 no gallop, No rub, no murmur  ABD: soft nontender, hypoactive bowel sounds, no mass  EXT:  warm x4, brisk cap refill   NEURO: CN II-XII grossly intact   SKIN: no obvious rash  LINES: clean, dry intact         Data:     Labs personally reviewed/interpreted: see a/p.          Billing: This patient is critically ill: Yes. Total critical care time today 35 min, excluding procedures.     Histories:    Current Medications              insulin glargine injection 10 Units (LANTUS)  Every morning          insulin glargine injection 15 Units (LANTUS)  Bedtime          sodium chloride bacteriostatic 0.9 % injection 1-5 mL  Once as needed          lidocaine (PF) 10 mg/mL (1 %) injection 1-5 mL (XYLOCAINE-MPF)  Once as needed          hydrALAZINE injection 10 mg (APRESOLINE)  Every 6 hours PRN          insulin aspart U-100 injection pen (NovoLOG)  Bedtime     Question Answer Comment   Scale? Standard    BG < 70 mg/dL Treat, and call MD    BG  mg/dL None - 0 Units    -180 mg/dL None - 0 units    -220 mg/dL 2 units    -260 mg/dL 3 units    -300 mg/dL 4 units    -340 mg/dL 5 units    -400 mg/dL 6 units    BG > 400 mg/dL 7 units and call MD alfredorin  ANTICOAGULANT syringe 50 mg (LOVENOX)  2 times daily          HYDROcodone-acetaminophen  mg per tablet 1-2 tablet (NORCO )  4 times daily PRN          benzonatate capsule 100 mg (TESSALON)  3 times daily          insulin aspart U-100 injection pen (NovoLOG)  3 times daily with meals     Question Answer Comment   Scale? Standard    BG < 70 mg/dL Treat, and call MD    BG  mg/dL None - 0 Units    -180 mg/dL 2 units    -220 mg/dL 4 units    -260 mg/dL 6 units    -300 mg/dL 8 units    -340 mg/dL 10 units    -400 mg/dL 12 units    BG > 400 mg/dL 14 units and call MD            guaiFENesin 100 mg/5 mL syrup 100 mg (ROBITUSSIN)  Every 4 hours PRN          finasteride tablet 5 mg (PROSCAR)  DAILY          atenoloL tablet 100 mg (TENORMIN)  DAILY          aspirin EC tablet 81 mg  DAILY          amLODIPine tablet 10 mg (NORVASC)  DAILY          dexAMETHasone tablet 6 mg (DECADRON)  DAILY          insulin aspart U-100 injection pen (NovoLOG)  3 times daily with meals     Question Answer Comment   Dosing Type? Dosing per Carbohydrate    Breakfast: 1 unit per 15 grams    Lunch: 1 unit per 15 grams    Supper: 1 unit per 15 grams            sodium chloride flush 2.5 mL (NS)  Flush for Line Care          doxazosin tablet 8 mg (CARDURA)  Bedtime          naloxone injection 0.2 mg (NARCAN)  Every 2 min PRN          naloxone injection 0.2 mg (NARCAN)  Every 2 min PRN          naloxone injection 0.4 mg (NARCAN)  Every 2 min PRN          naloxone injection 0.4 mg (NARCAN)  Every 2 min PRN          dextrose 50 % (D50W) syringe 20-50 mL  Every 15 min PRN          glucagon (human recombinant) injection 1 mg  Every 15 min PRN          ondansetron injection 4 mg (ZOFRAN)  Every 4 hours PRN          ondansetron tablet 8 mg (ZOFRAN)  Every 8 hours PRN          polyethylene glycol packet 17 g (MIRALAX)  Daily PRN          magnesium hydroxide suspension 30 mL (MILK OF MAG)  Daily PRN           bisacodyL suppository 10 mg (DULCOLAX)  Daily PRN          albuterol inhaler 2 puff (PROAIR HFA;PROVENTIL HFA;VENTOLIN HFA)  Every 4 hours PRN          sodium chloride flush 10 mL (NS)  Flush for Line Care                     ROS:  Denies headache, chest pain, nausea, diarrhea, complains of persistent cough

## 2021-06-13 NOTE — PLAN OF CARE
Problem: Pain  Goal: Patient's pain/discomfort is manageable  Outcome: Progressing     Problem: Safety  Goal: Patient will be injury free during hospitalization  Outcome: Progressing     Problem: Daily Care  Goal: Daily care needs are met  Outcome: Progressing     Problem: Glucose Imbalance  Goal: Achieve optimal glucose control  Outcome: Progressing     Problem: Potential for Compromised Skin Integrity  Goal: Skin integrity is maintained or improved  Outcome: Progressing  Goal: Nutritional status is improving  Outcome: Progressing     Problem: Urinary Incontinence  Goal: Perineal skin integrity is maintained or improved  Outcome: Progressing     Problem: Ineffective Airway Clearance  Goal: Maintain airway patency  Outcome: Progressing     Problem: Impaired Gas Exchange  Goal: Demonstrate improved ventilation and adequate oxygenation of tissues as evidenced by absence of respiratory distress  Outcome: Progressing     Problem: Breathing  Goal: Patient will maintain patent airway  Outcome: Progressing     Problem: Potential for Falls  Goal: Patient will remain free of falls  Outcome: Progressing

## 2021-06-13 NOTE — PROGRESS NOTES
Patient intubated this evening, no issues, NGT placed and reglan given to promote migration to post pyloric. Funk placed, sedation started, see MAR for rates/titration.

## 2021-06-13 NOTE — PROGRESS NOTES
Patient resting on BIPAP. Attempted to switch to HFNC but patient did not tolerate. Desaturated into the 80s. Current settings are as follows: 12/8 60%

## 2021-06-13 NOTE — H&P
[facility] COVID unit    Hospitalist History and Physical    Name: Felipe Cruz    MRN: [id number]  YOB: 1945    Age: 74 y.o.  Date of Admission:  12/6/2020  Primary Provider:  Wero Nolasco MD, [phone]  Physician:  Rao Lawrence    Assessment and Plan:  Felipe Cruz is a 74 y.o. male with no significant pulmonary disease, who presents with 1 week history of cough, shortness of breath, fatigue, poor appetite, nausea and admitted with COVID-19 infection involving Viral Pneumonia due to COVID-19 and Acute Hypoxic Respiratory Failure.    Confirmed COVID-19 infection  Duration of illness PTA:  1 week  Date of diagnosis on 11/30.     Study drugs/protocol: None        #. Acute Hypoxic Respiratory Failure secondary to COVID-19 infection  #. Viral Pneumonia secondary to COVID-19 infection   Pt presents with 1 week history of cough, shortness of breath, nausea, fatigue, poor appetite and low oxygen saturation at home, in 80s. Oxygen saturation was 82% on RA in ED at presentation. His wife had symptoms and diagnosis of COVID-19 prior to his.      Initial chest imaging - X-ray- showed bilateral interstitial and ground-glass opacities. Currently on 4 L O2 via NC.   There is not evidence of concurrent bacterial superinfection.   Pt is hemodynamically stable    PLAN:  - continue care on medical floor with continuous pulse oximetry under droplet precautions, minimized lab draws, and care consolidation  - Oxygen support with nasal cannula at 2-6 L/min, titrate to keep SPO2 > 90% and no higher than 96%  - Labs on admission: CBC with differential, CMP, d-dimer, reticulocyte count, PT/INR, aPTT, fibrinogen, antithrombin, ferritin, LDH, troponin and CRP. Will trend labs until patient reaches clinical stability.   - No imaging necessary at this time  - Avoid nebulizers in favor of MDIs;   - Conservative IV fluids   - prednisone 6 mg po daily for 10 days or until hospital discharge and IV Remdesevir for 5 days  -  Antibiotics are are not indicated. Will however check procalcitonin  - Research study protocol:  none    ACTIVE HOSPITAL PROBLEMS:  Mild acute kidney injury  Baseline Creatinine around 0.9, Cr 1.29 today  Possibly sec to volume depletion  IV hydration  Monitor renal function  Avoid nephrotoxins.    Fatigue sec to COVID    Nausea, sec to COVID    Elevated lactate, without acidosis  Possible sec to volume depletion  Iv hydration  Trend lactate  Hypomagnesemia  Replace with protocol    Diabetes mellitus.  Type II.   on long-term insulin use.  Last HbA1c 7.0,  per his PCP notes from 11/4/2020, but no records available for this on care everywhere  -Diabetic diet  -resume home lantus 35 units at bedtime  - Add novolog 1:15 carbs meal time novolog insulin coverage  - Hold PTA glipizide and metformin as inpatienbt  -Start sliding-scale insulin mellitus.    Essential hypertension  Resume atenolol and norvasc  Hold lisinopril and hydrochlorothiazide- sec to NICKY  Monitor BP    Mild hyponatremia, sodium 133  Hold hydrochlorothiazide  Monitor sodium    Chronic, stable Medical Problems:  BPH  Resume PTA cardura and porscar  Monitor for urinary retention      FEN:  - PO as tolerated. Avoid volume overload.     DVT Prophylaxis:   At high risk of thrombotic complications due to COVID-19 disease. D-dimer on admission is pending  - enoxaparin 40 mg subcutaneous twice every 12 hours    At discharge consider one of the following for 30 days and until the patient has returned to normal mobility:  - apixaban (Eliquis) 2.5 mg two times a day  - rivaroxaban (Xarelto) 10 mg once daily  All COVID-19  patients should be educated about the symptoms of DVT (leg swelling, pain, redness, warmth) and PE (dyspnea, chest pain, palpitations, cough, hemoptysis).    Advanced care planning:  Patient identifies   Name Home Phone Work Phone Mobile Phone Relationship Lgl GIANNI Blackmon   [phone] Spouse     as health care proxy.   -Code Status: FULL  CODE. Discussed with Patient.     Disposition: Expected discharge in 3-4 days to  Home once .oxygen needs stabilized and home oxygen arranged.    Rao Lawrence  [facility]ist   ----------------------------------------------------------------------------------    Chief Complaint:  Shortness of breath, hypoxia    History of Present Illness:  History obtained from Patient.Report provided from transferring provider.    Felipe Cruz is a 74 y.o. male  With PMH of OA, DM, HTN, BPH who presents to the ER for evaluation for hypoxia.   Pt reports onset of symptoms last Thursday, on Thanks giving day. He reports his wife had symptoms prior to him, about 2 days before his, she was also diagnosed with COVID-19.  Pt reports severe cough, shortness of breath with activity, poor appetite, fatigue and occasional nausea. He went to his clinic at [facility] last Monday ( Nov 30) and was prescribed prednisone, zpack and albuterol for treatment of URI and was tested for COVID, which was resulted positive on Wednesday ( 12/2). Pt reports cough and shortness of breath is persistent and his O2 sats was mid 80's today so he called his clinic and pt was referred to ED. He denies headaches, vomiting, blurry vision, diarrheas.    In the ED, he was in minimal respiratory distress, Oxygen sats 82% on RA.  CXR showed patchy infiltrates consistent with COVID.  He was placed on 4 L Oxygen but desaturates when he coughs.     Review of Systems:  A Comprehensive greater than 10 system review of systems was carried out.  Pertinent positives and negatives are noted above.  Otherwise negative for contributory information.       Past Medical History:  History reviewed. No pertinent past medical history.  There are no active problems to display for this patient.       Past Surgical History:  He   has no past surgical history on file.    PTA Medications:  See PTA meds list    Allergies:    none    Social History:  he is       Social History     Tobacco Use     Smoking status: Not on file   Substance Use Topics     Alcohol use: Not on file     Living Situation: patient lives with wife    Family History:  Reviewed, and History reviewed. No pertinent family history.    Physical Exam:  Vital Signs with Ranges  Heart Rate:  [70-79] 73  Resp:  [22-33] 28  BP: (144-177)/(64-74) 169/67  .vs  /67   Pulse 73   Resp 28   Wt (!) 255 lb (115.7 kg)   SpO2 93%   Vitals:    12/06/20 1613   Weight: (!) 255 lb (115.7 kg)       GEN:  non-toxic and alert   HEENT:  extra ocular movement intact, sclera clear, anicteric and oropharynx clear, no lesions   CV:  no edema or JVD  PULM:  normal work of breathing, no audible wheezing or stridor, on 4 L O2, not in distress  GI:  nondistended  EXT:  no joint swelling or deformity  SKIN:  Dry, no rash  NEURO:  nonfocal    Data:  CBC: normal  Chemistry:Na 133, CUB/Cr 29/1.33   Procal:N/A  CRP: N/A  D-dimer: N/A  Ferritin: N/A    ECG: N/A    CXR:  From Report: bilateral interstitial and ground-glass opacities    CT Chest: none

## 2021-06-13 NOTE — PROGRESS NOTES
12/17/20 1325   Patient Data   Vt Exp (mL) 470 mL   Minute Ventilation (L/min) 11.8 L/min   Total Resp Rate  24 BPM   PIP Observed (cm H2O) 26 cm H2O   MAP (cm H2O) 18   Auto/Intrinsic PEEP Observed (cm H2O) 0.7 cm H2O   Plateau Pressure (cm H2O) 24 cm H2O   Dynamic Compliance (L/cm H2O) 48 L/cm H2O   Airway Resistance 9   SpO2 100 %   Heart Rate 77

## 2021-06-13 NOTE — PROGRESS NOTES
CRITICAL CARE  PROGRESS NOTE:  Date of service: 2020    Felipe Cruz is a 74 year old male with a PMH of CAD, HTN, HLD, DM2, and BPH who was admitted to [facility] on 2020.  He was admitted to the general care floor.  He had increased oxygenation needs, started on HFNC, and transferred to the ICU on .  He was intubated for worsening hypoxia on 12/10.  He remains intubated and sedated.     MAIN CHANGES FOR TODAY:  - SBT  - change sedation to precedex    PLAN BY SYSTEMS:    HEMODYNAMICS/CV:  # CAD, HTN, HLD     - continue ASA 81 mg PO daily, metoprolol 12.5 mg PO two times a day  - labetalol PRN, hydralazine PRN for SBP > 160  - holding home amlodipine, atenolol, lisinopril  PLAN: no changes     NEUROLOGIC:  # acute pain  # agitation/anxiety  # delirium  # encephalopathy      - pain/sedation infusions: versed 8, fentanyl 200   - delirium: no current pharmalogical interventions ordered, delirium prevention, daily SAT  -  triglycerides: 136  PLAN: daily sedation vacation, add precedex    RESPIRATORY:  # acute hypoxic respiratory failure   # ARDS 2nd to COVID19  # CAP    -  Vent Mode: AC  FiO2 (%):  [40 %-50 %] 45 %  S RR:  [24] 24  S VT:  [500 mL] 500 mL  PEEP/CPAP (cm H2O):  [8 cm H2O-10 cm H2O] 8 cm H2O  Minute Ventilation (L/min):  [10.1 L/min-13.5 L/min] 13.5 L/min  PIP:  [21 cm H2O-26 cm H2O] 24 cm H2O  IN SUP:  [12 cm H20] 12 cm H20  MAP (cm H2O):  [12-15] 13    - intubated 12/10  - decrease PEEP  - SAT/SBT today   - AB.47/37/70/26  - PF: 175  - continue aggressive pulmonary hygiene  PLAN: wean PEEP, SBT      RENAL:  # NICKY, improved     - I/O: - 4.4 L sine admission; + 400 mL over the past 24 hours  - UOP: 1.6 L over the last 24 hours  - patient appears euvolemic, will hold on diuresis today   PLAN: maintain euvolemia, follow urine output, electrolytes, and renal function closely      ID:  # COVID 19   # CAP with COVID19  - Tmax: 98.7  - WBC: 10.9  - cultures:            "    - 12/6 blood: staph epi, contaminant                - 12/11 blood cultures: pending               - 12/11 urine: NGTD               - 12/11 sputum: pending   - ABX: none  - completed remdesivir course   PLAN: continue to follow fever curve and WBC, follow up cultures results, hold on antibiotics for now, continue steroid course     HEME:  # anemia of critical illness  # coagulopathy   # leukocytosis      - Hgb: 11.6  - Plts: 321  - no concerns for bleeding  - continue enoxaparin 50 mg two times a day  PLAN: follow CBC, continue prophylaxis      GI:  # severe protein calorie malnutrition     - nutrition: tube feeds @ 70 (goal)  - protein BID  - bowel regimen: miralax daily     ENDOCRINE:  # hyperglycemia 2nd to critical illness  # DM 2     - moderate sliding scale insulin Q4H + lantus 35 units daily  - goal glucose < 180 for optimal healing  - holding home oral DM 2 mets  - HgbA1C on admit 8.2     MSK:  # acute critical care weakness/deconditioning      - mobilize     PROPHYLAXIS:   DVT proph:Yes.  GI proph: Yes.  Requires solorzano for strict I&O: Yes  Restraints required for safety: Yes     DISPOSITION:  - ICU  - decision maker: wife, Yuridia  - family updated via phone by myself today      LINES/TUBES/DRAINS:  - R PICC, placed 12/9  - L art line, placed 12/10    SUBJECTIVE/INTERVAL EVENTS:  - patient seen and examined, chart reviewed  - discussed patient on multidisciplinary rounds  - no acute events overnight    OBJECTIVE:  /67 (Patient Position: Semi-mueller)   Pulse 70   Temp 97.6  F (36.4  C) (Oral)   Resp 24   Ht 6' 2\" (1.88 m)   Wt (!) 247 lb 2.2 oz (112.1 kg)   SpO2 96%   BMI 31.73 kg/m      Intake/Output last 3 shifts:  I/O last 3 completed shifts:  In: 2098 [I.V.:408; NG/GT:1690]  Out: 1675 [Urine:1675]  Intake/Output this shift:  I/O this shift:  In: 792.2 [I.V.:72.2; NG/GT:720]  Out: 300 [Urine:300]    ABG:  Recent Labs     12/13/20  0414   PHART 7.47*   OXYHB 94.2*   BEARTCALC 2.8   POCPEEP " 10   TEMP 37.0   POCRATE 24     GEN: no acute distress  NEURO: sedated but followed simple commands, POLLOCK, no focal deficit  HEENT: ETT secure  PULM: diminished without rhonchi, crackles or wheeze  CV/COR: diminished RRR  no murmur  GI: soft, nontender,  no guarding  :  Funk, urine yellow and clear  EXT:  peripheral pulses 2 +, no edema noted  SKIN: no obvious rash, warm and dry    Total Critical Care Time : 30 Minutes    DANIEL Montenegro, CNP

## 2021-06-13 NOTE — PROGRESS NOTES
CENTRAL LINE INSERTION PROCEDURE NOTE  (NON-OR)    Procedure Date: 12/22/2020   Performing Provider: KM Peña    Procedure:  Insertion of Central Venous Catheter  Right   INTERNAL  JUGULAR    Indications:  SEPTIC SHOCK, RESPIRATORY FAILURE and DRUG ADMINISTRATION       Estimated Blood Loss:  MINIMAL  ml  Complications: NONE     Procedure Details:   Procedure was done as an emergency : yes  The risks, benefits, complications, treatment options, and expected outcomes were discussed with WIFE .  The risks and potential complications of their problem and purposed procedure include but are not limited to infection, bleeding, pain,  lung puncture, the need for additional procedures, creating a complication requiring transfusion or operation, and nerve and vessel injury.  The patient/alternate (see above) concurred with the proposed plan, giving informed consent.  The site of the procedure was properly noted/marked. The patient was identified as Felipe Cruz with Date of Birth 1945 and the procedure verified as Insertion of Central Venous Catheter.  A Time Out was held and the above information confirmed.    Under sterile conditions the skin over the  Right   INTERNAL  JUGULAR     was prepped with Chlorhexidine and covered with a sterile drape.  Strict sterile conditions were maintained,  Cap, mask, and sterile gloves were worn by all participants.  5 ml of Lidocaine 1% local anesthetic was infiltrated into the skin and subcutaneous tissues.  A 22-gauge needle was used to identify the vein.  Using Sledinger technique an 18-gauge needle was then inserted into the vein.  A guide wire was then passed easily through the catheter.  There were no arrythmias    .  The catheter was then withdrawn.  A 7.0 Bengali triple lumen was then inserted into the vessel over the guide wire.  All ports were flushed with sterile solution and had good non-pulsatile blood return.  The catheter was sutured into place and an  occlusive sterile dressing applied.  The patient tolerated the procedure well with no change in vital signs.     Number of attempts:  1     A chest x-ray was ordered.      Condition: stable      ________________________________________________________________________  Moernita NESTOR Barahona  12/22/202010:49 AM

## 2021-06-13 NOTE — ANESTHESIA PROCEDURE NOTES
Emergent Intubation    Date/Time: 12/16/2020 12:30 PM    CRNA: Linn Castellanos CRNA  Indications: respiratory distress    Medications Administered  Etomidate (AMIDATE) injection, 10 mgMedication administration time:12/16/2020 12:30 PMRoute: oral  Technique: fiberoptic assisted  Laryngoscope size: glidescope 4.  Tube size: 8.5 mm  Tube type: cuffed  Cuff inflated: yes  Level of Difficulty: 0ETT to lip: 25 cm  Tube secured with: ETT arellano  ETCO2 = Yes  Breath sounds: equal    Sign out given. CXR and sedation per primary care team.

## 2021-06-13 NOTE — PROGRESS NOTES
[facility]    ICU PROGRESS NOTE:  DOS:  12/20/2020    Patient Summary:   Felipe Cruz is a 74 year old male with a PMH of CAD, HTN, HLD, DM2, and BPH who was admitted to [facility] on 12/6/2020.  He was admitted to the general care floor. He had increased oxygenation needs, started on HFNC, and transferred to the ICU on 12/8. He was intubated for worsening hypoxia on 12/10. He self extubated on 12/10, required reintubation, and again self-extubated on 12/14. He was reintubated 12/16 for worsening hypoxia    Major Changes for Today    Stop Veletri  Add on hepatic profile  CXR  Remove solorzano    Assessment/Plan:    NEUROLOGIC:  # acute pain  # agitation/anxiety  # delirium  # encephalopathy   - Did not tolerate Precedex and Propofol previously   - Fentanyl and versed drips with PRN boluses available  - Paralyzed with Cisatracurium. Will attempt to wean down today.   - delirium: scheduled seroquel 50 two times a day.    -RASS goal -5 while paralyzed.       HEMODYNAMICS/CV:  # Shock, septic  # CAD, HTN, HLD  - Continue norepinephrine for MAP > 65. Escalating doses overnight. Has settled since UF decreased. Will aim to keep even.   - 12/16: Echo: EF > 63%, Normal RV And LV fn, normal valves. No Tamponade   - Lactate flat     RESPIRATORY:  # Acute hypoxic respiratory failure   # ARDS 2nd to COVID19  # Klebsiella PNA   - intubated 12/10, self-extubated 12/14, reintubated 12/16  - Discontinue Veletri  - VC 32/480/14/65  - Ventilator bundle  - Supinate this morning.  while prone.   - CXR ordered 12/19, not done. Reorder.      GI:  # severe protein calorie malnutrition  # Constipation, now resolved.   - RD following. RD managing TF's.   - protein BID  - bowel regimen: miralax, senna, docusate sodium daily     RENAL:  # NICKY   # Metabolic acidosis  - Started on CRRT 12/18  - Will discuss with nephrology keeping I&O even today as well as potential formula change.   - Electrolytes balanced.  Persistently acidemic. Bicarb 18, pH 7.22.   - On enteral soium bicarb  - Now anuric. Remove solorzano. Bladder scan PRN and straight cath if PVR > 250.      ID:  # COVID 19   # CAP with COVID19  # Klebsiella VAP  #Septic shock  # Severe Hypothermia  - WBC:   16-->22K  - Hypothermia 12/19 93 degrees--> now normothermic  - 12/19 fungitell, galactomannan antigen, fungal BC, BC x2 pending. A BAL with GS, AFB, KOH, fungal culture were ordered; however, the microbiology lab does not currently have the sample. Very minimal secretions on bronch 12/19 (see my note), will not repeat at this time.   - ABX: started zosyn 12/16, changed to Ceftriaxone 12/17. 12/19 Vancomycin, Meropenem and Fluconazole started.   - cultures:               - 12/6 blood: staph epi, contaminant                - 12/11 blood cultures: NGTD                - 12/11 urine: NGTD               - 12/12 sputum: Klebsiella pneumoniae               - 12/12  Blood cultures- NGTD  - completed remdesivir course   - decadron 6 mg IV X 10 days      HEME:  # anemia of critical illness  # coagulopathy due to COVID -19  - Hemoglobin stable 11  - Continue with Heparin gtt at low intensity      ENDOCRINE:  # DM II  #Stress and steroid induced hyperglycemia  - PTA Lantus and oral agents   - goal glucose < 180 for optimal healing  - HgbA1C on admit 8.2  - continue with insulin infusion    MSK:  # acute critical care weakness/deconditioning   - hold OOB activity while prone/paralyzed. ROM     PROPHYLAXIS:   DVT proph:Yes. Heparin gtt  GI proph: Yes.  Requires solorzano for strict I&O: Yes  Restraints required for safety: Yes     DISPOSITION:  - ICU    LINES/TUBES/DRAINS:  - R PICC, placed 12/9  - L art line, placed 12/10  - R fem dialysis line 12/19     Total Critical Care Time : 55 Minutes exclusive of procedures     Mary De La Mater    Overnight events:   Physical Exam:  Vent settings for last 24 hours:  Vent Mode: AC  FiO2 (%):  [65 %] 65 %  S RR:  [28-32] 32  S VT:  [480 mL]  "480 mL  PEEP/CPAP (cm H2O):  [14 cm H2O] 14 cm H2O  Minute Ventilation (L/min):  [14 L/min-15.7 L/min] 15.7 L/min  PIP:  [28 cm H2O-29 cm H2O] 29 cm H2O  MAP (cm H2O):  [19-20] 19    /54   Pulse (!) 108   Temp 99.2  F (37.3  C) (Esophageal)   Resp (!) 32   Ht 6' 2\" (1.88 m)   Wt (!) 233 lb 15 oz (106.1 kg)   SpO2 99%   BMI 30.04 kg/m      Intake/Output last 3 shifts:  I/O last 3 completed shifts:  In: 4183.5 [I.V.:2048.7; NG/GT:1495; IV Piggyback:639.8]  Out: 4207 [Urine:5; Other:4192; Stool:10]  Intake/Output this shift:  I/O this shift:  In: 369.6 [I.V.:334.6; NG/GT:35]  Out: 102 [Other:102]    Physical Exam  Neuro: chemically sedated, paralyzed.  HEENT:  PERRLA. Pupils 3. ETT present and secured. NJ present and TF's infusing   RESP: BBS, lungs coarse, no wheezes.   CV: extremities warm, pulses palpable. Prone.   GI: prone, not examined  : no urine seen  Extreities  no swelling, pulses 2+ Cap refill brisk.     LAB:  Results from last 7 days   Lab Units 12/20/20  0557   LN-WHITE BLOOD CELL COUNT thou/uL 22.2*   LN-HEMOGLOBIN g/dL 11.1*   LN-HEMATOCRIT % 37.1*   LN-PLATELET COUNT thou/uL 343     Results from last 7 days   Lab Units 12/20/20  0557 12/20/20  0005 12/19/20  1650 12/19/20  0758 12/19/20  0430 12/18/20  0519 12/18/20  0519 12/16/20  2114 12/16/20 2114   LN-SODIUM mmol/L 137  137 138 138 140 141   < > 146*   < > 147*   LN-POTASSIUM mmol/L 5.0  5.1* 5.0 4.3 4.4 4.3   < > 4.3   < > 4.9   LN-CHLORIDE mmol/L 105  105 105 106 107 107   < > 115*   < > 113*   LN-CO2 mmol/L 18*  18* 18* 20* 19* 20*   < > 20*   < > 17*   LN-BLOOD UREA NITROGEN mg/dL 38*  --   --   --  57*  --  81*   < > 61*   LN-CREATININE mg/dL 2.59*  --   --   --  3.41*  --  4.49*   < > 2.29*   LN-CALCIUM mg/dL 8.4*  8.4* 8.6 8.3* 8.3* 8.4*   < > 8.1*   < > 8.6   LN-PROTEIN TOTAL g/dL  --   --   --  6.7  --   --   --   --  6.2   LN-BILIRUBIN TOTAL mg/dL  --   --   --  0.9  --   --   --   --  1.8*   LN-ALKALINE PHOSPHATASE " U/L  --   --   --  61  --   --   --   --  67   LN-ALT (SGPT) U/L  --   --   --  62*  --   --   --   --  51*   LN-AST (SGOT) U/L  --   --   --  34  --   --   --   --  16    < > = values in this interval not displayed.

## 2021-06-13 NOTE — PROGRESS NOTES
Vent Mode: AC  FiO2 (%):  [50 %-100 %] 50 %  S RR:  [20-24] 24  S VT:  [380 mL-500 mL] 500 mL  PEEP/CPAP (cm H2O):  [10 cm H2O-14 cm H2O] 14 cm H2O  Minute Ventilation (L/min):  [6.4 L/min-11.6 L/min] 11.5 L/min  PIP:  [22 cm H2O-28 cm H2O] 27 cm H2O  MAP (cm H2O):  [13-19] 18   PLAT: 26

## 2021-06-13 NOTE — PROGRESS NOTES
Patient Felipe Cruz is eligible to participate in the PassItOn trial of COVID convalescent plasma.  A  will contact the patient to obtain consent.  No further action is needed by the clinical team.    If you have questions, please contact Spring Jordan ([phone])      Spring Jordan MD, PhD  PI for the PassItOn Trial

## 2021-06-13 NOTE — PROGRESS NOTES
12/13/20 2004   Vent Settings   FiO2 (%) 45 %   Heater Temperature 99  F (37.2  C)   Resp Rate (Set) 24   Insp Time (sec) 0.9 sec   Vt (Set, mL) 500 mL   PEEP/CPAP (cm H2O) 8 cm H2O   Trigger Sensitivity Flow (L/min) 2 L/min   I:E Ratio 1:1.8   Humidification Heater   Patient Data   Vt Exp (mL) 504 mL   Minute Ventilation (L/min) 12.2 L/min   Total Resp Rate  24 BPM   PIP Observed (cm H2O) 23 cm H2O   MAP (cm H2O) 15   Plateau Pressure (cm H2O) 20 cm H2O   SpO2 95 %

## 2021-06-13 NOTE — PROGRESS NOTES
Patient proned at 0034. Titration of drips and pressors done as indicated to meet hemodynamic and sedation goals. Patient has had multiple loose, soft bowel movements. Minimal output from solorzano overnight. S/p 1500 LR infusion per MD order. Unable to get weight due to patient being prone.

## 2021-06-13 NOTE — PROGRESS NOTES
12/10/20 2100   Vent Information   Interface Invasive   Patient Data   Vt Exp (mL) 498 mL   Minute Ventilation (L/min) 10.5 L/min   Total Resp Rate  22 BPM   PIP Observed (cm H2O) 26 cm H2O   MAP (cm H2O) 16   Plateau Pressure (cm H2O) 24 cm H2O

## 2021-06-13 NOTE — PROGRESS NOTES
Renal progress note  CC: ARF  Assessment and Plan:  74 y.o. male     1. ARF: pt has normal underlying renal function; now hemodynamic ARF/ATN and has progressive rise in CR; continues to be acidotic and renal function is not leveling off as hoped so will pursue dialysis; for this critically ill pt with HoTN and variable fluid status, will start CRRT; d/w ICU team; appreciate their help and willingness to put in dialysis cath; d/w wife on phone and answered all questions; she gave consent for the start of dialysis  2. Volume status; has been problematic; on diuretics and then giving fluid; will be able to keep more on the dry side to optimize his lungs once on CRRT; will try some gentle UF; continue diuretics for now  3. COVID 19 infection with resp failure; on vent with full support; on abx to cover any bacterial infection (klebsiella in sputum); on veletri  4. Hypernatremia; mild; stable  5. Acidosis; primarily metabolic today;onoral bicarb--HD/CRRT will correct  6. Anemia; no active loss; following hgb  7. HoTN; on pressors  8. Nutrition; on TF; renal formula  9. DVT prophylaxis; on heparin gtt  10. CAD, hyperlipid; stable co-morbitdities  11. DM; on insulin gtt      Subjective  Urine output diminished; diuretics given; continue to have acidosis  Objective    Vital signs in last 24 hours  Temp:  [96.1  F (35.6  C)-98.3  F (36.8  C)] 98.3  F (36.8  C)  Heart Rate:  [] 97  Resp:  [0-25] 22  BP: ()/(52-70) 105/54  Arterial Line BP: ()/(39-71) 134/53  FiO2 (%):  [50 %-70 %] 70 %  Weight:   (!) 237 lb 7 oz (107.7 kg)    Intake/Output last 3 shifts  I/O last 3 completed shifts:  In: 2205.1 [I.V.:1320.1; NG/GT:885]  Out: 1350 [Urine:1300; Stool:50]  Intake/Output this shift:  I/O this shift:  In: 298 [NG/GT:298]  Out: 175 [Urine:175]    Physical Exam  Intubated and sedated; some facial edema  CV: RRR without murmur or rub  Lung: coarse  Ab: soft and NT; not distended; normal bs  Ext: some edema and well  perfused  Skin; no rash    Pertinent Labs   Lab Results   Component Value Date    WBC 16.3 (H) 12/18/2020    HGB 11.2 (L) 12/18/2020    HCT 35.3 (L) 12/18/2020    MCV 95 12/18/2020     12/18/2020     Lab Results   Component Value Date    CREATININE 4.49 (H) 12/18/2020    BUN 81 (H) 12/18/2020     (H) 12/18/2020    K 4.3 12/18/2020     (H) 12/18/2020    CO2 20 (L) 12/18/2020       Lab Results   Component Value Date    ALBUMIN 1.9 (L) 12/16/2020     Lab Results   Component Value Date    CALCIUM 8.1 (L) 12/18/2020    PHOS 4.8 (H) 12/18/2020     I reviewed all lab results  Krystal Licea

## 2021-06-13 NOTE — PROGRESS NOTES
"Pharmacy Consult: Vancomycin Dosing    Pharmacist consulted to dose vancomycin for Felipe Cruz, a 74 y.o. male.    Ordering provider:  Mary Harrell CNP (Critical Care) /      Indication for vancomycin therapy: Sepsis    Goal Trough Range:  15-20 mcg/mL based on indication    Other current antimicrobials              vancomycin 750 mg in sodium chloride 0.9% 100 mL (VANCOCIN)  Every 12 hours          meropenem 1 g in NaCl 0.9 % 50 mL (MINI-BAG Plus) (MERREM)  Every 8 hours                   Subjective/Objective:    Patient was admitted for Pneumonia due to 2019 novel coronavirus on 12/6/2020    Height: 6' 2\" (1.88 m)    Actual Body Weight (ABW): (!) 106.3 kg (234 lb 5.6 oz)    Ideal body weight: 82.2 kg (181 lb 3.5 oz)  Adjusted ideal body weight: 91.8 kg (202 lb 7.5 oz)    BMI: Body mass index is 30.09 kg/m .    No Known Allergies    Patient Active Problem List   Diagnosis     Pneumonia due to 2019 novel coronavirus     Acute respiratory failure with hypoxia (H)     Acute kidney injury (H)     BPH with urinary obstruction     Chronic pain disorder     Diabetes mellitus (H)     Hypertension     Primary osteoarthritis of right hip     Primary osteoarthritis of right knee     Acute respiratory distress syndrome (ARDS) due to COVID-19 virus (H)     Secondary spontaneous pneumothorax    History reviewed. No pertinent past medical history.     Temp Readings from Current Encounter:     12/21/20 0400 12/21/20 0600 12/21/20 0700   Temp: (!) 96.5  F (35.8  C) 96.9  F (36.1  C) 96.9  F (36.1  C)     Net Intake/Output (last 24 hours):  I/O last 3 completed shifts:  In: 5470.2 [I.V.:3860.2; NG/GT:1310; IV Piggyback:300]  Out: 4952 [Other:4852; Stool:50; Chest Tube:50]    Recent Labs     12/19/20  0430 12/19/20  0458 12/19/20  0758 12/19/20  0906 12/20/20  0429 12/20/20  0557 12/21/20  0405   WBC  --  16.6*  --   --   --  22.2* 28.6*   LACTICACID  --   --   --  1.2 1.5  --   --    CRP  --   --  7.9*  --   --   --   --  "   BUN 57*  --   --   --   --  38* 35*   CREATININE 3.41*  --   --   --   --  2.59* 1.96*     Estimated Creatinine Clearance: 41.6 mL/min (A) (by C-G formula based on SCr of 1.96 mg/dL (H)).    Recent Labs     12/19/20  1245   CULTURE 2+ Gram Negative Rods*       Results for orders placed or performed during the hospital encounter of 12/06/20   Culture/Gram Stain: Sputum    Collection Time: 12/19/20 12:45 PM    Specimen: Sputum, Respiratory Collected   Result Value Status    Culture 2+ Gram Negative Rods (!) Preliminary   Sputum culture    Collection Time: 12/16/20 10:36 PM    Specimen: Respiratory   Result Value Status    Culture Usual Funmilayo Final    Culture 1+ Yeast (!) Final   Urine culture    Collection Time: 12/16/20  9:14 PM    Specimen: Urine   Result Value Status    Culture No Growth Final   Sputum culture    Collection Time: 12/12/20  8:45 PM    Specimen: Respiratory   Result Value Status    Culture 4+ Klebsiella pneumoniae (!) Final   Urine culture    Collection Time: 12/11/20  8:55 AM    Specimen: Urine   Result Value Status    Culture No Growth Final   Culture, Blood Positive Work-up    Collection Time: 12/06/20  4:36 PM    Specimen: Vein, Peripheral; Blood   Result Value Status    Culture Staphylococcus epidermidis (!) Final       Recent labs: (last 7 days)     12/21/20  1122   VANCOMYCIN 25.2*       Vancomycin administrations: (last 120 hours)     Date/Time Action Medication Dose Rate    12/21/20 1210 New Bag    vancomycin 1,000 mg in NaCl 0.9 % 100 mL (MINI-BAG Plus) (VANCOCIN) 1,000 mg 50 mL/hr    12/21/20 0038 New Bag    vancomycin 1,000 mg in NaCl 0.9 % 100 mL (MINI-BAG Plus) (VANCOCIN) 1,000 mg 50 mL/hr    12/20/20 1216 New Bag    vancomycin 1,000 mg in NaCl 0.9 % 100 mL (MINI-BAG Plus) (VANCOCIN) 1,000 mg 50 mL/hr    12/20/20 0010 New Bag    vancomycin 1,000 mg in NaCl 0.9 % 100 mL (MINI-BAG Plus) (VANCOCIN) 1,000 mg 50 mL/hr    12/19/20 1415 New Bag    vancomycin 1,500 mg in sodium chloride 0.9%  250 mL (VANCOCIN) 1,500 mg 88.3 mL/hr          Assessment/Plan:    Pharmacist consulted to dose vancomycin for Sepsis, goal trough range 15-20 mcg/mL.  1. Change to vancomycin 750mg every 12 hours (7.5 mg/kg per actual body weight)   - Continue Q12H frequency for CRRT (CVVHDF)  2. Vancomycin trough level of 25.2 mcg/mL was above the goal trough range. This trough level was drawn at steady state.   - Note: IV vancomycin dose that was due at noon today was administered and IV was stopped after approx 1 hour of infusing. IVPB stopped after level was assessed.  Patient received about half of the 1000mg dose (received ~ 500mg).   - Next dose of vancomycin (750mg) IV will be scheduled for 06:00 12/22/20.  3. Pharmacist will plan to re-check a vancomycin trough level at steady state or sooner if clinically indicated.   4. Pharmacist will continue to follow.    Thank you for the consult.  Elli Alcocer, PharmD 12/21/2020 1:27 PM

## 2021-06-13 NOTE — ANESTHESIA PROCEDURE NOTES
Emergent Intubation    Date/Time: 12/11/2020 1:15 AM    CRNA: Ana Zaragoza CRNA  Indications: respiratory distress    Medications Administered  Propofol (DIPRIVAN) injection, 100 mgMedication administration time:12/11/2020 1:15 AMRoute: oral  Technique: direct  Laryngoscope size: glidescope 4.  Tube size: 8.0 mm  Tube type: cuffed  Cuff inflated: yes  Level of Difficulty: 0ETT to lip: 25 cm  Tube secured with: ETT arellano  ETCO2 = Yes  Breath sounds: equal  SaO2 %: 97    Sign out given. CXR and sedation per primary care team.

## 2021-06-13 NOTE — PLAN OF CARE
Overnight, patient on and off of pressers as needed for MAP >65. Please see eMAR and vitals for accurate times and results. Urine production improved but not significantly so. TF increased as ordered, next increase at noon.   For further updates, please see eMAR, notes, results and flowsheets. MATTHEW

## 2021-06-13 NOTE — CONSULTS
CRITICAL CARE CONSULT:    DOS:  12/08/2020    Patient Summary: Felipe Cruz is a 74 y.o. male  With PMH of OA, DM, HTN, BPH who presented to the [facility] ED for evaluation for hypoxia on 12/6/2020.   He went to his clinic at [facility] 11/30/2020 and was prescribed prednisone, azithromycin and albuterol for treatment of URI.  He was tested for COVID, 11/30 and  resulted positive on 12/2/2020.  He presented to [facility] ED 12/6/2020,  admitted to floor on 12/6/20.  He developed increasing oxygen demands and ICU transfer requested on 12/8/20.     Assessment/Plan:     NEURO:  # acute pain, myalgias   # hx of chronic pain from OA (joints and low back)   - cont with prn pain medications  - PTA medications include Norco for osteoarthritis     CVS:  #  Hx of hypertension   - PTA medications:lisinopril/hydrochlorothiazide, amlodipine 10 mg daily, and atenolol 100 mg daily  - Amlodipine and atenolol have continued, the lisinopril/hydrochlorothiazide was stopped upon admission due to acute kidney injury, and has not been resumed.  -  Blood pressure stable on 2 agents prior to transfer     RESP:  # Acute Respiratory Distress Syndrome (ARDS) 2/2 COVID  # Acute hypoxic respiratory failure    # Viral pneumonia due to COVID 19   - cont with HFNC and alternating with BIPAP as he tolerates   - Order BiPAP at nigt    - Cont with decadron 6mg daily for 10 days     GI:  #  Concern for Protein Calorie Malnutrition   - previously tolerated diabetic diet on floor. Cont to monitor     RENAL :  #  Concern for volume overload   - treated with IVF at time of admission for elevated lactic of 2.7, now resolved. - received lasix 40mg x2 on floor for concerns of volume overload with adequate response.  - monitor I/O's for now     # BPH  - continue with BPH medications     # Acute kidney injury   - Baseline GFR 60, now 52  - monitor, hold hydrochlorothiazide and lisinopril for now     # hypomagnesia   - replaced per  protocol.    ID:  #  COVID  19  - Dx on 11/30, confirmed on 12/2.   - CXR 12/6:  Consistent with COVID pneumonia infection.   - check COVID markers.   - Empiric abx stopped 12/7/20.  12/6/20: Procal 0.25. WBC normal.    -12/6: 1/2 Blood cultures coag (-) staph  - Cont with Decadron for total of 10 days.   - cont with Remedesivir x4 days. Appears to have finished course.     HEMATOLOGIC:  # Coagulopathy due to COVID-19  - Lovenox 40 mg q daily, will change to Lovenox 0.5mg/kg as pt transferred to ICU    ENDOCRINE:  # Diabetes mellitus   - PTA medications: metformin 1000 mg twice daily, glipizide 10 mg daily, and glargine 35 units subcu nightly.  - hgb A1C on admit 8.2  - cont metformin for now, some studies with protective benefit   - cont with Lantus,  Carb-meal coverage and s/s insulin correction scale.   - goal to keep BG <180    MSK:  # hx of OA  # hx of low back pain with Right sided sciatica and joint pains   - prn medications   - frequent turns/repositioning for comfort   - PT/OT consults     ICU PROPHYLAXIS:  GI: none indicated at this time   DVT:  Lovenox 0.5mg/kg two times a day     Lines/Drains/Tubes:  PIV's 12/6/20    Code: verified as full code     DISPO: ICU   Total Critical Care Time : 45 Minutes                                                                                                                             CCx: hypoxemia, increased oxygen need on HFNC     HPI: History obtained from Patient.Report provided from transferring provider.     Felipe Cruz is a 74 y.o. male  With PMH of OA, DM, HTN, BPH who presents to the ER for evaluation for hypoxia evaluation on 12/6/20, he was short of breath and with oxygen sats in 82% on room air     Pt reports onset of symptoms onThanks giving day 11/25/20. He went to his clinic at [facility] on Monday, Nov 30 and was prescribed prednisone, zpack and albuterol for treatment of URI and was tested for COVID, which was resulted positive on  "Wednesday, 12/2.     He was admitted to the general care floor, initially on NC cannula but eventually progressed to HFNC with increasing oxygen needs. ICU transfer was requested.     At time of assessment, he reports feeling better than this morning. He reports feeling dyspnea with activity. Denies chest pain, shortness of breath,  Palpitations or air hunger.     Past Medical History:  Hypertension   Diabetes mellitus   BPH   Arthritis   Chronic pain syndrome: Right hip/knee/shoulder and back.    Past Surgical History:  Left total knee arthroplasty   Right knee meniscectomy     Social History: Per chart, he is . He does not use tobacco or alcohol.     Family History:  History reviewed. No pertinent family history.    Allergies:  No Known Allergies    Physical Exam:  Vent settings for last 24 hours:  FiO2 (%):  [60 %-80 %] 80 %    /74 (Patient Position: Sitting)   Pulse 60   Temp 97.8  F (36.6  C) (Oral)   Resp 20   Ht 6' 2\" (1.88 m)   Wt (!) 255 lb 6.4 oz (115.8 kg)   SpO2 92%   BMI 32.79 kg/m      Intake/Output last 3 shifts:  I/O last 3 completed shifts:  In: 755 [P.O.:200; I.V.:505; IV Piggyback:50]  Out: 3500 [Urine:3200; Emesis/NG output:300]  Intake/Output this shift:  No intake/output data recorded.    Physical Exam  GEN: sitting up in bed, NAD.  NEURO:   A&O x3.  POLLOCK.    HEENT: PERRLA. Pupils 4mm. OP clear.   RESP: BBS, lungs diminished. On 50LPM, 80% FiO2  CV: S1, S2  ABD: Abdomen soft and compressible.   : no solorzano, no urine to assess   MSK: extremities warm and well perfused. Calves soft and compressible. Distal pulses, pulses 2+       LAB:  Recent Results (from the past 24 hour(s))   POCT Glucose    Specimen: Blood   Result Value Ref Range    Glucose 290 (H) 70 - 139 mg/dL   Reticulocytes   Result Value Ref Range    Retic Absolute Count 0.045 0.010 - 0.110 mill/uL    Retic Ct Pct 1.16 0.8 - 2.7 %   Basic Metabolic Panel   Result Value Ref Range    Sodium 138 136 - 145 mmol/L    " Potassium 4.0 3.5 - 5.0 mmol/L    Chloride 105 98 - 107 mmol/L    CO2 23 22 - 31 mmol/L    Anion Gap, Calculation 10 5 - 18 mmol/L    Glucose 133 (H) 70 - 125 mg/dL    Calcium 8.0 (L) 8.5 - 10.5 mg/dL    BUN 27 8 - 28 mg/dL    Creatinine 0.95 0.70 - 1.30 mg/dL    GFR MDRD Af Amer >60 >60 mL/min/1.73m2    GFR MDRD Non Af Amer >60 >60 mL/min/1.73m2   Lactate Dehydrogenase (LDH)   Result Value Ref Range    LD (LDH) 272 (H) 125 - 220 U/L   INR   Result Value Ref Range    INR 1.17 (H) 0.90 - 1.10   Fibrinogen   Result Value Ref Range    Fibrinogen 616 (H) 170 - 410 mg/dL   C-Reactive Protein   Result Value Ref Range    CRP 9.6 (H) 0.0 - 0.8 mg/dL   D-dimer, Quantitative   Result Value Ref Range    D-Dimer, Quant 0.61 (H) <=0.50 FEU ug/mL   Magnesium   Result Value Ref Range    Magnesium 2.0 1.8 - 2.6 mg/dL   HM1 (CBC with Diff)   Result Value Ref Range    WBC 10.3 4.0 - 11.0 thou/uL    RBC 3.90 (L) 4.40 - 6.20 mill/uL    Hemoglobin 11.8 (L) 14.0 - 18.0 g/dL    Hematocrit 34.8 (L) 40.0 - 54.0 %    MCV 89 80 - 100 fL    MCH 30.3 27.0 - 34.0 pg    MCHC 33.9 32.0 - 36.0 g/dL    RDW 12.5 11.0 - 14.5 %    Platelets 254 140 - 440 thou/uL    MPV 10.1 8.5 - 12.5 fL    Neutrophils % 80 (H) 50 - 70 %    Lymphocytes % 10 (L) 20 - 40 %    Monocytes % 8 2 - 10 %    Eosinophils % 0 0 - 6 %    Basophils % 0 0 - 2 %    Immature Granulocyte % 1 (H) <=0 %    Neutrophils Absolute 8.3 (H) 2.0 - 7.7 thou/uL    Lymphocytes Absolute 1.0 0.8 - 4.4 thou/uL    Monocytes Absolute 0.9 0.0 - 0.9 thou/uL    Eosinophils Absolute 0.0 0.0 - 0.4 thou/uL    Basophils Absolute 0.0 0.0 - 0.2 thou/uL    Immature Granulocyte Absolute 0.1 (H) <=0.0 thou/uL   POCT Glucose    Specimen: Blood   Result Value Ref Range    Glucose 115 70 - 139 mg/dL   POCT Glucose    Specimen: Blood   Result Value Ref Range    Glucose 174 (H) 70 - 139 mg/dL   POCT Glucose    Specimen: Blood   Result Value Ref Range    Glucose 234 (H) 70 - 139 mg/dL

## 2021-06-13 NOTE — PROGRESS NOTES
S[facility] COVID RT PROGRESS NOTE    DATA:    VENT DAY#  4    CURRENT SETTINGS:  VENT SETTINGS   AC 24 / 500 / +8 / 45%        FIO2:   45%    PATIENT PARAMETERS:    PIP 24  Pplat:  20  Pmean:  13  SBT: Y  SECRETIONS:  Small; white/tan; thick  02 SATS:  97    ETT SIZE 8.0  Secured at  23 cm at teeth    Securing device changed / tube re-taped date 12/11/2020    Respiratory Medications: none     ABG: none this shift    NOTE / PLAN:   Patient is on the ventilator. PEEP was reduced from 10 to 8 cmh2o. He was placed on a pressure support trial this morning. Settings were PS - 12 cmh2o over PEEP of 8 cmh2o. He was on pressure support for roughly 1 hour and 15 minutes. His tidal volumes began to decline and he looked fatigued. He was placed back on full support vent settings listed above. Breath sounds are coarse. Respiratory will continue following and weaning as able.

## 2021-06-13 NOTE — PROGRESS NOTES
Patient remains vented, sedated, paralyzed, on insulin gtt, and currently off pressor support.  Rass -5, Bis in high 30s/low 40s, train of 4 is 1/4 or 2/4 MA 7 temporal (baseline 4/4 on MA 7).  HD catheter placed around 1300 today, CRRT started at 1815.  See flowsheet.  Pt proned at approx 1430 for worsening PFR.  TF formula changed to [employer] Renal at a rate of 35 ml/hr, flush increased to 100 mL q4hr.  At approx 1445 pt flipped into a-fib w/ BBB and PVCs.  OT dose of amiodarone bolus given.  Pt remains in a-fib, MD aware.  Wife, Yuridia, Updated via telephone around 1730.

## 2021-06-13 NOTE — PROGRESS NOTES
S[facility] COVID RT PROGRESS NOTE    DATA:    VENT DAY#  2    CURRENT SETTINGS:  VENT SETTINGS   AC f 24 Vt 500 PEEP 14         FIO2:   80    PATIENT PARAMETERS:    PIP 28  Pplat:  24  Pmean:  19  SBT: N  SECRETIONS:  Copious, tan, thick  02 SATS:  99    ETT SIZE 8.5  Secured at  26 cm at teeth    Securing device changed / tube re-taped date 20    Respiratory Medications: Veletri     AB.28 43 259 19.4    NOTE / PLAN:   Pt is prone with nebulized Veletri inline. Copious thick secretions suctioned after about 3 hours prone. FIO2 decreased in response to ABG, will continue to wean as appropriate.

## 2021-06-13 NOTE — ANESTHESIA PROCEDURE NOTES
Feeding Tube Insertion    Performing CRNA:  Teri Hatch CRNA  Tube Type:  Nasogastric  Tube Size:  10 Fr  Placement/position confirmed:  X-ray  Number of Attempts:  1  Difficulty:  None  Secured with: Bridle.  Flushed with:  Water

## 2021-06-13 NOTE — ED NOTES
Pt reports he was tested for COVID on 11/30/20 at [facility] and was notified of a positive result on Wednesday 12/2. These test results are not available electronically at this time. Called [facility] to try to obtain medical records/JANICE but they cannot do this until Monday morning when they are open. Pt himself does not have any documentation of the test result.

## 2021-06-13 NOTE — PLAN OF CARE
Problem: Impaired Gas Exchange  Goal: Demonstrate improved ventilation and adequate oxygenation of tissues as evidenced by absence of respiratory distress  Outcome: Not Progressing     Problem: Breathing  Goal: Patient will maintain patent airway  Outcome: Not Progressing  Goal: Patient will utilize incentive spirometer  Outcome: Not Applicable this Shift  Goal: Patient performs or directs assisted coughing  Outcome: Not Applicable this Shift

## 2021-06-13 NOTE — PROGRESS NOTES
[facility]    Hospitalist Progress Note    Name: Felipe Cruz    MRN: [id number]  YOB: 1945    Age: 74 y.o.  Date of Admission:  12/6/2020  Primary Provider:  Wero Nolasco MD, [phone]  Physician:  Guzman Grijalva    Assessment and Plan:  Felipe Cruz is a 74 y.o. male  With PMH of OA, DM, HTN, BPH who presents to the ER for evaluation for hypoxia.   Pt reports onset of symptoms on Thanks giving day. He reports his wife had symptoms prior to him, about 2 days before his, she was also diagnosed with COVID-19.  Pt reports severe cough, shortness of breath with activity, poor appetite, fatigue and occasional nausea. He went to his clinic at [facility] Nov 30 and was prescribed prednisone, zpack and albuterol for treatment of URI and was tested for COVID, which was resulted positive on Wednesday12/2. Pt reports cough and shortness of breath is persistent and his O2 sats was mid 80's  so he called his clinic and pt was referred to ED. He denies headaches, vomiting, blurry vision, diarrheas.     Confirmed COVID-19 infection  Duration of illness PTA:  1 week  Date of diagnosis on 11/30.      Study drugs/protocol: None     Acute Hypoxic Respiratory Failure secondary to COVID-19 infection  Viral Pneumonia secondary to COVID-19 infection    Initial chest imaging - X-ray- showed bilateral interstitial and ground-glass opacities. Currently on 10 L oxy mask   There is not evidence of concurrent bacterial superinfection.   Pt is hemodynamically stable  -  continuous pulse oximetry under droplet precautions, minimized lab draws, and care consolidation  - Oxygen support with Oxy Mask at 10 L/min, titrate to keep SPO2 > 90% and no higher than 96%  - Labs: Will trend labs until patient reaches clinical stability.   - No imaging necessary at this time  - Avoid nebulizers in favor of MDIs;   - Conservative IV fluids   - dexamethasone 6 mg po daily for 10 days or until hospital discharge and IV  Remdesevir for 5 days  - Antibiotics are are not indicated. Will however check procalcitonin  - Research study protocol:  none     Mild acute kidney injury:  Baseline Creatinine around 0.9, Cr 1.29 today  Possibly sec to volume depletion  IV hydration  Monitor renal function  Avoid nephrotoxins.     Fatigue sec to COVID     Nausea, sec to COVID     Elevated lactate, without acidosis  Possible sec to volume depletion  Iv hydration  Trend lactate    Hypomagnesemia  Replace with protocol     Diabetes mellitus.  Type II.   on long-term insulin use.  Last HbA1c 7.0,  per his PCP notes from 11/4/2020, but no records available for this on care everywhere  -Diabetic diet  -resume home lantus 35 units at bedtime  - Add novolog 1:15 carbs meal time novolog insulin coverage  - Hold PTA glipizide and metformin as inpatienbt  -Start sliding-scale insulin mellitus.     Essential hypertension  Resume atenolol and norvasc  Hold lisinopril and hydrochlorothiazide- sec to NICKY  Monitor BP     Mild hyponatremia, sodium 133  Hold hydrochlorothiazide  Monitor sodium     Chronic, stable Medical Problems:  BPH  Resume PTA cardura and porscar  Monitor for urinary retention     FEN:  - PO as tolerated. Avoid volume overload.      DVT Prophylaxis:   At high risk of thrombotic complications due to COVID-19 disease. D-dimer on admission is pending  - enoxaparin 40 mg subcutaneous twice every 12 hours     At discharge consider one of the following for 30 days and until the patient has returned to normal mobility:  - apixaban (Eliquis) 2.5 mg two times a day  - rivaroxaban (Xarelto) 10 mg once daily  All COVID-19  patients should be educated about the symptoms of DVT (leg swelling, pain, redness, warmth) and PE (dyspnea, chest pain, palpitations, cough, hemoptysis).     Advanced care planning:  Patient identifies   Name Home Phone Work Phone Mobile Phone Relationship Lg GIANNI Blackmon     [phone] Spouse      as health care proxy.   -Code  "Status: FULL CODE. Discussed with Patient.     Disposition: Expected discharge in 3-4 days to  Home once .oxygen needs stabilized and home oxygen arranged.    Guzman Grijalva M.D.  =============================   Subjective:  Care team notes reviewed.     Patient reports shortness of breath in the coughing.    Patient denies: He denies chest pain headaches abdominal pain.  He denies nausea vomiting diarrhea.     ROS : see subjective    Bedside iPad was not used as part of this encounter because it was not medically appropriate to do so.    Exam:  Vital Signs with Ranges  Temp:  [97.6  F (36.4  C)-99.4  F (37.4  C)] 97.6  F (36.4  C)  Heart Rate:  [62-86] 63  Resp:  [20-33] 20  BP: (141-177)/(64-93) 141/66    /66 (Patient Position: Semi-mueller)   Pulse 63   Temp 97.6  F (36.4  C) (Oral)   Resp 20   Ht 6' 2\" (1.88 m)   Wt (!) 255 lb (115.7 kg)   SpO2 91%   BMI 32.74 kg/m    Vitals:    12/06/20 1613   Weight: (!) 255 lb (115.7 kg)       GEN:  non-toxic and alert   HEENT:  extra ocular movement intact, sclera clear, anicteric and oropharynx clear, no lesions   CV:  no edema or JVD  PULM:  increased work of breathing  GI:  nondistended  SKIN:  Dry, no rash  NEURO:  nonfocal      Medications:  Were Reviewed    amLODIPine  10 mg Oral DAILY     aspirin  81 mg Oral DAILY     atenoloL  100 mg Oral DAILY     dexAMETHasone  6 mg Oral DAILY     doxazosin  8 mg Oral QHS     finasteride  5 mg Oral DAILY     insulin aspart (NovoLOG) injection   Subcutaneous TID with meals     insulin aspart (NovoLOG) injection   Subcutaneous TID with meals     insulin glargine  35 Units Subcutaneous QHS     remdesivir  100 mg Intravenous Q24H     sodium chloride  10 mL Intravenous Line Care     sodium chloride  2.5 mL Intravenous Line Care       Data reviewed today: I reviewed all new labs and imaging reports over the last 24 hours.    Labs:  Results for GISELLE VILLANUEVA (MRN [id number]) as of 12/7/2020 08:57   Ref. Range 12/6/2020 16:36 " 12/6/2020 16:56   Sodium Latest Ref Range: 136 - 145 mmol/L 133 (L)    Potassium Latest Ref Range: 3.5 - 5.0 mmol/L 4.0    Chloride Latest Ref Range: 98 - 107 mmol/L 96 (L)    CO2 Latest Ref Range: 22 - 31 mmol/L 24    Anion Gap, Calculation Latest Ref Range: 5 - 18 mmol/L 13    BUN Latest Ref Range: 8 - 28 mg/dL 29 (H)    Creatinine Latest Ref Range: 0.70 - 1.30 mg/dL 1.33 (H)    GFR MDRD Af Amer Latest Ref Range: >60 mL/min/1.73m2 >60    GFR MDRD Non Af Amer Latest Ref Range: >60 mL/min/1.73m2 52 (L)    Calcium Latest Ref Range: 8.5 - 10.5 mg/dL 8.8    AST Latest Ref Range: 0 - 40 U/L 41 (H)    ALT Latest Ref Range: 0 - 45 U/L 37    ALBUMIN Latest Ref Range: 3.5 - 5.0 g/dL 3.0 (L)    Protein, Total Latest Ref Range: 6.0 - 8.0 g/dL 7.2    Alkaline Phosphatase Latest Ref Range: 45 - 120 U/L 49    Bilirubin, Total Latest Ref Range: 0.0 - 1.0 mg/dL 0.7    Magnesium Latest Ref Range: 1.8 - 2.6 mg/dL 1.6 (L)    CK, Total Latest Ref Range: 30 - 190 U/L 355 (H)    Troponin I Latest Ref Range: 0.00 - 0.29 ng/mL 0.02    Glucose Latest Ref Range: 70 - 125 mg/dL 205 (H)    CRP Latest Ref Range: 0.0 - 0.8 mg/dL 16.5 (H)    Lactic Acid Latest Ref Range: 0.7 - 2.0 mmol/L 2.7 (H)    WBC Latest Ref Range: 4.0 - 11.0 thou/uL 9.6    RBC Latest Ref Range: 4.40 - 6.20 mill/uL 4.41    Hemoglobin Latest Ref Range: 14.0 - 18.0 g/dL 13.5 (L)    Hematocrit Latest Ref Range: 40.0 - 54.0 % 39.5 (L)    MCV Latest Ref Range: 80 - 100 fL 90    MCH Latest Ref Range: 27.0 - 34.0 pg 30.6    MCHC Latest Ref Range: 32.0 - 36.0 g/dL 34.2    RDW Latest Ref Range: 11.0 - 14.5 % 12.4    Platelets Latest Ref Range: 140 - 440 thou/uL 263    MPV Latest Ref Range: 8.5 - 12.5 fL 10.3    Retic Absolute Count Latest Ref Range: 0.010 - 0.110 mill/uL 0.052    Neutrophils % Latest Ref Range: 50 - 70 % 82 (H)    Lymphocytes % Latest Ref Range: 20 - 40 % 9 (L)    Monocytes % Latest Ref Range: 2 - 10 % 8    Eosinophils % Latest Ref Range: 0 - 6 % 0    Basophils %  Latest Ref Range: 0 - 2 % 0    Neutrophils Absolute Latest Ref Range: 2.0 - 7.7 thou/uL 7.9 (H)    Lymphocytes Absolute Latest Ref Range: 0.8 - 4.4 thou/uL 0.9    Monocytes Absolute Latest Ref Range: 0.0 - 0.9 thou/uL 0.8    Eosinophils Absolute Latest Ref Range: 0.0 - 0.4 thou/uL 0.0    Basophils Absolute Latest Ref Range: 0.0 - 0.2 thou/uL 0.0    HCO3, Venous Latest Ref Range: 24.0 - 30.0 mmol/L  26.6   pH, Venous Latest Ref Range: 7.35 - 7.45   7.40   pCO2, Venous Latest Ref Range: 35 - 50 mm Hg  47   pO2, Charly Latest Ref Range: 25 - 47 mm Hg  33   Base Excess, Venous Latest Units: mmol/L  4.1   Oxyhemoglobin Latest Ref Range: 70.0 - 75.0 %  62.8 (L)        Imaging:    EXAM: XR CHEST 1 VIEW PORTABLE  LOCATION: [facility]  DATE/TIME: 12/6/2020 5:26 PM     INDICATION: Dyspnea/SOB.  COMPARISON: 11/30/2020.     IMPRESSION:   Interval development of bilateral interstitial and ground-glass opacities, likely to represent pneumonia in the proper clinical context, to include COVID, recommend follow-up to resolution. No pneumothorax. Cardiac silhouette mildly enlarged.   Tortuous atherosclerotic aorta.

## 2021-06-13 NOTE — PLAN OF CARE
Problem: Pain  Goal: Patient's pain/discomfort is manageable  Outcome: Progressing  Pt has denied pain this shift     Problem: Inadequate Gas Exchange  Goal: Patient will achieve/maintain normal respiratory rate/effort  Outcome: Progressing  Pt has had a strong, persistent dry cough this shift.  Respirations are labored at times.  Guaifenesin given per pt request.  RT instructed pt on use of Albuterol inhaler with spacer.  Lung sounds are wheezy and now have some crackles this am.     Problem: Impaired Gas Exchange  Goal: Demonstrate improved ventilation and adequate oxygenation of tissues as evidenced by absence of respiratory distress  Outcome: Progressing  Pt has had progressive increasing oxygen needs this shift.  Pt started on 9 liters oxymask and was started on high flow at 30 liters and 60% FiO2.  Pts sats are consistently 87-89%.  RT called and has increased high flow to 50 liters 70% FiO2.  Sats are in the low 90's.  Pt has shortness of breath at rest.  RT reported she was going to notify the doctor about increasing respiratory distress.      Problem: Breathing  Goal: Patient will utilize incentive spirometer  Outcome: Progressing  Pt is aware of how to use incentive spirometer.  Pt achieved 1000 this shift.

## 2021-06-13 NOTE — PROGRESS NOTES
S[facility] COVID RT PROGRESS NOTE     DATA:     VENT DAY# 8     CURRENT SETTINGS:  VENT SETTINGS   AC 24/500/+14              FIO2:  65%     PATIENT PARAMETERS:     PIP: 29  Pplat: 27    Pmean: 19   SBT: NO  SECRETIONS: moderate, tan  02 SATS:  99%     ETT SIZE  8.5 @ 26     Securing device changed / tube re-taped date 12/18/2020     Respiratory Medications: Veletri and Albuterol Q6PRN       PLAN:     Patient was proned 12/17 during the day shift. Patient not tolerating head turns at this time. Will continue current therapy and continue to monitor.

## 2021-06-13 NOTE — ED PROVIDER NOTES
EMERGENCY DEPARTMENT ENCOUNTER      IMPRESSION  Covid respiratory infection  Hypomagnesemia      MEDICAL DECISION MAKING    Patient evaluated for Covid respiratory illness.  He has history of diabetes and hypertension.  He was recently diagnosed with COVID-19.  He reports worsening shortness of breath and fatigue.  He has a home pulse ox that was recording less than 90%    On exam he is awake speaks in full sentences and has minimal respiratory distress.  Initial vital signs show hypoxemia with saturation in the upper 80s.  He has coarse breath sounds    He was placed on cardiac monitor and 2 L of nasal cannula oxygen which improved his saturations to the low 90s    Chest  x-ray  showed patchy infiltrates  consistent with Covid    Laboratory investigation showed elevated threatening and glucose and low magnesium which was supplemented with oral    IV fluid bolus given    Patient will be admitted to hospital        CHIEF COMPLAINT    Chief Complaint   Patient presents with     Cough     Covid Concern       HPI    Felipe Cruz is a 74 y.o. male with past medical history of osteoarthritis, hypertension, diabetes, and benign prostatic hyperplasia who is COVID-19 positive and presents to the ED ambulatory for symptoms of respiratory infection.     He reports cough congestion shortness of breath.  His wife is also COVID-19 positive.  He reports worsening shortness of breath and home oxygen saturations less than 90%.  He was seen in the clinic and placed on steroids antibiotics and inhaler.    Denies any other complaints at this time.     PAST MEDICAL HISTORY    Relevant past surgical history reviewed with patient, unless otherwise stated in HPI, history not pertinent to this visit.    History reviewed. No pertinent past medical history.    CURRENT MEDICATIONS    Patient's Medications    No medications on file       ALLERGIES    No Known Allergies    FAMILY HISTORY    History reviewed. No pertinent family history.    SOCIAL  HISTORY    Social History     Socioeconomic History     Marital status:      Spouse name: None     Number of children: None     Years of education: None     Highest education level: None   Occupational History     None   Social Needs     Financial resource strain: None     Food insecurity     Worry: None     Inability: None     Transportation needs     Medical: None     Non-medical: None   Tobacco Use     Smoking status: None   Substance and Sexual Activity     Alcohol use: None     Drug use: None     Sexual activity: None   Lifestyle     Physical activity     Days per week: None     Minutes per session: None     Stress: None   Relationships     Social connections     Talks on phone: None     Gets together: None     Attends L[employer] day service: None     Active member of club or organization: None     Attends meetings of clubs or organizations: None     Relationship status: None     Intimate partner violence     Fear of current or ex partner: None     Emotionally abused: None     Physically abused: None     Forced sexual activity: None   Other Topics Concern     None   Social History Narrative     None       REVIEW OF SYSTEMS    Constitutional: Positive reported fever, chills,  weakness, loss of appetite,   Eyes:  No reported pain, diplopia, vision loss,   HENT: Positive reported sore throat, no ear pain, dysphagia,   Respiratory: Positive SOB, wheeze, cough, no hemoptysis  Cardiovascular:  No reported CP, PETERSEN, palpitations, syncope  GI:  No reported  abdominal pain, nausea, vomiting, dark or bloody stools, diarrhea   Musculoskeletal:  No reported  new muscle/joint pain, swelling or loss of function.   Skin:  No reported rash   Neurologic:  No reported headache, focal weakness,  sensory changes, vertigo, seizure  Lymphatic:  No reported swollen glands     All other systems negative unless noted in HPI.    PHYSICAL EXAM    VITAL SIGNS: /67   Pulse 71   Resp (!) 31   Wt (!) 255 lb (115.7 kg)   SpO2 94%    General Appearance: Alert, cooperative, normal speech and facial symmetry,  appears stated age, appears fatigued  Head:  Normocephalic, without obvious abnormality, atraumatic  Eyes:   conjunctiva/corneas clear,   ENT:  Lips, mucosa, and tongue normal; teeth and gums normal, no pharyngeal inflammation, no dysphonia or difficulty swallowing, membranes are moist without pallor  Neck:  Supple, symmetrical, trachea midline, no adenopathy;        thyroid:  No enlargement/tenderness/nodules; no stridor,  no soft tissue swelling or midline CSP tenderness  Chest:  No tenderness or deformity,   Cardio:  Regular rate and rhythm, S1 and S2 normal, no murmur,  2+ pulses symmetric in all extremities  Pulm: Coarse breath sounds  Back: no CVA tenderness, no CTL spinal tenderness  Abdomen:  Soft, non-tender, non distended, bowel sounds active all four quadrants,     no masses, no organomegaly, no rebound or guarding, no peritoneal signs  Extremities:  Extremities normal, there is no tenderness to palpation , atraumatic, no cyanosis or edema, full function and range of motion, pulses equal in all extremities, normal cap refill, no joint swelling  Skin:  no rashes or lesions on exposed skin  Neuro:  Awake, alert, responsive to voice, follows commands, normal speech, No gross motor weakness or sensory loss, moves all extremities,    LABS  Pertinent lab results reviewed in chart.  Results for orders placed or performed during the hospital encounter of 12/06/20   Comprehensive Metabolic Panel   Result Value Ref Range    Sodium 133 (L) 136 - 145 mmol/L    Potassium 4.0 3.5 - 5.0 mmol/L    Chloride 96 (L) 98 - 107 mmol/L    CO2 24 22 - 31 mmol/L    Anion Gap, Calculation 13 5 - 18 mmol/L    Glucose 205 (H) 70 - 125 mg/dL    BUN 29 (H) 8 - 28 mg/dL    Creatinine 1.33 (H) 0.70 - 1.30 mg/dL    GFR MDRD Af Amer >60 >60 mL/min/1.73m2    GFR MDRD Non Af Amer 52 (L) >60 mL/min/1.73m2    Bilirubin, Total 0.7 0.0 - 1.0 mg/dL    Calcium 8.8 8.5 -  10.5 mg/dL    Protein, Total 7.2 6.0 - 8.0 g/dL    Albumin 3.0 (L) 3.5 - 5.0 g/dL    Alkaline Phosphatase 49 45 - 120 U/L    AST 41 (H) 0 - 40 U/L    ALT 37 0 - 45 U/L   Lactic Acid   Result Value Ref Range    Lactic Acid 2.7 (H) 0.7 - 2.0 mmol/L   Blood Gases, Venous   Result Value Ref Range    pH, Venous 7.40 7.35 - 7.45    pCO2, Venous 47 35 - 50 mm Hg    pO2, Charly 33 25 - 47 mm Hg    Base Excess, Venous 4.1 mmol/L    HCO3, Venous 26.6 24.0 - 30.0 mmol/L    Oxyhemoglobin 62.8 (L) 70.0 - 75.0 %    O2 Sat, Venous 64.0 (L) 70.0 - 75.0 %   Troponin I   Result Value Ref Range    Troponin I 0.02 0.00 - 0.29 ng/mL   Magnesium   Result Value Ref Range    Magnesium 1.6 (L) 1.8 - 2.6 mg/dL   HM1 (CBC with Diff)   Result Value Ref Range    WBC 9.6 4.0 - 11.0 thou/uL    RBC 4.41 4.40 - 6.20 mill/uL    Hemoglobin 13.5 (L) 14.0 - 18.0 g/dL    Hematocrit 39.5 (L) 40.0 - 54.0 %    MCV 90 80 - 100 fL    MCH 30.6 27.0 - 34.0 pg    MCHC 34.2 32.0 - 36.0 g/dL    RDW 12.4 11.0 - 14.5 %    Platelets 263 140 - 440 thou/uL    MPV 10.3 8.5 - 12.5 fL    Neutrophils % 82 (H) 50 - 70 %    Lymphocytes % 9 (L) 20 - 40 %    Monocytes % 8 2 - 10 %    Eosinophils % 0 0 - 6 %    Basophils % 0 0 - 2 %    Immature Granulocyte % 1 (H) <=0 %    Neutrophils Absolute 7.9 (H) 2.0 - 7.7 thou/uL    Lymphocytes Absolute 0.9 0.8 - 4.4 thou/uL    Monocytes Absolute 0.8 0.0 - 0.9 thou/uL    Eosinophils Absolute 0.0 0.0 - 0.4 thou/uL    Basophils Absolute 0.0 0.0 - 0.2 thou/uL    Immature Granulocyte Absolute 0.1 (H) <=0.0 thou/uL         RADIOLOGY    Xr Chest 1 View Portable    Result Date: 12/6/2020  EXAM: XR CHEST 1 VIEW PORTABLE LOCATION: [facility] DATE/TIME: 12/6/2020 5:26 PM INDICATION: Dyspnea/SOB. COMPARISON: 11/30/2020.     Interval development of bilateral interstitial and ground-glass opacities, likely to represent pneumonia in the proper clinical context, to include COVID, recommend follow-up to resolution. No  pneumothorax. Cardiac silhouette mildly enlarged.  Tortuous atherosclerotic aorta.        ED MEDS  Medications   sodium chloride flush 10 mL (NS) (has no administration in time range)         ED COURSE:  4:12 PM I met with the patient to gather history and to perform my initial exam wearing goggles, facial shield, gown, cap, gloves, and N95 mask. I discussed the plan for care while in the Emergency Department.    7:08 PM I spoke with Dr.Alok Lawrence, hospitalist who agrees with admission.         FINAL DIAGNOSIS:   1. 2019 novel coronavirus disease (COVID-19)              At the conclusion of the encounter I discussed  the results of all of the tests and the disposition with the patient .   All questions were answered.  The patient  acknowledged understanding and was involved in the decision making regarding the overall care plan.    I, Lady Sabi, am serving as a scribe to document services personally performed by Ezekiel Marks MD, based on my observations and the provider's statements to me.  I, Ezekiel Marks MD, attest that Lady eClestin is acting in a scribe capacity, has observed my performance of the services and has documented them in accordance with my direction.            Ezekiel Marks M.D.   Emergency Medicine  [facility] EMERGENCY DEPARTMENT  [address] [address]  Dept: [phone]  Loc: [phone]         Ezekiel Marks MD  12/09/20 0946

## 2021-06-13 NOTE — PROGRESS NOTES
CRITICAL CARE  PROGRESS NOTE:  Date of service: 12/15/2020    Felipe Cruz is a 74 year old male with a PMH of CAD, HTN, HLD, DM2, and BPH who was admitted to [facility] on 12/6/2020.  He was admitted to the general care floor.  He had increased oxygenation needs, started on HFNC, and transferred to the ICU on 12/8.  He was intubated for worsening hypoxia on 12/10. He self extubated on 12/10, required reintubation, and again self-extubated on 12/14. He is currently tolerating bipap at 14/10 at 90% fi02     MAIN CHANGES FOR TODAY:  - Treat agitated delirium   - Pan cultures sent  - Initiate zosyn for klebsiella pneumonia     PLAN BY SYSTEMS:    HEMODYNAMICS/CV:  # CAD, HTN, HLD     - continue ASA 81 mg PO daily, metoprolol 12.5 mg PO two times a day, hydralazine 50mg q 8 hrs, catapres 0.3mg @ HS   - Resume home amlodipine, lisinopril  - hold home dose of atenolol   - labetalol PRN, hydralazine PRN for SBP > 160    PLAN: no changes     NEUROLOGIC:  # acute pain  # agitation/anxiety  # delirium  # encephalopathy      - pain/sedation infusions: precedex   - delirium: scheduled seroquel 50 two times a day, oxycodone 5mg q 4 H.  PRN Haldol, lorazepam available for agitation.   - 12/12 triglycerides: 136  PLAN: Control anxiety to assist with bipap tolerance.     RESPIRATORY:  # acute hypoxic respiratory failure   # ARDS 2nd to COVID19  # Klebsiella PNA     - intubated 12/10, self-extubated 12/14  - Currently on bipap 12/8 90%  - Trial of HFNC as tolerated  - ABG on bipap 7.43/35/64/24 80%   - See ID section   PLAN: Low thresh hold to intubate      RENAL:  # NICKY, improved     - I/O: - 5 L since admission; -110 mL over the past 24 hours  - UOP: 2.2 L over the last 24 hours  - Lasix 20 mg today   - patient appears to be near euvolemia, goal is slightly net negative today  PLAN: Gentle diuresis, follow urine output, electrolytes, and renal function closely      ID:  # COVID 19   # CAP with COVID19  # HAP  "with klebsiella pneumonia   - Tmax: 100  - WBC: 17.2 -> 16.0-> 10   - Repeat cultures this am   - cultures:               - 12/6 blood: staph epi, contaminant                - 12/11 blood cultures: NGTD                - 12/11 urine: NGTD    - 12/12 sputum: Klebsiella pneumoniae    - 12/12  Blood cultures- NGTD   - PCT  0.12 on 12/14   - ABX: Initiate zosyn   - completed remdesivir course   - decadron 6 mg IV X 10 days   PLAN: Initiate zosyn for pan-sensitive klebsiella pneumoniae in sputum given pt's prolonged hospitalization. Follow fever curve, trend leukocytosis      HEME:  # anemia of critical illness  # coagulopathy   # leukocytosis      - Hgb: 12.5  - Plts: 284  - no concerns for bleeding  - continue enoxaparin 50 mg two times a day  PLAN: follow CBC, continue prophylaxis      GI:  # severe protein calorie malnutrition  # Aspiration risk  - Remain NPO in the setting of high risk of aspiration  - nutrition:  - Feeding tube in stomach   - protein BID  - bowel regimen: miralax daily     ENDOCRINE:  # hyperglycemia 2nd to critical illness  # DM 2     - Sliding scale insulin   - goal glucose < 180 for optimal healing  - holding home oral DM 2 meds  - HgbA1C on admit 8.2     MSK:  # acute critical care weakness/deconditioning      - mobilize  - OT/PT consulted      PROPHYLAXIS:   DVT proph:Yes.  GI proph: Yes.  Requires solorzano for strict I&O: Yes  Restraints required for safety: Yes     DISPOSITION:  - ICU  - decision maker: wife, Yuridia  - family updated via phone by myself today      LINES/TUBES/DRAINS:  - R PICC, placed 12/9  - L art line, placed 12/10    SUBJECTIVE/INTERVAL EVENTS:  - patient seen and examined, chart reviewed  - discussed patient on multidisciplinary rounds  - no acute events overnight    OBJECTIVE:  /53 (Patient Position: Semi-mueller)   Pulse 74   Temp 97.6  F (36.4  C) (Axillary)   Resp (!) 38   Ht 6' 2\" (1.88 m)   Wt (!) 242 lb 4.6 oz (109.9 kg)   SpO2 95%   BMI 31.11 kg/m  "     Intake/Output last 3 shifts:  I/O last 3 completed shifts:  In: 2130.3 [I.V.:1200.3; NG/GT:930]  Out: 2250 [Urine:2250]  Intake/Output this shift:  I/O this shift:  In: 495.5 [I.V.:295.5; NG/GT:200]  Out: 900 [Urine:900]    ABG:  Recent Labs     12/15/20  0347   PHART 7.43   OXYHB 92.0*   BEARTCALC -0.8   POCPEEP 8   TEMP 37.0   POCRATE 12     GEN: Lying in bed, resting comfortably, no acute distress.   NEURO: sedated but following simple commands, POLLOCK, no focal deficit  HEENT: Normocephalic, atraumatic, neck supple without lymphadenopathy  PULM: coarse bilaterally without wheezes, rubs  CV/COR: distant heart tones, RRR  no murmur  GI: obese, round, soft, nontender,  no guarding, hypoactive BS   :  Funk, urine yellow and clear  EXT:  peripheral pulses 2 +, trace edema noted  SKIN: no obvious rash, warm and dry    Total Critical Care Time : 40 Minutes    Nunu Umanzor, APRN, CNP

## 2021-06-13 NOTE — PROGRESS NOTES
1315 -- Patient moved from bed to chair with RT assistance. Tolerated well. Patient remains on heated humidified high flow nasal cannula at 50 LPM 70%. Will continue to attempt to wean as tolerated.

## 2021-06-13 NOTE — ANESTHESIA PROCEDURE NOTES
Emergent Intubation    Date/Time: 12/10/2020 4:52 PM    CRNA: Dennise Diop CRNA  Indications: respiratory distress  Medication administration time:12/10/2020 4:52 PMRoute: oral  Technique: direct  Laryngoscope size: Glidescope 4.  Tube size: 8.5 mm  Tube type: cuffed  Cuff inflated: yes  Level of Difficulty: 0ETT to lip: 24 cm  Tube secured with: ETT arellano  ETCO2 = Yes  Breath sounds: equal

## 2021-06-13 NOTE — PROGRESS NOTES
[facility]    ICU PROGRESS NOTE:  DOS:  12/19/2020    Patient Summary:   Felipe Cruz is a 74 year old male with a PMH of CAD, HTN, HLD, DM2, and BPH who was admitted to [facility] on 12/6/2020.  He was admitted to the general care floor. He had increased oxygenation needs, started on HFNC, and transferred to the ICU on 12/8. He was intubated for worsening hypoxia on 12/10. He self extubated on 12/10, required reintubation, and again self-extubated on 12/14. He was reintubated 12/16 for worsening hypoxia    Major Changes for Today    Diagnostic bronchoscopy  Obtain fungitell, fungal culture  Repeat blood culture  UA with reflex  Start Vancomycin, Meropenem and Fluconazole  Continue UF  Half Veletri  Supinate  Follow ABG    Assessment/Plan:    NEUROLOGIC:  # acute pain  # agitation/anxiety  # delirium  # encephalopathy   - Did not tolerate Precedex and Propofol previously.    - Fentanyl and versed drips with PRN boluses available  - Paralyzed with Cisatracurium   - delirium: scheduled seroquel 50 two times a day.    -RASS goal -5 while paralyzed.       HEMODYNAMICS/CV:  # Shock, septic  # CAD, HTN, HLD  - Continue norepinephrine for MAP > 65  - 12/16: Echo: EF > 63%, Normal RV And LV fn, normal valves. No Tamponade   - Lactate flat     RESPIRATORY:  # Acute hypoxic respiratory failure   # ARDS 2nd to COVID19  # Klebsiella PNA   - intubated 12/10, self-extubated 12/14, reintubated 12/16  - Decrease Veletri to half strength  - VC 24-->28/500-->480/14/70  - Ventilator bundle     RENAL:  # NICKY   # Metabolic acidosis  - Started on CRRT 12/18  - Continue UF goal -100  - Electrolytes balanced.      ID:  # COVID 19   # CAP with COVID19  # HAP with klebsiella pneumonia   - WBC:   16K  - New hypothermia, 93 degreses  - Obtain new cultures blood, urine, BAL.   - Send fungitell, galactommanan, lactate  - ABX: started zosyn 12/16, changed to Ceftriaxone 12/17. Will rebroaden coverage for now.  "Resume Vancomycin. Start Meropenem and Fluconazole.   - cultures:               - 12/6 blood: staph epi, contaminant                - 12/11 blood cultures: NGTD                - 12/11 urine: NGTD               - 12/12 sputum: Klebsiella pneumoniae               - 12/12  Blood cultures- NGTD               - PCT L 0.12 on 12/14, procal 12/16 -->0.27  - completed remdesivir course   - decadron 6 mg IV X 10 days        HEME:  # anemia of critical illness  # coagulopathy due to COVID   - Hemoglobin stable 11  - Continue with Heparin gtt at low intensity given NICKY and monitoring labs      GI:  # severe protein calorie malnutrition  # Constipation, now resolved.   - RD following. RD managing TF's.   - protein BID  - bowel regimen: miralax, senna, docusate sodium daily     ENDOCRINE:  # DM   #Stress and steroid induced hyperglycemia  - PTA Lantus and oral agents   - goal glucose < 180 for optimal healing  - HgbA1C on admit 8.2  - continue with insulin infusion    MSK:  # acute critical care weakness/deconditioning   - OT/PT consulted   - hold OOB activity while proning      PROPHYLAXIS:   DVT proph:Yes. Heparin gtt  GI proph: Yes.  Requires solorzano for strict I&O: Yes  Restraints required for safety: Yes     DISPOSITION:  - ICU    LINES/TUBES/DRAINS:  - R PICC, placed 12/9  - L art line, placed 12/10  - R fem dialysis line 12/19     Total Critical Care Time : 55 Minutes exclusive of procedures     Mary De La Mater    Overnight events:   Physical Exam:  Vent settings for last 24 hours:  Vent Mode: AC  FiO2 (%):  [65 %-70 %] 65 %  S RR:  [24-28] 28  S VT:  [480 mL-500 mL] 480 mL  PEEP/CPAP (cm H2O):  [14 cm H2O] 14 cm H2O  Minute Ventilation (L/min):  [11.4 L/min-12.1 L/min] 12.1 L/min  PIP:  [29 cm H2O] 29 cm H2O  MAP (cm H2O):  [19-20] 20    /54   Pulse 100   Temp 98.3  F (36.8  C) (Axillary)   Resp 28   Ht 6' 2\" (1.88 m)   Wt (!) 234 lb 15 oz (106.6 kg)   SpO2 95%   BMI 30.16 kg/m      Intake/Output last 3 " shifts:  I/O last 3 completed shifts:  In: 2151.8 [I.V.:798.8; NG/GT:1253; IV Piggyback:100]  Out: 3726 [Urine:985; Other:2641; Stool:100]  Intake/Output this shift:  I/O this shift:  In: 832.1 [I.V.:267.3; NG/GT:475; IV Piggyback:89.8]  Out: 1004 [Other:1004]    Physical Exam  Neuro: chemically sedated, paralyzed.  HEENT:  PERRLA. Pupils 3. ETT present and secured. NJ present and TF's infusing   RESP: BBS, lungs coarse, no wheezes.   CV: S1, S2  GI: abdomen soft and compressible. No masses  : no urine seen  Extreities  no swelling, pulses 2+ Cap refill brisk.     LAB:  Results from last 7 days   Lab Units 12/19/20  0458   LN-WHITE BLOOD CELL COUNT thou/uL 16.6*   LN-HEMOGLOBIN g/dL 11.3*   LN-HEMATOCRIT % 36.4*   LN-PLATELET COUNT thou/uL 310     Results from last 7 days   Lab Units 12/19/20  0758 12/19/20  0430 12/19/20  0000 12/18/20  0519 12/18/20  0519 12/17/20  1951 12/16/20  2114 12/16/20 2114   LN-SODIUM mmol/L 140 141 144   < > 146* 147*   < > 147*   LN-POTASSIUM mmol/L 4.4 4.3 4.4   < > 4.3 3.6   < > 4.9   LN-CHLORIDE mmol/L 107 107 110*   < > 115* 116*   < > 113*   LN-CO2 mmol/L 19* 20* 19*   < > 20* 20*   < > 17*   LN-BLOOD UREA NITROGEN mg/dL  --  57*  --   --  81* 76*   < > 61*   LN-CREATININE mg/dL  --  3.41*  --   --  4.49* 3.82*   < > 2.29*   LN-CALCIUM mg/dL 8.3* 8.4* 8.3*   < > 8.1* 8.1*   < > 8.6   LN-PROTEIN TOTAL g/dL 6.7  --   --   --   --   --   --  6.2   LN-BILIRUBIN TOTAL mg/dL 0.9  --   --   --   --   --   --  1.8*   LN-ALKALINE PHOSPHATASE U/L 61  --   --   --   --   --   --  67   LN-ALT (SGPT) U/L 62*  --   --   --   --   --   --  51*   LN-AST (SGOT) U/L 34  --   --   --   --   --   --  16    < > = values in this interval not displayed.

## 2021-06-13 NOTE — PROGRESS NOTES
12/19/20 1400   Patient Data   Vt Exp (mL) 517 mL   Minute Ventilation (L/min) 14 L/min   Total Resp Rate  28 BPM   PIP Observed (cm H2O) 28 cm H2O   MAP (cm H2O) 20   Auto/Intrinsic PEEP Observed (cm H2O) 0.7 cm H2O   Plateau Pressure (cm H2O) 24 cm H2O   Dynamic Compliance (L/cm H2O) 36.8 L/cm H2O   Airway Resistance 10   SpO2 97 %   Heart Rate (!) 110

## 2021-06-13 NOTE — SIGNIFICANT EVENT
Hospitalist cross cover note:  Increasing O2 needs overnight with increased work of breathing.   Now on 50L 70%, will transfer to ICU.   Yemi Marmolejo MD

## 2021-06-13 NOTE — PLAN OF CARE
Problem: Ineffective Airway Clearance  Goal: Maintain airway patency  Outcome: Progressing     Problem: Mechanical Ventilation  Goal: ET tube will be managed safely  Outcome: Progressing

## 2021-06-13 NOTE — PROGRESS NOTES
Brief ICU Note    Patient reevaluated following another transient desaturation episode after pronation, on 100% FiO2. Rate control improved with second Amiodarone bolus. Vasopressin was started and Norepinephrine requirements had decreased. A CXR was obtained revealing a small right apical pneumothorax. In the setting of high PPV and transient hypoxia, the decision was made to place a pigtail catheter. See separate procedure note.     A/P 74 y.o with progressive ARDS and worsening hypoxia, atrial fibrillation with RVR, septic shock 2/2 Klebsiella pneumonia on broad antimicrobial coverage with now worsening hypoxia and shock.     - Pigtail catheter to -20 cm suction  - SpO2 77-82%, plateau 32. Bolus with cisatracurium and resume gtt. BIS goal 40-60 with Fentanyl and Midazolam  - ABG 7.21/51/46/18  - Reprone  - Resume full strength veletri  - 1 amp of sodium bicarb now  - Continue CRRT, UF to keep even  - Bolused with Amiodarone 150 mg x 2 today. Continue amiodarone infusion.   - Escalating doses of Norepinephrine and Vasopressin. Start stress dose steroids  - Continue Meropenem, Vancomycin and Fluconazole.   - Wife updated. Discussed decompensation and multi organ system dysfunction. Discussed code status and recommended a change to DNR. Given the degree of his morbidity and the unlikely reversibility of his primary problem (ARDS), resuscitation attempts would likely be unsuccessful. She will think about this and telephone with a decision tonight.   Signed out to Dr. Dow ICU night staff    Additional critical care time 30 minutes exclusive of procedures

## 2021-06-13 NOTE — PROGRESS NOTES
Renal progress note-new to me, reviewed chart.  CC: ARF  Assessment and Plan:  74 y.o. male     1. ARF: pt has normal underlying renal function; now hemodynamic ARF/ATN and has progressive rise in CR; continues to have severe acidosis  And is anuric, now on CRRT which he's tolerating and acidosis better but requiring huge amounts of extra base.   2. Volume status up but hypotension limiting UF and no urine. Plan try UF as shock permits.   3. COVID 19 infection with resp failure; on vent with full support; on abx to cover any bacterial infection (klebsiella in sputum); on veletri  4. Hypernatremia; better on CRRT  5. Acidosis;resp and metabolica;on oral and IV supple bicarb and CRRT will correct  6. Anemia; no active loss; following hgb  7. HoTN; on pressors  8. Nutrition; on TF; renal formula  9. DVT prophylaxis; on heparin gtt  10. CAD, hyperlipid; stable co-morbitdities  11. DM; on insulin gtt    Will follow and updated his wife  Catrina Ly MD  [facility]  [phone]      Subjective  On vent, paralyzed and proned, on levo vaso and neosyn  No urine   PH 7.26   CRRT started 12/18 and no clotting overnoc, TMP creeping up now.   Bicarb drip added overnoc, and dialysate flow inc yesterday to 1500m//hr and he's getting enteral bicarb.  Already on pretty high pre and post filter infusion rates but I can increased further.         Objective    Vital signs in last 24 hours  Temp:  [95.7  F (35.4  C)-97.3  F (36.3  C)] 96.9  F (36.1  C)  Heart Rate:  [] 114  Resp:  [0-46] 32  BP: ()/(39-64) 128/50  Arterial Line BP: (0-160)/(0-65) 160/65  FiO2 (%):  [75 %-100 %] 75 %  Weight:   (!) 234 lb 5.6 oz (106.3 kg)    Intake/Output last 3 shifts  I/O last 3 completed shifts:  In: 5470.2 [I.V.:3860.2; NG/GT:1310; IV Piggyback:300]  Out: 4952 [Other:4852; Stool:50; Chest Tube:50]  Intake/Output this shift:  I/O this shift:  In: 2200.3 [I.V.:1760.3; NG/GT:190; IV Piggyback:250]  Out: 2283  [Other:2198; Chest Tube:85]    Physical Exam  D/w ICU MD and RN  Seen at bedside.  Intubated and proned  Lungs decreased  Cor Reg on tele  abd soft  Ext warm and mild edema  Skin no rash        Pertinent Labs   Lab Results   Component Value Date    WBC 28.6 (H) 12/21/2020    HGB 10.9 (L) 12/21/2020    HCT 35.5 (L) 12/21/2020    MCV 99 12/21/2020     12/21/2020     Lab Results   Component Value Date    CREATININE 1.96 (H) 12/21/2020    BUN 35 (H) 12/21/2020     12/21/2020    K 4.6 12/21/2020     12/21/2020    CO2 22 12/21/2020       Lab Results   Component Value Date    ALBUMIN 2.4 (L) 12/21/2020     Lab Results   Component Value Date    CALCIUM 8.3 (L) 12/21/2020    PHOS 6.2 (H) 12/21/2020     I reviewed all lab results  Catrina Ly

## 2021-06-13 NOTE — PROGRESS NOTES
Care Management Follow Up Note    Length of Stay (days) 15     Patient plan of care discussed at Interdisciplinary Rounds: yes                Expected Discharge Date: (TBD Intubated)  Concerns to be Addressed / Barriers to Discharge:  Intub    Anticipated Discharge Disposition:    Anticipated Discharge Services:      Selected Continued Care - Admitted Since 12/6/2020    No services have been selected for the patient.        Anticipated Discharge DME:      Plan:  Chart review.  Called wife, asked if she had any questions or concerns.  She said no.  She said she spoke with the unit about a hour ago.  CM to continue to follow.    MARCI Arias [facility] 12/21/2020 12:54 PM

## 2021-06-13 NOTE — PROGRESS NOTES
[facility]    ICU PROGRESS NOTE:  DOS:  12/17/2020    Patient Summary:   Felipe Cruz is a 74 year old male with a PMH of CAD, HTN, HLD, DM2, and BPH who was admitted to [facility] on 12/6/2020.  He was admitted to the general care floor. He had increased oxygenation needs, started on HFNC, and transferred to the ICU on 12/8. He was intubated for worsening hypoxia on 12/10. He self extubated on 12/10, required reintubation, and again self-extubated on 12/14. He was reintubated 12/16 for worsening hypoxia on despite bipap use.     Major Changes for Today    Stop zosyn today, plan for  Additional 6 days of ceftiaxone   Restart TF'   Wean Fio2   Stop PPI   Stop vecuronium infusion and start cisatriacurim drip  given NICKY    Insulin gtt, stop Lantus and s/s insulin given persistent glucoses >200's.   Heparin gtt to start at 10 am (stop Lovenox)   Stop D5 @ 10   Renal consult for NICKY   Supine this am, likely to reprone later today     Addendum: 12:03. I called pt's wife Yuridia.  I provided Yuridia with clinical update on his condition.  Questions answered.     Assessment/Plan:   HEMODYNAMICS/CV:  # Shock, distributive infection vs. vasoplegia secondary to sedation   # CAD, HTN, HLD  # Tachycardia, new onset following intubation.   - discontinue scheduled antihypertensives   - 12/16: Echo: EF > 63%, Normal RV And LV fn, normal valves. No Tamponade   Pressors:   - Norepinephrine infusion   - Vasopression  - Goal to keep MAP > 65     NEUROLOGIC:  # acute pain  # agitation/anxiety  # delirium  # encephalopathy     pain/sedation infusions:  -   precedex without response  -  did not tolerate propofol due to hypotension.   - Fentanyl and versed drips with PRN boluses available  - delirium: scheduled seroquel 50 two times a day,  PRN Haldol available for agitation.    -RASS goal -4 while proning therapy     RESPIRATORY:  # Acute hypoxic respiratory failure   # ARDS 2nd to COVID19  # Klebsiella PNA   -  intubated 12/10, self-extubated 12/14, reintubated 12/16  - Full strength veletri    Vent Mode: AC  FiO2 (%):  [80 %-100 %] 80 %  S RR:  [24] 24  S VT:  [500 mL] 500 mL  PEEP/CPAP (cm H2O):  [14 cm H2O] 14 cm H2O  Minute Ventilation (L/min):  [11 L/min-14.9 L/min] 11.7 L/min  PIP:  [28 cm H2O-35 cm H2O] 28 cm H2O  MAP (cm H2O):  [19-20] 19   - Vent and HOB elevation.     RENAL:  # NICKY   # Metabolic acidosis  # Hypernatremia Na 146, free water deficit ~ 1Liter.   - Creat worsening. Creat 2.9 today  - Renal consult.   - stop D5 infusion-- previously going at 10cc/hr   - increase free water enterally. Free water increase to 60 every 4 hours       ID:  # COVID 19   # CAP with COVID19  # HAP with klebsiella pneumonia   - WBC:  20.9 (14.1K)   - Repeat cultures 12/16-->pending   - cultures:               - 12/6 blood: staph epi, contaminant                - 12/11 blood cultures: NGTD                - 12/11 urine: NGTD               - 12/12 sputum: Klebsiella pneumoniae               - 12/12  Blood cultures- NGTD               - PCT L 0.12 on 12/14, procal 12/16 -->0.27  - completed remdesivir course   - decadron 6 mg IV X 10 days   - ABX:  zosyn 12/16, change to Ceftriaxone plan for 8 days today.       HEME:  # anemia of critical illness  # coagulopathy due to COVID   # leukocytosis   - no concerns for bleeding  - change Lovenox to Heparin gtt at low intensity given NICKY.   - send Heparin gtt labs: PTT/Heparin 10A     GI:  # severe protein calorie malnutrition  # Constipation, now resolved.   - RD following. RD managing TF's.   - protein BID  - bowel regimen: miralax, senna, docusate sodium daily     ENDOCRINE:  # DM 2 with superimposed hyperglycemia   - PTA Lantus and oral agents   - goal glucose < 180 for optimal healing  - HgbA1C on admit 8.2  - stop sliding scale insulin and lantus, glucose uncontrolled with BG > 200's start insulin gtt.      MSK:  # acute critical care weakness/deconditioning   - OT/PT consulted   -  "hold OOB activity while proning      PROPHYLAXIS:   DVT proph:Yes.  GI proph: Yes.  Requires solorzano for strict I&O: Yes  Restraints required for safety: Yes     DISPOSITION:  - ICU  - decision maker: wifeYuridia  - family updated via phone by myself today      LINES/TUBES/DRAINS:  - R PICC, placed 12/9  - L art line, placed 12/10     Total Critical Care Time : 45 Minutes      Oscar Holley PA-C    Overnight events: Course reviewed. NICKY, worsening creatinine with declining OUP.  Proned and paralyzed last evening. This am, chemically sedated     Objective:  Physical Exam:  Vent settings for last 24 hours:  Vent Mode: AC  FiO2 (%):  [80 %-100 %] 80 %  S RR:  [24] 24  S VT:  [500 mL] 500 mL  PEEP/CPAP (cm H2O):  [14 cm H2O] 14 cm H2O  Minute Ventilation (L/min):  [11 L/min-14.9 L/min] 11.7 L/min  PIP:  [28 cm H2O-35 cm H2O] 28 cm H2O  MAP (cm H2O):  [19-20] 19    /64 (Patient Position: Semi-mueller)   Pulse 84   Temp 97.9  F (36.6  C) (Axillary)   Resp 24   Ht 6' 2\" (1.88 m)   Wt (!) 239 lb 10.2 oz (108.7 kg)   SpO2 100%   BMI 30.77 kg/m      Intake/Output last 3 shifts:  I/O last 3 completed shifts:  In: 2038 [I.V.:1688; NG/GT:300; IV Piggyback:50]  Out: 710 [Urine:710]  Intake/Output this shift:  I/O this shift:  In: 90 [NG/GT:90]  Out: 100 [Urine:100]    Physical Exam  Neuro: chemically sedated.   HEENT:  PERRLA. Pupils 4mm. ETT present and secured. FT present, feeds infusing   RESP: BBS, lungs coarse, no wheezes   CV: S1, S2  GI: abdomen soft and compressible. No masses  : voiding per solorzano, urine clear and darker yellow   Skin:Ecchymosis on left flank, from prior subcutaneous Lovenox injections.   MSK: Extremities: calves soft and compressible, pulses 2+.    LAB:  Results from last 7 days   Lab Units 12/17/20  0930   LN-WHITE BLOOD CELL COUNT thou/uL 18.9*   LN-HEMOGLOBIN g/dL 11.0*   LN-HEMATOCRIT % 35.3*   LN-PLATELET COUNT thou/uL 336     Results from last 7 days   Lab Units 12/17/20  0451 " 12/16/20  2114 12/16/20  0421 12/12/20  0559 12/12/20  0559   LN-SODIUM mmol/L 146* 147* 147*   < > 137   LN-POTASSIUM mmol/L 4.6 4.9 3.6   < > 4.1   LN-CHLORIDE mmol/L 116* 113* 115*   < > 106   LN-CO2 mmol/L 19* 17* 22   < > 24   LN-BLOOD UREA NITROGEN mg/dL 68* 61* 46*   < > 41*   LN-CREATININE mg/dL 2.90* 2.29* 0.91   < > 1.01   LN-CALCIUM mg/dL 7.9* 8.6 8.7   < > 8.2*   LN-PROTEIN TOTAL g/dL  --  6.2  --   --  6.2   LN-BILIRUBIN TOTAL mg/dL  --  1.8*  --   --  0.5   LN-ALKALINE PHOSPHATASE U/L  --  67  --   --  47   LN-ALT (SGPT) U/L  --  51*  --   --  32   LN-AST (SGOT) U/L  --  16  --   --  14    < > = values in this interval not displayed.

## 2021-06-13 NOTE — PROGRESS NOTES
Patient arrived and was walked to the rest room before RT could assess. Patient dipped to mid to low 80's. Advised to not to have patient do that again. Patient currently on 50L /80% HFNC sats 94%. Patient breath sounds clear/diminished on the back and slightly coarse in the front. Will continue to monitor.

## 2021-06-13 NOTE — PROGRESS NOTES
12/14/20 0240   Vent Settings   FiO2 (%) 45 %   Heater Temperature 99  F (37.2  C)   Resp Rate (Set) 24   Insp Time (sec) 0.9 sec   Vt (Set, mL) 500 mL   PEEP/CPAP (cm H2O) 8 cm H2O   Trigger Sensitivity Flow (L/min) 2 L/min   I:E Ratio 1:1.8   Humidification Heater   Vent Calibration Completed  +   Patient Data   Vt Exp (mL) 487 mL   Minute Ventilation (L/min) 15.4 L/min   Total Resp Rate  28 BPM   PIP Observed (cm H2O) 32 cm H2O   MAP (cm H2O) 16   Plateau Pressure (cm H2O) 29 cm H2O   SpO2 93 %

## 2021-06-13 NOTE — PLAN OF CARE
Neuro: Patient currently sedated on fentanyl and precedex. See flowsheet for titrations, titrated for RASS -1/2. Pupils equal and reactive, CPOT 0. Able to move extremities purposefully. Plan to wean as tolerating. Patient becomes very agitated when stimulated.     Resp: 8.0 ET tube, 24 at the teeth. PEEP 8, , Fi02 continues at 45%, weaning as tolerating.  See blood gas for results. Suction and mouth care q 2 hourly, small thin secretions noted. Scant tan secretions from ET tube.     CV: SR with 1st degree AVB 60s, Extremities warm and well perfused, palpable pulses throughout. Patient hypertensive through most of shift, despite multiple scheduled and PRN medications. Multiple conversation with attending MD. See orders for details of medications changes and home meds restarted.      GI: Enteroflex remains in right nare, bridle for securement. Tube feeds currently running at 80cc, which is goal. 60cc free water q 4 hourly. BS active in all quadrants.     : Funk patent with judith urine. UO adequate. Funk care provided. Lasix x1 20mg and Diamox for diuresis.     Endo:  BS elevated >350. Insulin gtt initiated. See MAR for details of titrations.      ID: Afebrile.     Skin: Turn and reposition q 2 hourly.       Problem: Pain  Goal: Patient's pain/discomfort is manageable  Outcome: Progressing     Problem: Safety  Goal: Patient will be injury free during hospitalization  Outcome: Progressing     Problem: Daily Care  Goal: Daily care needs are met  Outcome: Progressing     Problem: Psychosocial Needs  Goal: Demonstrates ability to cope with hospitalization/illness  Outcome: Progressing  Goal: Collaborate with patient/family/caregiver to identify patient specific goals for this hospitalization  Outcome: Progressing     Problem: Potential for Compromised Skin Integrity  Goal: Skin integrity is maintained or improved  Outcome: Progressing  Goal: Nutritional status is improving  Outcome: Progressing     Problem:  Urinary Incontinence  Goal: Perineal skin integrity is maintained or improved  Outcome: Progressing     Problem: Inadequate Gas Exchange  Goal: Patient will achieve/maintain normal respiratory rate/effort  Outcome: Progressing     Problem: Ineffective Airway Clearance  Goal: Maintain airway patency  Outcome: Progressing     Problem: Impaired Gas Exchange  Goal: Demonstrate improved ventilation and adequate oxygenation of tissues as evidenced by absence of respiratory distress  Outcome: Progressing     Problem: Breathing  Goal: Patient will maintain patent airway  Outcome: Progressing  Goal: Patient will utilize incentive spirometer  Outcome: Progressing     Problem: Risk for Infection  Goal: Identify and demonstrate techniques, lifestyle changes to prevent/reduce risk of infection and promote safe environment  Outcome: Progressing     Problem: Potential for Falls  Goal: Patient will remain free of falls  Outcome: Progressing     Problem: Risk of Injury Due to Unsafe Behavior  Goal: Patient will remain safe while in restraint; physical/psychological needs will be met  Outcome: Progressing  Goal: Alternatives to restraint will be continually assessed with use of least restrictive device and discontinuation as soon as possible  Outcome: Progressing  Goal: Patient/Family will be able to communicate reason for restraint and steps for restraint application and removal  Outcome: Progressing     Problem: Cognitive Impairment or Disorientation  Goal: Patient will maintain or return to normal baseline cognitive function  Outcome: Progressing     Problem: Glucose Imbalance  Goal: Achieve optimal glucose control  Outcome: Not Progressing     Problem: Discharge Barriers  Goal: Patient's discharge needs are met  Outcome: Not Applicable this Shift     Problem: Knowledge Deficit  Goal: Patient/family/caregiver demonstrates understanding of disease process, treatment plan, medications, and discharge instructions  Outcome: Not  Applicable this Shift     Problem: Knowlegde Deficit  Goal: Verbalize understanding of condition/disease process and treatment, participate in lifestyle changes and treatment regimen  Outcome: Not Applicable this Shift

## 2021-06-13 NOTE — PROGRESS NOTES
Patient's latest ABG 7.15 - 57 - 90. Pressor needs have increased from earlier in the night. Will give 2 amps HCO3, start a HCO3 gtt  (150 mEQ in D5 at 150 ml/hr), and start HCO3 tabs two times a day. He continues on CRRT with goal UF for I = Os. Phenylephrine gtt ordered prn to maintain MAPs >/= 65.  Recent ECHO reviewed and wnl LVEF.     Sputum with GNR. He continues on meropenem / vancomycin.     Abbey Dow MD  Pulmonary and Critical Care Medicine  Pager: [phone]      Repeat ABG without improvement and increased pCO2. Have increased Vt to 520 (ideal body weight 82 kg) for 6.3 ml/kg Vt and goal to maintain Pplat </= 30.     Abbey Dow MD  Pulmonary and Critical Care Medicine  Pager: [phone]

## 2021-06-13 NOTE — PROGRESS NOTES
12/12/20 2045   Patient Data   Vt Exp (mL) 553 mL   Minute Ventilation (L/min) 13.4 L/min   Total Resp Rate  25 BPM   PIP Observed (cm H2O) 25 cm H2O   MAP (cm H2O) 15   Plateau Pressure (cm H2O) 21 cm H2O   SpO2 95 %   Heart Rate 74   PEEP 10  40%

## 2021-06-13 NOTE — PROGRESS NOTES
Critical Care Progress Note      12/10/2020    Name: Felipe Cruz MRN#: [id number]   Age: 74 y.o. YOB: 1945     Hsptl Day# 4  ICU DAY # 2    MV DAY # 0             Problem List:   Principal Problem:    Pneumonia due to 2019 novel coronavirus  Active Problems:    Acute respiratory failure with hypoxia (H)    Acute kidney injury (H)           Summary/Hospital Course:     Unable to obtain history directly from patient due to intubated/ventilated status.    Felipe Cruz is a 74 y.o. male with a PMH significant for hypertension, diabetes mellitus, BPH, arthritis and chronic pain syndrome who presented to [facility] with shortness of breath and hypoxia on 12/6. He was admitted to the general medicine floor on supplemental oxygen via oxymask, which progressed to HFNC on 12/8, prompting his transfer to the ICU later that evening. Now requiring HFNC and Bipap.           Assessment and plan :     Felipe Cruz IS a 74 y.o. male admitted on 12/6/2020 for COVID19 infection.   I have personally reviewed the daily labs, imaging studies, cultures and discussed the case with referring physician and consulting physicians.   My assessment and plan by system for this patient is as follows:    NEURO:  # acute pain, myalgias   # hx of chronic pain from OA (joints and low back)   # Hx of anxiety  - PRN dilaudid for pain  - lorazepam 0.5mg X 1 to assist with anxiety   - PTA medications include Norco for osteoarthritis      CVS:  #  Hx of hypertension   # Episode of SVT overnight. Self limiting, rate to 200's. Now in NSR   - Continue home dose of atenolol, amlodipine, cardura- may need to hold if patient unable to take PO safely.   - PRN hydralazine, labetalol  for SBP > 160  PTA medications: lisinopril/hydrochlorothiazide, amlodipine 10 mg daily, and atenolol 100 mg daily    RESP:  # Acute Respiratory Distress Syndrome (ARDS) 2/2 COVID  # Acute hypoxic respiratory failure    # Viral pneumonia due to COVID 19   # History  of childhood asthma   - cont with HFNC and alternating with BIPAP as he tolerates. Did not tolerate transition to HFNC this am. Will reattempt later today.    - BiPAP while asleep   -PRN albuterol available for dyspnea, wheezing   - CXR shows worsening bilateral infiltrates      GI:  #  Concern for Protein Calorie Malnutrition   - previously tolerated diabetic diet on floor.  Will make NPO given worsening respiratory status and intolerance of HFNC this am      RENAL :  #  Concern for volume overload   #hyponatremia, resolved. Na 137 today  # BPH  # Acute kidney injury, resolved   # hypomagnesia   - lasix today with goal of net negative 0.5-1 L   - monitor I/O's closely  - BMP daily   - continue with BPH medications   - Avoid nephrotoxic medications as able   - Electrolytes replaced per protocol      ID:  #  COVID  19  - Dx on 11/30, confirmed on 12/2.   - CXR 12/6:  Consistent with COVID pneumonia infection. Repeat CXR this am.   - Empiric abx stopped 12/7/20.  12/6/20: Procal 0.25. WBC normal.             -12/6: 1/2 Blood cultures coag (-) staph  - Repeat blood cultures sent 12/9 no growth  - Cont with Decadron for total of 10 days.   - Completed Remdesivir  5 days      HEMATOLOGIC:  # Coagulopathy due to COVID-19  #Anemia in setting of critical illness Hgb stable  - Lovenox 50 two times a day for category B prophylaxis   - Daily CBC      ENDOCRINE:  # Diabetes mellitus   - Discontinue metformin in setting of ICU   - Lantus 10units q am, Lantus 15 units q HS- Hold PM dose given pt NPO.   - Continue s/s insulin correction scale.   - goal to keep BG <180   - PTA medications: metformin 1000 mg twice daily, glipizide 10 mg daily, and glargine 35 units subcu nightly.  - hgb A1C on admit 8.2    MSK:  # hx of OA  # hx of low back pain with Right sided sciatica and joint pains   # Weakness/Deconditioning   - prn medications   - frequent turns/repositioning for comfort   - PT/OT consults     ICU Prophylaxis:   1. DVT:  Lovenox Sub-q SCDs  2. VAP: HOB 30 degrees, chlorhexidine rinse  3. Stress Ulcer: not indicated   4. Restraints: N/A  5. Wound care - per unit routine   6. Feeding - NPO given intolerance off bipap   7. Family Update:spoke with spouse Yuridia per phone   8. Disposition - ICU    Interval Events:  Discussed code status with patient who confirms his desire for mechanical intubation if unable to meet his oxygen requirements with bipap. This am, pt not tolerating off bipap despite HFNC at 90% fi02.     Lines:  PIV X 1  PICC placed 12/9- not drawing, will give TPA intraluminally       Plan of care discussed at rounds with interdisciplinary team and Dr. Benavidez.        Physical Examination:   Temp:  [97.9  F (36.6  C)-98.4  F (36.9  C)] 98.1  F (36.7  C)  Heart Rate:  [] 59  Resp:  [20-43] 25  BP: (105-162)/(53-85) 162/75  FiO2 (%):  [60 %-90 %] 60 %    Intake/Output Summary (Last 24 hours) at 12/10/2020 1013  Last data filed at 12/10/2020 0600  Gross per 24 hour   Intake --   Output 1020 ml   Net -1020 ml     Wt Readings from Last 4 Encounters:   12/10/20 218 lb 14.7 oz (99.3 kg)        FiO2 (%):  [60 %-90 %] 60 %    GEN: Sitting upright in bed, NAD   HEENT: head ncat, sclera anicteric, neck supple, without lymphadenopathy  PULM: unlabored, clear anteriorly, diminished in bases, no wheezes, no crackles   CV/COR: RRR S1S2 no gallop, No rub, no murmur  ABD: soft nontender, hypoactive bowel sounds, no mass  EXT:  warm x4, brisk cap refill   NEURO: CN II-XII grossly intact   SKIN: no obvious rash  LINES: clean, dry intact         Data:     Labs personally reviewed/interpreted: see a/p.          Billing: This patient is critically ill: Yes. Total critical care time today 35 min, excluding procedures.     Histories:    Current Medications              ascorbic acid (vitamin C) tablet 1,000 mg (VITAMIN C)  DAILY          cholecalciferol (vitamin D3) 1,000 unit (25 mcg) tablet 1,000 Units  DAILY          zinc gluconate tablet  50 mg  DAILY          insulin glargine injection 10 Units (LANTUS)  Every morning          polyethylene glycol packet 17 g (MIRALAX)  DAILY          furosemide injection 20 mg (LASIX)  Once          codeine-guaiFENesin  mg/5 mL liquid 5 mL (GUAIFENESIN AC)  Every 4 hours PRN          sodium chloride flush 10-30 mL (NS)  Every 8 hours scheduled          insulin glargine injection 15 Units (LANTUS)  Bedtime          bacitracin ointment packet 1 packet  Once as needed     Question:  Apply to:  Answer:  PICC insertion site        sodium chloride flush 10-20 mL (NS)  As needed          sodium chloride flush 20 mL (NS)  As needed          sodium chloride flush 10-30 mL (NS)  As needed          sodium chloride bacteriostatic 0.9 % injection 1-5 mL  Once as needed          hydrALAZINE injection 10 mg (APRESOLINE)  Every 6 hours PRN          insulin aspart U-100 injection pen (NovoLOG)  Bedtime     Question Answer Comment   Scale? Standard    BG < 70 mg/dL Treat, and call MD    BG  mg/dL None - 0 Units    -180 mg/dL None - 0 units    -220 mg/dL 2 units    -260 mg/dL 3 units    -300 mg/dL 4 units    -340 mg/dL 5 units    -400 mg/dL 6 units    BG > 400 mg/dL 7 units and call MD            enoxaparin ANTICOAGULANT syringe 50 mg (LOVENOX)  2 times daily          HYDROcodone-acetaminophen  mg per tablet 1-2 tablet (NORCO )  4 times daily PRN          benzonatate capsule 100 mg (TESSALON)  3 times daily          insulin aspart U-100 injection pen (NovoLOG)  3 times daily with meals     Question Answer Comment   Scale? Standard    BG < 70 mg/dL Treat, and call MD    BG  mg/dL None - 0 Units    -180 mg/dL 2 units    -220 mg/dL 4 units    -260 mg/dL 6 units    -300 mg/dL 8 units    -340 mg/dL 10 units    -400 mg/dL 12 units    BG > 400 mg/dL 14 units and call MD            finasteride tablet 5 mg (PROSCAR)  DAILY          atenoloL  tablet 100 mg (TENORMIN)  DAILY          aspirin EC tablet 81 mg  DAILY          amLODIPine tablet 10 mg (NORVASC)  DAILY          dexAMETHasone tablet 6 mg (DECADRON)  DAILY          insulin aspart U-100 injection pen (NovoLOG)  3 times daily with meals     Question Answer Comment   Dosing Type? Dosing per Carbohydrate    Breakfast: 1 unit per 15 grams    Lunch: 1 unit per 15 grams    Supper: 1 unit per 15 grams            doxazosin tablet 8 mg (CARDURA)  Bedtime          naloxone injection 0.2 mg (NARCAN)  Every 2 min PRN          naloxone injection 0.2 mg (NARCAN)  Every 2 min PRN          naloxone injection 0.4 mg (NARCAN)  Every 2 min PRN          naloxone injection 0.4 mg (NARCAN)  Every 2 min PRN          dextrose 50 % (D50W) syringe 20-50 mL  Every 15 min PRN          glucagon (human recombinant) injection 1 mg  Every 15 min PRN          ondansetron injection 4 mg (ZOFRAN)  Every 4 hours PRN          ondansetron tablet 8 mg (ZOFRAN)  Every 8 hours PRN          magnesium hydroxide suspension 30 mL (MILK OF MAG)  Daily PRN          bisacodyL suppository 10 mg (DULCOLAX)  Daily PRN          albuterol inhaler 2 puff (PROAIR HFA;PROVENTIL HFA;VENTOLIN HFA)  Every 4 hours PRN                     ROS:  Denies headache, chest pain, nausea, diarrhea, complains of persistent cough

## 2021-06-13 NOTE — PROGRESS NOTES
Vent Mode: AC  FiO2 (%):  [40 %-45 %] 40 %  S RR:  [24] 24  S VT:  [500 mL] 500 mL  PEEP/CPAP (cm H2O):  [10 cm H2O-14 cm H2O] 10 cm H2O  Minute Ventilation (L/min):  [11.9 L/min-13.2 L/min] 12.2 L/min  PIP:  [26 cm H2O-27 cm H2O] 26 cm H2O  MAP (cm H2O):  [15-18] 15   PLAT: 22

## 2021-06-13 NOTE — PROGRESS NOTES
[facility] COVID RT PROGRESS NOTE     DATA:     VENT DAY# 9     CURRENT SETTINGS:  VENT SETTINGS   AC 30 480 +16              FIO2:  100%     PATIENT PARAMETERS:     PIP 28  Pplat:  24  Pmean: 20   SBT: NO  SECRETIONS: small pink tinged tan  02 SATS:  97%     ETT SIZE  8.5 @ 28     Securing device changed / tube re-taped date 12/20/2020     Respiratory Medications: NONE         ABG: Results for GISELLE VILLANUEVA (MRN [id number]) as of 12/20/2020 17:34   Ref. Range 12/20/2020 14:15   pH, Arterial Latest Ref Range: 7.37 - 7.44  7.28 (L)   pCO2, Arterial Latest Ref Range: 35 - 45 mm Hg 51 (H)   pO2, Arterial Latest Ref Range: 75 - 85 mm Hg 85   Bicarbonate, Arterial Calc Latest Ref Range: 23.0 - 29.0 mmol/L 22.1 (L)     NOTE / PLAN:       Pt still requiring full ventilatory support. Supinated at 1120. Veletri discontinued. Pt proned at 1700 w/o complications. Peep increased to 16 due to desaturation episode. Advanced ETT 2cm per order, now 28 @ the teeth.

## 2021-06-13 NOTE — CONSULTS
Care Management Initial Consult    General Information:  Patient's communication limitations: none  Level of Orientation: A & O x 3     Advance Care Planning: Patient has advance directive, copy not in chart No No    Living Environment:   People in home/Living arrangements: Spouse/significant other  Current residence:  Apartment      Family/Social Support:  Care provided by/ Primary Caregiver: Self nobody  Provides care for someone: No  Description of Support System: Spouse/significant other     Lifestyle & Psychosocial Needs:        Socioeconomic History     Marital status:      Spouse name: Not on file     Number of children: Not on file     Years of education: Not on file     Highest education level: Not on file          Functional Status:  Prior to admission ADL limits: No      Current Resources:   Skilled Home Care Services:  No  Community Resources: None  Equipment currently used at home: none  Supplies currently used at home: None    Employment:  Employment Status: RetiredUnknown No    Financial/Environmental Concerns/Barriers to Discharge: Medical      Values/Beliefs:  Spiritual, Cultural Beliefs, [employer] Practices, Values that affect care:    Taoist           Additional Information:  Discharge goal is home pending response to treatment, medical needs and mobility closer to discharge.       Ana Duncan RN

## 2021-06-13 NOTE — CONSULTS
"Clinical Nutrition Therapy follow Up Note    Reason for Assessment:   Felipe Cruz is a 74 y.o. male assessed by the registered Dietitian for consult, nutrition support and MD referral to initiate and manage TF.   Pt intubated last night.  ICU    Current Nutrition Prescription:   Diet: NPO  IV dextrose or Fluids:     fentaNYL 2500 mcg/50 mL (50 mcg/mL), Last Rate: 200 mcg/hr (12/11/20 0800)       midazolam, Last Rate: 9 mg/hr (12/11/20 1107)       norepinephrine, Last Rate: Stopped (12/11/20 1107)       propofol, Last Rate: 5 mcg/kg/min (12/11/20 1107)         Current Nutrition Intake:  Pt NPO yesterday d/t bipap use. Was eating % prior.   Nasal Duodenal FT was placed this am, xray verified    Anthropometrics:  Height: 6' 2\" (188 cm)  Admission weight: 255 lb-stated  Weight: (!) 255 lb 8 oz (115.9 kg) 12/11, down 1 lb from day prior  Bed zeroed at 256 lb 12/10  BMI: 32.7  BMI indication: 30-34.9 obesity (class 1)  Ideal body weight 190 lb  Weight History:  Wt Readings from Last 10 Encounters:   12/11/20 (!) 255 lb 8 oz (115.9 kg)     All weights from the last year are 252 lb, so likely stated. His wife said he has been steadily losing weight over the last three years. Some of it has been intentional and some has occurred due to decreased PO in older age. She thinks he has lost weight in the last 2-3 weeks but is not sure how much or what his UBW is. He did talk about having to tighten his belt PTA.    RD Nutrition Focused Physical Exam:  The patient has the following physical signs which could indicate malnutrition: Deferred due to social distancing policy    GI Status/Output:   BM yesterday  On 1 pressor    Skin/Wound:   Mode Scale Score: 14    Medications:  SSI, lantus daily, decadron, Zn-gluconate,  carb dosed insulin, vit C, vit D, miralax daily, pepcid/famotidine  Iv lasix yesterday. K+ replacement today      Labs:  Lab Results   Component Value Date/Time    ALBUMIN 3.0 (L) 12/06/2020 04:36 PM     " 12/11/2020 05:17 AM    K 4.5 12/11/2020 05:17 AM    BUN 35 (H) 12/11/2020 05:17 AM    CREATININE 1.10 12/11/2020 05:17 AM     (H) 12/11/2020 05:17 AM    PHOS 3.5 12/11/2020 05:17 AM    MG 2.0 12/11/2020 05:17 AM   -217 mg/dl past 24 hours. In fair control, improved      Estimated Nutrition Needs:-reassesed 12/11    Energy Needs: 1299-2827 kcals daily, 14-17kcal/kg current weight of 115.9 kg  Protein Needs: >172 g daily, >2 g/kg IBW 86.3 kg  Fluid Needs: 1 ml/kcal or per Md    Malnutrition: Patient does not meet 2 diagnostic criteria for malnutrition     Nutrition Risk Level: high risk    Nutrition dx:   Impaired swallow function r/t increased respiratory needs evidenced by NPO/intubation    Goal:   Regular bowels - progressing  Nutrition intake > 75% of estimated needs- progressing - initiate TF today  jp TF at goal. - new    Intervention:   Pt is at low risk for refeeding    Initiate Peptamen VHP 20 ml/hr and increase by 10 ml/hr q 4 hr as jp to goal 70 ml/hr + 1 no carb prosource and 30 ml H20 q 4 hours =1680 ml, 1740 kcal, 169 g protein, 6 g fiber, 127 g cho, 1591 ml H20 total.      Add mvi with minerals to help meet micronutrient needs r/t COVID    Monitoring/Evaluation:   TF jp, weight, labs, bowels, renal funx    Nutrition History:  Information from family/caregiver and chart.  Diet prior to admission: Regular. He was eating ~50% of usual intake for 1-2 days PTA and slightly less than normal for a few days before that. He typically eats foods like sandwiches, eggs and soup with crackers and drinks diet pop, Gatorade and water.  Recent food/fluid intake: SHAD-no PO recorded  COVID GI sx of occasional nausea and poor appetite    Electronically signed by:  Elli Hurley RD

## 2021-06-13 NOTE — PLAN OF CARE
Patient on Bipap through the night tolerated well. PRN meds for cough given as  ordered. Elevated Bp throughout shift treated per prn orders. See flowsheets and MAR for all events of the shift. Will continue to monitor and report to oncoming shift.    Problem: Pain  Goal: Patient's pain/discomfort is manageable  Outcome: Progressing     Problem: Safety  Goal: Patient will be injury free during hospitalization  Outcome: Progressing     Problem: Daily Care  Goal: Daily care needs are met  Outcome: Progressing     Problem: Psychosocial Needs  Goal: Demonstrates ability to cope with hospitalization/illness  Outcome: Progressing  Goal: Collaborate with patient/family/caregiver to identify patient specific goals for this hospitalization  Outcome: Progressing     Problem: Discharge Barriers  Goal: Patient's discharge needs are met  Outcome: Progressing     Problem: Glucose Imbalance  Goal: Achieve optimal glucose control  Outcome: Progressing     Problem: Knowledge Deficit  Goal: Patient/family/caregiver demonstrates understanding of disease process, treatment plan, medications, and discharge instructions  Outcome: Progressing     Problem: Potential for Compromised Skin Integrity  Goal: Skin integrity is maintained or improved  Outcome: Progressing  Goal: Nutritional status is improving  Outcome: Progressing     Problem: Urinary Incontinence  Goal: Perineal skin integrity is maintained or improved  Outcome: Progressing     Problem: Inadequate Gas Exchange  Goal: Patient will achieve/maintain normal respiratory rate/effort  Outcome: Progressing     Problem: Ineffective Airway Clearance  Goal: Maintain airway patency  Outcome: Progressing     Problem: Impaired Gas Exchange  Goal: Demonstrate improved ventilation and adequate oxygenation of tissues as evidenced by absence of respiratory distress  Outcome: Progressing     Problem: Breathing  Goal: Patient will maintain patent airway  Outcome: Progressing  Goal: Patient is able  to maintain stable respiratory status with long term  tracheostomy  Outcome: Progressing  Goal: Patient will utilize incentive spirometer  Outcome: Progressing     Problem: Knowlegde Deficit  Goal: Verbalize understanding of condition/disease process and treatment, participate in lifestyle changes and treatment regimen  Outcome: Progressing  Goal: Demonstrate technique for CPAP/BiPAP  Outcome: Progressing     Problem: Risk for Infection  Goal: Identify and demonstrate techniques, lifestyle changes to prevent/reduce risk of infection and promote safe environment  Outcome: Progressing     Problem: Potential for Falls  Goal: Patient will remain free of falls  Outcome: Progressing

## 2021-06-13 NOTE — PROGRESS NOTES
Pt tolerated BiPAP well overnight, looking much more comfortable than last evening. He remains on settings of 12/8 rate of 12, and FiO2 of 80%.

## 2021-06-13 NOTE — PROGRESS NOTES
Patient experienced low oxygen desaturations < 90 % on HFN 50 L FIO2 70 %. FIO2 increased to 80 % with corresponding SPO2 improved to 91 %. Pt has frequent dry non-productive cough. No other events and or changes noted.

## 2021-06-13 NOTE — PROGRESS NOTES
[facility] COVID RT PROGRESS NOTE    DATA:    VENT DAY#  2    CURRENT SETTINGS:  VENT SETTINGS   Vent Mode: AC  FiO2 (%):  [60 %-100 %] 70 %  S RR:  [20-22] 22  S VT:  [380 mL-500 mL] 500 mL  PEEP/CPAP (cm H2O):  [10 cm H2O-12 cm H2O] 12 cm H2O  Minute Ventilation (L/min):  [6.4 L/min-10.8 L/min] 10.8 L/min  PIP:  [22 cm H2O-26 cm H2O] 24 cm H2O  MAP (cm H2O):  [13-16] 16        FIO2:   70    PATIENT PARAMETERS:    PIP 24  Pplat:  21  Pmean:  16  SBT: N/A  SECRETIONS:  Small    02 SATS:  99    ETT SIZE 8.5  Secured at  24 cm at teeth    Securing device changed / tube re-taped date     Respiratory Medications: none      AB.37/4//78/25.9    NOTE / PLAN:   Pt has been stable tonight on mechanical ventilation, aside from earlier self-extubation.  Continue to monitor

## 2021-06-13 NOTE — PROGRESS NOTES
[facility] COVID RT PROGRESS NOTE     DATA:     VENT DAY# 10     CURRENT SETTINGS:  VENT SETTINGS   AC 30 480 +16              FIO2:  80%     PATIENT PARAMETERS:     PIP: 33  Pplat: 30   Pmean: 25   SBT: NO  SECRETIONS: large, thick, yellow, tan, green  02 SATS:  96%     ETT SIZE  8.5 @ 26, withdrawn 2 cm from 28 cm@ the teeth. Confirmed with CXR.     Securing device changed / tube re-taped date 12/20/2020     Respiratory Medications: Patient restarted on Veletri 12/20 @ 2016.  Syringe changed @ 0200    ABG:    Results for GISELLE VILLANUEVA (MRN [id number]) as of 12/21/2020 04:36   Ref. Range 12/21/2020 04:16   pH, Arterial Latest Ref Range: 7.37 - 7.44  7.15 (LL)   pCO2, Arterial Latest Ref Range: 35 - 45 mm Hg 57 (H)   pO2, Arterial Latest Ref Range: 75 - 85 mm Hg 90 (H)   Bicarbonate, Arterial Calc Latest Ref Range: 23.0 - 29.0 mmol/L 17.2 (L)   O2 Sat, Arterial Latest Ref Range: 95.0 - 96.0 % 96.1 (H)   Oxyhemoglobin Latest Ref Range: 95.0 - 96.0 % 94.9 (L)   POC Base Excess Calc Latest Units: mmol/L -8.8   Ventilation Mode Unknown AC   Peep Latest Units: cm H2O 16   Sample Stabilized Temperature Latest Units: degrees C 37.0   Rate Latest Units: rr/min 32   FIO2 Unknown 80.00          NOTE / PLAN:      Patient proned again @ around 2100. Q2-Q3H head turns done. Will continue current therapy and continue to monitor.

## 2021-06-13 NOTE — PROGRESS NOTES
CRITICAL CARE  PROGRESS NOTE:  Date of service: 2020    Felipe Cruz is a 74 year old male with a PMH of CAD, HTN, HLD, DM2, and BPH who was admitted to [facility] on 2020.  He was admitted to the general care floor.  He had increased oxygenation needs, started on HFNC, and transferred to the ICU on .  He was intubated for worsening hypoxia on 12/10.  He remains intubated and sedated.      MAIN CHANGES FOR TODAY:  - wean PEEP  - SAT/SBT  - pan culture    PLAN BY SYSTEMS:    HEMODYNAMICS/CV:  # CAD, HTN, HLD    - continue ASA 81 mg PO daily, metoprolol 12.5 mg PO two times a day  - labetalol PRN, hydralazine PRN for SBP > 160  - holding home amlodipine, atenolol, lisinopril  PLAN: no changes    NEUROLOGIC:  # acute pain  # agitation/anxiety  # delirium  # encephalopathy     - pain/sedation infusions: versed 8, fentanyl 200   - delirium: no current pharmalogical interventions ordered, delirium prevention, daily SAT  -  triglycerides: 136  PLAN: daily sedation vacation     RESPIRATORY:  # acute hypoxic respiratory failure   # ARDS 2nd to COVID19  # CAP    -  Vent Mode: AC  FiO2 (%):  [40 %-45 %] 40 %  S RR:  [24] 24  S VT:  [500 mL] 500 mL  PEEP/CPAP (cm H2O):  [12 cm H2O-14 cm H2O] 12 cm H2O  Minute Ventilation (L/min):  [11.9 L/min-13.2 L/min] 13.2 L/min  PIP:  [26 cm H2O-27 cm H2O] 27 cm H2O  MAP (cm H2O):  [17-18] 17    - intubated 12/10  - plan to decrease PEEP today, goal 10 by the end of the day  - SAT/SBT today   - AB.42/40/86/+  - PF: 191  - continue aggressive pulmonary hygiene  PLAN: wean PEEP, SBT     RENAL:  # NICKY, improved    - I/O: - 4.9 L sine admission; - 177 mL over the past 24 hours  - UOP: 1.2 L over the last 24 hours  - patient appears euvolemic, will hold on diuresis today   PLAN: maintain euvolemia, follow urine output, electrolytes, and renal function closely     ID:  # COVID 19   # CAP with COVID19  - Tmax: 97.9  - WBC: 20.7 (13 yesterday)  -  "cultures:   - 12/6 blood: staph epi, contaminant    - 12/11 blood cultures: pending   - 12/11 urine: NGTD   - 12/11 sputum: pending   - ABX: none  - completed remdesivir course   PLAN: continue to follow fever curve and WBC, follow up cultures results, hold on antibiotics for now, continue steroid course    HEME:  # anemia of critical illness  # coagulopathy   # leukocytosis     - Hgb: 12.3  - Plts: 347  - no concerns for bleeding  - continue enoxaparin 50 mg two times a day  PLAN: follow CBC, continue prophylaxis     GI:  # severe protein calorie malnutrition    - nutrition: tube feeds @ 70 (goal)  - protein BID  - bowel regimen: miralax daily    ENDOCRINE:  # hyperglycemia 2nd to critical illness  # DM 2    - moderate sliding scale insulin Q4H + lantus 25 units daily  - goal glucose < 180 for optimal healing  - holding home oral DM 2 mets  - HgbA1C on admit 8.2      MSK:  # acute critical care weakness/deconditioning     - mobilize    PROPHYLAXIS:   DVT proph:Yes.  GI proph: Yes.  Requires solorzano for strict I&O: Yes  Restraints required for safety: Yes    DISPOSITION:  - ICU  - decision maker: wife, Yuridia  - family updated via phone by myself today     LINES/TUBES/DRAINS:  - R PICC, placed 12/9  - L art line, placed 12/10    SUBJECTIVE/INTERVAL EVENTS:  - patient seen and examined, chart reviewed  - discussed patient on multidisciplinary rounds  - remains intubated and sedated  - no acute events noted overnight     OBJECTIVE:  /64   Pulse (!) 57   Temp 98.4  F (36.9  C) (Oral)   Resp 24   Ht 6' 2\" (1.88 m)   Wt (!) 250 lb 9 oz (113.7 kg)   SpO2 97%   BMI 32.17 kg/m      Intake/Output last 3 shifts:  I/O last 3 completed shifts:  In: 1142.2 [I.V.:492.2; NG/GT:650]  Out: 1320 [Urine:1320]  Intake/Output this shift:  I/O this shift:  In: 412 [I.V.:72; NG/GT:340]  Out: 350 [Urine:350]    ABG:  Recent Labs     12/12/20  0605   PHART 7.42   OXYHB 96.1*   BEARTCALC 1.6   POCPEEP 14   TEMP 37.0   POCRATE 24 "     GEN: no acute distress  NEURO: sedated  HEENT: ETT secure  PULM: diminished without rhonchi, crackles or wheeze  CV/COR: diminished RRR  no murmur  GI: soft, nontender,  no guarding  :  Funk, urine yellow and clear  EXT:  peripheral pulses 2 +, no peripheral edema noted  SKIN: no obvious rash, warm and dry    Total Critical Care Time : 45 Minutes    DANIEL Montenegro, CNP

## 2021-06-14 NOTE — PROGRESS NOTES
ICU PROGRESS NOTE:      Assessment/Plan:  Felipe Cruz is a 74 year old male with a PMH of CAD, HTN, HLD, DM2, and BPH who was admitted to [facility] on 12/6/2020.  He was admitted to the general care floor. He had increased oxygenation needs, started on HFNC, and transferred to the ICU on 12/8. He was intubated for worsening hypoxia on 12/10. He self extubated on 12/10, required reintubation, and again self-extubated on 12/14. He was reintubated 12/16 for worsening hypoxia. Course complicated by anuric NICKY requiring CRRT, profound shock requiring vasopressors, enterobacter PNA, and CT head on 12/23 showed right PCA infarct. Abx course ended 1/1/21. Vaso pressors trending down, however now receiving HD and continues to appear fluid up. Intermittent neurologic responses.     Changes today:  - Consult to palliative, pt did not respond to commands for me this AM, delayed response to painful stimuli, discussed with nursing who agreed mentation and responses are intermittent at best and frequently do not seem purposeful. Remains full code.  - HD today, dropped pressures markedly per nephrology, minimal UF  - Received 1 U RBC during HD for Hg of 7 in presence of rectal bleeding  - Long discussion with wife, she remains uneasy about speaking with paliative care. I did readdress code status with nephrology's concerns, possible GI bleed, and poor neurologic status. She would like to sleep on it and discuss possible code status changes tomorrow    Neuro  Acute pain  Agitiation/anxiety  Delirium  Encephalopathy  Right PCA infarct - CT head 12/23  - Neurology following, appreciated  - Ceribell with non specific slowing with superimposed beta activity, no sharp or rhythmic discharges  - Repeat CT head 12/27 - Evolving infarct in right PCA, no hemorrhagic conversion  - Did not tolerate Precedex and Propofol previously   - Fentanyl @ 50 mcg/hr - nursing working on weaning down today  - Scheduled oxycodone 10mg  q 4 hours to assist with fentanyl wean   - RAAS goal 0 to -1, following commands. Limit sedating medications as able  - Pt did not respond to commands for me this AM, delayed response to painful stimuli, discussed with nursing who agreed mentation and responses are intermittent at best and frequently do not seem purposeful    CV  Shock, septic  Tachycardia, resolved  Atrila fibrillation   - 12/16: Echo: EF > 63%, Normal RV And LV fn, normal valves. No Tamponade   - Continue norepinephrine for MAP >65, wean as tolerated. Vasopressin off 12/31  - Started midodrine 12/31  - Stress dose steroids restarted 12/20    Resp:  Acute hypoxic respiratory failure and ARDS 2/2 COVID19 and bacterial PNA   Right apical pneumothorax s/p pigtail insertion 12/20  Right tension pneumothorax on 12/23 with CT clamped for procedure - Resolved when placed back to suction  - Keep CT in place until no longer on PPV  - CT placed to w/s, + tidaling, small airleak noted. If decompensates place back to suction.   - Continue lung protective ventilation  - Ventilation    Vent Mode: AC  FiO2 (%):  [40 %] 40 %  S RR:  [28] 28  S VT:  [520 mL] 520 mL  PEEP/CPAP (cm H2O):  [10 cm H2O] 10 cm H2O  Minute Ventilation (L/min):  [11.8 L/min-13.6 L/min] 13.1 L/min  PIP:  [25 cm H2O-30 cm H2O] 30 cm H2O  MAP (cm H2O):  [14-18] 16  - Consider PST as able  - Remain supinated given protracted course and proning no longer likely beneficial     GI/Nutrition  Severe protein calorie malnutrition   Diarrhea - C diff negative 12/22  Cholelithiasis - discovered on CT 12/23, non obstructive  Elevated LFTs - likely r/t shock possibly a component of Amiodarone. Alk phos and bili with normal parameters. CT abdomen completed 12/23 with no obstruction  - RD following   - protein BID  - bowel regimen on hold. Remove rectal tube concerns of skin integrity.  -Concern for maroon colored stools several days ago. Likely related to rectal uler 2/2 to rectal tube. Heparin had been  held and rectal tube removed. Bleeding again noted this AM.     Renal  Anuric NICKY   Persistent metabolic acidosis  Bilateral 2 cm renal masses - found incidentally on CT  Bladder wall ? Stone 3mm in size - found incidentally on CT  - CRRT 12/18-12/28  - HD started 12/29 - TTHS schedule per renal   - Anuric at this time  - Bladder scan PRN and straight cath if PVR > 250     ID  COVID 19   Klebsiella VAP- treated  Enterobacter cloacae VAP- active.   Septic shock  Severe Hypothermia  - WBC 12.8 (  - Profoundly hypothermic 12/26, 92 degrees. Culture with NGTD   - ID following. Appreciate cares and recommendations's  - Plan for meropenum 14 days of antibiotics for enterobacter PNA - ended 1/1/21      MICRO:  12/23 Fungitell negative.   12/22 c.diff negative  12/22 sputum gram stain with prelim 1+ GPB  12/22 BC with NGTD  12/19 fungitell (pending)  galactomannan antigen (negative)  12/19 BC x2 with NGTD  12/19 Sputum with Enterobacter cloacae (sensitive to Meropenem)  12/19 A BAL with GS, AFB, KOH, fungal culture were ordered. Very minimal secretions on bronch 12/19.   12/12 sputum: Klebsiella pneumoniae  12/12  Blood cultures- NGTD  12/11 urine NGTD  12/11 BC with NGTD  12/6 blood staph epi, contaminant     Antimicrobials:  12/16 zosyn 12/16-12/17  Ceftriaxone 12/17-12/19 12/19- 1/2 Meropenem   12/19- 12/23 Fluconazole  12/23 Fluconazole broadened to Micafungin     Other Workup:  12/23: CT C/A/P  12/23 lipase normal  12/23 No e/o ischemic limbs     COVID:  Completed remdesivir course   Decadron 6 mg IV X 10 days, remains on stress dose steroids      Endocrine  DM II  Stress and steroid induced hyperglycemia  HgbA1C on admit 8.2  - Hold PTA Lantus and oral agents  - 50 units Lantus and resistant sliding scale insulin    Heme:  Anemia of critical illness  Coagulopathy due to COVID -19  - Hemoglobin 7 this AM, give 1 U RBC with HD   - Resume Heparin gtt, low intensity.      Lines/Drains/Tubes:  - Right internal jugular TLC  "12/22  - L art line, placed 12/10  - R fem dialysis line 12/19  - Right pigtail chest tube    Subjective:  Pt intubated, minimal in any response to verbal stimuli, not appropriately following commands.     Objective:  Physical Exam:  Vent settings for last 24 hours:  Vent Mode: AC  FiO2 (%):  [40 %] 40 %  S RR:  [28] 28  S VT:  [520 mL] 520 mL  PEEP/CPAP (cm H2O):  [10 cm H2O] 10 cm H2O  Minute Ventilation (L/min):  [11.8 L/min-13.6 L/min] 13.1 L/min  PIP:  [25 cm H2O-30 cm H2O] 30 cm H2O  MAP (cm H2O):  [14-18] 16    /69   Pulse 98   Temp 98.6  F (37  C) (Axillary)   Resp 28   Ht 6' 2\" (1.88 m)   Wt 210 lb 5.1 oz (95.4 kg)   SpO2 98%   BMI 27.00 kg/m      Intake/Output last 3 shifts:  I/O last 3 completed shifts:  In: 3283.7 [I.V.:1319.7; Blood:321; NG/GT:1543; IV Piggyback:100]  Out: 150 [Chest Tube:150]  Intake/Output this shift:  No intake/output data recorded.    Physical Exam      Lab:  Results from last 7 days   Lab Units 01/02/21  0607   LN-WHITE BLOOD CELL COUNT thou/uL 12.8*   LN-HEMOGLOBIN g/dL 7.0*   LN-HEMATOCRIT % 21.7*   LN-PLATELET COUNT thou/uL 272     Results from last 7 days   Lab Units 01/02/21  0607 01/01/21  0540 12/31/20  0335   LN-SODIUM mmol/L 136 136 136   LN-POTASSIUM mmol/L 3.8 3.3* 3.8   LN-CHLORIDE mmol/L 104 104 104   LN-CO2 mmol/L 17* 18* 17*   LN-BLOOD UREA NITROGEN mg/dL 145* 117* 124*   LN-CREATININE mg/dL 6.30* 5.00* 5.21*   LN-CALCIUM mg/dL 7.7* 7.7* 7.9*       Radiology:  Echo Complete    Result Date: 12/16/2020    Technically difficult study due to poor acoustic windows and patient positioning.   Left ventricular ejection fraction is normal. The calculated left ventricular ejection fraction is 63%.   Normal right ventricular size and systolic function.   No hemodynamically significant valvular heart abnormalities.   Small circumferential pericardial effusion. No obvious echocardiographic evidence of cardiac tamponade.   No previous study for comparison.      Xr " Chest 1 View Portable    Result Date: 1/2/2021  EXAM: XR CHEST 1 VIEW PORTABLE LOCATION: [facility] DATE/TIME: 1/2/2021 9:35 AM INDICATION: follow up pneumothorax, right COMPARISON: 12/26/2020.     Chest tube at the right apex is unchanged. No pneumothorax is identified. Endotracheal tube is approximately 5 cm above the emerita. Central venous catheter is in the superior vena cava near its junction with the right atrium. Bilateral airspace opacities consistent with bilateral pneumonia are again noted and not definitely changed from the prior study.    Xr Chest 1 View Portable    Result Date: 12/26/2020  EXAM: XR CHEST 1 VIEW PORTABLE LOCATION: [facility] DATE/TIME: 12/26/2020 8:45 AM INDICATION: ARDS, hypothermia COMPARISON: 12/23/2020     Endotracheal tube tip is at least 5.7 cm above the emerita. Feeding tube overlies the mediastinum with the tip below the inferior border of the image. There is an esophageal thermistor tip at the level of the clavicles, which can be advanced, depending on its intended clinical usage. Right internal jugular central line tip at the expected location of the cavoatrial junction. Left midline catheter tip projects over the scapula. There is a right apical pigtail chest tube. There is diffuse interstitial and airspace disease, but no large effusions or pneumothorax. Unchanged cardiac size. Aortic atherosclerosis.     Xr Chest 1 View Portable    Result Date: 12/23/2020  EXAM: XR CHEST 1 VIEW PORTABLE LOCATION: [facility] DATE/TIME: 12/23/2020 1:48 PM INDICATION: evaluate right PTX COMPARISON: 12/22/2020     Stable right chest tube, right internal jugular central venous catheter and 2 enteric tubes. No visible pneumothorax. No significant interval change in diffuse airspace opacities. Stable heart size.    Xr Chest 1 View Portable    Result Date: 12/22/2020  EXAM: XR CHEST 1 VIEW PORTABLE LOCATION: [facility] DATE/TIME: 12/22/2020 5:46 PM INDICATION: line placement COMPARISON: Portable AP view of the chest 12/22/2020 at 1049 hours     Satisfactory position of the endotracheal tube. Gastric and feeding tubes course below the diaphragmatic hiatus and outside the field-of-view. Right jugular approach central venous catheter terminates in the SVC. Right pigtail pleural drain is present. Crescentic lucency along the right diaphragmatic pleura reflects a small residual anterior pneumothorax. Unchanged multifocal bilateral airspace opacities consistent with infection/related inflammation.    Xr Chest 1 View Portable    Result Date: 12/22/2020  EXAM: XR CHEST 1 VIEW PORTABLE LOCATION: [facility] DATE/TIME: 12/22/2020 11:39 AM INDICATION: line placement COMPARISON: 11/20/2020     ET tube 3.5 cm from the emerita. A couple enteric tubes entering the stomach. Right chest tube with very tiny sliver of a right apical pneumothorax. Tiny left central line tip in the left axilla. Right PICC tip has a couple loops as it passes through the axillary and subclavian vein. This should be repositioned. Stable patchy bilateral pulmonary infiltrates. I will call the nurse's station with this report. NOTE: ABNORMAL REPORT THE DICTATION ABOVE DESCRIBES AN ABNORMALITY FOR WHICH FOLLOW-UP IS NEEDED. .    Xr Chest 1 View Portable    Result Date: 12/20/2020  EXAM: XR CHEST 1 VIEW PORTABLE LOCATION: [facility] DATE/TIME: 12/20/2020 7:57 PM INDICATION: chest tube, ETT placement COMPARISON: Earlier today at 1913 hours     A new or repositioned right pleural drain now projects in good position over the right lung apex. Resolution of the right pneumothorax. Moderate subcutaneous emphysema along right chest wall unchanged. ET tube and right PICC line remain in good position. Prominent mixed interstitial and alveolar infiltrates persist bilaterally. Heart size normal.    Xr  Chest 1 View Portable    Result Date: 12/20/2020  EXAM: XR CHEST 1 VIEW PORTABLE LOCATION: [facility] DATE/TIME: 12/20/2020 7:27 PM INDICATION: chest tube placement COMPARISON: 12/20/2020 at 1759 hours     Extensive airspace infiltrates throughout both lungs, unchanged from earlier today. Endotracheal tube tip 1.5 cm above the emerita. Right PICC line tip mid SVC. Enteric tube extends into the stomach. Presumed left PICC line tip overlying the axilla, unchanged. Right chest tube is located within the subcutaneous tissues lateral to the chest wall, outside the chest cavity. Subcutaneous gas along the right chest wall. Tiny right apical pneumothorax.    Xr Chest 1 View Portable    Result Date: 12/20/2020  EXAM: XR CHEST 1 VIEW PORTABLE LOCATION: [facility] DATE/TIME: 12/20/2020 6:10 PM INDICATION: ARDS, please obtain while prone COMPARISON: 12/20/2020 at 1204 hours.     ET tube remains in good position with its tip approximately 2.5 cm above the emerita. Feeding tube extends at least into the stomach. Right PICC line in good position. Stable or slight increase in the prominent, mixed interstitial alveolar infiltrates present bilaterally. Heart size normal.    Xr Chest 1 View Portable    Result Date: 12/20/2020  EXAM: XR CHEST 1 VIEW PORTABLE LOCATION: [facility] DATE/TIME: 12/20/2020 12:19 PM INDICATION: ARDS COMPARISON: 12/16/2020     The endotracheal tube is 6.9 cm superior to the emerita. Enteric tube tip in the stomach. Stable bilateral PICC. No pneumothorax. Diffuse airspace disease, left greater than right, which is slightly decreased in the lung bases compared to prior. No pleural effusion. Normal heart size.    Xr Chest 1 View Portable    Result Date: 12/16/2020  EXAM: XR CHEST 1 VIEW PORTABLE LOCATION: [facility] DATE/TIME: 12/16/2020 12:35 PM INDICATION: confirm ET Tube position. COVID COMPARISON:  12/11/2020     Endotracheal tube tip 4.7 cm from emerita. Right PICC tip projects over the upper SVC. Left PICC projects over the axilla. The diffuse bilateral pulmonary infiltrates appear worse with obscuration of the left heart border. No definite pneumothorax. No significant effusion seen.    Xr Chest 1 View Portable    Result Date: 12/11/2020  EXAM: XR CHEST 1 VIEW PORTABLE LOCATION: [facility] DATE/TIME: 12/11/2020 5:26 AM INDICATION: Confirm ETT placement. COMPARISON: 12/10/2020.     Endotracheal tube tip 3.5 cm above the emerita. Enteric tube tip below the diaphragm. Extensive diffuse bilateral pulmonary infiltrates. Heart size within normal limits. Remainder unremarkable.    Xr Chest 1 View Portable    Result Date: 12/10/2020  EXAM: XR CHEST 1 VIEW PORTABLE LOCATION: [facility] DATE/TIME: 12/10/2020 7:38 PM INDICATION: intubation, COVID pneumonia. COMPARISON: Film earlier today and older studies.     Endotracheal tube is in good position the mid trachea. Feeding tube can be seen coursing into the stomach. Right upper extremity PICC line catheter is in good position in the proximal SVC. No change in the moderate, bilateral interstitial and airspace opacities.    Xr Chest 1 View Portable    Result Date: 12/10/2020  EXAM: XR CHEST 1 VIEW PORTABLE LOCATION: [facility] DATE/TIME: 12/10/2020 10:53 AM INDICATION: increased O2 needs, COVID 19 COMPARISON: 12/06/2020     Right PICC tip projects over the upper SVC. Stable heart size. No effusion. Worsening bilateral lung infiltrates.    Xr Chest 1 View Portable    Result Date: 12/6/2020  EXAM: XR CHEST 1 VIEW PORTABLE LOCATION: [facility] DATE/TIME: 12/6/2020 5:26 PM INDICATION: Dyspnea/SOB. COMPARISON: 11/30/2020.     Interval development of bilateral interstitial and ground-glass opacities, likely to represent pneumonia in the proper clinical context, to include  COVID, recommend follow-up to resolution. No pneumothorax. Cardiac silhouette mildly enlarged.  Tortuous atherosclerotic aorta.    Xr Abdomen Ap Portable    Result Date: 12/24/2020  EXAM: XR ABDOMEN AP PORTABLE LOCATION: [facility] DATE/TIME: 12/24/2020 7:26 PM INDICATION: NJ placement COMPARISON: Chest x-ray 12/23/2020 and CT abdomen 12/23/2020     Enteric feeding tube tip located within the gastric lumen. Adjacent superimposed enteric suction tube with the tip in the gastric lumen. Nonobstructive bowel gas pattern.    Xr Abdomen Ap Portable    Result Date: 12/22/2020  EXAM: XR ABDOMEN AP PORTABLE LOCATION: [facility] DATE/TIME: 12/22/2020 5:47 PM INDICATION: feeding tube placement COMPARISON: Abdominal radiographs from earlier today at 1052 hours     Gastric tube is unchanged in position terminating in the stomach body. The feeding tube was repositioned, which extends to the distal stomach and then has an acute bend with parallel retrograde course of the tube now with tip in the proximal stomach directed retrograde. Tip of a right femoral catheter terminates over the low right sacrum. Nonobstructive bowel gas pattern.    Xr Abdomen Ap Portable    Result Date: 12/22/2020  EXAM: XR ABDOMEN AP PORTABLE LOCATION: [facility] DATE/TIME: 12/22/2020 11:40 AM INDICATION: feeding tube COMPARISON: None.     Feeding tube in the distal stomach near the pylorus. Additional enteric tube in the body the stomach.     Xr Abdomen Ap Portable    Result Date: 12/14/2020  EXAM: XR ABDOMEN AP PORTABLE LOCATION: [facility] DATE/TIME: 12/14/2020 3:32 PM INDICATION: feeding tube placement COMPARISON: 12/11/2020     The feeding tube tip is in the region of the gastric antrum. Bowel gas pattern is unremarkable. Partially consolidating opacities are noted in the right lower lobe laterally.    Xr Abdomen Ap Portable    Result Date:  12/11/2020  EXAM: XR ABDOMEN AP PORTABLE LOCATION: [facility] DATE/TIME: 12/11/2020 5:29 AM INDICATION: Check feeding tube placement after being looped back upon itself. COMPARISON: None.     Feeding tube with tip in the right upper quadrant. Based upon its course and location, this likely lies within the proximal duodenum. Nonspecific visualized bowel gas pattern. Degenerative changes in the spine.     Ct Head Without Contrast    Result Date: 12/27/2020  EXAM: CT HEAD WO CONTRAST LOCATION: [facility] DATE/TIME: 12/27/2020 10:33 AM INDICATION: Stroke, follow up CVA COMPARISON: CT head without contrast 12/23/2020. TECHNIQUE: Routine CT Head without IV contrast. Multiplanar reformats. Dose reduction techniques were used. FINDINGS: INTRACRANIAL CONTENTS: Again seen are changes associated with an evolving right PCA distribution infarct. No hemorrhagic transformation. No midline shift. Mild presumed chronic small vessel ischemic changes. Mild to moderate generalized volume loss. No hydrocephalus. VISUALIZED ORBITS/SINUSES/MASTOIDS: No intraorbital abnormality. There fluid level in the right maxillary sinus. Scattered fluid/membrane thickening in the mastoid air cells bilaterally. BONES/SOFT TISSUES: No acute abnormality.     1.  Evolving subacute infarct in the right PCA vascular distribution without hemorrhagic transformation. 2.  Air-fluid level in the right maxillary sinus and scattered fluid/membrane thickening in the bilateral mastoid air cells.    Ct Head Without Contrast    Result Date: 12/23/2020  EXAM: CT HEAD WO CONTRAST LOCATION: [facility] DATE/TIME: 12/23/2020 9:14 AM INDICATION: Concern for stroke. COMPARISON: None. TECHNIQUE: Routine CT head without IV contrast. Multiplanar reformats. Dose reduction techniques were used. FINDINGS: INTRACRANIAL CONTENTS: Marked hypoattenuation obscuring the gray-white junction of the medial  posterior right temporal lobe, as well as the medial and inferior right occipital lobe. This is compatible with a right PCA distribution infarct, with the overall degree of hypoattenuation favoring a subacute infarct. No intracranial hemorrhage associated with this focus of PCA distribution infarct. There is, however, a punctate hyperdense focus in the lateral right thalamus measuring up to 3 mm in diameter. While nonspecific, attention on follow-up imaging will be of benefit to exclude developing hemorrhage.  While edema partially effaces the atrium and occipital horn, there is no significant midline shift. Mild burden scattered chronic small vessel ischemic change. Ventricles are nondilated. Background mild to moderate diffuse parenchymal volume loss. Calcification of the distal internal carotid arteries bilaterally. Additional calcification along the falx. VISUALIZED ORBITS/SINUSES/MASTOIDS: No intraorbital abnormality. Air-fluid levels in the bilateral maxillary sinuses, larger on the right. Additional air-fluid level in the right frontal sinus, with opacification in the anterior right ethmoid air cells. Fluid in the bilateral mastoid air cells. BONES/SOFT TISSUES: Deformity of the left nasal bone. In the absence of pain on palpation, this is favored to be chronic in nature.     1.  Right PCA distribution infarct with involvement of the medial posterior right temporal lobe as well as the medial and inferior right occipital lobe. Marked hypoattenuation obscuring the gray-white junction favors an overall subacute nature. No intracranial hemorrhage associated with this focus of infarction. 2.  3 mm punctate hyperdense focus in the lateral right thalamus is nonspecific and potentially relates to a small amount of mineralization. Punctate focus of acute intraparenchymal hemorrhage is possible, and attention on follow-up head CT will be of benefit. 3.  Background age-related changes, as above. 4.  Diffuse paranasal  sinus disease with fluid opacification in the bilateral mastoid air cells. Findings and impression discussed with Morenita Barahona RN, by phone at 0935 hours on 12/23/2020.    Us Carotid Bilateral    Result Date: 12/25/2020  EXAM: US CAROTID BILATERAL LOCATION: [facility] DATE/TIME: 12/25/2020 9:49 AM INDICATION: Cerebrovascular accident. COMPARISON: None. TECHNIQUE: Duplex exam performed utilizing 2D gray-scale imaging, Doppler interrogation with color-flow and spectral waveform analysis. The percent diameter stenosis is determined using NASCET criteria and Society of Radiologists in Ultrasound Consensus Criteria. FINDINGS: Exam compromised from overlying IVs along the right neck and intubation tubing and limited patient movement. Portions of the right common carotid artery not seen. RIGHT: Mild-moderate plaque at the bifurcation. The peak systolic velocity in the ICA is less than 125 cm/sec. Normal velocities in the ECA. Vertebral artery not visualized. LEFT: Mild-moderate plaque at the bifurcation. The peak systolic velocity in the ICA is 125-230 cm/sec. Normal velocities in the ECA. Vertebral artery not visualized. VELOCITY CHART: Portions of the right common carotid artery not visualized, as above. CCA   Right: 50 cm/s   Left: 46 cm/s ICA   Right: 77 cm/s   Left: 84 cm/s ECA   Right: 64 cm/s   Left: 68 cm/s ICA/CCA PSV Ratio   Right: 1.54   Left: 1.83     1.  Portions of exam compromised as above. 2.  Mild-moderate plaque formation, velocities consistent with less than 50% stenosis in the right internal carotid artery. 3.  Mild-moderate plaque formation, velocities consistent with less than 50% stenosis in the left internal carotid artery by peak systolic velocity. The left ICA / CCA ratio falls within the 50-69% range. 4.  Vertebral arteries not seen.     Echo Limited W Doppler    Result Date: 12/28/2020    Left ventricular ejection fraction is normal. The calculated left ventricular  "ejection fraction is 71%.   Normal right ventricular size and systolic function.   No hemodynamically significant valvular heart abnormalities.   Trace pericardial effusion with no echocardiographic evidence of cardiac tamponade.   When compared to the previous study dated 12/16/2020, the pericardial effusion has decreased in size. Other findings are comparable.      Poc Us 3cg Picc Placement Guidance    Result Date: 12/9/2020  Exam was performed as guidance for PICC line insertion.  Click \"PACS images\" hyperlink below to view any stored images.  For specific procedure details, view procedure note authored by PICC/Vascular Access Nurse.    Ct Chest Abdomen Pelvis Without Oral With Iv Contrast    Result Date: 12/23/2020  EXAM: CT CHEST ABDOMEN PELVIS WO ORAL W IV CONTRAST LOCATION: [facility] DATE/TIME: 12/23/2020 9:17 AM INDICATION: Sepsis marked leukocytosis without source COMPARISON: 12/22/2020, 12/20/2020 and 12/16/2020 abdominal and chest radiography. No prior CT. TECHNIQUE: CT scan of the chest, abdomen, and pelvis was performed following injection of IV contrast. Multiplanar reformats were obtained. Dose reduction techniques were used. CONTRAST: Iohexol (Omni) 100 mL FINDINGS: LUNGS AND PLEURA: Interval development large right tension pneumothorax with depression of the right hemidiaphragm and shift of the mediastinum and heart from right to left (but no significant right lung atelectasis) despite the presence of a well-positioned chest tube (with no visible kinking) in the upper right pleural space. No left pneumothorax or pneumomediastinum. No pleural fluid in either hemithorax. Trace amount of subcutaneous and intramuscular edema upper anterior right chest wall unchanged. Dense consolidation throughout the posterior segment right upper lobe with lesser involvement of the apical and anterior segments allowing for difference in technique unchanged. Innumerable additional smaller " foci of consolidation, mild generalized hazy groundglass density opacity and interlobular septal thickening throughout both lungs also likely unchanged. MEDIASTINUM/AXILLAE: Endotracheal tube tip 3.5 cm above the emerita. Right IJ central venous catheter tip mid SVC. Benign calcified mediastinal and bilateral hilar lymph nodes now dave lymphadenopathy. Three-vessel coronary artery calcification. No pericardial effusion. Heart size appears to be within normal limits. Normal caliber thoracic aorta and central pulmonary arteries. No obvious central pulmonary arterial emboli with the remainder of the pulmonary arteries not well seen. HEPATOBILIARY: Mild diffuse hepatic steatosis. Liver otherwise normal. Cholelithiasis. No bile duct dilatation. PANCREAS: Normal. SPLEEN: Normal. ADRENAL GLANDS: Normal. KIDNEYS/BLADDER: Circumscribed round 2 cm low-attenuation mass mid to lower right kidney and a circumscribed 2 cm mildly complex mass with internal enhancement or calcification arising exophytically from the lower pole left kidney. A 3 mm hyperdense focus related to the left posterolateral bladder wall is nonspecific and could be a small stone or focus of enhancement. Kidneys, ureters and bladder otherwise normal. BOWEL: Nasogastric tube tip proximal stomach in good position. Nasoenteric feeding tube extends to the gastroduodenal junction and reverses course with tip in the gastric fundus. No bowel obstruction or inflammatory change. No free fluid, free air or intra-abdominal abscess. LYMPH NODES: No lymphadenopathy. VASCULATURE: Moderate amount of calcified atheromatous plaque throughout the normal caliber abdominal aorta and at the origin of the renal and mesenteric arteries. Right common femoral vein catheter tip in the proximal external iliac vein PELVIC ORGANS: Normal. MUSCULOSKELETAL: Normal.     1.  Interval development large right tension pneumothorax with depression of the right hemidiaphragm and shift of the  mediastinum and heart from right to left despite the presence of a well-positioned chest tube (with no visible kinking) in the upper right pleural space. Findings discussed with Morenita Barahona CNP 9:35 AM 12/23/2020. 2.  Dense consolidation posterior segment right upper lobe and innumerable additional smaller foci of consolidation, mild generalized hazy groundglass density opacity and interlobular septal thickening throughout both lungs appear unchanged. 3.  Cholelithiasis. 4.  Nasoenteric feeding tube extends to the gastroduodenal junction and reverses course with tip in the gastric fundus. 5.  A 3 mm hyperdense focus related to the left posterolateral bladder wall is nonspecific and could be a small stone or focus of enhancement. Recommend nonurgent CT urogram in several months. 6.  Bilateral 2 cm renal masses likely represent cysts. These could be evaluated at the same time as the nonurgent CT urogram. NOTE: ABNORMAL REPORT THE DICTATION ABOVE DESCRIBES AN ABNORMALITY FOR WHICH FOLLOW-UP IS NEEDED.         Total Critical Care Time : 1 Hour      Woodrow Oseguera PA-C

## 2021-06-14 NOTE — PROGRESS NOTES
Discontinued CRRT machine. Transported to CT. RN X3 and RT present on transport. No reportable events. Transferred back to ICU bed successful.

## 2021-06-14 NOTE — PROGRESS NOTES
[facility] COVID RT PROGRESS NOTE     DATA:     VENT DAY# 14     CURRENT SETTINGS:  VENT SETTINGS:  AC 32 520 +16              FIO2:  60%     PATIENT PARAMETERS:     PIP: 31   Pplat: unable   Pmean: 22   SBT: NO  SECRETIONS: small, tan thick  02 SATS: 96%     ETT SIZE  8.5 @ 26     Securing device changed / tube re-taped date 12/21/2020     Respiratory Medications: None     ABG:  Results for GISELLE VILLANUEVA (MRN [id number]) as of 12/25/2020 05:28   Ref. Range 12/25/2020 04:29   pH, Arterial Latest Ref Range: 7.37 - 7.44  7.30 (L)   pCO2, Arterial Latest Ref Range: 35 - 45 mm Hg 40   pO2, Arterial Latest Ref Range: 75 - 85 mm Hg 90 (H)   Bicarbonate, Arterial Calc Latest Ref Range: 23.0 - 29.0 mmol/L 19.5 (L)   O2 Sat, Arterial Latest Ref Range: 95.0 - 96.0 % 96.5 (H)   Oxyhemoglobin Latest Ref Range: 95.0 - 96.0 % 94.6 (L)   POC Base Excess Calc Latest Units: mmol/L -6.4   Ventilation Mode Unknown AC   Peep Latest Units: cm H2O 16   Sample Stabilized Temperature Latest Units: degrees C 37.0   Rate Latest Units: rr/min 32   FIO2 Unknown 60.00        12/25/20 0225   Vent Information   Interface Invasive   Vent Mode AC   Vent Settings   FiO2 (%) 60 %   Heater Temperature 98.6  F (37  C)   Resp Rate (Set) 32   Insp Time (sec) 0.7 sec   Vt (Set, mL) 520 mL   PEEP/CPAP (cm H2O) 16 cm H2O   Trigger Sensitivity Flow (L/min) 2 L/min   I:E Ratio 1:1.5   Humidification Heater   Patient Data   Vt Exp (mL) 644 mL   Minute Ventilation (L/min) 15.9 L/min   Total Resp Rate  33 BPM   PIP Observed (cm H2O) 31 cm H2O   MAP (cm H2O) 22   Auto/Intrinsic PEEP Observed (cm H2O) 2.2 cm H2O   Dynamic Compliance (L/cm H2O) 55 L/cm H2O   Airway Resistance 7.8     NOTE / PLAN:    No events over night. Will continue current therapy and continue to monitor.

## 2021-06-14 NOTE — PROGRESS NOTES
01/04/21 2020   Patient Data   Vt Exp (mL) 498 mL   Minute Ventilation (L/min) 13.3 L/min   Total Resp Rate  29 BPM   PIP Observed (cm H2O) 27 cm H2O   MAP (cm H2O) 14   Auto/Intrinsic PEEP Observed (cm H2O) 1.1 cm H2O   Dynamic Compliance (L/cm H2O) 29.5 L/cm H2O   Airway Resistance 9.3

## 2021-06-14 NOTE — PLAN OF CARE
Problem: Occupational Therapy  Goal: OT Goals  Note: Pt to meet goals by: 12/30/20 to maximize IND for safe d/c to next level of care     -Pt to demo UB dress with min A  -Pt to demo basic trf skills with min A, VSS  -Pt to demo bed mobility, from flat bed, with CGA w/demo PLB  -Pt to demo grooming task from sitting EOB for 5 minutes with CGA  -Pt to participate in 8 minutes of intermittent exercise/activity with VSS  -Pt to participate in cognitive assessments to assist with treatment and safe d/c planning.      VENITA Sánchez/SUSAN    Occupational Therapy Discharge Summary    Date of OT Discharge: 1/6/21  Refer to daily doc flowsheet for equipment issued and current functional status.  Discharge Destination:  [place] for GI work up  Discharge Comments: Pt seen for eval/x1 session only. Medical status declined during stay. Was not approp for skilled OT over past week. Cont OT if medically stable.     Please note that if goals are not fully met the patient is making progress towards established goals, which is documented in flowsheet notes. If further therapy is recommended it is related to documented deficits, and is necessary to maximize functional independence in order for patient to return to previous level of function.      1/7/2021 by VENITA Sánchez/SUSAN

## 2021-06-14 NOTE — PROGRESS NOTES
Infectious Diseases Progress Note  [facility]    Date of visit: 12/25/2020      ASSESSMENT   74-year-old man with a history of diabetes, hypertension, BPH who is admitted with respiratory distress from COVID-19.    1. COVID-19.  Diagnosed on 11/30.  Developed rapid worsening of respiratory status, intubated on 12/10.  Completed 5-day course of remdesivir.  Completed 10-day course of dexamethasone on 12/15.  Course complicated by right-sided pneumothorax.  Now with acute hepatitis, new stroke, and renal failure.  2. Leukocytosis.  Mildly elevated white cells during admission, possibly from steroids.  Started to rise in earnest on 12/17, now markedly elevated with a left shift.  Blood and urine cultures negative.  Sputum growing Enterobacter cloacae.  C. difficile test negative.  Recently started on stress dose steroids, which may be contributing to rise.  May also be from general inflammatory process from severe Covid illness. Wbc down today.  3. Renal failure.  Nephrology consulted on 12/17, now on dialysis.  4. Acute hepatitis.  Elevated transaminases.  ALT starting to rise around 12/16.  No signs of biliary obstruction on CT scan today.  Consider possible medications, recently started on amiodarone.  Shock liver?  5. Right PCA stroke. Followed by neurology. Critical illness myopathy.    Principal Problem:    Pneumonia due to 2019 novel coronavirus  Active Problems:    Acute respiratory failure with hypoxia (H)    Acute kidney injury (H)    Acute respiratory distress syndrome (ARDS) due to COVID-19 virus (H)    Secondary spontaneous pneumothorax    Acute right PCA stroke (H)       PLAN   -Continue meropenem for Enterobacter in sputum  -would not treat yeast in sputum. Recent fungitell and galactomannan negative.      Angel Leon MD  [facility]  Direct messaging: [facility] Paging  On-Call ID provider: [phone], option:  9      ===========================================      SUBJECTIVE / INTERVAL HISTORY:     No acute events. Remains on pressors, stable. CRRT. Vent setting stable. No fevers     Review of Systems     Intubated and sedated.         Antibiotics   Meropenem 12/19-      Previous:  Ceftriaxone 12/17-12/18  Fluconazole 12/19-12/22  Zosyn 12/15-12/16  Remdesivir x5 days  Micafungin 12/23-  Vancomycin 12/16, 12/19-12/22      Physical Exam     Heart Rate:  [] 78  Resp:  [6-35] 16  Arterial Line BP: ()/() 115/52  FiO2 (%):  [55 %-60 %] 55 %    Vitals:    12/25/20 1015   BP:    Pulse: 78   Resp: 16   Temp:    SpO2: 100%       Limited exam to conserve PPE. Patient seen from window. See intensivist note for full exam.  Lying in bed, intubated, supine, chest tube in place. No distress.    Cultures   12/12 sputum culture: Klebsiella  12/16 blood cultures x2: No growth to date  12/16 urine culture: Negative  12/16 sputum culture: Normal ernst and yeast  12/19 fungal blood culture: Pending  12/19 blood cultures x2: No growth to date  12/19 sputum culture: Enterobacter cloacae  12/22 sputum: GPB, culture: yeast   12/22 blood cultures x2: No growth to date      Pertinent Labs:     Results from last 7 days   Lab Units 12/25/20  0547 12/25/20  0039 12/24/20  1557 12/24/20  0507 12/23/20  0406 12/23/20  0406   LN-WHITE BLOOD CELL COUNT thou/uL 35.1*  --   --  40.9*  --  37.6*  37.8*   LN-HEMOGLOBIN g/dL 9.2* 9.4* 9.9* 10.3*   < > 10.0*  10.0*   LN-PLATELET COUNT thou/uL 228 239 270 279   < > 290  268   LN-MONOCYTES - REL (DIFF) %  --   --   --  7  --  7    < > = values in this interval not displayed.       Results from last 7 days   Lab Units 12/25/20  0547 12/25/20  0039 12/24/20  1557 12/24/20  0507 12/23/20  0405 12/23/20  0405 12/21/20  1122 12/21/20  1122   LN-SODIUM mmol/L 135* 134* 136 135*   < > 135*   < >  --    LN-POTASSIUM mmol/L 4.6 4.6 4.7 4.8   < > 4.7   < >  --    LN-CHLORIDE mmol/L 106 104 104 105   <  > 100   < >  --    LN-CO2 mmol/L 18* 18* 19* 18*   < > 22   < >  --    LN-BLOOD UREA NITROGEN mg/dL 28  --   --  30*  --  24   < >  --    LN-CREATININE mg/dL 1.11  --   --  1.18  --  1.22   < >  --    LN-CALCIUM mg/dL 8.5 8.4* 8.5 8.8  8.8   < > 8.7   < >  --    LN-ALBUMIN g/dL 2.4*  --   --   --   --  2.0*  --  2.4*   LN-PROTEIN TOTAL g/dL 5.8*  --   --   --   --  6.0  --  6.7   LN-BILIRUBIN TOTAL mg/dL 1.0  --   --   --   --  0.7  --  0.8   LN-ALKALINE PHOSPHATASE U/L 103  --   --   --   --  99  --  86   LN-ALT (SGPT) U/L 268*  --   --   --   --  182*  --  77*   LN-AST (SGOT) U/L 216*  --   --   --   --  213*  --  62*    < > = values in this interval not displayed.       Results from last 7 days   Lab Units 12/19/20  0758   LN-C-REACTIVE PROTEIN mg/dL 7.9*           Imaging:     Xr Abdomen Ap Portable    Result Date: 12/24/2020  EXAM: XR ABDOMEN AP PORTABLE LOCATION: [facility] DATE/TIME: 12/24/2020 7:26 PM INDICATION: NJ placement COMPARISON: Chest x-ray 12/23/2020 and CT abdomen 12/23/2020     Enteric feeding tube tip located within the gastric lumen. Adjacent superimposed enteric suction tube with the tip in the gastric lumen. Nonobstructive bowel gas pattern.        Attestation:  I have reviewed today's Medications, Vital Signs, Imaging, and Labs.

## 2021-06-14 NOTE — PROGRESS NOTES
NEUROLOGY PROGRESS NOTE     ASSESSMENT & PLAN   Hospital Day#: 19    Visit diagnosis: Cerebral infarction    Right posterior cerebral artery territory infarction    74 years old male with history of DM, HTN diagnosed with COVID-19 on 11/30/2020 with a rapid worsening of respiratory status, intubated on on 12/10/2020.  Patient completed course of remdesivir, Decadron    CT head done on 12/23/2020 shows right PCA distribution infarction with 3 mm punctate hyperdense focus in the right thalamus that likely a small amount of mineralization but hemorrhage is a possibility and follow-up CT scan was recommended    Echocardiogram shows normal ejection fraction with no hemodynamically significant valvular heart abnormality    [facility]ell EEG is pending    Currently patient is on aspirin and IV heparin    Check lipid profile and start Lipitor 10 mg daily    Follow-up CT on 12/27/2020    Check carotid ultrasound    Due to prolonged illness patient is at increased risk of critical illness neuromyopathy and his tone is decreased    Neurology Discharge Planning :   ANDREW Vasquez MD  [facility]  (Previously, [facility]  Tel: [phone]     PROBLEM LIST      Patient Active Problem List    Diagnosis Date Noted     Acute right PCA stroke (H)      Secondary spontaneous pneumothorax      Acute respiratory distress syndrome (ARDS) due to COVID-19 virus (H) 12/10/2020     Acute respiratory failure with hypoxia (H)      Acute kidney injury (H)      Pneumonia due to 2019 novel coronavirus 12/06/2020     Diabetes mellitus (H) 06/24/2019     Hypertension 06/24/2019     Chronic pain disorder 03/27/2019     Primary osteoarthritis of right hip 01/26/2018     Primary osteoarthritis of right knee 01/26/2018     BPH with urinary obstruction 11/15/2017     Past Medical History:   Patient  has no past medical history on file.     SUBJECTIVE     Patient intubated and sedated      OBJECTIVE     Vital signs in last 24 hours  Heart Rate:  [] 77  Resp:  [14-37] 32  Arterial Line BP: ()/() 100/43  FiO2 (%):  [60 %] 60 %    Weight:   200 lb 9.9 oz (91 kg)    I/O last 24 hours    Intake/Output Summary (Last 24 hours) at 12/25/2020 0806  Last data filed at 12/25/2020 0800  Gross per 24 hour   Intake 2018.59 ml   Output 2823 ml   Net -804.41 ml     Review of Systems   Pertinent items are noted in HPI.    General Physical Exam: Patient is alert and oriented x 0. Vital signs were reviewed and are documented in EMR. Neck was supple, no Carotid bruit, thyromegaly, JVD or lymphadenopathy noted.  Neurological Exam:  Patient intubated and sedated.  Pupil equal round, nonreactive no nystagmus.  No doll's eyes.  No withdrawal to painful stimuli reflexes absent     DIAGNOSTIC STUDIES     Pertinent Radiology   Radiology Results: Personally reviewed image/s and Personally reviewed impression/s    CT  12/23/2020  1.  Right PCA distribution infarct with involvement of the medial posterior right temporal lobe as well as the medial and inferior right occipital lobe. Marked hypoattenuation obscuring the gray-white junction favors an overall subacute nature. No   intracranial hemorrhage associated with this focus of infarction.     2.  3 mm punctate hyperdense focus in the lateral right thalamus is nonspecific and potentially relates to a small amount of mineralization. Punctate focus of acute intraparenchymal hemorrhage is possible, and attention on follow-up head CT will be of   benefit.     3.  Background age-related changes, as above.     4.  Diffuse paranasal sinus disease with fluid opacification in the bilateral mastoid air cells    Echocardiogram    Technically difficult study due to poor acoustic windows and patient positioning.    Left ventricular ejection fraction is normal. The calculated left ventricular ejection fraction is 63%.    Normal right ventricular size and systolic  function.    No hemodynamically significant valvular heart abnormalities.    Small circumferential pericardial effusion. No obvious echocardiographic evidence of cardiac tamponade.    No previous study for comparison.    Pertinent Labs   Lab Results: personally reviewed.   Recent Results (from the past 24 hour(s))   POCT Glucose    Collection Time: 12/24/20 11:31 AM    Specimen: Blood   Result Value Ref Range    Glucose 117 70 - 139 mg/dL   Blood Gases, Arterial    Collection Time: 12/24/20 11:33 AM   Result Value Ref Range    pH, Arterial 7.35 (L) 7.37 - 7.44    pCO2, Arterial 35 35 - 45 mm Hg    pO2, Arterial 59 (L) 75 - 85 mm Hg    Bicarbonate, Arterial Calc 19.7 (L) 23.0 - 29.0 mmol/L    O2 Sat, Arterial 89.4 (L) 95.0 - 96.0 %    Oxyhemoglobin 88.3 (L) 95.0 - 96.0 %    Base Excess, Arterial Calc -6.2 mmol/L    FIO2      Sample Stabilized Temperature 37.0 degrees C   Electrolyte Panel    Collection Time: 12/24/20  3:57 PM   Result Value Ref Range    Sodium 136 136 - 145 mmol/L    Potassium 4.7 3.5 - 5.0 mmol/L    CO2 19 (L) 22 - 31 mmol/L    Chloride 104 98 - 107 mmol/L    Anion Gap, Calculation 13 5 - 18 mmol/L   Magnesium    Collection Time: 12/24/20  3:57 PM   Result Value Ref Range    Magnesium 2.5 1.8 - 2.6 mg/dL   Phosphorus    Collection Time: 12/24/20  3:57 PM   Result Value Ref Range    Phosphorus 5.5 (H) 2.5 - 4.5 mg/dL   Hemoglobin    Collection Time: 12/24/20  3:57 PM   Result Value Ref Range    Hemoglobin 9.9 (L) 14.0 - 18.0 g/dL   Platelet Count    Collection Time: 12/24/20  3:57 PM   Result Value Ref Range    Platelets 270 140 - 440 thou/uL   Calcium, Ionized, Measured    Collection Time: 12/24/20  3:57 PM   Result Value Ref Range    Calcium, Ionized Measured 1.08 (L) 1.11 - 1.30 mmol/L    Calcium, Ionized pH 7.4 1.05 (L) 1.11 - 1.30 mmol/L    pH 7.35 7.35 - 7.45   Calcium    Collection Time: 12/24/20  3:57 PM   Result Value Ref Range    Calcium 8.5 8.5 - 10.5 mg/dL   POCT Glucose    Collection  Time: 12/24/20  4:35 PM    Specimen: Blood   Result Value Ref Range    Glucose 101 70 - 139 mg/dL   Blood Gases, Arterial    Collection Time: 12/24/20  5:47 PM   Result Value Ref Range    pH, Arterial 7.37 7.37 - 7.44    pCO2, Arterial 35 35 - 45 mm Hg    pO2, Arterial 58 (L) 75 - 85 mm Hg    Bicarbonate, Arterial Calc 20.8 (L) 23.0 - 29.0 mmol/L    O2 Sat, Arterial 89.3 (L) 95.0 - 96.0 %    Oxyhemoglobin 87.6 (L) 95.0 - 96.0 %    Base Excess, Arterial Calc -4.7 mmol/L    FIO2      Sample Stabilized Temperature 37.0 degrees C   Ammonia    Collection Time: 12/24/20  8:00 PM   Result Value Ref Range    Ammonia 28 11 - 35 umol/L   POCT Glucose    Collection Time: 12/24/20  9:10 PM    Specimen: Blood   Result Value Ref Range    Glucose 107 70 - 139 mg/dL   POCT Glucose    Collection Time: 12/24/20 11:48 PM    Specimen: Blood   Result Value Ref Range    Glucose 124 70 - 139 mg/dL   Electrolyte Panel    Collection Time: 12/25/20 12:39 AM   Result Value Ref Range    Sodium 134 (L) 136 - 145 mmol/L    Potassium 4.6 3.5 - 5.0 mmol/L    CO2 18 (L) 22 - 31 mmol/L    Chloride 104 98 - 107 mmol/L    Anion Gap, Calculation 12 5 - 18 mmol/L   Magnesium    Collection Time: 12/25/20 12:39 AM   Result Value Ref Range    Magnesium 2.5 1.8 - 2.6 mg/dL   Phosphorus    Collection Time: 12/25/20 12:39 AM   Result Value Ref Range    Phosphorus 4.5 2.5 - 4.5 mg/dL   Hemoglobin    Collection Time: 12/25/20 12:39 AM   Result Value Ref Range    Hemoglobin 9.4 (L) 14.0 - 18.0 g/dL   Platelet Count    Collection Time: 12/25/20 12:39 AM   Result Value Ref Range    Platelets 239 140 - 440 thou/uL   Calcium, Ionized, Measured    Collection Time: 12/25/20 12:39 AM   Result Value Ref Range    Calcium, Ionized Measured 1.10 (L) 1.11 - 1.30 mmol/L    Calcium, Ionized pH 7.4 1.07 (L) 1.11 - 1.30 mmol/L    pH 7.34 (L) 7.35 - 7.45   Calcium    Collection Time: 12/25/20 12:39 AM   Result Value Ref Range    Calcium 8.4 (L) 8.5 - 10.5 mg/dL   POCT Glucose     Collection Time: 12/25/20  4:17 AM    Specimen: Blood   Result Value Ref Range    Glucose 76 70 - 139 mg/dL   Blood Gases, Arterial    Collection Time: 12/25/20  4:29 AM   Result Value Ref Range    pH, Arterial 7.30 (L) 7.37 - 7.44    pCO2, Arterial 40 35 - 45 mm Hg    pO2, Arterial 90 (H) 75 - 85 mm Hg    Bicarbonate, Arterial Calc 19.5 (L) 23.0 - 29.0 mmol/L    O2 Sat, Arterial 96.5 (H) 95.0 - 96.0 %    Oxyhemoglobin 94.6 (L) 95.0 - 96.0 %    Base Excess, Arterial Calc -6.4 mmol/L    Ventilation Mode AC     Rate 32 rr/min    FIO2 60.00     Peep 16 cm H2O    Sample Stabilized Temperature 37.0 degrees C    Ventilator Tidal Volume 520 mL   Basic Metabolic Panel    Collection Time: 12/25/20  5:47 AM   Result Value Ref Range    Sodium 135 (L) 136 - 145 mmol/L    Potassium 4.6 3.5 - 5.0 mmol/L    Chloride 106 98 - 107 mmol/L    CO2 18 (L) 22 - 31 mmol/L    Anion Gap, Calculation 11 5 - 18 mmol/L    Glucose 128 (H) 70 - 125 mg/dL    Calcium 8.5 8.5 - 10.5 mg/dL    BUN 28 8 - 28 mg/dL    Creatinine 1.11 0.70 - 1.30 mg/dL    GFR MDRD Af Amer >60 >60 mL/min/1.73m2    GFR MDRD Non Af Amer >60 >60 mL/min/1.73m2   HM2(CBC W/O DIFF)    Collection Time: 12/25/20  5:47 AM   Result Value Ref Range    WBC 35.1 (HH) 4.0 - 11.0 thou/uL    RBC 3.02 (L) 4.40 - 6.20 mill/uL    Hemoglobin 9.2 (L) 14.0 - 18.0 g/dL    Hematocrit 29.4 (L) 40.0 - 54.0 %    MCV 97 80 - 100 fL    MCH 30.5 27.0 - 34.0 pg    MCHC 31.3 (L) 32.0 - 36.0 g/dL    RDW 15.1 (H) 11.0 - 14.5 %    Platelets 228 140 - 440 thou/uL    MPV 11.8 8.5 - 12.5 fL   Anti-Xa Heparin Level    Collection Time: 12/25/20  5:47 AM   Result Value Ref Range    Anti-Xa Heparin Assay 0.56 See comments IU/mL   Hepatic Profile    Collection Time: 12/25/20  5:47 AM   Result Value Ref Range    Bilirubin, Total 1.0 0.0 - 1.0 mg/dL    Bilirubin, Direct 0.6 (H) <=0.5 mg/dL    Protein, Total 5.8 (L) 6.0 - 8.0 g/dL    Albumin 2.4 (L) 3.5 - 5.0 g/dL    Alkaline Phosphatase 103 45 - 120 U/L      (H) 0 - 40 U/L     (H) 0 - 45 U/L   Thyroid Stimulating Hormone (TSH)    Collection Time: 12/25/20  5:47 AM   Result Value Ref Range    TSH 0.97 0.30 - 5.00 uIU/mL   CK Total    Collection Time: 12/25/20  5:47 AM   Result Value Ref Range    CK, Total 706 (HH) 30 - 190 U/L         HOSPITAL MEDICATIONS       amino acids-protein hydrolys  1 packet Enteral Tube BID     [START ON 12/30/2020] amiodarone  200 mg Oral DAILY     amiodarone  400 mg Oral BID     ascorbic acid (vitamin C)  1,000 mg Enteral Tube DAILY     aspirin  81 mg Enteral Tube DAILY     chlorhexidine  15 mL Topical Q12H     docusate sodium (COLACE) 50 mg/5 mL oral liquid  100 mg Enteral Tube BID     hydrocortisone sod succ  50 mg Intravenous Q6H FIXED TIMES     insulin aspart (NovoLOG) injection   Subcutaneous Q4H FIXED TIMES     insulin NPH  55 Units Subcutaneous BID     lactulose  20 g Enteral Tube TID     meropenem  1 g Intravenous Q8H     omeprazole  20 mg Oral QAM AC    Or     omeprazole  20 mg Enteral Tube QAM AC    Or     pantoprazole  40 mg Intravenous QAM AC     senna (SENOKOT) syrup  8.8 mg Enteral Tube BID     sodium chloride  10-30 mL Intravenous Q8H FIXED TIMES     vit B cmplx 3-FA-vit C-biotin  1 tablet Enteral Tube DAILY     white petrolatum-mineral oiL   Both Eyes Q8H        Total time spent for face to face visit, reviewing labs/imaging studies, counseling and coordination of care was: 30 Minutes More than 50% of this time was spent on counseling and coordination of care.        This note was dictated using voice recognition software.  Any grammatical or context distortions are unintentional and inherent to the software.

## 2021-06-14 NOTE — PROGRESS NOTES
Second visit.     Called to bedside for rectal bleeding by nursing.     Patient noted to have dark maroon blood on chux mixed with stool.   At 6'o clock region area of pink skin breakdown noted but no active bleed. Dark marroon hematochezia noted. Rectal examine done with decreased rectal tone, but unable to palpate any hemorrhoids, unable to visual any bleeding due to poor visual field.     GI consult placed and GI paged. Awaiting call back

## 2021-06-14 NOTE — PROGRESS NOTES
[facility] COVID RT PROGRESS NOTE    DATA:    VENT DAY#  16    CURRENT SETTINGS:  VENT SETTINGS  Vent Mode: AC  FiO2 (%):  [40 %] 40 %  S RR:  [28] 28  S VT:  [520 mL] 520 mL  PEEP/CPAP (cm H2O):  [10 cm H2O] 10 cm H2O  Minute Ventilation (L/min):  [11.8 L/min-13.6 L/min] 13.1 L/min  PIP:  [25 cm H2O-30 cm H2O] 30 cm H2O  MAP (cm H2O):  [14-18] 16      PATIENT PARAMETERS:    PIP 30  Pplat:  28  Pmean:  16  SBT: No  SECRETIONS:  Suction for small amount of thick tan secretion   02 SATS:  97%    ETT SIZE 8.5  Secured at  26 cm at teeth    Securing device changed / tube re-taped date No    Respiratory Medications: None     ABG:   Results for GISELLE VILLANUEVA (MRN [id number]) as of 1/2/2021 16:53   Ref. Range 1/2/2021 06:07   pH, Arterial Latest Ref Range: 7.37 - 7.44  7.29 (L)   pCO2, Arterial Latest Ref Range: 35 - 45 mm Hg 36   pO2, Arterial Latest Ref Range: 75 - 85 mm Hg 75   Bicarbonate, Arterial Calc Latest Ref Range: 23.0 - 29.0 mmol/L 17.7 (L)   O2 Sat, Arterial Latest Ref Range: 95.0 - 96.0 % 95.2   Oxyhemoglobin Latest Ref Range: 95.0 - 96.0 % 92.7 (L)   POC Base Excess Calc Latest Units: mmol/L -8.9       NOTE / PLAN:   Patient seen on full ventilator support and remain on full ventilator support at this time.     Respiratory care will continue to follow up and monitor oxygenation and ventilation.

## 2021-06-14 NOTE — PROGRESS NOTES
"[facility]    Assessment and Plan:    Right PCA stroke, encephalopathy, diffuse weakness  The stroke is likely cardioembolic due to A. Fib    Stroke was well demarcated on initial scan, so clearly it was more than 24 hours old, therefore he was not a candidate for thrombolysis or other revascularization.      Patient still quite ill with ARDS due to COVID-19  More responsive yesterday, today does not follow commands.   fentanyl just stopped today  Still on some norepinephrine  Follow-up CT 12- again showed the right PCA infarct, no new changes, no significant hemorrhagic transformation    He remains anuric, regular hemodialysis started now, Tuesday Thursday Saturday.  A. fib: Okay to anticoagulate from my perspective.    Will recheck CT head  Continue supportive care.    Subjective:     Red is a 75-year-old man admitted at [facility] on December 6 with significant respiratory disease due to COVID-19.  I initially met him on [place] after CT scan had shown right PCA stroke.  At that time he was quite unresponsive, doing a bit better now.    1-2-21: no significant change today, HD starting now    1-3-21: did not follow commands for me today    Objective:  /69   Pulse (!) 103   Temp 98  F (36.7  C) (Axillary)   Resp (!) 29   Ht 6' 2\" (1.88 m)   Wt 210 lb 5.1 oz (95.4 kg)   SpO2 98%   BMI 27.00 kg/m       Ventilated via ET tube  Supine, no acute distress  Gaze conjugate  He does squeeze to command on both sides  Eyes wide open, not blinking, does flinch a little to threat  Looks to left when I yell at him, but does not follow any commands today  Does not blink when I ask him to   Tone still a little bit reduced      Scheduled Meds:    amino acids-protein hydrolys  1 packet Enteral Tube DAILY     amiodarone  200 mg Oral DAILY     aspirin  81 mg Enteral Tube DAILY     chlorhexidine  15 mL Topical Q12H     [Held by provider] docusate sodium (COLACE) 50 mg/5 mL " oral liquid  100 mg Enteral Tube BID     hydrocortisone sod succ  50 mg Intravenous Q6H FIXED TIMES     insulin aspart (NovoLOG) injection   Subcutaneous Q4H FIXED TIMES     insulin glargine  45 Units Subcutaneous BID     midodrine  10 mg Oral TID with meals     omeprazole  20 mg Oral QAM AC    Or     omeprazole  20 mg Enteral Tube QAM AC    Or     pantoprazole  40 mg Intravenous QAM AC     oxyCODONE  10 mg Enteral Tube Q4H     [Held by provider] senna (SENOKOT) syrup  8.8 mg Enteral Tube BID     sodium chloride  10-30 mL Intravenous Q8H FIXED TIMES     vit B cmplx 3-FA-vit C-biotin  1 tablet Enteral Tube DAILY     white petrolatum-mineral oiL   Both Eyes Q8H     Continuous Infusions:    dextrose 10%       fentaNYL citrate (PF) Stopped (01/03/21 0802)     heparin 1,000 Units/hr (01/03/21 0600)     insulin infusion (1 unit/mL) Stopped (12/30/20 1400)     norepinephrine 0.09 mcg/kg/min (01/03/21 0802)     vasopressin Stopped (12/31/20 1235)     PRN Meds:.acetaminophen, albuterol, bisacodyL, dextrose 10%, dextrose 50 % (D50W), fentaNYL pf, glucagon (human recombinant), hydrALAZINE, insulin infusion (1 unit/mL), labetalol, lipase-protease-amylase **AND** sodium bicarbonate, magnesium hydroxide, midazolam, naloxone **OR** naloxone **OR** naloxone **OR** naloxone, ondansetron **OR** ondansetron, polyethylene glycol, polyvinyl alcohol, sodium chloride bacteriostatic, sodium chloride, sodium chloride     Patient Active Problem List   Diagnosis     Pneumonia due to 2019 novel coronavirus     Acute respiratory failure with hypoxia (H)     Acute kidney injury (H)     BPH with urinary obstruction     Chronic pain disorder     Diabetes mellitus (H)     Hypertension     Primary osteoarthritis of right hip     Primary osteoarthritis of right knee     Acute respiratory distress syndrome (ARDS) due to COVID-19 virus (H)     Secondary spontaneous pneumothorax     Acute right PCA stroke (H)     Encephalopathy         Eris Mcqueen,  MD

## 2021-06-14 NOTE — INTERVAL H&P NOTE
I have performed an assessment and examined the patient, as necessary, to update the patient's current status that may have changed since the prior History and Physical.  The History & Physical has been reviewed and updates that have been made are as follows needs TDC for intermediate term HD.

## 2021-06-14 NOTE — PROGRESS NOTES
Clinical Nutrition Therapy Follow Up Note      Current Nutrition Prescription:   Diet: Tube Feeding, No Tray  Formula: Novasource Renal @ 50ml/hr  Flushes: 40ml water every 8 hours  Diet Supplements:  none  IV dextrose or Fluids:     dextrose 10%       fentaNYL citrate (PF), Last Rate: 25 mcg/hr (12/29/20 0700)       heparin, Last Rate: 1,350 Units/hr (12/29/20 0700)       insulin infusion (1 unit/mL), Last Rate: 4 Units/hr (12/29/20 1610)       norepinephrine, Last Rate: 0.06 mcg/kg/min (12/29/20 1610)       sodium chloride 0.9%, Last Rate: 10 mL/hr (12/29/20 0700)       vasopressin, Last Rate: 2.4 Units/hr (12/29/20 1520)      Current Nutrition Intake:  Enteral nutrition access is a nasal gastric tube, with a placement date of 12/24/20. The current tube feeding order will provide 2400 kcals, 109 grams protein, 0 grams fiber, 259 grams carbohydrate, 830 mls free water from formula, 120 mls from fluid flush, for a total of 950 mls free water daily.     Anthropometrics:  Admission weight: 255 lb  Weight: 207 lb 14.3 oz (94.3 kg) +1 pitting edema UEs, +1 pitting edema LEs and 2+ pitting edema generalized (up from +1), net fluid down 1.4L since admission per I/Os (About 3.1lbs)     GI Status/Output:   GI symptoms include: Diarrhea per nurse  Bowel Sounds hypoactive per nurse  Last BM: 12/29/20 per nurse 15 stools yesterday, 5 stools so far today    Skin/Wound:  Pressure ulcer/Decubitus ulcers noted.    Medications:  Medications reviewed: 1000 units vitamin C (diarrhea), hydrocortisone, merrem, prilosec, nephro-bipin, kayexalate, insulin,     Labs:  Labs reviewed: BG , Na 134, , Cr 4.15, GFR 14, Phos 5.9    Estimated Nutrition Needs:   current weight of 86.9 kg    Energy Needs: 3550-6909 kcals daily, 25-30 kcal/kg   Protein Needs: 104-122g daily, 1.2-1.4 g/kg   Fluid Needs: Output + 1000ml    Malnutrition: Patient meets diagnostic criteria for severe protein calorie malnutrition in the context of acute  illness as evidenced by  unintentional weight loss and fluid accumulation    Nutrition Risk Level: high risk    Nutrition dx:  Impaired swallow function r/t increased respiratory needs evidenced by NPO/intubation     Malnutrition r/t acute illness evidenced by 7% weight loss x 1 week, mild fluid accumulation, mild fat and muscle loss     Altered nutrient utilization r/t NICKY evidenced by increased renal labs, fluid retention, decrease UOP    Goal Status:  Regular bowels - not progressing  Nutrition intake > 75% of estimated needs- progressing  Tolerate  TF at goal. - progressing  No increase in renal labs d/t intake - not met  New goal 12/23/20: resume enteral feeding as soon as able    Intervention:  Recommend discontinue Vitamin C or reduce dose if possible since Nephro-bipin also contains Vitamin C. (RDA for Vitamin c is 90mg/day for males > 70 years old. The tolerable upper intake level is 2000mg/day. Patient is getting 1000mg/day via supplement, 72mg from TF, 60-100mg from nephro-bipin. Vitamin C toxicity can cause diarrhea.)     Per discussion with pharmacist, will discuss at rounds tomorrow the discontinuation of vitamin c as patient likely does not need it as it was a PTA medication.     If diarrhea does not improve with discontinued vitamin c recommend adding fiber although have concern regarding potential ileus.     Monitoring/Evaluation:  TF tolerance, wt, labs, stooling

## 2021-06-14 NOTE — PROGRESS NOTES
NEUROLOGY PROGRESS NOTE     ASSESSMENT & PLAN   Hospital Day#: 23    Impression: Right posterior cerebral artery distribution stroke.  Most likely cardioembolic, given vascular distribution and atrial fibrillation.  As mentioned before, would not explain decreased responsiveness.  2.  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  3.  Encephalopathy-multifactorial process given acute kidney injury and hypotension problems and Covid infection.        Patient Active Problem List    Diagnosis Date Noted     Acute right PCA stroke (H)      Secondary spontaneous pneumothorax      Acute respiratory distress syndrome (ARDS) due to COVID-19 virus (H) 12/10/2020     Acute respiratory failure with hypoxia (H)      Acute kidney injury (H)      Pneumonia due to 2019 novel coronavirus 12/06/2020     Diabetes mellitus (H) 06/24/2019     Hypertension 06/24/2019     Chronic pain disorder 03/27/2019     Primary osteoarthritis of right hip 01/26/2018     Primary osteoarthritis of right knee 01/26/2018     BPH with urinary obstruction 11/15/2017     Past Medical History: Patient  has no past medical history on file.     SUBJECTIVE     No new changes.  Seen during dialysis run which he appeared to tolerate.     OBJECTIVE     Vital signs in last 24 hours  Temp:  [98.2  F (36.8  C)-99.6  F (37.6  C)] 98.9  F (37.2  C)  Heart Rate:  [] 94  Resp:  [22-38] 29  BP: ()/(45-71) 151/63  Arterial Line BP: ()/(39-72) 109/51  FiO2 (%):  [45 %-60 %] 60 %    Weight:   207 lb 14.3 oz (94.3 kg)    I/O last 24 hours    Intake/Output Summary (Last 24 hours) at 12/29/2020 1453  Last data filed at 12/29/2020 1354  Gross per 24 hour   Intake 3133.74 ml   Output 2408 ml   Net 725.74 ml       Review of Systems   unable    General Physical Exam: Patient is unresponsive. Vital signs were reviewed and are documented in EMR.  Neurological Exam:  Patient with eyes open but not responding to  questions.  Pupils are reactive.  No response to visual threat on either side.  No movement of arms or legs to painful stimulation.  Babinski-no movement.  DTRs trace at ankles/knees.  1/4 upper extremities.     DIAGNOSTIC STUDIES     Pertinent Radiology   Radiology Results: No results found.    Pertinent Labs   Lab Results: reviewed.  Recent Results (from the past 24 hour(s))   POCT Glucose    Collection Time: 12/28/20  3:00 PM    Specimen: Artery; Blood   Result Value Ref Range    Glucose 231 (H) 70 - 139 mg/dL   Type and Screen    Collection Time: 12/28/20  3:58 PM   Result Value Ref Range    ABORh A POS     Antibody Screen Negative Negative   INR    Collection Time: 12/28/20  3:58 PM   Result Value Ref Range    INR 1.16 (H) 0.90 - 1.10   Hemoglobin    Collection Time: 12/28/20  3:58 PM   Result Value Ref Range    Hemoglobin 8.8 (L) 14.0 - 18.0 g/dL   Anti-Xa Heparin Level    Collection Time: 12/28/20  3:58 PM   Result Value Ref Range    Anti-Xa Heparin Assay 0.42 See comments IU/mL   Platelet Count    Collection Time: 12/28/20  3:58 PM   Result Value Ref Range    Platelets 223 140 - 440 thou/uL   POCT Glucose    Collection Time: 12/28/20  4:00 PM    Specimen: Artery; Blood   Result Value Ref Range    Glucose 202 (H) 70 - 139 mg/dL   POCT Glucose    Collection Time: 12/28/20  4:55 PM    Specimen: Artery; Blood   Result Value Ref Range    Glucose 188 (H) 70 - 139 mg/dL   POCT Glucose    Collection Time: 12/28/20  6:13 PM    Specimen: Blood   Result Value Ref Range    Glucose 202 (H) 70 - 139 mg/dL   POCT Glucose    Collection Time: 12/28/20  7:04 PM    Specimen: Artery; Blood   Result Value Ref Range    Glucose 202 (H) 70 - 139 mg/dL   POCT Glucose    Collection Time: 12/28/20  7:57 PM    Specimen: Capillary; Blood   Result Value Ref Range    Glucose 203 (H) 70 - 139 mg/dL   Blood Gases, Arterial    Collection Time: 12/28/20  8:10 PM   Result Value Ref Range    pH, Arterial 7.34 (L) 7.37 - 7.44    pCO2, Arterial  34 (L) 35 - 45 mm Hg    pO2, Arterial 66 (L) 75 - 85 mm Hg    Bicarbonate, Arterial Calc 18.9 (L) 23.0 - 29.0 mmol/L    O2 Sat, Arterial 93.6 (L) 95.0 - 96.0 %    Oxyhemoglobin 91.6 (L) 95.0 - 96.0 %    Base Excess, Arterial Calc -7.4 mmol/L    Ventilation Mode AC     Rate 28 rr/min    FIO2 45.00     Peep 12 cm H2O    Sample Stabilized Temperature 37.0 degrees C    Ventilator Tidal Volume 520 mL   POCT Glucose    Collection Time: 12/28/20  9:03 PM    Specimen: Capillary; Blood   Result Value Ref Range    Glucose 187 (H) 70 - 139 mg/dL   POCT Glucose    Collection Time: 12/28/20 10:05 PM    Specimen: Blood   Result Value Ref Range    Glucose 194 (H) 70 - 139 mg/dL   POCT Glucose    Collection Time: 12/28/20 10:55 PM    Specimen: Capillary; Blood   Result Value Ref Range    Glucose 177 (H) 70 - 139 mg/dL   POCT Glucose    Collection Time: 12/28/20 11:54 PM    Specimen: Capillary; Blood   Result Value Ref Range    Glucose 169 (H) 70 - 139 mg/dL   POCT Glucose    Collection Time: 12/29/20  1:02 AM    Specimen: Capillary; Blood   Result Value Ref Range    Glucose 161 (H) 70 - 139 mg/dL   POCT Glucose    Collection Time: 12/29/20  1:59 AM    Specimen: Artery; Blood   Result Value Ref Range    Glucose 156 (H) 70 - 139 mg/dL   Anti-Xa Heparin Level    Collection Time: 12/29/20  2:00 AM   Result Value Ref Range    Anti-Xa Heparin Assay 0.53 See comments IU/mL   POCT Glucose    Collection Time: 12/29/20  3:51 AM    Specimen: Blood   Result Value Ref Range    Glucose 139 70 - 139 mg/dL   POCT Glucose    Collection Time: 12/29/20  5:01 AM    Specimen: Artery; Blood   Result Value Ref Range    Glucose 151 (H) 70 - 139 mg/dL   Basic Metabolic Panel    Collection Time: 12/29/20  5:08 AM   Result Value Ref Range    Sodium 137 136 - 145 mmol/L    Potassium 4.4 3.5 - 5.0 mmol/L    Chloride 107 98 - 107 mmol/L    CO2 16 (L) 22 - 31 mmol/L    Anion Gap, Calculation 14 5 - 18 mmol/L    Glucose 155 (H) 70 - 125 mg/dL    Calcium 8.3 (L)  8.5 - 10.5 mg/dL     (H) 8 - 28 mg/dL    Creatinine 4.15 (H) 0.70 - 1.30 mg/dL    GFR MDRD Af Amer 17 (L) >60 mL/min/1.73m2    GFR MDRD Non Af Amer 14 (L) >60 mL/min/1.73m2   HM2(CBC W/O DIFF)    Collection Time: 12/29/20  5:08 AM   Result Value Ref Range    WBC 19.3 (H) 4.0 - 11.0 thou/uL    RBC 2.83 (L) 4.40 - 6.20 mill/uL    Hemoglobin 8.8 (L) 14.0 - 18.0 g/dL    Hematocrit 27.7 (L) 40.0 - 54.0 %    MCV 98 80 - 100 fL    MCH 31.1 27.0 - 34.0 pg    MCHC 31.8 (L) 32.0 - 36.0 g/dL    RDW 18.8 (H) 11.0 - 14.5 %    Platelets 215 140 - 440 thou/uL    MPV 12.1 8.5 - 12.5 fL   Magnesium    Collection Time: 12/29/20  5:08 AM   Result Value Ref Range    Magnesium 2.6 1.8 - 2.6 mg/dL   Phosphorus    Collection Time: 12/29/20  5:08 AM   Result Value Ref Range    Phosphorus 5.9 (H) 2.5 - 4.5 mg/dL   Troponin I    Collection Time: 12/29/20  5:08 AM   Result Value Ref Range    Troponin I 0.38 (HH) 0.00 - 0.29 ng/mL   POCT Glucose    Collection Time: 12/29/20  6:04 AM    Specimen: Blood   Result Value Ref Range    Glucose 151 (H) 70 - 139 mg/dL   POCT Glucose    Collection Time: 12/29/20  6:55 AM    Specimen: Capillary; Blood   Result Value Ref Range    Glucose 209 (H) 70 - 139 mg/dL   Blood Gases, Arterial    Collection Time: 12/29/20  8:15 AM   Result Value Ref Range    pH, Arterial 7.29 (L) 7.37 - 7.44    pCO2, Arterial 37 35 - 45 mm Hg    pO2, Arterial 74 (L) 75 - 85 mm Hg    Bicarbonate, Arterial Calc 17.9 (L) 23.0 - 29.0 mmol/L    O2 Sat, Arterial 94.8 (L) 95.0 - 96.0 %    Oxyhemoglobin 92.8 (L) 95.0 - 96.0 %    Base Excess, Arterial Calc -8.6 mmol/L    Ventilation Mode AC     Rate 28 rr/min    FIO2 60.00     Peep 12 cm H2O    Sample Stabilized Temperature 37.0 degrees C    Ventilator Tidal Volume 520 mL   Anti-Xa Heparin Level    Collection Time: 12/29/20  8:15 AM   Result Value Ref Range    Anti-Xa Heparin Assay 0.63 See comments IU/mL   Hepatitis B Surface Antigen (HBsAG)    Collection Time: 12/29/20  8:15 AM    Result Value Ref Range    Hepatitis B Surface Ag Negative Negative   POCT Glucose    Collection Time: 12/29/20  8:18 AM    Specimen: Artery; Blood   Result Value Ref Range    Glucose 170 (H) 70 - 139 mg/dL   POCT Glucose    Collection Time: 12/29/20 10:11 AM    Specimen: Blood   Result Value Ref Range    Glucose 171 (H) 70 - 139 mg/dL   POCT Glucose    Collection Time: 12/29/20 12:15 PM    Specimen: Artery; Blood   Result Value Ref Range    Glucose 159 (H) 70 - 139 mg/dL   POCT Glucose    Collection Time: 12/29/20  2:09 PM    Specimen: Blood   Result Value Ref Range    Glucose 138 70 - 139 mg/dL         HOSPITAL MEDICATIONS       [START ON 12/30/2020] amiodarone  200 mg Oral DAILY     amiodarone  400 mg Oral BID     ascorbic acid (vitamin C)  1,000 mg Enteral Tube DAILY     aspirin  81 mg Enteral Tube DAILY     chlorhexidine  15 mL Topical Q12H     [Held by provider] docusate sodium (COLACE) 50 mg/5 mL oral liquid  100 mg Enteral Tube BID     hydrocortisone sod succ  50 mg Intravenous Q6H FIXED TIMES     meropenem  1 g Intravenous Q8H     omeprazole  20 mg Oral QAM AC    Or     omeprazole  20 mg Enteral Tube QAM AC    Or     pantoprazole  40 mg Intravenous QAM AC     [Held by provider] senna (SENOKOT) syrup  8.8 mg Enteral Tube BID     sodium chloride  10-30 mL Intravenous Q8H FIXED TIMES     vit B cmplx 3-FA-vit C-biotin  1 tablet Enteral Tube DAILY     white petrolatum-mineral oiL   Both Eyes Q8H        Total time spent for face to face visit, reviewing labs/imaging studies, counseling and coordination of care was: 15 minutes More than 50% of this time was spent on counseling and coordination of care.    Guamro Ramos MD  [facility]  Direct Secure Messaging: [email or web address]  Tel: [phone]

## 2021-06-14 NOTE — PROGRESS NOTES
Patient tolerated hemodialysis run.  According to dialysis RN, she was able to pull 2000 ml.  Patient then turned and repositioned after run.  ETT arellano replaced by writer and RT.  New mepilex placed on his left check ulcer area. Patient did have an induced cough while suctioning and oral care.  Otherwise neuros remain unchanged.  Wife has not called for an update.

## 2021-06-14 NOTE — PROGRESS NOTES
D: PICC line not patent. Will not flush X3 lumens. BP began to decline, minimal access.     A: Called for provider. Moved necessary drips to peripheral IV to maintain BP during placement of new central line.    R: Central line placed successfully. PICC discontinued and removed. VSS with previous amount of vasoactive agents.     A: During central line placement CRRT machine clotted. Filter replaced.    R:  CRRT off for maximum  1 hour. Continued CRRT.

## 2021-06-14 NOTE — PROGRESS NOTES
Events of day include: discontinued CRRT for head CT. Plan to start HD 12/28. Weaned sedation.    Tolerated sedation of  Fentanyl. Neuro exam per flowsheets.     Respiratory status progressing with vent support AC FiO2 45%/PEEP 12/ and RR 28.     Vital signs tenuous on norepinephrine and restarted vasopressin. Drips weaned per specified parameters, see eMar.     Family updated and all questions answered; wife and friends able to video conference with patient.

## 2021-06-14 NOTE — PROGRESS NOTES
12/22/20 1420   Patient Data   Vt Exp (mL) 524 mL   Minute Ventilation (L/min) 14.7 L/min   Total Resp Rate  32 BPM   PIP Observed (cm H2O) 30 cm H2O   MAP (cm H2O) 21   Auto/Intrinsic PEEP Observed (cm H2O) 1.4 cm H2O   Plateau Pressure (cm H2O) 27 cm H2O   Dynamic Compliance (L/cm H2O) 48.3 L/cm H2O   Airway Resistance 7.1   SpO2 100 %   Heart Rate 86

## 2021-06-14 NOTE — PROGRESS NOTES
Pt ABG's improved, CRRT continues with net 0. Able to wean levophed gtt down and vasopressin. Tx electrolytes per protocol. Pt's heparin gtt therapeutic. Lungs CTA, anuric, FMS in place with 500ml of loose stool. No neuro responses noted this shift. CT to right anterior chest wall with resolved or rate air leak. No crepitus noted.

## 2021-06-14 NOTE — DISCHARGE SUMMARY
Provider Discharge Summary    Primary Care Physician:  Wero Nolasco MD    Discharge Provider: Ermelinda Bragg CNP      Admission Date: 12/6/2020. Admission Diagnoses: 2019 novel coronavirus disease (COVID-19) [U07.1]   Discharge Date: January 6, 2021   Disposition: Final discharge disposition not confirmed;   Condition at Discharge: Fair  Code Status: Full Code     Principal Diagnosis:  Pneumonia due to 2019 novel coronavirus    Discharge Diagnoses:  Principal Problem:    Pneumonia due to 2019 novel coronavirus  Active Problems:    Acute respiratory failure with hypoxia (H)    Acute kidney injury (H)    Acute respiratory distress syndrome (ARDS) due to COVID-19 virus (H)    Secondary spontaneous pneumothorax    Acute right PCA stroke (H)    Encephalopathy    Encounter for palliative care    Metabolic encephalopathy      Consult/s: pulmonary/intensive care, ID, GI, nephrology and neurology    Plan by system:     Neuro: Acute pain; agitation/anxiety; ICU delirium, persistent metabolic encephalopathy; right PCA infarct (CT 12/23/2020).    - Neurology following   - Patient is off continuous sedative gtts; Now on scheduled oxy 10mg Q6-hours. Per nursing, he is waking up more and following some commands.  Dosing last adjusted on 1/4-21.     Cardiovascular: septic shock, atrial fibrillation.    - Levophed and Vasopressin for MAP > 65    - Midodrine 10mg three times a day started 1/4/21   - Stress dose steroid started 12/20/2020 - not sure how much this is helping. Back off slowly.    - tele monitoring.    - continue Amio, aspirin,     Pulmonary: Acute hypoxic respiratory failure with ARDS secondary COVID19 - recovered. Right apical pneumothorax s/p pigtail chest tube insertion on 12/20/2020. Trach placement 1/5/2021.    - Patient did not tolerate chest tube clamping on 12/23 --> developed right tension pneumothorax. Resolved when placed back on suction.    - Chest tube to water seal and plan on keeping in place until  off PPV.    - Continue current vent settings:   Vent Mode: AC  FiO2 (%):  [45 %-80 %] 50 %  S RR:  [28] 28  S VT:  [520 mL] 520 mL  PEEP/CPAP (cm H2O):  [8 cm H2O-10 cm H2O] 10 cm H2O  Minute Ventilation (L/min):  [13 L/min-15.4 L/min] 13.5 L/min  PIP:  [27 cm H2O-34 cm H2O] 27 cm H2O  MAP (cm H2O):  [15-18] 16    - routine trach care. Remove sutures 1/12/2021.       GI: Severe protein calorie malnutrition; diarrhea; maroon stools persistent; rectal ulcer secondary to rectal tube; cholelithiasis - nonobstructive; elevated LFTs.    - Nursing reporting large loose maroon stools multiple times per shift. He has been transfused with 2-units PRBCs yesterday for dropping hemoglobin. Discussed with GI physician; probably does not need to be scoped at this time but may need CTA and IR intervention if he has a big bleed. Considering our limited capabilities at this facility, transfer to facility that is able to perform Endoscopy and IR is preferred. Discussed with wife who after some thought, consented for transfer.    -  Avoid rectal tube placement due to presence of rectal ulcer.    - Follow up hemoglobin this noon - done.    - Hold tube feeding.     Renal: NICKY with little sign of renal recovery; S/P CRRT 12/18/2020 - 12/28/2020. iHD started 12/29. Hyponatremia.    - UF completed today    - tunneled dialysis catheter placed by IR 1/5   - Bladder scan daily    - Daily BMP; monitor sodium.     ID: COVID19 - recovered; Encterobacter cloacae VAP - treated, Septic shock.    - Completed 14-day course of meropenem for enterobacter pna (ended 1/1).    - Currently off antibiotics.     - ID signed off 12/30/2020     Endocrine: DMII; stress and steroid induced hyperglycemia.    - Continue to hold lantus while tube feeding is on hold.    - sliding scale insulin resistant scale Q4-h.     Heme/Coag: Acute blood loss anemia - Maroon stools concerning for acute GI bleed; coagulopathy due to COVID19    - Transferring to [facility]  there is availability of endoscopic and/or IR intervention.    - Trend hemoglobin and transfuse < 7.     Family: Wife Yuridia is main contact; working closely with Palliative Care team which she finds helpful.     *Telephone sign out to Dr. ARLEN Key completed*     Ermelinda Bragg, [facility] Pulmonary/Critical Care     Total time spent on day of discharge > 30 minutes.   _________________________________________________________    Hospital Summary:   Felipe Cruz is a 74 year old male with a PMH of CAD, HTN, HLD, DM2, and BPH who was admitted to [facility] on 12/6/2020.  He was admitted to the general care floor. He had increased oxygenation needs, started on HFNC, and transferred to the ICU on 12/8. He was intubated for worsening hypoxia on 12/10. He self extubated on 12/10, required reintubation, and again self-extubated on 12/14. He was reintubated 12/16 for worsening hypoxia. Course complicated by anuric NICKY requiring CRRT, profound shock requiring vasopressors, enterobacter PNA, and CT head on 12/23 showed right PCA infarct. Abx course ended 1/1/21. Vasopressors trending down, however now receiving HD and continues to appear fluid up. Intermittent neurologic responses, improving now that continuous sedation has been stopped. Over the course of the last week, nursing is reported several maroon stools. He was seen by GI Dr. Cohn on 12/30; no intervention required at that time. He has had several large maroon stools per nursing report accompanied by drops in hemoglobin requiring blood transfusion.       Vital Signs in last 24 hours:    Temp:  [97.4  F (36.3  C)-99.3  F (37.4  C)] 99.3  F (37.4  C)  Heart Rate:  [] 81  Resp:  [14-61] 28  BP: (108-175)/(52-78) 175/73  Arterial Line BP: ()/(42-78) 138/52  FiO2 (%):  [45 %-80 %] 50 %    Physical Exam:    General: Eyes open, no distress.   HEENMT: Trach site with old dried blood. Mouth is dry.   Neuro: squeezes hands  bilaterally; PERRL.   CV: Afib, rate controlled. S1S2, no murmur.   Pulm: Unlabored on full vent; lungs are coarse, diminished. No wheeze.   Integ: significant edema to all extremities.     Micro:    Blood: ngtd    Fungitell negative.    c.diff negative   sputum gram stain with prelim 1+ GPB   BC with NGTD   fungitell (pending)  galactomannan antigen (negative)   BC x2 with NGTD   Sputum with Enterobacter cloacae (sensitive to Meropenem)   A BAL with GS, AFB, KOH, fungal culture were ordered. Very minimal secretions on bronch .    sputum: Klebsiella pneumoniae    Blood cultures- NGTD   urine NGTD   BC with NGTD   blood staph epi, contaminant    Imagin/6 CXR - Well-positioned tracheostomy. Feeding tube extends below the diaphragmatic hiatus and outside the field-of-view. Right PICC and right jugular approach central venous catheters terminate at the SVC right atrial junction and lower third of the SVC   respectively. Tunneled left jugular approach dialysis catheter terminates in the right atrium.   Apically directed right pleural drain is unchanged in position. No visible pneumothorax.   Patchy bilateral airspace opacities are not substantively changed including confluent opacity in the basal left chest which obscures the left hemidiaphragm interface.   Unchanged cardiac and mediastinal borders accounting for differences in projection.   Bilateral glenohumeral osteoarthrosis. No new bone findings.    2020 Head CT: 1.  Right PCA distribution infarct with involvement of the medial posterior right temporal lobe as well as the medial and inferior right occipital lobe. Marked hypoattenuation obscuring the gray-white junction favors an overall subacute nature. No   intracranial hemorrhage associated with this focus of infarction.   2.  3 mm punctate hyperdense focus in the lateral right thalamus is nonspecific and potentially relates to a  small amount of mineralization. Punctate focus of acute intraparenchymal hemorrhage is possible, and attention on follow-up head CT will be of   benefit.  3.  Background age-related changes, as above.  4.  Diffuse paranasal sinus disease with fluid opacification in the bilateral mastoid air cells.    1/3/2021 Head CT - 1.  No new acute intracranial abnormality.  2.  Continued expected evolution of the right posterior cerebral artery territory infarction without complication.

## 2021-06-14 NOTE — PROGRESS NOTES
[facility] COVID RT PROGRESS NOTE     DATA:  VENT DAY#  19     CURRENT SETTINGS:   AC 28, 520, +8, 45%     PATIENT PARAMETERS:     PIP:   26  Pplat:  22  Pmean:  14     SBT:   .     SECRETIONS:   scant pink   02 SATS:   95-97     ETT SIZE 8.5  Secured at  25 cm at teeth     Securing device changed / tube re-taped date:   12/29     Respiratory Medications:   N/A      ABG: Pt was stable during night on mechanical ventilation.  Had one episode of desaturation around 05:30 and FiO2 increased to 45%.  Extensive breakdown on lips, both upper and lower which are most scabbed over at this point.  Continue to monitor

## 2021-06-14 NOTE — PROGRESS NOTES
12/26/20 0752   Patient Data   Vt Exp (mL) 656 mL   Minute Ventilation (L/min) 15 L/min   Total Resp Rate  37 BPM   PIP Observed (cm H2O) 33 cm H2O   MAP (cm H2O) 22   Auto/Intrinsic PEEP Observed (cm H2O) 2.2 cm H2O   Plateau Pressure (cm H2O) 31 cm H2O   Dynamic Compliance (L/cm H2O) 39 L/cm H2O   Airway Resistance 3.7   SpO2 100 %   Heart Rate 97

## 2021-06-14 NOTE — PLAN OF CARE
Neuro: Patient currently only receiving fentanyl. All other sedation has been stopped. See flowsheet for neuro assessment details. No cough, gag or blink reflexes noted. Pupils equal and sluggishly reactive, CPOT 0. No movement to extremities noted.      Resp: 8.5 ET tube, 26 at the teeth. PEEP 12, , Fi02 continues at 45 %, weaning as tolerating.  Suction and mouth care q 2 hourly, scant oral secretions noted. Scant tan secretions from ET tube.     CV: Afib rates 100-120, -120s systolic, titrating Levophed  for MAPs >65. Extremities warm and well perfused, palpable pulses throughout.      GI: Enteroflex remains in nare, bridle for securement. Tube feeds currently running at 50cc, which is goal. 40 cc free water q 4 hourly. BS active in all quadrants, [facility] remains in place.      : Anuric. Bladderscan 24mL. CRRT stopped yesterday with plan to start hemodialysis today per nephrology note.      Endo:  BS >250s,  Acu-checks q 4, insulin given according to sliding scale.  Lantus given per order.      ID: Tmax 100.4F, tylenol given q8H, cooling blanket in place. WBC decreasing slowly.      Skin: Pressure sores noted per assessment. No new skin breakdown noted. Turn and reposition for comfort. Mepilex remains in place to coccyx area. Turn and reposition q 2 hourly.       Problem: Pain  Goal: Patient's pain/discomfort is manageable  Outcome: Progressing     Problem: Safety  Goal: Patient will be injury free during hospitalization  Outcome: Progressing     Problem: Daily Care  Goal: Daily care needs are met  Outcome: Progressing     Problem: Psychosocial Needs  Goal: Collaborate with patient/family/caregiver to identify patient specific goals for this hospitalization  Outcome: Progressing     Problem: Potential for Compromised Skin Integrity  Goal: Skin integrity is maintained or improved  Outcome: Progressing  Goal: Nutritional status is improving  Outcome: Progressing     Problem: Inadequate Gas  Exchange  Goal: Patient will achieve/maintain normal respiratory rate/effort  Outcome: Progressing     Problem: Ineffective Airway Clearance  Goal: Maintain airway patency  Outcome: Progressing     Problem: Impaired Gas Exchange  Goal: Demonstrate improved ventilation and adequate oxygenation of tissues as evidenced by absence of respiratory distress  Outcome: Progressing     Problem: Breathing  Goal: Patient will maintain patent airway  Outcome: Progressing     Problem: Risk for Infection  Goal: Identify and demonstrate techniques, lifestyle changes to prevent/reduce risk of infection and promote safe environment  Outcome: Progressing     Problem: Potential for Falls  Goal: Patient will remain free of falls  Outcome: Progressing     Problem: Decreased Mental Status Causing Increased Need for Safety  Goal: To ensure patient safety, patient will abstain from any threats or actions to harm self or others  Outcome: Progressing     Problem: Mechanical Ventilation  Goal: Patient will maintain patent airway  Outcome: Progressing  Goal: Oral health is maintained or improved  Outcome: Progressing  Goal: Respiratory status - ventilation  Description: Movement of air in and out of the lungs.    Liberate from ventilator  Outcome: Progressing  Goal: ET tube will be managed safely  Outcome: Progressing  Goal: Mobility/activity is maintained at optimum level for patient  Outcome: Progressing     Problem: Potential for transmission of organism by Contact, Enteric, Droplet and/or Airborne routes  Goal: Prevent transmission of organisms  Outcome: Progressing     Problem: Psychosocial Needs  Goal: Demonstrates ability to cope with hospitalization/illness  Outcome: Not Progressing     Problem: Discharge Barriers  Goal: Patient's discharge needs are met  Outcome: Not Progressing     Problem: Glucose Imbalance  Goal: Achieve optimal glucose control  Outcome: Not Progressing     Problem: Knowledge Deficit  Goal: Patient/family/caregiver  demonstrates understanding of disease process, treatment plan, medications, and discharge instructions  Outcome: Not Progressing     Problem: Breathing  Goal: Patient will utilize incentive spirometer  Outcome: Not Progressing     Problem: Knowlegde Deficit  Goal: Verbalize understanding of condition/disease process and treatment, participate in lifestyle changes and treatment regimen  Outcome: Not Progressing     Problem: Cognitive Impairment or Disorientation  Goal: Patient will maintain or return to normal baseline cognitive function  Outcome: Not Progressing     Problem: Decreased Mental Status Causing Increased Need for Safety  Goal: Decrease patient's symptoms  Outcome: Not Progressing     Problem: Mechanical Ventilation  Goal: Ability to express needs and understand communication  Outcome: Not Progressing

## 2021-06-14 NOTE — PROGRESS NOTES
01/01/21 1914   Patient Data   Total Resp Rate  30 BPM   PIP Observed (cm H2O) 25 cm H2O   MAP (cm H2O) 14   Plateau Pressure (cm H2O) 11 cm H2O   Dynamic Compliance (L/cm H2O) 42.9 L/cm H2O   Airway Resistance 8.2

## 2021-06-14 NOTE — PLAN OF CARE
Problem: Inadequate Gas Exchange  Goal: Patient will achieve/maintain normal respiratory rate/effort  Outcome: Progressing     Problem: Ineffective Airway Clearance  Goal: Maintain airway patency  Outcome: Progressing     Problem: Impaired Gas Exchange  Goal: Demonstrate improved ventilation and adequate oxygenation of tissues as evidenced by absence of respiratory distress  Outcome: Progressing     Problem: Breathing  Goal: Patient will maintain patent airway  Outcome: Progressing     Problem: Mechanical Ventilation  Goal: Patient will maintain patent airway  Outcome: Progressing

## 2021-06-14 NOTE — PROGRESS NOTES
Updates:    Pt remains mechanically ventilated via trach which was placed 1/5/21. Trach site is in tact. Restarted TF @ 0000. Pt now follows commands in BUE by squeezing fingers when asked and nods head to questions. Pt's BP became hypotensive, titrated Levo accordingly and restarted Vasopressin. Will continue to monitor.

## 2021-06-14 NOTE — PLAN OF CARE
Patient resting comfortably on the vent throughout the shift. Meds given and titrated per MD order. See flowsheets and MAR for all events of the shift. Will continue to monitor and report to oncoming shift.    Problem: Pain  Goal: Patient's pain/discomfort is manageable  Outcome: Progressing     Problem: Safety  Goal: Patient will be injury free during hospitalization  Outcome: Progressing     Problem: Daily Care  Goal: Daily care needs are met  Outcome: Progressing     Problem: Psychosocial Needs  Goal: Demonstrates ability to cope with hospitalization/illness  Outcome: Progressing  Goal: Collaborate with patient/family/caregiver to identify patient specific goals for this hospitalization  Outcome: Progressing     Problem: Discharge Barriers  Goal: Patient's discharge needs are met  Outcome: Progressing     Problem: Glucose Imbalance  Goal: Achieve optimal glucose control  Outcome: Progressing     Problem: Knowledge Deficit  Goal: Patient/family/caregiver demonstrates understanding of disease process, treatment plan, medications, and discharge instructions  Outcome: Progressing     Problem: Potential for Compromised Skin Integrity  Goal: Skin integrity is maintained or improved  Outcome: Progressing  Goal: Nutritional status is improving  Outcome: Progressing     Problem: Inadequate Gas Exchange  Goal: Patient will achieve/maintain normal respiratory rate/effort  Outcome: Progressing     Problem: Ineffective Airway Clearance  Goal: Maintain airway patency  Outcome: Progressing     Problem: Impaired Gas Exchange  Goal: Demonstrate improved ventilation and adequate oxygenation of tissues as evidenced by absence of respiratory distress  Outcome: Progressing     Problem: Breathing  Goal: Patient will maintain patent airway  Outcome: Progressing     Problem: Knowlegde Deficit  Goal: Verbalize understanding of condition/disease process and treatment, participate in lifestyle changes and treatment regimen  Outcome:  Progressing     Problem: Risk for Infection  Goal: Identify and demonstrate techniques, lifestyle changes to prevent/reduce risk of infection and promote safe environment  Outcome: Progressing     Problem: Potential for Falls  Goal: Patient will remain free of falls  Outcome: Progressing     Problem: Cognitive Impairment or Disorientation  Goal: Patient will maintain or return to normal baseline cognitive function  Outcome: Progressing     Problem: Decreased Mental Status Causing Increased Need for Safety  Goal: Decrease patient's symptoms  Outcome: Progressing  Goal: To ensure patient safety, patient will abstain from any threats or actions to harm self or others  Outcome: Progressing     Problem: Mechanical Ventilation  Goal: Patient will maintain patent airway  Outcome: Progressing  Goal: Oral health is maintained or improved  Outcome: Progressing  Goal: Respiratory status - ventilation  Description: Movement of air in and out of the lungs.    Liberate from ventilator  Outcome: Progressing  Goal: ET tube will be managed safely  Outcome: Progressing  Goal: Ability to express needs and understand communication  Outcome: Progressing  Goal: Mobility/activity is maintained at optimum level for patient  Outcome: Progressing     Problem: Potential for transmission of organism by Contact, Enteric, Droplet and/or Airborne routes  Goal: Prevent transmission of organisms  Outcome: Progressing

## 2021-06-14 NOTE — PROGRESS NOTES
"[facility]    Assessment and Plan:    Right PCA stroke, encephalopathy, diffuse weakness  The stroke is likely cardioembolic due to A. Fib    Patient still quite ill with ARDS due to COVID-19  More responsive than when I first met him on [address].  But still on a little bit of sedation--fentanyl   Still on some norepinephrine  Follow-up CT again showed the right PCA infarct, no new changes, no significant hemorrhagic transformation    He remains anuric, regular hemodialysis started now, Tuesday Thursday Saturday.  A. fib: Okay to anticoagulate from my perspective.    Continue supportive care.    Subjective:     Red is a 75-year-old man admitted at [facility] on December 6 with significant respiratory disease due to COVID-19.  I initially met him on [place] [facility] after CT scan had shown right PCA stroke.  At that time he was quite unresponsive, doing a bit better now.    1-2-21: no significant change today, HD starting now    Objective:  /50   Pulse (!) 108   Temp 97.8  F (36.6  C)   Resp 28   Ht 6' 2\" (1.88 m)   Wt 216 lb 7.9 oz (98.2 kg)   SpO2 99%   BMI 27.80 kg/m       Ventilated via ET tube  Supine, no acute distress  Eyes drifting open at rest  Gaze conjugate  He does squeeze to command on both sides  He focusses eyes and tracks me when I talk to him, his right side better than left  Tone still a little bit reduced    CT images personally reviewed    Scheduled Meds:    amino acids-protein hydrolys  1 packet Enteral Tube DAILY     amiodarone  200 mg Oral DAILY     aspirin  81 mg Enteral Tube DAILY     chlorhexidine  15 mL Topical Q12H     [Held by provider] docusate sodium (COLACE) 50 mg/5 mL oral liquid  100 mg Enteral Tube BID     hydrocortisone sod succ  50 mg Intravenous Q6H FIXED TIMES     insulin aspart (NovoLOG) injection   Subcutaneous Q4H FIXED TIMES     insulin glargine  50 Units Subcutaneous BID     midodrine  10 mg Oral TID with meals     " omeprazole  20 mg Oral QAM AC    Or     omeprazole  20 mg Enteral Tube QAM AC    Or     pantoprazole  40 mg Intravenous QAM AC     oxyCODONE  10 mg Enteral Tube Q4H     [Held by provider] senna (SENOKOT) syrup  8.8 mg Enteral Tube BID     sodium chloride  10-30 mL Intravenous Q8H FIXED TIMES     vit B cmplx 3-FA-vit C-biotin  1 tablet Enteral Tube DAILY     white petrolatum-mineral oiL   Both Eyes Q8H     Continuous Infusions:    dextrose 10%       fentaNYL citrate (PF) 75 mcg/hr (01/02/21 0814)     heparin 1,050 Units/hr (01/02/21 0814)     insulin infusion (1 unit/mL) Stopped (12/30/20 1400)     norepinephrine 0.16 mcg/kg/min (01/02/21 0814)     vasopressin Stopped (12/31/20 1235)     PRN Meds:.acetaminophen, albuterol, bisacodyL, dextrose 10%, dextrose 50 % (D50W), fentaNYL pf, glucagon (human recombinant), hydrALAZINE, insulin infusion (1 unit/mL), labetalol, lipase-protease-amylase **AND** sodium bicarbonate, magnesium hydroxide, midazolam, naloxone **OR** naloxone **OR** naloxone **OR** naloxone, ondansetron **OR** ondansetron, polyethylene glycol, polyvinyl alcohol, sodium chloride 0.9%, sodium chloride bacteriostatic, sodium chloride, sodium chloride     Patient Active Problem List   Diagnosis     Pneumonia due to 2019 novel coronavirus     Acute respiratory failure with hypoxia (H)     Acute kidney injury (H)     BPH with urinary obstruction     Chronic pain disorder     Diabetes mellitus (H)     Hypertension     Primary osteoarthritis of right hip     Primary osteoarthritis of right knee     Acute respiratory distress syndrome (ARDS) due to COVID-19 virus (H)     Secondary spontaneous pneumothorax     Acute right PCA stroke (H)     Encephalopathy         Eris Mcqueen MD

## 2021-06-14 NOTE — PROGRESS NOTES
12/30/20 1918   Patient Data   Vt Exp (mL) 531 mL   Minute Ventilation (L/min) 15.2 L/min   Total Resp Rate  30 BPM   PIP Observed (cm H2O) 27 cm H2O   MAP (cm H2O) 17   Plateau Pressure (cm H2O) 26 cm H2O   Dynamic Compliance (L/cm H2O) 38.8 L/cm H2O   Airway Resistance 7.1

## 2021-06-14 NOTE — PROGRESS NOTES
[facility]    ICU PROGRESS NOTE:  DOS:  12/29/2020    Patient Summary:   Felipe Cruz is a 74 year old male with a PMH of CAD, HTN, HLD, DM2, and BPH who was admitted to [facility] on 12/6/2020.  He was admitted to the general care floor. He had increased oxygenation needs, started on HFNC, and transferred to the ICU on 12/8. He was intubated for worsening hypoxia on 12/10. He self extubated on 12/10, required reintubation, and again self-extubated on 12/14. He was reintubated 12/16 for worsening hypoxia. Course complicated by anuric NICKY requiring CRRT and now profound shock vasopressors, enterobacter PNA.      Major Changes for Today    iHD today  Monitor secretions--bloody ETT noted. Monitor closely.     15:48: I called and updated his wife,  Yuridia, questions answered.     Assessment/Plan:  NEUROLOGIC:  # acute pain  # agitation/anxiety  # delirium  # encephalopathy   - Did not tolerate Precedex and Propofol previously   - Fentanyl @ 25 mcg/hr-- restarted 12/28 due to tachypnea    - Versed gtts off.    - Stopped Seroquel   - Cisatracurium discontinued 12/22  - RAAS goal 0 to -1 but currently GCS 3T, no gag or cough reflex     # Right PCA infarct  Demonstrated on CTH 12/23 with concern for possible right thalamus hypodensity, potential 3 mm hemorrhagic transformation within the right PCA distribution  - Neurology consulted, appreciate recommendations  - Ceribell with non specific slowing with superimposed beta activity. No sharp or rhythmic discharges.               - repeat CT head 12/27/20:  Evolving infarct, in right PCA, no hemorrhagic conversion  - Carotid US with 50-50% bilateral ICA stenosis     HEMODYNAMICS/CV:  # Shock, septic  #Tachycrdia, resolved   # Atrial fibrillation   - 12/16: Echo: EF > 63%, Normal RV And LV fn, normal valves. No Tamponade   - Continue norepinephrine for MAP >65.   - Stress dose steroids restarted 12/20  - trop remain in 0.3 range. Echo 12/28 with no WMA,  decrease pericardial effusion. No tamponade. Normal EF   - ID as below     RESPIRATORY:  # Acute hypoxic respiratory failure and ARDS 2/2 COVID19  - intubated 12/10, self-extubated 12/14, reintubated 12/16    # Klebsiella PNA   # Right apical pneumothorax s/p pigtail insertion 12/20  # Right tension pneumothorax on 12/23 with CT clamped for procedure. Resolved when placed back to suction. Continue CT to suction for now.   - Continue lung protective ventilation  - PEEP 12  Vent Mode: AC  FiO2 (%):  [45 %-60 %] 60 %  S RR:  [28] 28  S VT:  [520 mL] 520 mL  PEEP/CPAP (cm H2O):  [12 cm H2O] 12 cm H2O  Minute Ventilation (L/min):  [13.2 L/min-15.1 L/min] 13.2 L/min  PIP:  [28 cm H2O-36 cm H2O] 28 cm H2O  MAP (cm H2O):  [18-20] 18  - Leave supinated- P/F <150. But given protracted course proning no longer likely beneficially and will not improve his outcome    - Off inhaled  Veletri     GI:  # Severe protein calorie malnutrition   # Diarrhea-- C diff negative 12/22  # Concern for ileus- non obstructive bowel gas pattern on 12/22 KUB and on CT, now stooling.   # Cholelithiasis discovered on CT 12/23, non obstructive  # Elevated LFTs, likely r/t shock possibly a component of Amiodarone. Alk phos and bili with normal parameters. CT abdomen completed 12/23 with no obstruction  - RD following.   - protein BID  - bowel regimen: hold given diarrhea.      RENAL:  # Anuric NICKY   # Persistent metabolic acidosis  - Started on CRRT 12/18. Stopped 12/28. Plan for iHD today. TTHS schedule per renal   - Electrolytes balanced  - Bladder scan PRN and straight cath if PVR > 250.      # Bilateral 2 cm renal masses incidentally found on CT, recommend follow up at later time  # Bladder wall ? Stone 3mm in size incidentally found on CT. Recommended follow up      ID:  # COVID 19   # Klebsiella VAP- treated  # Enterobacter cloacae VAP- active.   # Septic shock  # Severe Hypothermia  -WBC:  40-->35.1-->32-->24--> 20--> 19.3 (today)  - Profoundly  hypothermic 12/26, 92 degrees. Culture with NGTD   - ID following. Appreciate cares and recommendations's   - Plan for 14 days of antibiotics for enterobacter PNA.     MICRO:  12/23 Fungitell negative.   12/22 c.diff negative  12/22 sputum gram stain with prelim 1+ GPB  12/22 BC with NGTD  12/19 fungitell (pending)  galactomannan antigen (negative)  12/19 BC x2 with NGTD  12/19 Sputum with Enterobacter cloacae (sensitive to Meropenem)  12/19 A BAL with GS, AFB, KOH, fungal culture were ordered. Very minimal secretions on bronch 12/19.   12/12 sputum: Klebsiella pneumoniae  12/12  Blood cultures- NGTD  12/11 urine NGTD  12/11 BC with NGTD  12/6 blood staph epi, contaminant     Antimicrobials:  12/16 zosyn 12/16-12/17  Ceftriaxone 12/17-12/19 12/19--> Meropenem   12/19- 12/23 Fluconazole  12/23 Fluconazole broadened to Micafungin     Other Workup:  12/23: CT C/A/P  12/23 lipase normal  12/23 No e/o ischemic limbs     COVID:  Completed remdesivir course   Decadron 6 mg IV X 10 days      HEME:  # anemia of critical illness  # coagulopathy due to COVID -19  - Hemoglobin stable   - Continue with Heparin gtt, increased to high intensity 12/22--monitor, if continues ETT bleeds, will change back to low intensity       ENDOCRINE:  # DM II  # Stress and steroid induced hyperglycemia  HgbA1C on admit 8.2  - Hold PTA Lantus and oral agents  - cont with insulin gtt. Goal to keep BG < 180.      MSK:  # acute critical care weakness/deconditioning   - hold OOB activity while heavily sedated/ tenuous  - ROM     SOCIAL:  Overall poor prognosis. Palliative consult but wife refused.      PROPHYLAXIS:   DVT proph:Yes. Heparin gtt for embolic stroke and A-fib   GI proph: Yes.  Requires solorzano for strict I&O: Yes  Restraints required for safety: Yes     DISPOSITION:  - ICU     LINES/TUBES/DRAINS:  - Right internal jugular TLC 12/22  - L art line, placed 12/10  - R fem dialysis line 12/19  - Right pigtail chest tube     Total Critical Care  "Time : 45 Minutes      Oscar Holley PA-C     Overnight events: Course reviewed. No acute events overnight.  Plan for HD today. Some blood secretions from ETT. Fentanyl gtt-low rate overnight due to tachypnea. Not responsive to verbal or physical stimuli.     Objective:  Physical Exam:  Vent settings for last 24 hours:  Vent Mode: AC  FiO2 (%):  [45 %-60 %] 60 %  S RR:  [28] 28  S VT:  [520 mL] 520 mL  PEEP/CPAP (cm H2O):  [12 cm H2O] 12 cm H2O  Minute Ventilation (L/min):  [13.2 L/min-15.1 L/min] 13.2 L/min  PIP:  [28 cm H2O-36 cm H2O] 28 cm H2O  MAP (cm H2O):  [18-20] 18    /63   Pulse (!) 101   Temp 98.9  F (37.2  C)   Resp 28   Ht 6' 2\" (1.88 m)   Wt 207 lb 14.3 oz (94.3 kg)   SpO2 99%   BMI 26.69 kg/m      Intake/Output last 3 shifts:  I/O last 3 completed shifts:  In: 3034.1 [I.V.:1079.1; NG/GT:1790; IV Piggyback:165]  Out: 868 [Stool:800; Chest Tube:68]  Intake/Output this shift:  I/O this shift:  In: 598.6 [I.V.:273.6; NG/GT:275; IV Piggyback:50]  Out: 1540 [Other:1500; Chest Tube:40]    Physical Exam  Neuro: not responsive to voice or physical stimuli   HEENT:  PERRLA. Pupils 4mm. ETT present and secured. NJ present and secure. OP dry.   RESP: BBS, lungs coarse. No air leak from CT-- pink serosangious drainage   CV: S1, S2  GI: abdomen soft and compressible. Rectal tube with liquid green stool   : voiding per solorzano urine clear and yellow   MSK: Extremities: calves soft and compressible, pulses 2+.     LAB:  Results from last 7 days   Lab Units 12/29/20  0508   LN-WHITE BLOOD CELL COUNT thou/uL 19.3*   LN-HEMOGLOBIN g/dL 8.8*   LN-HEMATOCRIT % 27.7*   LN-PLATELET COUNT thou/uL 215     Results from last 7 days   Lab Units 12/29/20  0508 12/28/20  0405 12/27/20  1637 12/26/20  0518 12/26/20  0518 12/25/20  0547 12/25/20  0547 12/23/20  0405 12/23/20  0405   LN-SODIUM mmol/L 137 134* 134*   < > 135*  135*   < > 135*   < > 135*   LN-POTASSIUM mmol/L 4.4 5.0 4.8  4.8   < > 4.5  4.5   < > 4.6   < " > 4.7   LN-CHLORIDE mmol/L 107 105 104   < > 105  105   < > 106   < > 100   LN-CO2 mmol/L 16* 16* 16*   < > 16*  16*   < > 18*   < > 22   LN-BLOOD UREA NITROGEN mg/dL 109* 68* 51*   < > 28  --  28   < > 24   LN-CREATININE mg/dL 4.15* 2.74* 1.76*   < > 0.94  --  1.11   < > 1.22   LN-CALCIUM mg/dL 8.3* 8.7 8.4*   < > 8.3*  8.3*   < > 8.5   < > 8.7   LN-PROTEIN TOTAL g/dL  --   --   --   --  6.0  --  5.8*  --  6.0   LN-BILIRUBIN TOTAL mg/dL  --   --   --   --  0.8  --  1.0  --  0.7   LN-ALKALINE PHOSPHATASE U/L  --   --   --   --  109  --  103  --  99   LN-ALT (SGPT) U/L  --   --   --   --  234*  --  268*  --  182*   LN-AST (SGOT) U/L  --   --   --   --  123*  --  216*  --  213*    < > = values in this interval not displayed.

## 2021-06-14 NOTE — PROGRESS NOTES
01/05/21 0750   Vent Information   Vent Mode AC   Vent Settings   FiO2 (%) 45 %   Resp Rate (Set) 28   Insp Time (sec) 0.7 sec   Vt (Set, mL) 520 mL   PEEP/CPAP (cm H2O) 8 cm H2O   Patient Data   Minute Ventilation (L/min) 13.2 L/min   Total Resp Rate  28 BPM   PIP Observed (cm H2O) 27 cm H2O   MAP (cm H2O) 15   Plateau Pressure (cm H2O) 25 cm H2O   Static Compliance (L/cm H2O) 40   SpO2 100 %   Heart Rate 86

## 2021-06-14 NOTE — PROGRESS NOTES
"[facility]    Assessment and Plan:    Right PCA stroke, encephalopathy, diffuse weakness  The stroke is likely cardioembolic due to A. Fib    Patient still quite ill with ARDS due to COVID-19  More responsive than when I first met him on [place] [address].  But still on a little bit of sedation  Still on some norepinephrine  Follow-up CT again showed the right PCA infarct, no new changes, no significant hemorrhagic transformation    He remains anuric, regular hemodialysis started now, Tuesday Thursday Saturday.  A. fib: Okay to anticoagulate from my perspective.    Continue supportive care.    Subjective:     Red is a 75-year-old man admitted at [facility] on December 6 with significant respiratory disease due to COVID-19.  I initially met him on [place] [address] after CT scan had shown right PCA stroke.  At that time he was quite unresponsive, doing a bit better now.    Objective:  /48   Pulse (!) 102   Temp 98.6  F (37  C) (Axillary)   Resp (!) 45   Ht 6' 2\" (1.88 m)   Wt 202 lb 9.6 oz (91.9 kg)   SpO2 100%   BMI 26.01 kg/m       Ventilated via ET tube  Supine, no acute distress  Eyes drifting open at rest  Gaze conjugate  He does squeeze to command on both sides  He focusses eyes and tracks me when I talk to him, his right side better than left  Tone still a little bit reduced    CT images personally reviewed    Scheduled Meds:    amino acids-protein hydrolys  1 packet Enteral Tube DAILY     amiodarone  200 mg Oral DAILY     aspirin  81 mg Enteral Tube DAILY     chlorhexidine  15 mL Topical Q12H     [Held by provider] docusate sodium (COLACE) 50 mg/5 mL oral liquid  100 mg Enteral Tube BID     hydrocortisone sod succ  50 mg Intravenous Q6H FIXED TIMES     insulin aspart (NovoLOG) injection   Subcutaneous Q4H FIXED TIMES     insulin glargine  45 Units Subcutaneous BID     meropenem  1 g Intravenous Q8H     midodrine  10 mg Oral TID with meals     omeprazole  20 mg Oral QAM " AC    Or     omeprazole  20 mg Enteral Tube QAM AC    Or     pantoprazole  40 mg Intravenous QAM AC     [Held by provider] senna (SENOKOT) syrup  8.8 mg Enteral Tube BID     sodium chloride  10-30 mL Intravenous Q8H FIXED TIMES     vit B cmplx 3-FA-vit C-biotin  1 tablet Enteral Tube DAILY     white petrolatum-mineral oiL   Both Eyes Q8H     Continuous Infusions:    dextrose 10%       fentaNYL citrate (PF) 100 mcg/hr (01/01/21 0400)     insulin infusion (1 unit/mL) Stopped (12/30/20 1400)     norepinephrine 0.11 mcg/kg/min (01/01/21 0927)     vasopressin Stopped (12/31/20 1235)     PRN Meds:.acetaminophen, albuterol, bacitracin, bisacodyL, dextrose 10%, dextrose 50 % (D50W), fentaNYL pf, glucagon (human recombinant), heparin ANTICOAGULANT, hydrALAZINE, insulin infusion (1 unit/mL), labetalol, lipase-protease-amylase **AND** sodium bicarbonate, magnesium hydroxide, magnesium sulfate IVPB, magnesium sulfate IVPB, midazolam, naloxone **OR** naloxone **OR** naloxone **OR** naloxone, ondansetron **OR** ondansetron, polyethylene glycol, polyvinyl alcohol, potassium chloride, potassium chloride, potassium chloride, povidone-iodine, sodium chloride 0.9%, sodium chloride bacteriostatic, sodium chloride, sodium chloride, sodium phosphate IVPB, sodium phosphate IVPB     Patient Active Problem List   Diagnosis     Pneumonia due to 2019 novel coronavirus     Acute respiratory failure with hypoxia (H)     Acute kidney injury (H)     BPH with urinary obstruction     Chronic pain disorder     Diabetes mellitus (H)     Hypertension     Primary osteoarthritis of right hip     Primary osteoarthritis of right knee     Acute respiratory distress syndrome (ARDS) due to COVID-19 virus (H)     Secondary spontaneous pneumothorax     Acute right PCA stroke (H)     Encephalopathy         Eris Mcqueen MD

## 2021-06-14 NOTE — PLAN OF CARE
Problem: Mechanical Ventilation  Goal: Patient will maintain patent airway  Outcome: Progressing     Problem: Mechanical Ventilation  Goal: ET tube will be managed safely  Outcome: Progressing      [facility] COVID RT PROGRESS NOTE    DATA:  VENT DAY#  20 of 3rd intubation. (Pt intubated all but 1 day of the last 28 days.)    CURRENT SETTINGS:   AC 28, 520, +10, 50%    PATIENT PARAMETERS:    PIP:   27-29  Pplat:  24-26  Pmean:  15-17    SBT:   None today.    SECRETIONS:   Pt sx'd x2 by RN/RT for scant amts of secretions.    02 SATS:  %    Trach:   #8 Shiley PERC placed 01/05    Respiratory Medications:   Duoneb / Mucomyst nebs Q6.    ABG:  Results for GISELLE VILLANUEVA (MRN [id number]) as of 1/6/2021 16:10   1/6/2021 04:28   pH, Arterial 7.48 (H)   pCO2, Arterial 30 (L)   pO2, Arterial 77   Bicarbonate, Arterial Calc 23.7   O2 Sat, Arterial 97.4 (H)   Oxyhemoglobin 95.1   POC Base Excess Calc -1.4   Ventilation Mode AC   Peep 10   Sample Stabilized Temperature 37.0   Rate 28   Vt 520   FIO2 0.50   P/F ratio 154     NOTE / PLAN:   Continue resp support as needed, monitor closely, & wean as tolerated.     Transfer to [facility] pending. Trach tube obturator, and spare trach tubes will be sent w/ pt.

## 2021-06-14 NOTE — PLAN OF CARE
Neuro: Patient currently paralyzed and sedated on fentanyl and versed. See flowsheet for titrations, titrated for RASS -4. Pupils equal and reactive, CPOT 0. Furrows brow and moves jaw in response to painful stimuli. No other movement noted. Remains synchronous with the ventilator. Plan to wean as tolerating.      Resp: 8.5 ET tube, 26 at the teeth. PEEP 16, , FiO2 at 55%. Suction and mouth care q 2 hourly, small amounts tan secretions suctioned orally and nasally. Small thick, tan secretions from ET tube. Bicarb gtt continues.      CV: Afib with PVCs, heartrate variable, BP 90-150s systolic, titrating Levophed  for MAPs >65. Extremities cool, with weak palpable pulses throughout. Vasopressin maxed at 2.4 units/min,       GI: Enteroflex remains in right nare, bridle for securement. Enteroflex unable to be able to be flushed at beginning of shift. PRN medication to unclog given. Pushing water still difficult. OGT being used for tube feedings which were restarted at a trickle rate of 10mL/hr. BS hypoactive in all quadrants, bowel regimen in place. [facility] in place.     : Patient anuric. CRRT continues. HD catheter remains positional. See CRRT flowsheet for details.      Endo:  -150s,  Acu-checks q4H, insulin via sliding scale and Novolin two times a day.     Skin: Face edematous due to proning, but improving now that patient is supine. Areas of denuded skin on left cheek. Wound care consulted. Turn and reposition for comfort as patient tolerates. Mepilex remains in place to coccyx area.      Problem: Pain  Goal: Patient's pain/discomfort is manageable  Outcome: Progressing     Problem: Safety  Goal: Patient will be injury free during hospitalization  Outcome: Progressing     Problem: Daily Care  Goal: Daily care needs are met  Outcome: Progressing     Problem: Psychosocial Needs  Goal: Collaborate with patient/family/caregiver to identify patient specific goals for this hospitalization  Outcome:  Progressing     Problem: Glucose Imbalance  Goal: Achieve optimal glucose control  Outcome: Progressing     Problem: Potential for Compromised Skin Integrity  Goal: Skin integrity is maintained or improved  Outcome: Progressing  Goal: Nutritional status is improving  Outcome: Progressing     Problem: Urinary Incontinence  Goal: Perineal skin integrity is maintained or improved  Outcome: Progressing     Problem: Inadequate Gas Exchange  Goal: Patient will achieve/maintain normal respiratory rate/effort  Outcome: Progressing     Problem: Ineffective Airway Clearance  Goal: Maintain airway patency  Outcome: Progressing     Problem: Impaired Gas Exchange  Goal: Demonstrate improved ventilation and adequate oxygenation of tissues as evidenced by absence of respiratory distress  Outcome: Progressing     Problem: Breathing  Goal: Patient will maintain patent airway  Outcome: Progressing     Problem: Risk for Infection  Goal: Identify and demonstrate techniques, lifestyle changes to prevent/reduce risk of infection and promote safe environment  Outcome: Progressing     Problem: Potential for Falls  Goal: Patient will remain free of falls  Outcome: Progressing     Problem: Decreased Mental Status Causing Increased Need for Safety  Goal: Provide a safe environment for patient (Implement appropriate elements in plan of care)  Outcome: Progressing  Goal: Decrease patient's symptoms  Outcome: Progressing  Goal: To ensure patient safety, patient will abstain from any threats or actions to harm self or others  Outcome: Progressing     Problem: Mechanical Ventilation  Goal: Patient will maintain patent airway  Outcome: Progressing  Goal: Oral health is maintained or improved  Outcome: Progressing  Goal: Respiratory status - ventilation  Description: Movement of air in and out of the lungs.    Liberate from ventilator  Outcome: Progressing  Goal: ET tube will be managed safely  Outcome: Progressing  Goal: Mobility/activity is  maintained at optimum level for patient  Outcome: Progressing     Problem: Potential for transmission of organism by Contact, Enteric, Droplet and/or Airborne routes  Goal: Prevent transmission of organisms  Outcome: Progressing     Problem: Psychosocial Needs  Goal: Demonstrates ability to cope with hospitalization/illness  Outcome: Not Progressing     Problem: Discharge Barriers  Goal: Patient's discharge needs are met  Outcome: Not Progressing     Problem: Breathing  Goal: Patient will utilize incentive spirometer  Outcome: Not Progressing     Problem: Cognitive Impairment or Disorientation  Goal: Patient will maintain or return to normal baseline cognitive function  Outcome: Not Progressing     Problem: Mechanical Ventilation  Goal: Ability to express needs and understand communication  Outcome: Not Progressing     Problem: Knowledge Deficit  Goal: Patient/family/caregiver demonstrates understanding of disease process, treatment plan, medications, and discharge instructions  Outcome: Not Applicable this Shift     Problem: Knowlegde Deficit  Goal: Verbalize understanding of condition/disease process and treatment, participate in lifestyle changes and treatment regimen  Outcome: Not Applicable this Shift

## 2021-06-14 NOTE — PROGRESS NOTES
[facility]    ICU PROGRESS NOTE:  DOS:  12/27/2020    Patient Summary:   Felipe Cruz is a 74 year old male with a PMH of CAD, HTN, HLD, DM2, and BPH who was admitted to [facility] on 12/6/2020.  He was admitted to the general care floor. He had increased oxygenation needs, started on HFNC, and transferred to the ICU on 12/8. He was intubated for worsening hypoxia on 12/10. He self extubated on 12/10, required reintubation, and again self-extubated on 12/14. He was reintubated 12/16 for worsening hypoxia. Course complicated by anuric NICKY requiring CRRT and now profound shock requiring two to three vasopressors.    Major Changes for Today    CT head  RR 28  ABG 1000  Wean Versed off   Decrease Fentanyl    Addendum  Reevaluated this afternoon for elevated ST segments on monitor  Rhythm vacillating between atrial fibrillation and accelerated junctional rhythm rate 120s  Norepinephrine requirements have escalated through the day  Now febrile, off CRRT circuit  Recheck K, EKG, spot check troponin  Bedside ECHO: small pericardial effusion without tamponade physiology, RV appears small, IVC 1 cm, no gross WMA, EF appears grossly normal.   PEEP wean to 12  Close attention to pericardial effusion  Give 500 ml NS bolus (given hyponatremia)  Add back vasopressin to wean down norepinephrine and decrease beta agonism  QTc prolonged- 528. Magnesium 2.5. Still on amiodarone.     Assessment/Plan:    NEUROLOGIC:  # acute pain  # agitation/anxiety  # delirium  # encephalopathy   Did not tolerate Precedex and Propofol previously   - Fentanyl and versed drips with PRN boluses available. Wean Versed to off. Decrease Fentanyl.   - Cisatracurium discontinued 12/22  - Stop Seroquel   - RAAS goal o to -1  - Currently GCS 3T, no gag or cough reflex  - Discontinued statin given transaminitis.      # Right PCA infarct  Demonstrated on CTH 12/23 with concern for possible right thalamus hypodensity, potential  3 mm hemorrhagic transformation within the right PCA distribution  - Neurology consulted, appreciate recommendations  - Ceribell with non specific slowing with superimposed beta activity. No sharp or rhythmic discharges.   - Carotid US with 50-50% bilateral ICA stenosis  - repeat CT head this morning with evolution of PCA subacute infarct without hemorrhagic transformation.       HEMODYNAMICS/CV:  #Shock, septic  #Tachycrdia, resolved   12/16: Echo: EF > 63%, Normal RV And LV fn, normal valves. No Tamponade   - Continue norepinephrine for MAP >65.   - Stress dose steroids restarted 12/20  - ID as below     RESPIRATORY:  # Acute hypoxic respiratory failure and ARDS 2/2 COVID19  # Klebsiella PNA   # Right apical pneumothorax s/p pigtail insertion 12/20  # Right tension pneumothorax on 12/23 with CT clamped for procedure. Resolved when placed back to suction.   - intubated 12/10, self-extubated 12/14, reintubated 12/16  - Continue lung protective ventilation  - Wean PEEP to 14  - Vent Mode: AC  FiO2 (%):  [45 %] 45 %  S RR:  [28-32] 28  S VT:  [520 mL] 520 mL  PEEP/CPAP (cm H2O):  [14 cm H2O-16 cm H2O] 14 cm H2O  Minute Ventilation (L/min):  [12.6 L/min-17.2 L/min] 14.7 L/min  PIP:  [29 cm H2O-32 cm H2O] 29 cm H2O  MAP (cm H2O):  [19-21] 19  - Ventilator bundle  - Leave supinated- P/F <150. But given protracted course proning no longer likely beneficially and will not improve his outcome    - Leave CT to suction, no air leak noted.  - Off Veletri    GI:  # Severe protein calorie malnutrition  # Constipation, now resolved   # Diarrhea  # Concern for ileus- non obstructive bowel gas pattern on 12/22 KUB and on CT, now stooling.   # Cholelithiasis discovered on CT 12/23, non obstructive  # Elevated LFTs, likely r/t shock possibly a component of Amiodarone. Alk phos and bili with normal parameters. CT abdomen completed 12/23 with no e/o obstruction  - RD following.   - protein BID  - bowel regimen: hold given diarrhea.   -  C diff negative 12/22     RENAL:  # Anuric NICKY   # Persistent metabolic acidosis  - Started on CRRT 12/18. Stop today. Plan for HD tomorrow.   - Electrolytes balanced  - Persistently acidemic in spite of CRRT, continue enteral replacement. PH stable 7.3  - On enteral sodium bicarb   - Bladder scan PRN and straight cath if PVR > 250.     # Bilateral 2 cm renal masses incidentally found on CT, recommend follow up at later time  # Bladder wall ? Stone 3mm in size incidentally found on CT. Recommended follow up     ID:  # COVID 19   # Klebsiella VAP- treated  #Enterobacter cloacae VAP- active.   # Septic shock  # Severe Hypothermia  -WBC:  40-->35.1-->32-->24  - Profoundly hypothermic 12/26, 92 degrees. Discussed with ID. Blood cultures repeated and continue Meropenem.     MICRO:  12/23 Fungitell negative.   12/22 c.diff negative  12/22 sputum gram stain with prelim 1+ GPB  12/22 BC with NGTD  12/19 fungitell (pending)  galactomannan antigen (negative)  12/19 BC x2 with NGTD  12/19 Sputum with Enterobacter cloacae (sensitive to Meropenem)  12/19 A BAL with GS, AFB, KOH, fungal culture were ordered; however, the microbiology lab does not currently have the sample. Very minimal secretions on bronch 12/19 (see my note), will not repeat at this time.   12/12 sputum: Klebsiella pneumoniae  12/12  Blood cultures- NGTD  12/11 urine NGTD  12/11 BC with NGTD  12/6 blood staph epi, contaminant    Antimicrobials:  12/16 zosyn 12/16-12/17  Ceftriaxone 12/17-12/19 12/19--> Meropenem   12/19- 12/23 Fluconazole  12/23 Fluconazole broadened to Micafungin    Other Workup:  12/23: CT C/A/P  12/23 lipase normal  12/23 No e/o ischemic limbs    COVID:  Completed remdesivir course   Decadron 6 mg IV X 10 days      HEME:  # anemia of critical illness  # coagulopathy due to COVID -19  - Hemoglobin stable   - Continue with Heparin gtt, increased to high intensity 12/22     ENDOCRINE:  # DM II  # Stress and steroid induced hyperglycemia  HgbA1C  "on admit 8.2  - Hold PTA Lantus and oral agents  - Hold NPH to 55 units two times a day  - High resistance sliding scale insulin. Start lantus 6 units daily.    - goal glucose < 180 for optimal healing    MSK:  # acute critical care weakness/deconditioning   - hold OOB activity while heavily sedated/ tenuous; ROM     SOCIAL:  Overall poor prognosis. Discussed at length with spouse yesterday. Mental status remains poor    PROPHYLAXIS:   DVT proph:Yes. Heparin gtt  GI proph: Yes.  Requires solorzano for strict I&O: Yes  Restraints required for safety: Yes     DISPOSITION:  - ICU    LINES/TUBES/DRAINS:  - Right internal jugular TLC 12/22  - L art line, placed 12/10  - R fem dialysis line 12/19  - Right pigtail chest tube     Total Critical Care Time : 40 Minutes      Mary De La Mater       Overnight events: Course reviewed. No acute events. SHAD ROS.     Physical Exam:  Vent settings for last 24 hours:  Vent Mode: AC  FiO2 (%):  [45 %] 45 %  S RR:  [28-32] 28  S VT:  [520 mL] 520 mL  PEEP/CPAP (cm H2O):  [14 cm H2O-16 cm H2O] 14 cm H2O  Minute Ventilation (L/min):  [12.6 L/min-17.2 L/min] 14.7 L/min  PIP:  [29 cm H2O-32 cm H2O] 29 cm H2O  MAP (cm H2O):  [19-21] 19    /52 (Patient Position: Semi-mueller)   Pulse (!) 128   Temp 98.6  F (37  C) (Esophageal)   Resp (!) 32   Ht 6' 2\" (1.88 m)   Wt 191 lb 9.3 oz (86.9 kg)   SpO2 99%   BMI 24.60 kg/m      Intake/Output last 3 shifts:  I/O last 3 completed shifts:  In: 2426.8 [I.V.:948.8; NG/GT:1323; IV Piggyback:155]  Out: 2518 [Other:2392; Stool:100; Chest Tube:26]  Intake/Output this shift:  I/O this shift:  In: 603.3 [I.V.:193.3; NG/GT:410]  Out: 86 [Other:86]    Physical Exam  Neuro: unresponsive, pupils equal and reactive, no movement of extremities. No gag, no cough.   HEENT:  PERRLA. Pupils 2. ETT present and secured. NJ and OG present  RESP: BBS, lungs diminished throughout, no wheezes. Synchronous with ventilator  CV: extremities warm pulses palpable.   GI: " S/NT/ND; + BS; rectal tube in place  : anuric   Extremities: +3 edema, pulses 2+     LAB:  Results from last 7 days   Lab Units 12/27/20  0532   LN-WHITE BLOOD CELL COUNT thou/uL 24.2*   LN-HEMOGLOBIN g/dL 9.6*   LN-HEMATOCRIT % 30.3*   LN-PLATELET COUNT thou/uL 222     Results from last 7 days   Lab Units 12/27/20  0532 12/26/20  2121 12/26/20  1357 12/26/20  0518 12/25/20  0547 12/25/20  0547 12/23/20  0405 12/23/20  0405   LN-SODIUM mmol/L 135*  134* 136 135* 135*  135*   < > 135*   < > 135*   LN-POTASSIUM mmol/L 4.5  4.5 4.6 4.4 4.5  4.5   < > 4.6   < > 4.7   LN-CHLORIDE mmol/L 105  105 104 105 105  105   < > 106   < > 100   LN-CO2 mmol/L 16*  16* 21* 17* 16*  16*   < > 18*   < > 22   LN-BLOOD UREA NITROGEN mg/dL 30*  --   --  28  --  28   < > 24   LN-CREATININE mg/dL 0.93  --   --  0.94  --  1.11   < > 1.22   LN-CALCIUM mg/dL 8.1*  8.1* 8.5 8.2* 8.3*  8.3*   < > 8.5   < > 8.7   LN-PROTEIN TOTAL g/dL  --   --   --  6.0  --  5.8*  --  6.0   LN-BILIRUBIN TOTAL mg/dL  --   --   --  0.8  --  1.0  --  0.7   LN-ALKALINE PHOSPHATASE U/L  --   --   --  109  --  103  --  99   LN-ALT (SGPT) U/L  --   --   --  234*  --  268*  --  182*   LN-AST (SGOT) U/L  --   --   --  123*  --  216*  --  213*    < > = values in this interval not displayed.

## 2021-06-14 NOTE — PROGRESS NOTES
Pt core temp remains very hypothermic in spite of warm blankets, and warming blanket.   Pt unresponsive. VSS. See flowsheet for gtts.Continue with CRRT.  EEG with Cerebell completed at 0300. Seems to be tolerating TF. Liquid stool noted. Anuric. Pt able to keep his BP within nomral limits. Remains unresponsive.See flowsheet for gtts.

## 2021-06-14 NOTE — PROGRESS NOTES
Care Management Follow Up Note    Length of Stay (days) 17     Patient plan of care discussed at Interdisciplinary Rounds: yes                Expected Discharge Date: (TBD Intubated)  Concerns to be Addressed / Barriers to Discharge:  Intubated    Anticipated Discharge Disposition:  TBD  Anticipated Discharge Services:      Selected Continued Care - Admitted Since 12/6/2020    No services have been selected for the patient.        Anticipated Discharge DME:      Plan:  Chart review.  Patient was independent prior to being hospitalized.  Patient lives with spouse.  CM to continue to follow.    MARCI Arias [facility] 12/23/2020 11:13 AM

## 2021-06-14 NOTE — PROGRESS NOTES
Care Management Follow Up Note    Length of Stay (days) 25     Patient plan of care discussed at Interdisciplinary Rounds: yes    Expected Discharge Date: (TBD Intubated)  Concerns to be Addressed / Barriers to Discharge:  pulmonary status    Anticipated Discharge Disposition:  TBD  Anticipated Discharge Services:    TBD  Selected Continued Care - Admitted Since 12/6/2020    No services have been selected for the patient.        Plan: Patient admitted for covid 19 pneumonia, hypoxia requiring vent 12/10, extubated 12/14 and re-intubated 12/16. Tube feeding. Nephrology following for ARF requiring dialysis. Neurology following for cerebral infarction 12/23. Prognosis guarded.      Assessment History: Patient independent at baseline and lives in an apartment with spouse.      Final discharge plan pending hospital progression. There has been slight improvement but prognosis remains guarded. Critical Care team is primary contact with [facility] for updates. Discharge planning discussion not appropriate at this time.     Kathi Giang RN

## 2021-06-14 NOTE — PROCEDURES
Indications: Chronic respiratory failure and need for ongoing mechanical ventilation.    Consent: Given patient s intubation and sedation, the patient was unable to provide consent. Discussed the procedure with the patient s decision maker, including the indications, risks, benefits, and alternatives. All questions were answered. Written consent was obtained and placed in the chart.    Preprocedure: Universal protocol was followed for this procedure. Prior to the initiation of sedation or the procedure, a timeout/ Pause for the Cause  was performed. The patient s identity was verified by confirming the patient s wrist band for name, date of birth, and medical record number. Everyone in the room was in agreement with the patient identify, the procedure to be performed, consent was in place and matched the planned procedure, and the procedure site. The area was cleaned with a CHG scrub and draped with large sterile barrier. Hand hygiene was performed, and cap, mask, sterile gown, and sterile gloves were worn. The patient was covered by a large sterile drape. Sterile technique was maintained for the entire procedure.    Anesthesia: The patient was intubated and sedated prior to the procedure. Additional midazolam and dilaudid was given for deep sedation, as well as 100mg of propofol.  Once the patient was adequately sedated and with continuous BIS monitoring, vecuronium was administered for paralysis.    Procedure: The patient was placed in the supine position. The anterior neck was prepped and draped in usual sterile fashion. 1% lidocaine was administered approximately 2 fingerbreadths above the sternal notch for local anesthesia. A 1.5-cm horizontal incision was then performed 2 fingerbreadths above the sternal notch. Using a curved Ritu, blunt dissection was performed down to the level of the pretracheal fascia. At this point, the bronchoscope was introduced through the endotracheal tube and the trachea was  properly visualized. The endotracheal tube was then gradually withdrawn within the trachea under direct bronchoscopic visualization. Proper midline position was confirmed by bouncing the needle from the tracheostomy tray over the trachea with bronchoscopic examination. The needle was advanced into the trachea and proper positioning was confirmed with direct visualization. The needle was then removed leaving a white outer cannula in position. The wire from the tracheostomy tray was then advanced through the white outer cannula. The cannula was then removed. The small, blue dilator was then advanced over the wire into the trachea. Once proper dilatation was achieved, the dilator was removed. The large, tapered dilator was then advanced over the wire into the trachea. The dilator was removed leaving the wire and white inner cannula in position. A number 8 percutaneous Shiley tracheostomy tube was then advanced over the wire and white inner cannula into the trachea. Proper positioning was confirmed with bronchoscopic visualization. The tracheostomy tube was then sutured in place with two nylon sutures. It was further secured with a tracheostomy tie.  Estimated blood loss: Less than 10 mL.  Complications: None immediate.

## 2021-06-14 NOTE — PROGRESS NOTES
RENAL PROGRESS NOTE      CHIEF COMPLAINT: NICKY, COVID-19 infection        ASSESSMENT/PLAN:  Acute kidney injury - With normal underlying renal function and now with hemodynamic NICKY/ATN. Started dialysis 12/18. S/p CRRT , then transitioned to intermittent hemodialysis last week.    -We've had quite a bit of difficult with tolerating dialysis due to intradialytic hypotension and only able to get minimal UF. No signs of renal recovery. Serum creatinine is trending up between dialysis treatments. Remains anuric.   -Last HD 01/02/20. We will plan HD today as per TTS schedule and UF runs on alternating days. Consider restart CRRT if unable to tolerate iHD.     Access: right femoral CVC catheter placed 18 days ago. No reported issues. Patient is schedule for tunneled catheter placement by IR this afternoon.    Mild hyponatremia -serum sodium is stable at 134 this am. Could be secondary to hypervolemia. Monitor      Hyperphosphatemia-serum phosphorus is trending up to 6.9 this am. Start on Sevelamer 8000 mg three times a day via NG tube      Hypervolemia - Net 12L positive since admission but I/O are not accurate. Last UF recorded on 12/29.    1600 ml removed with UF 1/04 but not recorded.Wt is trending up.  -UF with dialysis has been difficult with hypotension. Daily UF. Try to remove 2-3 kgs with UF as tolerated. Keep MAP>65  -daily weight  -strict I/O     COVID-19 infection - With respiratory failure. On ventilator since 12/10. S/p 5 days of remdesivir and 10 days of dexamethasone. Course has been complicated by right sided pneumothorax. ICU team planning trach today.     Anemia - Hgb is drifting down, s/p PRBCs . Transfusions as per ICU team     Hypotension - Remains on Norepi drip and is on midodrine 10 mg three times a day as well     Nutrition - alb 1.8. On tube feeds on renal formulation.      DVT prophylaxis - Off heparin drip due to GI bleed and bleeding in mouth.     Coronary artery disease/ atrial  fibrillation     Diabetes mellitus - On insulin     Right PCA CVA - Followed by neurology     Disposition - Palliative care is following, input appreciated    Discussed with ICU team    We will continue to follow  Gale Sotomayor MD  [facility]  [phone].    SUBJECTIVE:   1600 ml removed with UF on maximum dose of Norepinephrine. Patient developed rectal bleeding and bleeding in mouth. Hgb dropped to 6.9 received 1U PRBC. Patient remains intubated. On Norepi drip.   Patient was seen at bedside and events were reviewed with RN. Patient is awake, follows commands.    OBJECTIVE:    Vital signs in last 24 hours  Temp:  [97.2  F (36.2  C)-98.2  F (36.8  C)] 98.1  F (36.7  C)  Heart Rate:  [] 83  Resp:  [7-40] 28  BP: ()/(44-68) 110/52  Arterial Line BP: ()/(41-82) 122/47  FiO2 (%):  [40 %-45 %] 45 %  Weight:   (!) 261 lb 11 oz (118.7 kg)    Intake/Output last 3 shifts  I/O last 3 completed shifts:  In: 2776.5 [I.V.:336.5; Blood:1000; NG/GT:1440]  Out: 50 [Chest Tube:50]  Intake/Output this shift:  No intake/output data recorded.    PHYSICAL EXAM:  Gen: NAD, Intubated and supine  HEENT: ETT and NJ in place, blood in the mouth  Lungs: Coarse breath sounds b/l in anterior fields  CV: RRR  Abd soft, non tender, non distended  Ext : warm , non pitting edema dorsum of both hands, trace edema of the lower extremities b/l  :No Funk catheter, incontinent of urine  Skin no rash  Neuro: eyes are open,  follows commands      Pertinent Labs   Lab Results   Component Value Date    WBC 11.8 (H) 01/05/2021    HGB 7.2 (L) 01/05/2021    HCT 20.2 (L) 01/05/2021    MCV 97 01/05/2021     01/05/2021     Lab Results   Component Value Date    CREATININE 6.59 (HH) 01/05/2021     (H) 01/05/2021     (L) 01/05/2021    K 4.7 01/05/2021     01/05/2021    CO2 16 (L) 01/05/2021       Lab Results   Component Value Date    ALBUMIN 1.8 (L) 01/05/2021     Lab Results   Component  Value Date    CALCIUM 7.7 (L) 01/05/2021    PHOS 6.9 (H) 01/05/2021         Katie Roche PA-C  [facility]  [phone]

## 2021-06-14 NOTE — PROGRESS NOTES
01/04/21 0237   Vent Information   Interface Invasive   Patient Data   Vt Exp (mL) 509 mL   Minute Ventilation (L/min) 15.1 L/min   Total Resp Rate  32 BPM   PIP Observed (cm H2O) 25 cm H2O   MAP (cm H2O) 14   Auto/Intrinsic PEEP Observed (cm H2O) 2.4 cm H2O   Plateau Pressure (cm H2O) 18 cm H2O   Dynamic Compliance (L/cm H2O) 37.4 L/cm H2O   Airway Resistance 8.4

## 2021-06-14 NOTE — PROGRESS NOTES
12/29/20 0830   Patient Data   Vt Exp (mL) 546 mL   Minute Ventilation (L/min) 13.2 L/min   Total Resp Rate  32 BPM   PIP Observed (cm H2O) 28 cm H2O   MAP (cm H2O) 18   Auto/Intrinsic PEEP Observed (cm H2O) 1.6 cm H2O   Plateau Pressure (cm H2O) 26 cm H2O   Dynamic Compliance (L/cm H2O) 23.8 L/cm H2O   Airway Resistance 5.5   SpO2 98 %   Heart Rate 87

## 2021-06-14 NOTE — PROGRESS NOTES
01/06/21 0755   Vent Information   Vent Mode AC   Vent Settings   FiO2 (%) 50 %   Resp Rate (Set) 28   Insp Time (sec) 0.7 sec   Vt (Set, mL) 520 mL   PEEP/CPAP (cm H2O) 10 cm H2O   Patient Data   Minute Ventilation (L/min) 13.5 L/min   Total Resp Rate  29 BPM   PIP Observed (cm H2O) 27 cm H2O   MAP (cm H2O) 16   Plateau Pressure (cm H2O) 24 cm H2O   SpO2 100 %   Heart Rate 90

## 2021-06-14 NOTE — PROGRESS NOTES
CRISTOBAL COVID RT PROGRESS NOTE    DATA:    VENT DAY# 15    CURRENT SETTINGS:  VENT SETTINGS  Vent Mode: AC  FiO2 (%):  [40 %] 40 %  S RR:  [28] 28  S VT:  [520 mL] 520 mL  PEEP/CPAP (cm H2O):  [10 cm H2O] 10 cm H2O  Minute Ventilation (L/min):  [13.2 L/min-15.5 L/min] 13.2 L/min  PIP:  [24 cm H2O-33 cm H2O] 25 cm H2O  MAP (cm H2O):  [13-15] 15     PATIENT PARAMETERS:    PIP 25  Pplat:  Unable to obtain  Pmean:  15  SBT: No  SECRETIONS:  Suction for small amount of thick secretion   02 SATS:  96%    ETT SIZE 8.5  Secured at  26 cm at teeth    Securing device changed / tube re-taped date No    Respiratory Medications: None     ABG:     NOTE / PLAN:   Patient seen on full ventilator support and no change at this shift. Plan continue full ventilator support and monitor

## 2021-06-14 NOTE — PROGRESS NOTES
VSS, no new issues on night shift. Patient did continue to have multiple small incontinent bloody bowel movements on night shift but Hgb was stable. Doctors aware.

## 2021-06-14 NOTE — PROGRESS NOTES
01/01/21 0058   Patient Data   PIP Observed (cm H2O) 33 cm H2O   MAP (cm H2O) 15   Plateau Pressure (cm H2O) 25 cm H2O

## 2021-06-14 NOTE — PROGRESS NOTES
Transported to CT with RN X3, RT, and NA. Successful imaging, and return to unit. Returned at 0930

## 2021-06-14 NOTE — PROGRESS NOTES
Infectious Diseases Progress Note  [facility]    Date of visit: 12/29/2020      ASSESSMENT   74-year-old man with a history of diabetes, hypertension, BPH who is admitted with respiratory distress from COVID-19.    1. COVID-19.  Diagnosed on 11/30.  Developed rapid worsening of respiratory status, intubated on 12/10.  Completed 5-day course of remdesivir.  Completed 10-day course of dexamethasone on 12/15.  Course complicated by right-sided pneumothorax.  Now with acute hepatitis, new stroke, and renal failure.  2. Leukocytosis.  Mildly elevated white cells during admission, possibly from steroids.  Started to rise in earnest on 12/17, now markedly elevated with a left shift.  Blood and urine cultures negative.  Sputum growing Enterobacter cloacae.  C. difficile test negative.  Recently started on stress dose steroids, which may be contributing to rise.  May also be from general inflammatory process from severe Covid illness. Wbc coming down slowly  3. Renal failure.  Nephrology consulted on 12/17, now on dialysis.  4. Acute hepatitis.  Elevated transaminases.  ALT starting to rise around 12/16.  No signs of biliary obstruction on CT scan today.  Consider possible medications, recently started on amiodarone.  Shock liver? Improving   5. Right PCA stroke. Followed by neurology. Critical illness myopathy.  6. Hypothermia. Infectious work-up as above. CRRT can be contributing. Autonomic dysregulation following stroke? Erratic temperature curve, stable this morning    Principal Problem:    Pneumonia due to 2019 novel coronavirus  Active Problems:    Acute respiratory failure with hypoxia (H)    Acute kidney injury (H)    Acute respiratory distress syndrome (ARDS) due to COVID-19 virus (H)    Secondary spontaneous pneumothorax    Acute right PCA stroke (H)       PLAN   -Continue meropenem for Enterobacter in sputum. Plan for 14-day course, end date already in  -Not much more to add from ID perspective for now      Call with questions      Agnel Leon MD  [facility]  Direct messaging: Meet You Paging  On-Call ID provider: [phone], option: 9      ===========================================      SUBJECTIVE / INTERVAL HISTORY:     No acute events. Pressor needs up. On intermittent HD. temps stable.     Review of Systems     Intubated and sedated.         Antibiotics   Meropenem 12/19-      Previous:  Ceftriaxone 12/17-12/18  Fluconazole 12/19-12/22  Zosyn 12/15-12/16  Remdesivir x5 days  Micafungin 12/23-  Vancomycin 12/16, 12/19-12/22      Physical Exam     Temp:  [98.2  F (36.8  C)-99.6  F (37.6  C)] 98.8  F (37.1  C)  Heart Rate:  [] 99  Resp:  [25-38] 30  BP: ()/(45-68) 148/68  Arterial Line BP: ()/(39-72) 93/49  FiO2 (%):  [45 %-60 %] 60 %    Vitals:    12/29/20 1130   BP: 148/68   Pulse:    Resp:    Temp:    SpO2:        Limited exam to conserve PPE. Patient seen from window. See intensivist note for full exam.  Gen: lying in bed, supine, chest tube in place on right. Intubated.    Cultures   12/12 sputum culture: Klebsiella  12/16 blood cultures x2: No growth to date  12/16 urine culture: Negative  12/16 sputum culture: Normal ernst and yeast  12/19 fungal blood culture: Pending  12/19 blood cultures x2: No growth to date  12/19 sputum culture: Enterobacter cloacae  12/22 sputum: GPB, culture: yeast   12/22 blood cultures x2: No growth to date  12/26 blood culture x 2: no growth to date  12/26 fungal blood culture: no growth to date       Pertinent Labs:     Results from last 7 days   Lab Units 12/29/20  0508 12/28/20  1558 12/28/20  0405 12/27/20  0532 12/27/20  0532 12/24/20  0507 12/24/20  0507 12/23/20  0406 12/23/20  0406   LN-WHITE BLOOD CELL COUNT thou/uL 19.3*  --  20.0*  --  24.2*   < > 40.9*  --  37.6*  37.8*   LN-HEMOGLOBIN g/dL 8.8* 8.8* 9.0*   < > 9.6*   < > 10.3*   < > 10.0*  10.0*   LN-PLATELET COUNT thou/uL 215 223 208   < > 222   < > 279   < > 290   268   LN-MONOCYTES - REL (DIFF) %  --   --   --   --   --   --  7  --  7    < > = values in this interval not displayed.       Results from last 7 days   Lab Units 12/29/20  0508 12/28/20  0405 12/27/20  1637 12/26/20  0518 12/26/20  0518 12/25/20  0547 12/25/20  0547 12/23/20  0405 12/23/20  0405   LN-SODIUM mmol/L 137 134* 134*   < > 135*  135*   < > 135*   < > 135*   LN-POTASSIUM mmol/L 4.4 5.0 4.8  4.8   < > 4.5  4.5   < > 4.6   < > 4.7   LN-CHLORIDE mmol/L 107 105 104   < > 105  105   < > 106   < > 100   LN-CO2 mmol/L 16* 16* 16*   < > 16*  16*   < > 18*   < > 22   LN-BLOOD UREA NITROGEN mg/dL 109* 68* 51*   < > 28  --  28   < > 24   LN-CREATININE mg/dL 4.15* 2.74* 1.76*   < > 0.94  --  1.11   < > 1.22   LN-CALCIUM mg/dL 8.3* 8.7 8.4*   < > 8.3*  8.3*   < > 8.5   < > 8.7   LN-ALBUMIN g/dL  --   --   --   --  2.4*  --  2.4*  --  2.0*   LN-PROTEIN TOTAL g/dL  --   --   --   --  6.0  --  5.8*  --  6.0   LN-BILIRUBIN TOTAL mg/dL  --   --   --   --  0.8  --  1.0  --  0.7   LN-ALKALINE PHOSPHATASE U/L  --   --   --   --  109  --  103  --  99   LN-ALT (SGPT) U/L  --   --   --   --  234*  --  268*  --  182*   LN-AST (SGOT) U/L  --   --   --   --  123*  --  216*  --  213*    < > = values in this interval not displayed.                 Imaging:     Echo Limited W Doppler    Result Date: 12/28/2020    Left ventricular ejection fraction is normal. The calculated left ventricular ejection fraction is 71%.   Normal right ventricular size and systolic function.   No hemodynamically significant valvular heart abnormalities.   Trace pericardial effusion with no echocardiographic evidence of cardiac tamponade.   When compared to the previous study dated 12/16/2020, the pericardial effusion has decreased in size. Other findings are comparable.          Attestation:  I have reviewed today's Medications, Vital Signs, Imaging, and Labs.

## 2021-06-14 NOTE — PROGRESS NOTES
CRISTOBAL COVID RT PROGRESS NOTE     DATA:     VENT DAY#  15     CURRENT SETTINGS: A/C  VENT SETTINGS   32/520/16              FIO2:  60%     PATIENT PARAMETERS:     PIP 33  Pplat:  28  Pmean:  20  SBT: None  SECRETIONS:  None   02 SATS:  97     ETT SIZE 8.5  Secured at  26 cm at teeth     Securing device changed / tube re-taped date.      Respiratory Medications: none      ABG:    Results for GISELLE VILLANUEVA (MRN [id number]) as of 12/26/2020 16:59   Ref. Range 12/26/2020 12:46   pH, Arterial Latest Ref Range: 7.37 - 7.44  7.31 (L)   pCO2, Arterial Latest Ref Range: 35 - 45 mm Hg 37   pO2, Arterial Latest Ref Range: 75 - 85 mm Hg 87 (H)   Bicarbonate, Arterial Calc Latest Ref Range: 23.0 - 29.0 mmol/L 18.6 (L)           NOTE / PLAN    To remain on full ventilator support .12/26/2020  .Mr. maki

## 2021-06-14 NOTE — PROGRESS NOTES
Spiritual Care Note    Spiritual Assessment: I called and spoke to pt's spouse on the phone. I introduced spiritual care and what we have to offer during this time. I also checked in with her on how she is doing. She shared that she is taking things day to day.     Care Provided: Supportive conversation and introduction to spiritual care.    Plan of Care: Chaplains are available as needed during this hospitalization.     Nasir Graves M.Div.  Staff   [facility]  [phone]

## 2021-06-14 NOTE — PROGRESS NOTES
"Wound Ostomy  WOC Assessment        Allergies:  No Known Allergies    Diagnosis:   Patient Active Problem List    Diagnosis Date Noted     Encounter for palliative care      Encephalopathy      Acute right PCA stroke (H)      Secondary spontaneous pneumothorax      Acute respiratory distress syndrome (ARDS) due to COVID-19 virus (H) 12/10/2020     Acute respiratory failure with hypoxia (H)      Acute kidney injury (H)      Pneumonia due to 2019 novel coronavirus 12/06/2020     Diabetes mellitus (H) 06/24/2019     Hypertension 06/24/2019     Chronic pain disorder 03/27/2019     Primary osteoarthritis of right hip 01/26/2018     Primary osteoarthritis of right knee 01/26/2018     BPH with urinary obstruction 11/15/2017       Height:  6' 2\" (1.88 m)    Weight:   (!) 223 lb 5.2 oz (101.3 kg)    Labs:  Recent Labs     01/04/21  0415   HGB 7.4*       Mode:  Mode Scale Score: 9    Specialty Bed:  [place]    Wound culture obtained: No    Edema:  Not assessed today    Anatomic Site/Laterality: Body    Reason for ongoing care:   Skin integrity and plan of care    Encounter Type:  Subsequent Encounter Wound Type:   Body    Patient noted to have several areas of injury to face. L upper eyelid/eye brow with red discoloration fading and decreasing in size, now measuring 0.5 cm x 2 cm; bottom lip with multiple areas of dried drainage across lip and also noted on tongue at this time; and R ear without dried blood or open areas; L ear continues with a 2 cm x 2 cm area of dried blood, but unable to assess if there are any open areas at this time. Keep open to air and routine oral cares.    Prone Positioning recommendations:    Prior  Apply Mepilex sacral dressings for protection if pt has bony prominences (shoulders, iliac crest) - may leave in place when not proned.  Ensure tongue is in the mouth, remove bite block for pressure injury prevention.  Ensure no lines/tubes/drains (as much as able) will be under patient when " prone.    Prone position  Use pillows or Z maxx pads to off load pressure to bony prominences.  Position head so that ears are not folded and so that head/neck are aligned.  Reposition head every one hour.  Use swim position per protocol for positioning when supine.    Prone Skin Folds  Use Interdry under pannus/breasts prior to placing patient in prone position. This needs to lay flat (do not bunch up under fold)   If an antifungal is needed: obtain order for miconazole powder from provider and use flat pillowcase in place of Interdry.     Mepilex dressings to cheeks when in prone position - may leave in place when not proned. Change every 5 days or sooner if soiled.    Prevention measures for mucosal integrity:  1. No bite block when proning. (rationale - the hard plastic will leave the patient at greater risk for pressure injury)  2. Hourly head reposition and moisturizer to exposed portion of tongue (moisturizer which comes with the oral care kits). (rationale - remove pressure to tongue/lips and face and maintain mucosal integrity for the tongue)  3. Use tongue blade when repositioning ETT, so tongue doesn t move along with the tube. (rationale - to ensure pressure is not on the same area of the tongue, even though the ETT is technically repositioned)     Nursing care provided was - none at this time.    Discussed plan of care with patient as able.    Outcomes and treatment recommendations are to promote skin integrity.    Actions taken by WOC RN: 2 minutes of education.    Planned Follow Up: Weekly.    Plan for next visit: Reassess skin integrity.

## 2021-06-14 NOTE — PROGRESS NOTES
Renal progress note   CC: ARF  Assessment and Plan:  74 y.o. male     1. ARF: pt has normal underlying renal function; now hemodynamic ARF/ATN and has progressive rise in CR; anuria and on CRRT since 12/18.  Now transition to conventional dialysis the beginning of this week  2. Hypervolemia-improved, Wt is trending down. Net 5.1 L  3. COVID 19 infection with resp failure; on vent with full support;  Completed 5-day course of remdesivir.  Completed 10-day course of dexamethasone on 12/15.  Course complicated by right-sided pneumothorax  4. Hyponatremia- Serum sodium is stable mid 130s  5. Acidosis; mixed  6. Anemia; no active loss; following hgb  7. HoTN; Remains on Levophed drip but weaning   8. Nutrition; on TF; renal formula  9. DVT prophylaxis; on heparin gtt  10. CAD, hyperlipid; stable co-morbitdities  11. DM; on insulin gtt  12. Acute hepatitis.  Elevated transaminases.   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      We will follow          Subjective    Overall course is essentially; hemodynamics stable.    Continues to require pressors and currently on both norepinephrine and vasopressin and pressor requirement stable    FiO2 requirement slowly improving at 40%    Acid-base status looks good; pH 7.35 with PCO2 33 before dialysis.    Dialysis scheduled for later today and plan to keep him on Tuesday Thursday Saturday schedule.  He remains essentially anuric.        Objective    Vital signs in last 24 hours  Temp:  [97.6  F (36.4  C)] 97.6  F (36.4  C)  Heart Rate:  [82-99] 90  Resp:  [0-82] 10  BP: (132)/(56) 132/56  Arterial Line BP: ()/(20-74) 127/54  FiO2 (%):  [40 %-45 %] 40 %  Weight:   208 lb 15.9 oz (94.8 kg)    Intake/Output last 3 shifts  I/O last 3 completed shifts:  In: 2528.4 [I.V.:758.4; NG/GT:1670; IV Piggyback:100]  Out: 520 [Stool:400; Chest Tube:120]  Intake/Output this shift:  No intake/output data  recorded.    Physical Exam  Viewed through window  Intubated on ventilator    Not examined and reports of other providers exams reviewed      Pertinent Labs   Lab Results   Component Value Date    WBC 12.9 (H) 12/31/2020    HGB 7.6 (L) 12/31/2020    HCT 25.4 (L) 12/31/2020     (H) 12/31/2020     12/31/2020     Lab Results   Component Value Date    CREATININE 5.21 (H) 12/31/2020     (H) 12/31/2020     12/31/2020    K 3.8 12/31/2020     12/31/2020    CO2 17 (L) 12/31/2020       Lab Results   Component Value Date    ALBUMIN 2.4 (L) 12/26/2020     Lab Results   Component Value Date    CALCIUM 7.9 (L) 12/31/2020    PHOS 5.8 (H) 12/31/2020     I reviewed all lab results  Eren Hutchison

## 2021-06-14 NOTE — PROGRESS NOTES
Renal progress note   CC: ARF  Assessment and Plan:  74 y.o. male     1. ARF: pt has normal underlying renal function; now hemodynamic ARF/ATN and has progressive rise in CR; continues to have severe acidosis  And is anuric, now on CRRT which he's tolerating and acidosis a little better but requiring large amounts of extra base.  Will increase DFR again and stop enteral bicarb(not absorbing) and cont bicarb drip for now.   2. Volume status up but hypotension limiting UF and no urine. Plan try UF as shock permits. Pressor requirement slightly decreased  3. COVID 19 infection with resp failure; on vent with full support; on broad abx/ fluconazole and lines changed (klebsiella in sputum); on veletri  4. Hypernatremia; better on CRRT  5. Acidosis;resp and metabolica;on oral and IV supple bicarb and CRRT  See above  6. Anemia; no active loss; following hgb  7. HoTN; slightly reduced pressor need.  8. Nutrition; on TF; renal formula  9. DVT prophylaxis; on heparin gtt  10. CAD, hyperlipid; stable co-morbitdities  11. DM; on insulin gtt    Will follow and update his wife  Catrina TELLO Malachi PEARSON  [facility]  [phone]      Subjective  On vent, paralyzed and proned, on levo vaso    No urine   CRRT started 12/18  Clotted system  Now on high intensity heparin gtt.   Still on bicarb IVF   Large NG output so prob not absorbing any enteral bicarb.       Objective    Vital signs in last 24 hours  Temp:  [96  F (35.6  C)-99.1  F (37.3  C)] 96  F (35.6  C)  Heart Rate:  [] 97  Resp:  [0-33] 33  BP: (107-155)/(48-60) 107/49  Arterial Line BP: ()/(48-94) 120/59  FiO2 (%):  [60 %-100 %] 60 %  Weight:   (!) 230 lb 2.6 oz (104.4 kg)    Intake/Output last 3 shifts  I/O last 3 completed shifts:  In: 7068 [I.V.:6238; NG/GT:480; IV Piggyback:350]  Out: 9378 [Emesis/NG output:1550; Other:7565; Stool:100; Chest Tube:163]  Intake/Output this shift:  I/O this shift:  In: 1375.8 [I.V.:1375.8]  Out: 9785  [Other:1664]    Physical Exam  D/w ICU RN  Seen at bedside.  Intubated and proned  Lungs decreased  Cor Reg on tele  abd soft  Ext warm and mild edema  Skin no rash        Pertinent Labs   Lab Results   Component Value Date    WBC 35.8 (HH) 12/22/2020    HGB 10.7 (L) 12/22/2020    HCT 33.3 (L) 12/22/2020    MCV 96 12/22/2020     12/22/2020     Lab Results   Component Value Date    CREATININE 1.48 (H) 12/22/2020    BUN 26 12/22/2020     (L) 12/22/2020     12/22/2020    K 4.3 12/22/2020    K 4.3 12/22/2020     12/22/2020     12/22/2020    CO2 20 (L) 12/22/2020    CO2 20 (L) 12/22/2020       Lab Results   Component Value Date    ALBUMIN 2.4 (L) 12/21/2020     Lab Results   Component Value Date    CALCIUM 8.6 12/22/2020    CALCIUM 8.6 12/22/2020    PHOS 5.3 (H) 12/22/2020     I reviewed all lab results  Catrina Ly

## 2021-06-14 NOTE — PROGRESS NOTES
Care Management Follow Up Note    Length of Stay (days) 22     Patient plan of care discussed at Interdisciplinary Rounds: yes    Expected Discharge Date: (TBD Intubated)  Concerns to be Addressed / Barriers to Discharge: covid care    Anticipated Discharge Disposition:  TBD  Anticipated Discharge Services:    TBD  Selected Continued Care - Admitted Since 12/6/2020    No services have been selected for the patient.              Plan: Patient admitted for covid 19 pneumonia, hypoxia requiring vent 12/10, extubated 12/14 and re-intubated 12/16. Tube feeding. Nephrology following for ARF requiring dialysis. Neurology following for cerebral infarction 12/23. Prognosis MD mariann discussed with spouse 12/24. Spouse would like to continue aggressive cares. Palliative support has been declined.     Assessment History: Patient independent at baseline and lives in an apartment with spouse.      Final discharge plan pending hospital progression. Last CM contact with spouse 12/21. Due to grave prognosis, discharge discussion not appropriate at this time.          Kathi Giang RN

## 2021-06-14 NOTE — PROGRESS NOTES
Care Management Follow Up Note    Length of Stay (days) 30     Patient plan of care discussed at Interdisciplinary Rounds: yes    Expected Discharge Date: (Intubated)  Concerns to be Addressed / Barriers to Discharge:  pulmonary status, trach today    Anticipated Discharge Disposition:   TBD  Anticipated Discharge Services:    TBD  Selected Continued Care - Admitted Since 12/6/2020    No services have been selected for the patient.        Plan: Patient admitted for covid 19 pneumonia, hypoxia requiring vent 12/10, extubated 12/14 and re-intubated 12/16. Tube feeding. Nephrology following for ARF requiring dialysis. Neurology following for cerebral infarction 12/23. Tracheostomy today.      Assessment History: Patient independent at baseline and lives in an apartment with spouse.     Patient will have tracheostomy today. LTACH would be likely discharge plan pending hospital progression. Will review for referral tomorrow 1/6 and discuss with spouse.             Kathi Giang RN

## 2021-06-14 NOTE — PROGRESS NOTES
[facility] COVID RT PROGRESS NOTE     DATA:     VENT DAY# 11     CURRENT SETTINGS:  VENT SETTINGS   AC 32/520/+16              FIO2:  55%    PATIENT PARAMETERS:     PIP 30  Pplat:  27  Pmean: 21   SBT: NO  SECRETIONS: small to moderate tan   02 SATS:  100%     ETT SIZE  8.5 @ 26     Securing device changed / tube re-taped date NO     Respiratory Medications: Veletri full strength     AB.33/42/90/21.4/985     NOTE / PLAN:   Pt continues to require full ventilatory support. Pt emergently supinated at 0925 for line access. Tolerated well. Sputum culture sent. Plan is to leave pt supine and continue to monitor ABG's.

## 2021-06-14 NOTE — PROGRESS NOTES
[facility]    ICU PROGRESS NOTE:  DOS:  12/24/2020    Patient Summary:   Felipe Cruz is a 74 year old male with a PMH of CAD, HTN, HLD, DM2, and BPH who was admitted to [facility] on 12/6/2020.  He was admitted to the general care floor. He had increased oxygenation needs, started on HFNC, and transferred to the ICU on 12/8. He was intubated for worsening hypoxia on 12/10. He self extubated on 12/10, required reintubation, and again self-extubated on 12/14. He was reintubated 12/16 for worsening hypoxia. Course complicated by anuric NICKY requiring CRRT and now profound shock requiring two to three vasopressors.    Major Changes for Today    Increase Versed   Stop Bicarb infusion, favor intermittent dosing   Discuss poor prognosis with wife   Hold on further proning   Hold on Velitri   Albumin 25% 25g      Assessment/Plan:    NEUROLOGIC:  # acute pain  # agitation/anxiety  # delirium  # encephalopathy   Did not tolerate Precedex and Propofol previously   - Fentanyl and versed drips with PRN boluses available. Increased tachycardia, will give additional boluses to see if there is improvement.   - Cisatracurium discontinued 12/22  - Seroquel 50 mg two times a day.    -RASS goal -4    # Right PCA infarct  Demonstrated on CTH 12/23 with concern for possible right thalamus hypodensity, potential 3 mm hemorrhagic transformation within the right PCA distribution  - Neurology consulted, appreciate recommendations  - Will be difficult to rescan given tension PTX that developed with transport. Continue to hold today     HEMODYNAMICS/CV:  #Shock, septic  #Tachycrdia   12/16: Echo: EF > 63%, Normal RV And LV fn, normal valves. No Tamponade   Off of phenylephrine late yesterday, tolerating some fluid removal but given rise in heart rate and pressor needs may need to hold additional removal   - Continue norepinephrine, vasopressin for MAP > 65   -Phenylephrine also available  - Stress dose  steroids restarted 12/20  - ID as below     RESPIRATORY:  # Acute hypoxic respiratory failure and ARDS 2/2 COVID19  # Klebsiella PNA   # Right apical pneumothorax s/p pigtail insertion 12/20  # Right tension pneumothorax on 12/23 with CT clamped for procedure  - intubated 12/10, self-extubated 12/14, reintubated 12/16  - Continue lung protective ventilation  - VC 32/520/+16   - Titrate FiO2 to maintain SpO2 >92%  - Ventilator bundle  - Leave supinated- P/F 98. But given protracted course proning no longer likely beneficially and will not improve his outcome    - Leave CT to suction, continuous air leak noted  - CXR stable   - ABG q6h  - Stop Velitri    GI:  # Severe protein calorie malnutrition  # Constipation, now resolved   # Diarrhea  # Concern for ileus- non obstructive bowel gas pattern on 12/22 KUB and on CT   Last BM 12/22  Emesis afternoon 12/21- OG placed  # Cholelithiasis discovered on CT 12/23, non obstructive  # Elevated LFTs, likely r/t shock possibly a component of Amiodarone. Alk phos and bili with normal parameters. CT abdomen completed 12/23 with no e/o obstruction  - RD following. RD managing TF's- resume at trophic given high pressor requirement   - protein BID  - bowel regimen: miralax, senna, docusate sodium daily, add lactulose   - Received scheduled Reglan for 24 hours (12/22-12/23)  - C diff negative 12/22  - Replace postpyloric feeding tube     RENAL:  # Anuric NICKY   # Mixed metabolic and respiratory acidosis  Started on CRRT 12/18  - Unable to remove fluids 2/2 increasing pressor requirements and tachycardia   - Electrolytes balanced  - Persistently acidemic, improved with bicarb drip- no off infusion. Intermittent dosing per renal   - On enteral sodium bicarb, likely not absorbing  - Bladder scan PRN and straight cath if PVR > 250.     #  Bilateral 2 cm renal masses incidentally found on CT, recommend follow up at later time  # Bladder wall ? Stone 3mm in size incidentally found on CT.  Recommended follow up     ID:  # COVID 19   # CAP with COVID19  # Klebsiella VAP  # Septic shock  # Severe Hypothermia, improved  # Leukocytosis, worsening  # Concern for aspiration- had emesis on 12/21 requiring OG placement  WBC:   16-->22K-->28K-->35K-->38K-->40  Now normothermic  - Consult ID , appreciate recs    MICRO:  12/23 Fungitell resent  12/22 c.diff negative  12/22 sputum gram stain with prelim 1+ GPB  12/22 BC with NGTD  12/19 fungitell (pending)  galactomannan antigen (negative)  12/19 BC x2 with NGTD  12/19 Sputum with Enterobacter cloacae (sensitive to Meropenem)  12/19 A BAL with GS, AFB, KOH, fungal culture were ordered; however, the microbiology lab does not currently have the sample. Very minimal secretions on bronch 12/19 (see my note), will not repeat at this time.   12/12 sputum: Klebsiella pneumoniae  12/12  Blood cultures- NGTD  12/11 urine NGTD  12/11 BC with NGTD  12/6 blood staph epi, contaminant    Antimicrobials:  12/16 zosyn 12/16-12/17  Ceftriaxone 12/17-12/19 12/19-->** Vancomycin, Meropenem   12/19- 12/23 Fluconazole  12/23 Fluconazole broadened to Micafungin    Other Workup:  12/23: CT C/A/P  12/23 lipase normal  12/23 No e/o ischemic limbs    COVID:  Completed remdesivir course   Decadron 6 mg IV X 10 days      HEME:  # anemia of critical illness  # coagulopathy due to COVID -19  # Leukocytosis WBC:   16-->22K-->28K-->35K-->38K-->40K  Clotted CRRT filter twice overnight 12/22   - Hemoglobin stable 10  - Continue with Heparin gtt, increased to high intensity 12/22  - ID management as above     ENDOCRINE:  # DM II  # Stress and steroid induced hyperglycemia  HgbA1C on admit 8.2  - Hold PTA Lantus and oral agents  - goal glucose < 180 for optimal healing  - Increase NPH to 55 units two times a day (hold evening dose given decrease in BG and TF on hold  - High resistance sliding scale insulin     MSK:  # acute critical care weakness/deconditioning   - hold OOB activity while heavily  "sedated/ tenuous; ROM     SOCIAL:  Discussed patient's condition and grave prognosis with wife over phone 12/23. Again reiterated that prognosis is poor and he will not likely survive this hospital stay. It was expressed to her that we are offering everything we can for him and it will be up to him and his body to respond. She inquired if she was able to visit him. This will be discussed with the charge nurse. Would like to proceed with aggressive cares including full code at this time at the direction of their previous discussions prior to hospitalization. Palliative care offered previously and was declined. Will continue to have ongoing discussions.    PROPHYLAXIS:   DVT proph:Yes. Heparin gtt  GI proph: Yes.  Requires solorzano for strict I&O: Yes  Restraints required for safety: Yes     DISPOSITION:  - ICU    LINES/TUBES/DRAINS:  - Right internal jugular TLC 12/22  - L art line, placed 12/10  - R fem dialysis line 12/19  - Right pigtail chest tube     Total Critical Care Time : 60 Minutes      Deandre Fontana       Overnight events: Course reviewed. Worsening pressor needs and worsening oxygenation.     Physical Exam:  Vent settings for last 24 hours:  Vent Mode: AC  FiO2 (%):  [45 %-60 %] 60 %  S RR:  [32] 32  S VT:  [520 mL] 520 mL  PEEP/CPAP (cm H2O):  [16 cm H2O] 16 cm H2O  Minute Ventilation (L/min):  [6.6 L/min-20 L/min] 20 L/min  PIP:  [31 cm H2O-32 cm H2O] 32 cm H2O  MAP (cm H2O):  [20-22] 20    /52 (Patient Position: Semi-mueller)   Pulse (!) 124   Temp 98.6  F (37  C) (Esophageal)   Resp (!) 32   Ht 6' 2\" (1.88 m)   Wt (!) 228 lb 13.4 oz (103.8 kg)   SpO2 95%   BMI 29.38 kg/m      Intake/Output last 3 shifts:  I/O last 3 completed shifts:  In: 2419.2 [I.V.:1789.2; NG/GT:530; IV Piggyback:100]  Out: 2848 [Other:2817; Chest Tube:31]  Intake/Output this shift:  I/O this shift:  In: 279.9 [I.V.:169.9; NG/GT:110]  Out: 244 [Other:244]    Physical Exam  Neuro: chemically sedated  HEENT:  HEIDI. " Pupils 2. ETT present and secured. NJ and OG present  RESP: BBS, lungs diminished throughout, no wheezes. Synchronous with ventilator  CV: extremities cool, pulses palpable.   GI: S/NT/ND; + BS; rectal tube in place  : anuric   Extremities: +2 edema, pulses 2+     LAB:  Results from last 7 days   Lab Units 12/24/20  0507   LN-WHITE BLOOD CELL COUNT thou/uL 40.9*   LN-HEMOGLOBIN g/dL 10.3*   LN-HEMATOCRIT % 31.9*   LN-PLATELET COUNT thou/uL 279     Results from last 7 days   Lab Units 12/24/20  0507 12/24/20  0014 12/24/20  0013 12/23/20  1542 12/23/20  0405 12/23/20  0405 12/22/20  0450 12/22/20  0450 12/21/20  1122 12/21/20  1122 12/20/20  0557 12/20/20  0557   LN-SODIUM mmol/L 135* 136  --  136   < > 135*   < > 136  135*   < >  --    < > 137  137   LN-POTASSIUM mmol/L 4.8 4.8  --  4.7   < > 4.7   < > 4.3  4.3   < >  --    < > 5.0  5.1*   LN-CHLORIDE mmol/L 105 104  --  104   < > 100   < > 100  100   < >  --    < > 105  105   LN-CO2 mmol/L 18* 19*  --  20*   < > 22   < > 20*  20*   < >  --    < > 18*  18*   LN-BLOOD UREA NITROGEN mg/dL 30*  --   --   --   --  24  --  26  --   --    < > 38*   LN-CREATININE mg/dL 1.18  --   --   --   --  1.22  --  1.48*  --   --    < > 2.59*   LN-CALCIUM mg/dL 8.8  8.8  --  8.4* 8.5   < > 8.7   < > 8.6  8.6   < >  --    < > 8.4*  8.4*   LN-PROTEIN TOTAL g/dL  --   --   --   --   --  6.0  --   --   --  6.7  --  6.7   LN-BILIRUBIN TOTAL mg/dL  --   --   --   --   --  0.7  --   --   --  0.8  --  0.8   LN-ALKALINE PHOSPHATASE U/L  --   --   --   --   --  99  --   --   --  86  --  75   LN-ALT (SGPT) U/L  --   --   --   --   --  182*  --   --   --  77*  --  84*   LN-AST (SGOT) U/L  --   --   --   --   --  213*  --   --   --  62*  --  39    < > = values in this interval not displayed.

## 2021-06-14 NOTE — CONSULTS
[facility] - Palliative Care Consultation Note    Patient: Felipe Cruz  Date of Admission:  12/6/2020    Requesting Clinician / Team:Covid ICU team  Reason for consult: Goals of care    Recommendations:    GOALS OF CARE    Yuridia reaffirmed she wants Red to remain a FULL CODE at this time    Red's wife, Yuridia, has restorative goals for Red and she and thousands are leaving Red's care in 'God's hands'    Red has a great desire for a high quality of life and is a proud  man and would not want to resume his life in a significantly compromised fashion    Yuridia doesn't think that Red would find being confined long term in a nursing home acceptable    Yuridia thinks long term IHD would be acceptable to Red Shi thinks Red would not want a feeding tube    Yuridia thinks that Red would not want a tracheostomy especially if he couldn't speak afterward but she would want more information about the pros and cons of the intervention    SYMPTOM MANAGEMENT    No unmet symptom needs at this time.    These recommendations have been discussed with covid team.      Thank you for the opportunity to participate in the care of this patient and family.      During regular M-F work hours -- if you are not sure who specifically to contact -- please contact us by calling [phone].        Palliative Care Assessment  I  discussed the reason for Palliative Care Referral and our role in symptom management, managing complex pain, patient family communication and understanding their choices for medical treatment and providing  guidance in making difficult decisions in the framework of focusing on patient comfort and quality of life for Felipe Cruz who is a 75 y.o. male with covid pneumonia and NICKY requiring IHD and posterior CVA.    Today, the patient was seen for:  Goals of care         Prognosis, Goals, and/or Advance Care Planning were addressed today: Yes     Mood, coping, and/or meaning in the context of serious  illness were addressed today: Yes    Summary of Palliative Encounter: As Red is intubated and sedated, I called and spoke with his wife Yuridia.  I introduced the concept of palliative care to her and explained how I think I and my team could be helpful for her.  Yuridia gave me a pretty good summary of Red's current health state.  Yuridia had covid first and feels anguish that she got over it without difficulty and now Red is critically ill.  She especially feels bad that he was leading a full and vibrant life until four weeks ago and now he is critically ill.      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  Yuridia says that both she and Red have had experiences where people praying for them have brought them back from serious health events and she is hoping for a 'deep miracle.'  I assured her we all want the same for Red and we have to recognize that he is critically ill and there is a lot of uncertainty about what his functional recovery could look like.  Yuridia told me she feels like she 'just keeps going round and round in her mind with her thoughts about what to do.'      Yuridia also provided me with a nice social history which is recorded below.  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    Yuridia also feels that she needs to respect that Red told someone when he was admitted that he was a 'full directive'  and wanted everything done.  I described for her what it means to be a surrogate decision maker and her job is to represent for us what Red would want if he knew how sick he is now and if he understood what his prognosis is or what his future recovery might look like.  When considering this Yuridia said that Red is a very proud  man and he would not want others to see him in a compromised or weakened light nor would he want to be confined to a nursing home and he would not want a permanent feeding tube nor a trache if it seemed as though he would not be able to speak and preach.    I reviewed several possible scenarios with Yuridia in terms of goals of care and the responses are recorded above in the Goals of Care section.      Yuridia did not feel a need for either the palliative  or  to call on her.  She did agree that I can follow up this Wednesday.  She knows how to access the ipad to see Red and she also has my contact information.    Medical Situation: Felipe Cruz is a 74 year old male with a PMH of CAD, HTN, HLD, DM2, and BPH who was admitted to [facility] on 12/6/2020.  He was admitted to the general care floor. He had increased oxygenation needs, started on HFNC, and transferred to the ICU on 12/8. He was intubated for worsening hypoxia on 12/10. He self extubated on 12/10, required reintubation, and again self-extubated on 12/14. He was reintubated 12/16 for worsening hypoxia. Course complicated by anuric NICKY requiring CRRT, profound shock requiring vasopressors, enterobacter PNA, and CT head on 12/23 showed right PCA infarct. Abx course ended 1/1/21. Vaso pressors trending down, and he has. Intermittent neurologic responses.     Previous Palliative Care Encounters:  None on file    Functional Status:     Functional Status two weeks prior to hospitalization: PPS:   80%- 1. Full; 2. Normal activity with effort, some evidence of disease; 3. Full; 4. Normal  or reduced; 5. Full    Functional status Current:   PPS:   10%- 1. Totally bed bound; 2. Unable to do any activity, extensive disease; 3. Total care; 4. Mouth care only; 5. Drowsy or coma +/- confusion     Prognosis, Goals, & Planning:   Prognosis (quality & quantity): very guarded   Basis for estimate: clinical judgment   High risk of death within one year? yes    Patient's decision making preferences: together with roche loved ones (wife Yuridia)        Capacity evaluation:    Pt name does not have capacity to make a decision regarding complex goals of care based on the following:    Inability to understand relevant information about her condition.    Inability to demonstrate understanding of her illness and care needs.    Inability to demonstrate reasoning to make decisions regarding her illness.    Inability to communicate her options for treatment.            I have concerns about the patient/family's health literacy today: No           Patient has a completed Health Care Directive: No      Code status: Full Code    Social:     Birthplace: born on [facility];     Education: Born again at age 36 and began working at [employer] and then worked with [employer] as a ; attended [employer],    Occupation:  Full time [employer]    Relationships:wife Yuridia for 7 years; Red has two daughters; knows 'thousands of people through his [employer] work'    Hobbies: hunting, 'northern lifestyle.'     Patient is 'famous for...his deep love and roselyn    Spirituality: Taoism     Alcohol: none X 37 years    Tobacco:none X 37 years;     Drugs: heroin and drug addiction until ~ 40 years ago;      Living situation: lives in an apartment in [place] with his wife;     History of Present Illness:  family/loved ones and chart review in [facility]     Felipe Cruz is a 74 year old male with a PMH of CAD, HTN, HLD, DM2, and BPH who was admitted to [facility] on  12/6/2020.  He was admitted to the general care floor. He had increased oxygenation needs, started on HFNC, and transferred to the ICU on 12/8. He was intubated for worsening hypoxia on 12/10. He self extubated on 12/10, required reintubation, and again self-extubated on 12/14. He was reintubated 12/16 for worsening hypoxia. Course complicated by anuric NICKY requiring CRRT, profound shock requiring vasopressors, enterobacter PNA, and CT head on 12/23 showed right PCA infarct. Abx course ended 1/1/21. Vaso pressors trending down, however now receiving HD and continues to appear fluid up. Intermittent neurologic responses.     Key Palliative Symptom Data:  # Pain severity in the last 12 hours: none  # Dyspnea severity in the last 12 hours: none  # Nausea severity in the last 12 hours: none  # Anxiety severity in the last 12 hours: none    Patient is on opioids: assessed and bowels ok/no needed changes to plan of care today.    ROS:  Unable to assess fully as patient is intubated/sedated      Past Medical History:  History reviewed. No pertinent past medical history.     Past Surgical History:  Past Surgical History:   Procedure Laterality Date     PICC INSERTION - TRIPLE LUMEN  12/9/2020              Family History:  History reviewed. No pertinent family history.       Allergies:  No Known Allergies     Medications:  I have reviewed this patient's medication profile and medications from this hospitalization.       amino acids-protein hydrolys  1 packet Enteral Tube DAILY     amiodarone  200 mg Oral DAILY     aspirin  81 mg Enteral Tube DAILY     chlorhexidine  15 mL Topical Q12H     [Held by provider] docusate sodium (COLACE) 50 mg/5 mL oral liquid  100 mg Enteral Tube BID     hydrocortisone sod succ  50 mg Intravenous Q6H FIXED TIMES     insulin aspart (NovoLOG) injection   Subcutaneous Q4H FIXED TIMES     insulin glargine  35 Units Subcutaneous BID     midodrine  10 mg Oral TID with meals     omeprazole  20 mg Oral  QAM AC    Or     omeprazole  20 mg Enteral Tube QAM AC    Or     pantoprazole  40 mg Intravenous QAM AC     oxyCODONE  10 mg Enteral Tube Q4H     [Held by provider] senna (SENOKOT) syrup  8.8 mg Enteral Tube BID     sodium chloride  10-30 mL Intravenous Q8H FIXED TIMES     vit B cmplx 3-FA-vit C-biotin  1 tablet Enteral Tube DAILY     white petrolatum-mineral oiL   Both Eyes Q8H       PRN MEDS:   acetaminophen, albumin human, albuterol, bisacodyL, dextrose 10%, dextrose 50 % (D50W), fentaNYL pf, glucagon (human recombinant), heparin ANTICOAGULANT, heparin ANTICOAGULANT, heparin ANTICOAGULANT, hydrALAZINE, insulin infusion (1 unit/mL), labetalol, lipase-protease-amylase **AND** sodium bicarbonate, magnesium hydroxide, midazolam, naloxone **OR** naloxone **OR** naloxone **OR** naloxone, ondansetron **OR** ondansetron, polyethylene glycol, polyvinyl alcohol, sodium chloride 0.9%, sodium chloride bacteriostatic, sodium chloride, sodium chloride     Physical Exam:   Temp  Min: 97.7  F (36.5  C)  Max: 99.6  F (37.6  C)    Pulse  Min: 91  Max: 112    Resp  Min: 15  Max: 45    No data recorded    SpO2  Min: 95 %  Max: 100 %        Due to covid    Data reviewed:  Reviewed recent pertinent imaging, comments:   Ct Head Without Contrast    Result Date: 1/3/2021  EXAM: CT HEAD WO CONTRAST LOCATION: [facility] DATE/TIME: 1/3/2021 12:51 PM INDICATION: Confusion. Less responsive. Stroke follow-up. COMPARISON: 12/27/2020. TECHNIQUE: Routine CT Head without IV contrast. Multiplanar reformats. Dose reduction techniques were used. FINDINGS: INTRACRANIAL CONTENTS: No significant change in the evolving right posterior cerebral artery territory infarction. No significant mass effect or hemorrhagic transformation. No intracranial hemorrhage, extraaxial collection, or mass effect.  No acute large artery territory infarction. Mild presumed chronic small vessel ischemic changes. Mild generalized volume loss. No  hydrocephalus. VISUALIZED ORBITS/SINUSES/MASTOIDS: No intraorbital abnormality. Decreased dependent fluid in the right maxillary sinus. Remaining paranasal sinuses are clear. Tympanomastoid cavities are clear. BONES/SOFT TISSUES: No acute abnormality.     1.  No new acute intracranial abnormality. 2.  Continued expected evolution of the right posterior cerebral artery territory infarction without complication.    For a range for imaging in the last 24 hours    Reviewed recent labs, comments:   Lab Results: personally reviewed    Lab Results   Component Value Date     (L) 01/04/2021    K 4.3 01/04/2021     01/04/2021    CO2 19 (L) 01/04/2021     (H) 01/04/2021    CREATININE 5.75 (H) 01/04/2021    CALCIUM 7.6 (L) 01/04/2021     Lab Results   Component Value Date    WBC 10.0 01/04/2021    HGB 7.4 (L) 01/04/2021    HCT 22.6 (L) 01/04/2021    MCV 97 01/04/2021     01/04/2021     Lab Results   Component Value Date/Time    ALBUMIN 2.4 (L) 12/26/2020 05:18 AM     Wt Readings from Last 3 Encounters:   01/04/21 (!) 223 lb 5.2 oz (101.3 kg)           TTS: I have personally spent a total of >80  minutes  today on the unit in review of medical record, consultation with the medical providers and assessment of patient today, with more than 50% of this time spent in counseling, coordination of care, and conversation with Red's wife, Yuridia, re: diagnostic results, prognosis, symptom management, emotional support, and development of plan of care.     Laci Hoff MD MS FAAFP  [facility]  Office [phone]  Fax [phone]    During regular M-F work hours -- if you are not sure who specifically to contact -- please contact us by calling [phone].

## 2021-06-14 NOTE — PROGRESS NOTES
12/28/20 0639   Vent Information   Vent Mode AC   Patient Ventilator Status On   Respiratory Assessment   Respiratory Pattern Regular   Bilateral Breath Sounds Diminished   Vent Settings   FiO2 (%) 45 %   Resp Rate (Set) 28   Insp Time (sec) 0.7 sec   Vt (Set, mL) 520 mL   PEEP/CPAP (cm H2O) 12 cm H2O   Trigger Sensitivity Flow (L/min) 2 L/min   I:E Ratio 1:2.1   Patient Data   Vt Exp (mL) 490 mL   Minute Ventilation (L/min) 14.2 L/min   Total Resp Rate  30 BPM   PIP Observed (cm H2O) 28 cm H2O   MAP (cm H2O) 18   Auto/Intrinsic PEEP Observed (cm H2O) 2.8 cm H2O   Plateau Pressure (cm H2O) 26 cm H2O   Dynamic Compliance (L/cm H2O) 47.5 L/cm H2O   Airway Resistance 8.4   SpO2 96 %   Heart Rate (!) 102   Airway Suctioning/Secretions   Suction Device  Inline   Secretion Amount None

## 2021-06-14 NOTE — PROGRESS NOTES
NEUROLOGY PROGRESS NOTE     ASSESSMENT & PLAN   Hospital Day#: 20    Visit diagnosis: Cerebral infarction    Right posterior cerebral artery territory infarction    74 years old male with history of DM, HTN diagnosed with COVID-19 on 11/30/2020 with a rapid worsening of respiratory status, intubated on on 12/10/2020.  Patient completed course of remdesivir, Decadron    CT head done on 12/23/2020 shows right PCA distribution infarction with 3 mm punctate hyperdense focus in the right thalamus that likely a small amount of mineralization but hemorrhage is a possibility and follow-up CT scan was recommended.  Repeat CT scan tomorrow morning    Echocardiogram shows normal ejection fraction with no hemodynamically significant valvular heart abnormality    Ceribell EEG shows nonspecific slowing with superimposed beta activity likely medication effect no sharp activity or rhythmic discharges seen to suggest seizures    Currently patient is on aspirin and IV heparin    LDL 56, no need for statin will discontinue Lipitor    Carotid ultrasound shows 50 to 60% bilateral ICA stenosis    Due to prolonged illness patient is at increased risk of critical illness neuromyopathy and his tone is decreased    Patient with acute renal failure on CRRT    Neurology Discharge Planning :   ANDREW Vasquez MD  [facility]  (Previously, [facility]  Tel: [phone]     PROBLEM LIST      Patient Active Problem List    Diagnosis Date Noted     Acute right PCA stroke (H)      Secondary spontaneous pneumothorax      Acute respiratory distress syndrome (ARDS) due to COVID-19 virus (H) 12/10/2020     Acute respiratory failure with hypoxia (H)      Acute kidney injury (H)      Pneumonia due to 2019 novel coronavirus 12/06/2020     Diabetes mellitus (H) 06/24/2019     Hypertension 06/24/2019     Chronic pain disorder 03/27/2019     Primary osteoarthritis of right hip 01/26/2018      Primary osteoarthritis of right knee 01/26/2018     BPH with urinary obstruction 11/15/2017     Past Medical History:   Patient  has no past medical history on file.     SUBJECTIVE     Patient unresponsive with no meaningful response     OBJECTIVE     Vital signs in last 24 hours  Heart Rate:  [] 94  Resp:  [5-35] 32  Arterial Line BP: ()/() 147/67  FiO2 (%):  [45 %-50 %] 45 %    Weight:   198 lb 3.1 oz (89.9 kg)    I/O last 24 hours    Intake/Output Summary (Last 24 hours) at 12/26/2020 0914  Last data filed at 12/26/2020 0900  Gross per 24 hour   Intake 2600.37 ml   Output 3112 ml   Net -511.63 ml     Review of Systems   Pertinent items are noted in HPI.    General Physical Exam: Patient is alert and oriented x 0. Vital signs were reviewed and are documented in EMR. Neck was supple, no Carotid bruit, thyromegaly, JVD or lymphadenopathy noted.  Neurological Exam:  Patient intubated and sedated.  Pupil equal round, nonreactive no nystagmus.  No doll's eyes.  No withdrawal to painful stimuli reflexes absent     DIAGNOSTIC STUDIES     Pertinent Radiology   Radiology Results: Personally reviewed image/s and Personally reviewed impression/s    CT  12/23/2020  1.  Right PCA distribution infarct with involvement of the medial posterior right temporal lobe as well as the medial and inferior right occipital lobe. Marked hypoattenuation obscuring the gray-white junction favors an overall subacute nature. No   intracranial hemorrhage associated with this focus of infarction.     2.  3 mm punctate hyperdense focus in the lateral right thalamus is nonspecific and potentially relates to a small amount of mineralization. Punctate focus of acute intraparenchymal hemorrhage is possible, and attention on follow-up head CT will be of   benefit.     3.  Background age-related changes, as above.     4.  Diffuse paranasal sinus disease with fluid opacification in the bilateral mastoid air cells    Ceribell  EEG  Nonspecific slowing with superimposed faster activity likely medication effect.  No seizures or focal slowing recorded    Echocardiogram    Technically difficult study due to poor acoustic windows and patient positioning.    Left ventricular ejection fraction is normal. The calculated left ventricular ejection fraction is 63%.    Normal right ventricular size and systolic function.    No hemodynamically significant valvular heart abnormalities.    Small circumferential pericardial effusion. No obvious echocardiographic evidence of cardiac tamponade.    No previous study for comparison.    Pertinent Labs   Lab Results: personally reviewed.   Recent Results (from the past 24 hour(s))   POCT Glucose    Collection Time: 12/25/20 12:04 PM    Specimen: Blood   Result Value Ref Range    Glucose 129 70 - 139 mg/dL   Lipid Profile    Collection Time: 12/25/20  3:57 PM   Result Value Ref Range    Triglycerides 148 <=149 mg/dL    Cholesterol 111 <=199 mg/dL    LDL Calculated 56 <=129 mg/dL    HDL Cholesterol 25 (L) >=40 mg/dL    Patient Fasting > 8hrs? No    Platelet Count    Collection Time: 12/25/20  3:58 PM   Result Value Ref Range    Platelets 225 140 - 440 thou/uL   Hemoglobin    Collection Time: 12/25/20  3:58 PM   Result Value Ref Range    Hemoglobin 9.5 (L) 14.0 - 18.0 g/dL   Calcium, Ionized, Measured    Collection Time: 12/25/20  3:58 PM   Result Value Ref Range    Calcium, Ionized Measured 1.13 1.11 - 1.30 mmol/L    Calcium, Ionized pH 7.4 1.06 (L) 1.11 - 1.30 mmol/L    pH 7.29 (L) 7.35 - 7.45   Electrolyte Panel    Collection Time: 12/25/20  4:17 PM   Result Value Ref Range    Sodium 136 136 - 145 mmol/L    Potassium 4.6 3.5 - 5.0 mmol/L    CO2 18 (L) 22 - 31 mmol/L    Chloride 106 98 - 107 mmol/L    Anion Gap, Calculation 12 5 - 18 mmol/L   Magnesium    Collection Time: 12/25/20  4:17 PM   Result Value Ref Range    Magnesium 2.5 1.8 - 2.6 mg/dL   Phosphorus    Collection Time: 12/25/20  4:17 PM   Result Value  Ref Range    Phosphorus 5.6 (H) 2.5 - 4.5 mg/dL   Calcium    Collection Time: 12/25/20  4:17 PM   Result Value Ref Range    Calcium 8.4 (L) 8.5 - 10.5 mg/dL   POCT Glucose    Collection Time: 12/25/20  4:23 PM    Specimen: Blood   Result Value Ref Range    Glucose 151 (H) 70 - 139 mg/dL   POCT Glucose    Collection Time: 12/25/20  7:47 PM    Specimen: Blood   Result Value Ref Range    Glucose 83 70 - 139 mg/dL   Calcium, Ionized, Measured    Collection Time: 12/25/20 10:22 PM   Result Value Ref Range    Calcium, Ionized Measured 1.11 1.11 - 1.30 mmol/L    Calcium, Ionized pH 7.4 1.06 (L) 1.11 - 1.30 mmol/L    pH 7.31 (L) 7.35 - 7.45   Calcium    Collection Time: 12/25/20 10:22 PM   Result Value Ref Range    Calcium 8.3 (L) 8.5 - 10.5 mg/dL   Hemoglobin    Collection Time: 12/25/20 10:22 PM   Result Value Ref Range    Hemoglobin 9.7 (L) 14.0 - 18.0 g/dL   Phosphorus    Collection Time: 12/25/20 10:22 PM   Result Value Ref Range    Phosphorus 5.6 (H) 2.5 - 4.5 mg/dL   Platelet Count    Collection Time: 12/25/20 10:22 PM   Result Value Ref Range    Platelets 236 140 - 440 thou/uL   Electrolyte Panel    Collection Time: 12/25/20 10:23 PM   Result Value Ref Range    Sodium 136 136 - 145 mmol/L    Potassium 4.6 3.5 - 5.0 mmol/L    CO2 17 (L) 22 - 31 mmol/L    Chloride 106 98 - 107 mmol/L    Anion Gap, Calculation 13 5 - 18 mmol/L   Magnesium    Collection Time: 12/25/20 10:23 PM   Result Value Ref Range    Magnesium 2.5 1.8 - 2.6 mg/dL   Blood Gases, Arterial    Collection Time: 12/25/20 11:21 PM   Result Value Ref Range    pH, Arterial 7.29 (L) 7.37 - 7.44    pCO2, Arterial 38 35 - 45 mm Hg    pO2, Arterial 79 75 - 85 mm Hg    Bicarbonate, Arterial Calc 18.3 (L) 23.0 - 29.0 mmol/L    O2 Sat, Arterial 94.7 (L) 95.0 - 96.0 %    Oxyhemoglobin 92.8 (L) 95.0 - 96.0 %    Base Excess, Arterial Calc -8.0 mmol/L    Ventilation Mode AC     Rate 32 rr/min    FIO2 50.00     Peep 16 cm H2O    Sample Stabilized Temperature 37.0  degrees C    Ventilator Tidal Volume 520 mL   POCT Glucose    Collection Time: 12/25/20 11:55 PM    Specimen: Blood   Result Value Ref Range    Glucose 113 70 - 139 mg/dL   HM2(CBC W/O DIFF)    Collection Time: 12/26/20  5:15 AM   Result Value Ref Range    WBC 32.2 (HH) 4.0 - 11.0 thou/uL    RBC 3.17 (L) 4.40 - 6.20 mill/uL    Hemoglobin 9.7 (L) 14.0 - 18.0 g/dL    Hematocrit 30.8 (L) 40.0 - 54.0 %    MCV 97 80 - 100 fL    MCH 30.6 27.0 - 34.0 pg    MCHC 31.5 (L) 32.0 - 36.0 g/dL    RDW 16.0 (H) 11.0 - 14.5 %    Platelets 226 140 - 440 thou/uL    MPV 12.1 8.5 - 12.5 fL   Anti-Xa Heparin Level    Collection Time: 12/26/20  5:15 AM   Result Value Ref Range    Anti-Xa Heparin Assay 0.74 See comments IU/mL   Calcium, Ionized, Measured    Collection Time: 12/26/20  5:15 AM   Result Value Ref Range    Calcium, Ionized Measured 1.10 (L) 1.11 - 1.30 mmol/L    Calcium, Ionized pH 7.4 1.03 (L) 1.11 - 1.30 mmol/L    pH 7.29 (L) 7.35 - 7.45   Basic Metabolic Panel    Collection Time: 12/26/20  5:18 AM   Result Value Ref Range    Sodium 135 (L) 136 - 145 mmol/L    Potassium 4.5 3.5 - 5.0 mmol/L    Chloride 105 98 - 107 mmol/L    CO2 16 (L) 22 - 31 mmol/L    Anion Gap, Calculation 14 5 - 18 mmol/L    Glucose 189 (H) 70 - 125 mg/dL    Calcium 8.3 (L) 8.5 - 10.5 mg/dL    BUN 28 8 - 28 mg/dL    Creatinine 0.94 0.70 - 1.30 mg/dL    GFR MDRD Af Amer >60 >60 mL/min/1.73m2    GFR MDRD Non Af Amer >60 >60 mL/min/1.73m2   Calcium    Collection Time: 12/26/20  5:18 AM   Result Value Ref Range    Calcium 8.3 (L) 8.5 - 10.5 mg/dL   Electrolyte Panel    Collection Time: 12/26/20  5:18 AM   Result Value Ref Range    Sodium 135 (L) 136 - 145 mmol/L    Potassium 4.5 3.5 - 5.0 mmol/L    CO2 16 (L) 22 - 31 mmol/L    Chloride 105 98 - 107 mmol/L    Anion Gap, Calculation 14 5 - 18 mmol/L   Magnesium    Collection Time: 12/26/20  5:18 AM   Result Value Ref Range    Magnesium 2.5 1.8 - 2.6 mg/dL   Phosphorus    Collection Time: 12/26/20  5:18 AM    Result Value Ref Range    Phosphorus 5.6 (H) 2.5 - 4.5 mg/dL   Hepatic Profile    Collection Time: 12/26/20  5:18 AM   Result Value Ref Range    Bilirubin, Total 0.8 0.0 - 1.0 mg/dL    Bilirubin, Direct 0.5 <=0.5 mg/dL    Protein, Total 6.0 6.0 - 8.0 g/dL    Albumin 2.4 (L) 3.5 - 5.0 g/dL    Alkaline Phosphatase 109 45 - 120 U/L     (H) 0 - 40 U/L     (H) 0 - 45 U/L   Blood Gases, Arterial    Collection Time: 12/26/20  5:20 AM   Result Value Ref Range    pH, Arterial 7.28 (L) 7.37 - 7.44    pCO2, Arterial 39 35 - 45 mm Hg    pO2, Arterial 99 (H) 75 - 85 mm Hg    Bicarbonate, Arterial Calc 18.0 (L) 23.0 - 29.0 mmol/L    O2 Sat, Arterial 97.6 (H) 95.0 - 96.0 %    Oxyhemoglobin 95.7 95.0 - 96.0 %    Base Excess, Arterial Calc -8.6 mmol/L    Ventilation Mode AC     Rate 32 rr/min    FIO2 50.00     Peep 16 cm H2O    Sample Stabilized Temperature 37.0 degrees C    Ventilator Tidal Volume 610 mL   POCT Glucose    Collection Time: 12/26/20  6:18 AM    Specimen: Blood   Result Value Ref Range    Glucose 141 (H) 70 - 139 mg/dL         HOSPITAL MEDICATIONS       amino acids-protein hydrolys  1 packet Enteral Tube BID     [START ON 12/30/2020] amiodarone  200 mg Oral DAILY     amiodarone  400 mg Oral BID     ascorbic acid (vitamin C)  1,000 mg Enteral Tube DAILY     aspirin  81 mg Enteral Tube DAILY     chlorhexidine  15 mL Topical Q12H     docusate sodium (COLACE) 50 mg/5 mL oral liquid  100 mg Enteral Tube BID     hydrocortisone sod succ  50 mg Intravenous Q6H FIXED TIMES     insulin aspart (NovoLOG) injection   Subcutaneous Q4H FIXED TIMES     [Held by provider] insulin NPH  55 Units Subcutaneous BID     meropenem  1 g Intravenous Q8H     omeprazole  20 mg Oral QAM AC    Or     omeprazole  20 mg Enteral Tube QAM AC    Or     pantoprazole  40 mg Intravenous QAM AC     senna (SENOKOT) syrup  8.8 mg Enteral Tube BID     simvastatin  20 mg Oral QHS     sodium chloride  10-30 mL Intravenous Q8H FIXED TIMES     vit  B cmplx 3-FA-vit C-biotin  1 tablet Enteral Tube DAILY     white petrolatum-mineral oiL   Both Eyes Q8H        Total time spent for face to face visit, reviewing labs/imaging studies, counseling and coordination of care was: 30 Minutes More than 50% of this time was spent on counseling and coordination of care.        This note was dictated using voice recognition software.  Any grammatical or context distortions are unintentional and inherent to the software.

## 2021-06-14 NOTE — PROGRESS NOTES
Attempting to wean down sedation as tolerated due to no neuro responses. Titrating as tolerated. Versed gtt off from 1mg/ hr. Will monitor.

## 2021-06-14 NOTE — PROGRESS NOTES
Infectious Diseases Progress Note  [facility]    Date of visit: 12/27/2020      ASSESSMENT   74-year-old man with a history of diabetes, hypertension, BPH who is admitted with respiratory distress from COVID-19.    1. COVID-19.  Diagnosed on 11/30.  Developed rapid worsening of respiratory status, intubated on 12/10.  Completed 5-day course of remdesivir.  Completed 10-day course of dexamethasone on 12/15.  Course complicated by right-sided pneumothorax.  Now with acute hepatitis, new stroke, and renal failure.  2. Leukocytosis.  Mildly elevated white cells during admission, possibly from steroids.  Started to rise in earnest on 12/17, now markedly elevated with a left shift.  Blood and urine cultures negative.  Sputum growing Enterobacter cloacae.  C. difficile test negative.  Recently started on stress dose steroids, which may be contributing to rise.  May also be from general inflammatory process from severe Covid illness. Wbc coming down slowly  3. Renal failure.  Nephrology consulted on 12/17, now on dialysis.  4. Acute hepatitis.  Elevated transaminases.  ALT starting to rise around 12/16.  No signs of biliary obstruction on CT scan today.  Consider possible medications, recently started on amiodarone.  Shock liver? Improving   5. Right PCA stroke. Followed by neurology. Critical illness myopathy.  6. Hypothermia. Infectious work-up as above. CRRT can be contributing. Autonomic dysregulation following stroke? Temp better this morning    Principal Problem:    Pneumonia due to 2019 novel coronavirus  Active Problems:    Acute respiratory failure with hypoxia (H)    Acute kidney injury (H)    Acute respiratory distress syndrome (ARDS) due to COVID-19 virus (H)    Secondary spontaneous pneumothorax    Acute right PCA stroke (H)       PLAN   -Continue meropenem for Enterobacter in sputum  -poor prognosis     Angel Leon MD  [facility]  Direct messaging: [email or web address]  Paging  On-Call ID provider: [phone], option: 9      ===========================================      SUBJECTIVE / INTERVAL HISTORY:     No acute events. Remains on warming blanket. On CRRT. Repeat CT head ordered for today.    Review of Systems     Intubated and sedated.         Antibiotics   Meropenem 12/19-      Previous:  Ceftriaxone 12/17-12/18  Fluconazole 12/19-12/22  Zosyn 12/15-12/16  Remdesivir x5 days  Micafungin 12/23-  Vancomycin 12/16, 12/19-12/22      Physical Exam     Heart Rate:  [] 107  Resp:  [11-40] 32  Arterial Line BP: ()/(38-59) 131/57  FiO2 (%):  [45 %] 45 %    Vitals:    12/27/20 0845   BP:    Pulse: (!) 107   Resp: (!) 32   Temp:    SpO2: 94%       GENERAL:  Intubated and sedated   HENT:  Head is normocephalic, atraumatic.  ET tube in place.    EYES:  Eyes have anicteric sclerae without conjunctival injection or stigmata of endocarditis.   LUNGS:  Clear to auscultation.  CARDIOVASCULAR:  Regular rate and rhythm with no murmurs, gallops or rubs.  ABDOMEN:  Normal bowel sounds, soft, nontender. No appreciable hepatosplenomegaly  EXT: Extremities warm and without edema.  Amputations of third, fourth, and fifth finger on the left hand.    SKIN:  No acute rashes.  Right IJ line, PICC line, and right femoral HD line is in place without any surrounding erythema.   NEUROLOGIC: Sedated    Cultures   12/12 sputum culture: Klebsiella  12/16 blood cultures x2: No growth to date  12/16 urine culture: Negative  12/16 sputum culture: Normal ernst and yeast  12/19 fungal blood culture: Pending  12/19 blood cultures x2: No growth to date  12/19 sputum culture: Enterobacter cloacae  12/22 sputum: GPB, culture: yeast   12/22 blood cultures x2: No growth to date  12/26 blood culture x 2: no growth to date  12/26 fungal blood culture: no growth to date       Pertinent Labs:     Results from last 7 days   Lab Units 12/27/20  0532 12/26/20  2121 12/26/20  1357 12/26/20  0515 12/25/20  0547  12/25/20  0547 12/24/20  0507 12/24/20  0507 12/23/20  0406 12/23/20  0406   LN-WHITE BLOOD CELL COUNT thou/uL 24.2*  --   --  32.2*  --  35.1*  --  40.9*  --  37.6*  37.8*   LN-HEMOGLOBIN g/dL 9.6* 10.0* 9.4* 9.7*   < > 9.2*   < > 10.3*   < > 10.0*  10.0*   LN-PLATELET COUNT thou/uL 222 244 221 226   < > 228   < > 279   < > 290  268   LN-MONOCYTES - REL (DIFF) %  --   --   --   --   --   --   --  7  --  7    < > = values in this interval not displayed.       Results from last 7 days   Lab Units 12/27/20  0532 12/26/20  2121 12/26/20  1357 12/26/20  0518 12/25/20  0547 12/25/20  0547 12/23/20  0405 12/23/20  0405   LN-SODIUM mmol/L 135*  134* 136 135* 135*  135*   < > 135*   < > 135*   LN-POTASSIUM mmol/L 4.5  4.5 4.6 4.4 4.5  4.5   < > 4.6   < > 4.7   LN-CHLORIDE mmol/L 105  105 104 105 105  105   < > 106   < > 100   LN-CO2 mmol/L 16*  16* 21* 17* 16*  16*   < > 18*   < > 22   LN-BLOOD UREA NITROGEN mg/dL 30*  --   --  28  --  28   < > 24   LN-CREATININE mg/dL 0.93  --   --  0.94  --  1.11   < > 1.22   LN-CALCIUM mg/dL 8.1*  8.1* 8.5 8.2* 8.3*  8.3*   < > 8.5   < > 8.7   LN-ALBUMIN g/dL  --   --   --  2.4*  --  2.4*  --  2.0*   LN-PROTEIN TOTAL g/dL  --   --   --  6.0  --  5.8*  --  6.0   LN-BILIRUBIN TOTAL mg/dL  --   --   --  0.8  --  1.0  --  0.7   LN-ALKALINE PHOSPHATASE U/L  --   --   --  109  --  103  --  99   LN-ALT (SGPT) U/L  --   --   --  234*  --  268*  --  182*   LN-AST (SGOT) U/L  --   --   --  123*  --  216*  --  213*    < > = values in this interval not displayed.                 Imaging:     No results found.      Attestation:  I have reviewed today's Medications, Vital Signs, and Labs.

## 2021-06-14 NOTE — PROGRESS NOTES
01/05/21 0305   Vent Information   Interface Invasive   Patient Data   Vt Exp (mL) 527 mL   Minute Ventilation (L/min) 13.2 L/min   Total Resp Rate  28 BPM   PIP Observed (cm H2O) 26 cm H2O   MAP (cm H2O) 14   Auto/Intrinsic PEEP Observed (cm H2O) 3.6 cm H2O   Plateau Pressure (cm H2O) 22 cm H2O   Dynamic Compliance (L/cm H2O) 39.2 L/cm H2O   Airway Resistance 9.8

## 2021-06-14 NOTE — PROGRESS NOTES
12/30/20 0752   Patient Data   Vt Exp (mL) 523 mL   Minute Ventilation (L/min) 13.1 L/min   Total Resp Rate  28 BPM   PIP Observed (cm H2O) 31 cm H2O   MAP (cm H2O) 17   Auto/Intrinsic PEEP Observed (cm H2O) 0.9 cm H2O   Plateau Pressure (cm H2O) 28 cm H2O   Dynamic Compliance (L/cm H2O) 32.4 L/cm H2O   Airway Resistance 8.6   SpO2 99 %

## 2021-06-14 NOTE — PROCEDURES
"PICC Line Insertion Procedure Note  Pt. Name: Felipe Cruz  MRN:        [id number]    Procedure: Insertion of a  triple Lumen  5 fr  Bard SOLO (valved) Power PICC, Lot number [id number]    Indications: vasopressors, vascular access    Contraindications : none noted    Procedure Details   Patient identified with 2 identifiers and \"Time Out\" conducted.  .     Central line insertion bundle followed: hand hygeine performed prior to procedure, site cleansed with cholraprep, hat, mask, sterile gloves,sterile gown worn, patient draped with maximum barrier head to toe drape, sterile field maintained.    The vein was assessed and found to be compressible and of adequate size. 3 ml 1% Lidocaine administered sq to the insertion site. A 5 Fr PICC was inserted into the brachial vein of the right arm with ultrasound guidance. One attempt(s) required to access vein.   Catheter threaded without difficulty. Good blood return noted.    Modified Seldinger Technique used for insertion.    The 8 sharps that are included in the PICC insertion kit were accounted for and disposed of in the sharps container prior to breakdown of the sterile field.    Catheter secured with Statlock, biopatch and Tegaderm dressing applied.    Findings:  Total catheter length  46 cm, with 2 cm exposed. Mid upper arm circumference is 39 cm. Catheter was flushed with 30 cc NS. Patient  tolerated procedure well.    Tip placement verified by xray. Xray read by Dr. Fadi Barahona . Tip placement in the SVC/RA jct..    CLABSI prevention brochure left at bedside.    Patient's primary RN notified PICC is ready for use.    Comments:          Jose Antonio Mahoney RN    [facility]  [phone]      "

## 2021-06-14 NOTE — PROGRESS NOTES
Care Management Follow Up Note    Length of Stay (days) 27    Patient plan of care discussed at Interdisciplinary Rounds: yes                Expected Discharge Date: (TBD, intubated)      Concerns to be Addressed / Barriers to Discharge: ICU level of care due to hypoxic respiratory failure requiring oral intubation/ventilatory support and drips.  Alteration in renal function requiring dialysis on a Wcrhwaf-Dzpwcryw-Sfckdcep schedule. Nephrology following. Alteration in neuro status (Right PCA stroke), neuro following.    Follow up from Rounds: Not medically ready for transfer or discharge at this time. Patient's status is guarded.     Anticipated Discharge Disposition: Discharge goal is pending response to treatment, medical needs and mobility closer to discharge.   Anticipated Discharge Services:  To be determined by destination, patient/family preferences, medical needs and mobility status closer to the time of discharge.  Anticipated Discharge DME: To be determined.     Plan:  Patient is  and independent with activities of daily living at baseline. His wife Yuridia is primary family contact. He tested positive for COVID-10 on 11/30/2020.  CM will continue to monitor progression of care, review team recommendations and provide discharge planning assist as needed.      Ana Duncan RN    Abbreviation  Code:  [facility] Wheelchair ([facility] WC), [facility] Stretcher ([facility] STR), Patient (pt), Transitional Care Unit (TCU), Skilled Nursing Facility (SNF), Long Term Care (LTC), Assisted Living ([facility] or AL), Care Management (CM), Physical Therapy (PT), Occupational Therapy (OT), Speech Therapy (ST), Respiratory Therapy (RT).

## 2021-06-14 NOTE — PROGRESS NOTES
[facility] COVID RT PROGRESS NOTE    DATA:    VENT DAY#  20    CURRENT SETTINGS:  VENT SETTINGS  Vent Mode: AC  FiO2 (%):  [40 %-80 %] 50 %  S RR:  [28] 28  S VT:  [520 mL] 520 mL  PEEP/CPAP (cm H2O):  [8 cm H2O-10 cm H2O] 10 cm H2O  Minute Ventilation (L/min):  [13 L/min-15.4 L/min] 15.4 L/min  PIP:  [27 cm H2O-34 cm H2O] 27 cm H2O  MAP (cm H2O):  [15-18] 15          FIO2:  50    PATIENT PARAMETERS:    PIP 31  Pplat:  29  Pmean:  18  SBT: NA  SECRETIONS:  Scant bloody  02 SATS:  100    TRACH: # 8 shiley DCT       Respiratory Medications: Duoneb/ Mucomyst Q6     ABG: Results for GISELLE VILLANUEVA (MRN [id number]) as of 1/6/2021 04:57   Ref. Range 1/6/2021 04:28   pH, Arterial Latest Ref Range: 7.37 - 7.44  7.48 (H)   pCO2, Arterial Latest Ref Range: 35 - 45 mm Hg 30 (L)   pO2, Arterial Latest Ref Range: 75 - 85 mm Hg 77   Bicarbonate, Arterial Calc Latest Ref Range: 23.0 - 29.0 mmol/L 23.7   O2 Sat, Arterial Latest Ref Range: 95.0 - 96.0 % 97.4 (H)   Oxyhemoglobin Latest Ref Range: 95.0 - 96.0 % 95.1   POC Base Excess Calc Latest Units: mmol/L -1.4       NOTE / PLAN:   Patient remains trached  and respiratory status stable overnight on current vent settings.     Respiratory Care will continue to follow and monitor this patient.   Melinda Chaney  LRT, RRT,

## 2021-06-14 NOTE — PROGRESS NOTES
01/02/21 0736   Patient Data   Minute Ventilation (L/min) 13.1 L/min   Total Resp Rate  29 BPM   PIP Observed (cm H2O) 30 cm H2O   MAP (cm H2O) 16   Auto/Intrinsic PEEP Observed (cm H2O) 0.9 cm H2O   Plateau Pressure (cm H2O) 28 cm H2O   Dynamic Compliance (L/cm H2O) 47 L/cm H2O   Airway Resistance 7   SpO2 98 %   Heart Rate 98

## 2021-06-14 NOTE — PROGRESS NOTES
[facility]    ICU PROGRESS NOTE:  DOS:  12/23/2020    Patient Summary:   Felipe Cruz is a 74 year old male with a PMH of CAD, HTN, HLD, DM2, and BPH who was admitted to [facility] on 12/6/2020.  He was admitted to the general care floor. He had increased oxygenation needs, started on HFNC, and transferred to the ICU on 12/8. He was intubated for worsening hypoxia on 12/10. He self extubated on 12/10, required reintubation, and again self-extubated on 12/14. He was reintubated 12/16 for worsening hypoxia. Course complicated by anuric NICKY requiring CRRT and now profound shock requiring two to three vasopressors.    Major Changes for Today    Send Fungitel  Add on LFTs, lipase  Add on differential  CT head/chest/abd/pelvis  Broaden antifungal from fluconazole to micafungin  Increase NPH  Decrease velitri to off  Pause bicarb drip  Neurology consult  ID consult  Replace feeding tube    Assessment/Plan:    NEUROLOGIC:  # acute pain  # agitation/anxiety  # delirium  # encephalopathy   Did not tolerate Precedex and Propofol previously   - Fentanyl and versed drips with PRN boluses available  - Cisatracurium discontinued 12/22  - Seroquel 50 mg two times a day.    -RASS goal -4    # Right PCA infarct  Demonstrated on CTH 12/23 with concern for possible right thalamus hypodensity, potential 3 mm hemorrhagic transformation within the right PCA distribution  - Neurology consulted, appreciate recommendations  - Will be difficult to rescan today given tension PTX that developed with transport    HEMODYNAMICS/CV:  # Shock, septic  12/16: Echo: EF > 63%, Normal RV And LV fn, normal valves. No Tamponade   Off of phenylephrine late yesterday, tolerating some fluid removal overnight  - Continue norepinephrine, vasopressin for MAP > 65   -Phenylephrine also available  - Stress dose steroids restarted 12/20  - ID as below     RESPIRATORY:  # Acute hypoxic respiratory failure and ARDS 2/2 COVID19  #  Klebsiella PNA   # Right apical pneumothorax s/p pigtail insertion 12/20  # Right tension pneumothorax on 12/23 with CT clamped for procedure  - intubated 12/10, self-extubated 12/14, reintubated 12/16  - Continue lung protective ventilation  - VC 32/520/+16   - Titrate FiO2 to maintain SpO2 >92%  - Ventilator bundle  - Leave supinated- P/F 140-  improved supine vs. prone  - Leave CT to suction, continuous air leak noted  - CXR today  - ABG q6h  - Start to taper velitri by 1/2 to off today    GI:  # Severe protein calorie malnutrition  # Constipation, now resolved   # Diarrhea  # Concern for ileus- non obstructive bowel gas pattern on 12/22 KUB  Last BM 12/22  Emesis afternoon 12/21- OG placed with 1.5 liters returned; enteral feeds held  # Cholelithiasis discovered on CT 12/23, non obstructive  # Elevated LFTs, likely r/t shock possibly a component of Amiodarone. Alk phos and bili with normal parameters. CT abdomen completed 12/23 with no e/o obstruction  - RD following. RD managing TF's- resume at trophic given high pressor requirement after abdominal xray  - protein BID  - bowel regimen: miralax, senna, docusate sodium daily  - Received scheduled Reglan for 24 hours (12/22-12/23)  - C diff negative 12/22  - Replace postpyloric feeding tube     RENAL:  # Anuric NICKY   # Mixed metabolic and respiratory acidosis  Started on CRRT 12/18  - Tolerating some fluid removal, still remains on vasopressor support  - Electrolytes balanced  - Persistently acidemic, improved with bicarb drip- pause today and evaluate need  - On enteral soium bicarb, likely not absorbing  - Bladder scan PRN and straight cath if PVR > 250.     # Bilateral 2 cm renal masses incidentally found on CT, recommend follow up at later time  # Bladder wall ? Stone 3mm in size incidentally found on CT. Recommended follow up in 3 months    ID:  # COVID 19   # CAP with COVID19  # Klebsiella VAP  # Septic shock  # Severe Hypothermia, resolved  # Leukocytosis,  worsening  # Concern for aspiration- had emesis on 12/21 requiring OG placement  WBC:   16-->22K-->28K-->35K-->38K  Now normothermic  - Consult ID today, appreciate recs    MICRO:  12/23 Fungitell resent  12/22 c.diff negative  12/22 sputum gram stain with prelim 1+ GPB  12/22 BC with NGTD  12/19 fungitell (pending)  galactomannan antigen (negative)  12/19 BC x2 with NGTD  12/19 Sputum with Enterobacter cloacae (sensitive to Meropenem)  12/19 A BAL with GS, AFB, KOH, fungal culture were ordered; however, the microbiology lab does not currently have the sample. Very minimal secretions on bronch 12/19 (see my note), will not repeat at this time.   12/12 sputum: Klebsiella pneumoniae  12/12  Blood cultures- NGTD  12/11 urine NGTD  12/11 BC with NGTD  12/6 blood staph epi, contaminant    Antimicrobials:  12/16 zosyn 12/16-12/17  Ceftriaxone 12/17-12/19 12/19-->** Vancomycin, Meropenem   12/19- 12/23 Fluconazole  12/23 Fluconazole broadened to Micafungin    Other Workup:  12/23: CT C/A/P  12/23 lipase normal  12/23 No e/o ischemic limbs    COVID:  Completed remdesivir course   Decadron 6 mg IV X 10 days      HEME:  # anemia of critical illness  # coagulopathy due to COVID -19  # Leukocytosis WBC:   16-->22K-->28K-->35K-->38K  Clotted CRRT filter twice overnight 12/22   - Hemoglobin stable 10  - Continue with Heparin gtt, increased to high intensity 12/22  - ID management as above     ENDOCRINE:  # DM II  # Stress and steroid induced hyperglycemia  HgbA1C on admit 8.2  - PTA Lantus and oral agents  - goal glucose < 180 for optimal healing  - Increase NPH to 55 units two times a day  - High resistance sliding scale insulin     MSK:  # acute critical care weakness/deconditioning   - hold OOB activity while heavily sedated/ tenuous; ROM     SOCIAL:  Discussed patient's condition and grave prognosis with wife over phone 12/21. Would like to proceed with aggressive cares including full code at this time at the direction of  "their previous discussions prior to hospitalization. Palliative care offered and was declined. Will continue to have ongoing discussions.    PROPHYLAXIS:   DVT proph:Yes. Heparin gtt  GI proph: Yes.  Requires solorzano for strict I&O: Yes  Restraints required for safety: Yes     DISPOSITION:  - ICU    LINES/TUBES/DRAINS:  - Right internal jugular TLC 12/22  - L art line, placed 12/10  - R fem dialysis line 12/19  - Right pigtail chest tube     Total Critical Care Time : 60 Minutes exclusive of procedures     Morenita Barahona    Overnight events: Course reviewed. Developed right tension PTX during transport for test today    Physical Exam:  Vent settings for last 24 hours:  Vent Mode: AC  FiO2 (%):  [45 %-55 %] 45 %  S RR:  [32] 32  S VT:  [520 mL] 520 mL  PEEP/CPAP (cm H2O):  [16 cm H2O] 16 cm H2O  Minute Ventilation (L/min):  [10.9 L/min-16.2 L/min] 10.9 L/min  PIP:  [30 cm H2O-32 cm H2O] 32 cm H2O  MAP (cm H2O):  [21-22] 22    /54 (Patient Position: Semi-mueller)   Pulse (!) 107   Temp 97.9  F (36.6  C)   Resp (!) 32   Ht 6' 2\" (1.88 m)   Wt (!) 229 lb 0.9 oz (103.9 kg)   SpO2 99%   BMI 29.41 kg/m      Intake/Output last 3 shifts:  I/O last 3 completed shifts:  In: 6829.2 [I.V.:5501.7; NG/GT:755; IV Piggyback:572.5]  Out: 8141 [Emesis/NG output:405; Other:7616; Chest Tube:120]  Intake/Output this shift:  I/O this shift:  In: 692.6 [I.V.:692.6]  Out: 98 [Other:98]    Physical Exam  Neuro: chemically sedated  HEENT:  PERRLA. Pupils 2. ETT present and secured. NJ and OG present  RESP: BBS, lungs diminished throughout, no wheezes. Synchronous with ventilator  CV: extremities cool, pulses palpable.   GI: S/NT/ND; + BS; rectal tube in place  : no urine seen  Extremities: mild edema, pulses 2+ Cap refill sluggish.     LAB:  Results from last 7 days   Lab Units 12/23/20  0808 12/23/20  0406   LN-WHITE BLOOD CELL COUNT thou/uL  --  37.6*  37.8*   LN-HEMOGLOBIN g/dL 10.0* 10.0*  10.0*   LN-HEMATOCRIT %  --  31.6*  " 31.2*   LN-PLATELET COUNT thou/uL 259 290  268     Results from last 7 days   Lab Units 12/23/20  0809 12/23/20  0405 12/22/20  2344 12/22/20  0450 12/22/20  0450 12/21/20  1122 12/21/20  1122 12/21/20  0405 12/21/20  0405 12/20/20  0557 12/20/20  0557   LN-SODIUM mmol/L 135* 135* 133*   < > 136  135*   < >  --    < > 137   < > 137  137   LN-POTASSIUM mmol/L 4.9 4.7 5.0   < > 4.3  4.3   < >  --    < > 5.1*   < > 5.0  5.1*   LN-CHLORIDE mmol/L 100 100 99   < > 100  100   < >  --    < > 103   < > 105  105   LN-CO2 mmol/L 21* 22 20*   < > 20*  20*   < >  --    < > 18*   < > 18*  18*   LN-BLOOD UREA NITROGEN mg/dL  --  24  --   --  26  --   --   --  35*  --  38*   LN-CREATININE mg/dL  --  1.22  --   --  1.48*  --   --   --  1.96*  --  2.59*   LN-CALCIUM mg/dL 8.0* 8.7 8.3*   < > 8.6  8.6   < >  --    < > 8.3*   < > 8.4*  8.4*   LN-PROTEIN TOTAL g/dL  --  6.0  --   --   --   --  6.7  --   --   --  6.7   LN-BILIRUBIN TOTAL mg/dL  --  0.7  --   --   --   --  0.8  --   --   --  0.8   LN-ALKALINE PHOSPHATASE U/L  --  99  --   --   --   --  86  --   --   --  75   LN-ALT (SGPT) U/L  --  182*  --   --   --   --  77*  --   --   --  84*   LN-AST (SGOT) U/L  --  213*  --   --   --   --  62*  --   --   --  39    < > = values in this interval not displayed.

## 2021-06-14 NOTE — PROGRESS NOTES
"Clinical Nutrition Therapy Follow Up Note      Receiving HD. Trach placed yesterday  Current Nutrition Prescription:   Diet:TF: Novasource renal goal 50 ml/hr restarted post procedures yesterday at 1700. Had been on hold  Most of yesterday  Modular: 1 no carb prosource  Flush: 30 ml H20 q 4 hours  IV dextrose or Fluids:     dextrose 10%, Last Rate: 50 mL/hr (01/05/21 1114)       [Held by provider] heparin, Last Rate: 1,150 Units/hr (01/05/21 0400)       insulin infusion (1 unit/mL), Last Rate: Stopped (12/30/20 1400)       norepinephrine, Last Rate: 0.02 mcg/kg/min (01/06/21 0800)       vasopressin, Last Rate: 2.4 Units/hr (01/06/21 0800)      Current Nutrition Intake:  Enteral nutrition access is a nasal gastric tube, with a placement date of 12/11/20.   Enteral nutrition at goal provides: 2460 kcals, 119 grams protein, 0 grams fiber, 259 grams carbohydrate, 830 mls free water from formula, 120 mls from fluid flush, for a total of 950 mls free water daily.     TF held most of yesterday for procedures and possible GIB with maroon stools    Pt is meeting estimated nutrition needs from all sources    Anthropometrics:  Height: 6'2\"  Admission weight: 255 lb  Weight: (!) 253 lb 8.5 oz (115 kg)1/6, down 8 lb from day prior with diuresis. Fluid up, I=O, net + 15L     +2 generalized edema per nsg. Stable.  Bed zeroed at 256 lb 12/10  Low weight: 191 lb 12/27  BMI:32.5- not accurate d/t fluid retention  BMI indication:obese  Ideal body weight 190 lb +/- 10%  18% weight loss x 3 weeks if current weights are accurate    GI Status/Output:   Bowel Sounds present per nurse, passing flatus  8 medium to xlg maroon loose BM yesterday. 3 medium loose today (not noted as maroon)  On 2 pressors- weaning    Physical Assessment:   Seen through the window 1/6 and 1/5: Mild buccal and temporal muscle loss and Moderate orbital fat loss, Mild to moderate Deltoid and UE muscle loss    Medications:  Medications reviewed: iv pantoprazole q 12, " colace bid(on hold) senna bid (on hold), nephrovite, ssi, lantus(on hold), oxycodone q 6 hours  Iv hydrocortisone, renvela added. Oxycodone decreased. Pantoprazole increased    Labs:  Lab Results   Component Value Date/Time    ALBUMIN 1.8 (L) 2021 09:12 AM     (L) 2021 04:28 AM    K 4.1 2021 04:28 AM     (H) 2021 04:28 AM    CREATININE 4.77 (H) 2021 04:28 AM     (H) 2021 04:28 AM    PHOS 5.9 (H) 2021 04:28 AM    MG 1.8 2021 04:28 AM   BUN/CR/Phos improved post hD  Na decreased- renal following  B-116 mg/dl past 24 hours. Hypoglycemia x 2 -first while TF off, second at 0021 TF was running at goal.     Estimated Nutrition Needs: Reassesed  d/t weight loss  Last low weight of 86.9 kg     Energy Needs: 0430-9018 kcals daily, 25-30 kcal/kg   Protein Needs: 104-122g daily, 1.2-1.4 g/kg   Fluid Needs: Output + 1000ml     Malnutrition: Noted previously Severe    Nutrition Risk Level: high risk    Nutrition dx:  Impaired swallow function r/t increased respiratory needs evidenced by NPO/intubation    Malnutrition r/t acute illness evidenced by 18% weight loss x 3 weeks, moderate fluid accumulation, moderate fat and mild muscle loss    Altered nutrient utilization r/t NICKY evidenced by increased renal labs, need for Hd    Goal Status:  Regular bowels - not progressing  No increase in renal labs d/t intake - progressing    Intervention:  Continue current nutrition RX    Monitoring/Evaluation:  TF tolerance, wt, labs, stooling    Nutrition History:  Information from family/caregiver and chart.  Diet prior to admission: Regular. He was eating ~50% of usual intake for 1-2 days PTA and slightly less than normal for a few days before that. He typically eats foods like sandwiches, eggs and soup with crackers and drinks diet pop, Gatorade and water.  Recent food/fluid intake: SHAD-no PO recorded  COVID GI sx of occasional nausea and poor appetite

## 2021-06-14 NOTE — PROGRESS NOTES
[facility] COVID RT PROGRESS NOTE    DATA:    VENT DAY#  16    CURRENT SETTINGS:  VENT SETTINGS   Vent Mode: AC  FiO2 (%):  [40 %] 40 %  S RR:  [28] 28  S VT:  [520 mL] 520 mL  PEEP/CPAP (cm H2O):  [10 cm H2O] 10 cm H2O  Minute Ventilation (L/min):  [11.8 L/min-15.5 L/min] 13.6 L/min  PIP:  [25 cm H2O-33 cm H2O] 25 cm H2O  MAP (cm H2O):  [14-18] 14          FIO2:   40    PATIENT PARAMETERS:    PIP 25  Pplat:  11  Pmean:  14  SBT: NA  SECRETIONS:  Small tan thick   02 SATS:  94    ETT SIZE 8.5  Secured at  26 cm at teeth  Securing device changed / tube re-taped date     Respiratory Medications: Duo Q6    ABG: Results for GISELLE VILLANUEVA (MRN [id number]) as of 1/2/2021 06:10   Ref. Range 1/2/2021 06:07   pH, Arterial Latest Ref Range: 7.37 - 7.44  7.29 (L)   pCO2, Arterial Latest Ref Range: 35 - 45 mm Hg 36   pO2, Arterial Latest Ref Range: 75 - 85 mm Hg 75   Bicarbonate, Arterial Calc Latest Ref Range: 23.0 - 29.0 mmol/L 17.7 (L)   O2 Sat, Arterial Latest Ref Range: 95.0 - 96.0 % 95.2   Oxyhemoglobin Latest Ref Range: 95.0 - 96.0 % 92.7 (L)   POC Base Excess Calc Latest Units: mmol/L -8.9       NOTE / PLAN:   Patient remained intubated and respiratory status stable overnight on current vent settings.     Respiratory Care will continue to follow and monitor this patient.   Melinda Chaney  LRT, RRT,

## 2021-06-14 NOTE — PROGRESS NOTES
12/29/20 1917   Patient Data   Vt Exp (mL) 488 mL   Minute Ventilation (L/min) 14.4 L/min   Total Resp Rate  30 BPM   PIP Observed (cm H2O) 28 cm H2O   MAP (cm H2O) 17   Auto/Intrinsic PEEP Observed (cm H2O) 1.9 cm H2O   Plateau Pressure (cm H2O) 26 cm H2O   Dynamic Compliance (L/cm H2O) 24.3 L/cm H2O   Airway Resistance 8.6

## 2021-06-14 NOTE — PROGRESS NOTES
Renal progress note   CC: ARF  Assessment and Plan:  74 y.o. male     1. ARF: pt has normal underlying renal function; now hemodynamic ARF/ATN and has progressive rise in CR; anuria and on CRRT since 12/18.  Now transition to conventional dialysis the beginning of this week  2. Hypervolemia-improved, Wt is trending down. Net 5.1 L  3. COVID 19 infection with resp failure; on vent with full support;  Completed 5-day course of remdesivir.  Completed 10-day course of dexamethasone on 12/15.  Course complicated by right-sided pneumothorax  4. Hyponatremia- Serum sodium is stable mid 130s  5. Acidosis; mixed  6. Anemia; no active loss; following hgb  7. HoTN; Remains on Levophed drip but weaning   8. Nutrition; on TF; renal formula  9. DVT prophylaxis; on heparin gtt  10. CAD, hyperlipid; stable co-morbitdities  11. DM; on insulin gtt  12. Acute hepatitis.  Elevated transaminases.   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      We will follow          Subjective    Overall course reasonably stable.    Continues to require pressors and currently on both norepinephrine and vasopressin    FiO2 requirement 50%    Acid-base status looks good    Other metabolic parameters regarding his renal failure are also fine.    So far it looks like he will tolerate intermittent hemodialysis okay, and plan to continue Tuesday Thursday Saturday schedule for now        Objective    Vital signs in last 24 hours  Temp:  [98.4  F (36.9  C)-98.8  F (37.1  C)] 98.8  F (37.1  C)  Heart Rate:  [] 90  Resp:  [7-45] 17  BP: (107-142)/(45-68) 142/68  Arterial Line BP: ()/(37-80) 141/57  FiO2 (%):  [50 %-60 %] 50 %  Weight:   208 lb 5.4 oz (94.5 kg)    Intake/Output last 3 shifts  I/O last 3 completed shifts:  In: 2908.5 [I.V.:1103.5; NG/GT:1655; IV Piggyback:150]  Out: 3910 [Other:1500; Stool:2300; Chest Tube:110]  Intake/Output this shift:  I/O this shift:  In:  805.9 [I.V.:255.9; NG/GT:500; IV Piggyback:50]  Out: 420 [Stool:400; Chest Tube:20]    Physical Exam  Viewed through window  Intubated on ventilator    Not examined and reports of other providers exams reviewed      Pertinent Labs   Lab Results   Component Value Date    WBC 16.7 (H) 12/30/2020    HGB 8.2 (L) 12/30/2020    HCT 25.1 (L) 12/30/2020    MCV 97 12/30/2020     12/30/2020     Lab Results   Component Value Date    CREATININE 3.87 (H) 12/30/2020    BUN 90 (H) 12/30/2020     12/30/2020    K 3.5 12/30/2020     12/30/2020    CO2 19 (L) 12/30/2020       Lab Results   Component Value Date    ALBUMIN 2.4 (L) 12/26/2020     Lab Results   Component Value Date    CALCIUM 7.6 (L) 12/30/2020    PHOS 4.2 12/30/2020     I reviewed all lab results  Eren Hutchison

## 2021-06-14 NOTE — PLAN OF CARE
Neuro: Patient currently only receiving fentanyl. All other sedation has been stopped. See flowsheet for neuro assessment details. No cough, gag or blink reflexes noted. Pupils equal and sluggishly reactive, CPOT 0. No movement to extremities noted.      Resp: 8.5 ET tube, 26 at the teeth. PEEP 12, , Fi02 continues at 60%, weaning as tolerating.  Suction and mouth care q 2 hourly, scant oral secretions noted. Scant bloody, tan secretions from ET tube.     CV: Afib rates 100-120, -120s systolic, titrating Levophed  for MAPs >65. Extremities warm and well perfused, palpable pulses throughout.      GI: Enteroflex remains in nare, bridle for securement. Tube feeds currently running at 50cc, which is goal. 40 cc free water q 4 hourly. BS active in all quadrants, Dignacare remains in place with high output. Bloody streaking in dignacare as well as around tube has slowed.     : Anuric. Bladderscan 30mL. CRRT stopped yesterday with plan to start hemodialysis today per nephrology note.      Endo:  BS 100s, insulin gtt continues.       ID: Tmax 99.2F, tylenol given q8H PRN, cooling blanket in place. WBC decreasing slowly.      Skin: Pressure sores noted per assessment. No new skin breakdown noted. Turn and reposition for comfort. Mepilex remains in place to coccyx area. Turn and reposition q 2 hourly.       Problem: Pain  Goal: Patient's pain/discomfort is manageable  Outcome: Progressing     Problem: Safety  Goal: Patient will be injury free during hospitalization  Outcome: Progressing     Problem: Daily Care  Goal: Daily care needs are met  Outcome: Progressing     Problem: Glucose Imbalance  Goal: Achieve optimal glucose control  Outcome: Progressing     Problem: Potential for Compromised Skin Integrity  Goal: Skin integrity is maintained or improved  Outcome: Progressing  Goal: Nutritional status is improving  Outcome: Progressing     Problem: Inadequate Gas Exchange  Goal: Patient will achieve/maintain  normal respiratory rate/effort  Outcome: Progressing     Problem: Ineffective Airway Clearance  Goal: Maintain airway patency  Outcome: Progressing     Problem: Breathing  Goal: Patient will maintain patent airway  Outcome: Progressing     Problem: Knowlegde Deficit  Goal: Verbalize understanding of condition/disease process and treatment, participate in lifestyle changes and treatment regimen  Outcome: Progressing     Problem: Risk for Infection  Goal: Identify and demonstrate techniques, lifestyle changes to prevent/reduce risk of infection and promote safe environment  Outcome: Progressing     Problem: Potential for Falls  Goal: Patient will remain free of falls  Outcome: Progressing     Problem: Cognitive Impairment or Disorientation  Goal: Patient will maintain or return to normal baseline cognitive function  Outcome: Progressing     Problem: Decreased Mental Status Causing Increased Need for Safety  Goal: Decrease patient's symptoms  Outcome: Progressing  Goal: To ensure patient safety, patient will abstain from any threats or actions to harm self or others  Outcome: Progressing     Problem: Mechanical Ventilation  Goal: Patient will maintain patent airway  Outcome: Progressing  Goal: Oral health is maintained or improved  Outcome: Progressing  Goal: Respiratory status - ventilation  Description: Movement of air in and out of the lungs.    Liberate from ventilator  Outcome: Progressing  Goal: ET tube will be managed safely  Outcome: Progressing     Problem: Potential for transmission of organism by Contact, Enteric, Droplet and/or Airborne routes  Goal: Prevent transmission of organisms  Outcome: Progressing     Problem: Psychosocial Needs  Goal: Demonstrates ability to cope with hospitalization/illness  Outcome: Not Progressing  Goal: Collaborate with patient/family/caregiver to identify patient specific goals for this hospitalization  Outcome: Not Progressing     Problem: Discharge Barriers  Goal: Patient's  discharge needs are met  Outcome: Not Progressing     Problem: Knowledge Deficit  Goal: Patient/family/caregiver demonstrates understanding of disease process, treatment plan, medications, and discharge instructions  Outcome: Not Progressing     Problem: Impaired Gas Exchange  Goal: Demonstrate improved ventilation and adequate oxygenation of tissues as evidenced by absence of respiratory distress  Outcome: Not Progressing     Problem: Breathing  Goal: Patient will utilize incentive spirometer  Outcome: Not Progressing     Problem: Mechanical Ventilation  Goal: Ability to express needs and understand communication  Outcome: Not Progressing  Goal: Mobility/activity is maintained at optimum level for patient  Outcome: Not Progressing

## 2021-06-14 NOTE — PROGRESS NOTES
[facility] PALLIATIVE CARE PROGRESS NOTE        Chief Complaint: covid PNA, GI bleed    Key Palliative Symptom Data:  # Pain severity in the last 12 hours: none  # Dyspnea severity in the last 12 hours: none  # Nausea severity in the last 12 hours: none  # Anxiety severity in the last 12 hours: none    Patient is on opioids: assessed and bowels ok/no needed changes to plan of care today.      Prognosis, Goals, or Advance Care Planning was addressed today with: Yes.    Mood, coping, and/or meaning in the context of serious illness were addressed today: Yes.    Palliative Encounter Summary/Comments:  I called Red's wife Yuridia at the request of Ermelinda Bragg.  I did so to reassure her she made a good choice in having Red transferred over to [facility] for his GI care.  She is feeling reassured.  I also told her I would ask the palliative care team at [facility] keep an eye out for her and she knows she can reach out through our office number to reach any of us if she has decisions she needs to think or talk through.    TT: I have personally spent a total of 15 minutes  today on the unit in review of medical record, consultation with the medical providers and assessment of patient today, with more than 50% of this time spent in counseling, coordination of care, and telephone call with his wife, Yuridia, re: diagnostic results, prognosis, symptom management, risks and benefits of management options, emotional support and development of plan of care.    Laci Hoff MD MS FAAFP  [facility] Palliative Care Service  Office [phone]  Fax [phone]

## 2021-06-14 NOTE — PROGRESS NOTES
[facility] COVID RT PROGRESS NOTE    DATA:    VENT DAY#  17    CURRENT SETTINGS:  VENT SETTINGS   AC 28 / 520 / +12 / 45%        FIO2:   45%    PATIENT PARAMETERS:    PIP 32  Pplat:  26  Pmean:  18  SBT: n  SECRETIONS:  none  02 SATS:  93%    ETT SIZE 8.5  Secured at  26 cm at teeth    Securing device changed / tube re-taped date 12/28/20    Respiratory Medications: no     ABG: Results for GISELLE VILLANUEVA (MRN [id number]) as of 12/28/2020 17:26   Ref. Range 12/28/2020 07:53   pH, Arterial Latest Ref Range: 7.37 - 7.44  7.37   pCO2, Arterial Latest Ref Range: 35 - 45 mm Hg 33 (L)   pO2, Arterial Latest Ref Range: 75 - 85 mm Hg 74 (L)   Bicarbonate, Arterial Calc Latest Ref Range: 23.0 - 29.0 mmol/L 19.6 (L)   O2 Sat, Arterial Latest Ref Range: 95.0 - 96.0 % 95.6       NOTE / PLAN:   Patient remains on full vent support. No changes made to the ventilator today. Breath sounds are coarse, but secretions have been minimal. Spo2 gradually declining on current settings. ET tube re-secured this afternoon. Respiratory will continue to monitor.

## 2021-06-14 NOTE — PLAN OF CARE
Problem: Ineffective Airway Clearance  Goal: Maintain airway patency  Outcome: Progressing     Problem: Impaired Gas Exchange  Goal: Demonstrate improved ventilation and adequate oxygenation of tissues as evidenced by absence of respiratory distress  Outcome: Progressing     Problem: Breathing  Goal: Patient will maintain patent airway  Outcome: Progressing  Goal: Patient performs or directs assisted coughing  Outcome: Progressing     Problem: Mechanical Ventilation  Goal: Patient will maintain patent airway  Outcome: Progressing

## 2021-06-14 NOTE — PROGRESS NOTES
01/01/21 0800   Patient Data   Vt Exp (mL) 509 mL   Minute Ventilation (L/min) 13.2 L/min   Total Resp Rate  29 BPM   PIP Observed (cm H2O) 25 cm H2O   MAP (cm H2O) 15   Auto/Intrinsic PEEP Observed (cm H2O) 1.9 cm H2O   Plateau Pressure (cm H2O)   (unable to obtain)   Dynamic Compliance (L/cm H2O) 48 L/cm H2O   Airway Resistance 6   SpO2 98 %   Heart Rate (!) 101

## 2021-06-14 NOTE — PROGRESS NOTES
Nutrition Brief Note    Pt remains on trophic TF d/t pressors, recent concern for ileus. TF on hold or trophic x 5 days as of today. If unable to advance within next 48 hrs, recommend parenteral nutrition if appropriate based on clinical status.

## 2021-06-14 NOTE — PLAN OF CARE
Problem: Breathing  Goal: Patient will maintain patent airway  Outcome: Progressing     Problem: Mechanical Ventilation  Goal: Patient will maintain patent airway  Outcome: Progressing  Goal: Oral health is maintained or improved  Outcome: Progressing  Goal: Respiratory status - ventilation  Description: Movement of air in and out of the lungs.    Liberate from ventilator  Outcome: Progressing  Goal: ET tube will be managed safely  Outcome: Progressing  Goal: Ability to express needs and understand communication  Outcome: Progressing  Goal: Mobility/activity is maintained at optimum level for patient  Outcome: Not Applicable this Shift

## 2021-06-14 NOTE — PLAN OF CARE
Problem: Pain  Goal: Patient's pain/discomfort is manageable  Outcome: Progressing     Problem: Safety  Goal: Patient will be injury free during hospitalization  Outcome: Progressing     Problem: Daily Care  Goal: Daily care needs are met  Outcome: Progressing     Problem: Psychosocial Needs  Goal: Demonstrates ability to cope with hospitalization/illness  Outcome: Not Applicable this Shift  Goal: Collaborate with patient/family/caregiver to identify patient specific goals for this hospitalization  Outcome: Progressing     Problem: Glucose Imbalance  Goal: Achieve optimal glucose control  Outcome: Progressing     Problem: Knowledge Deficit  Goal: Patient/family/caregiver demonstrates understanding of disease process, treatment plan, medications, and discharge instructions  Outcome: Progressing     Problem: Potential for Compromised Skin Integrity  Goal: Skin integrity is maintained or improved  Outcome: Progressing  Goal: Nutritional status is improving  Outcome: Progressing     Problem: Inadequate Gas Exchange  Goal: Patient will achieve/maintain normal respiratory rate/effort  Outcome: Progressing     Problem: Ineffective Airway Clearance  Goal: Maintain airway patency  Outcome: Progressing     Problem: Impaired Gas Exchange  Goal: Demonstrate improved ventilation and adequate oxygenation of tissues as evidenced by absence of respiratory distress  Outcome: Progressing     Problem: Breathing  Goal: Patient will maintain patent airway  Outcome: Progressing     Problem: Mechanical Ventilation  Goal: Patient will maintain patent airway  Outcome: Progressing

## 2021-06-14 NOTE — PROGRESS NOTES
Renal progress note   CC: ARF  Assessment and Plan:  74 y.o. male     1. ARF: pt has normal underlying renal function; now hemodynamic ARF/ATN and has progressive rise in CR; anuria and on CRRT since 12/18.  severe acidosis a little better but requiring inc CRRT dose and large amounts of extra base. Now stopped bicarb drip and bicarb level trending down this am. Plan bolus bicarb and follow. Cont CRRT 30 ml/kg/hr.  2. Volume status up but hypotension limiting UF and no urine. Plan try UF as bp can tolerate. Currently at 75 ml/hr  3. COVID 19 infection with resp failure; on vent with full support; on broad abx/ fluconazole and lines changed (klebsiella in sputum); on veletri  4. Hypernatremia; better on CRRT  5. Acidosis;resp and metabolic- now controlled with CRRT  / off bicarb gtt, gave one time extra bicarb this am. Follow.   6. Anemia; no active loss; following hgb  7. HoTN; slightly reduced pressor need.  8. Nutrition; on TF; renal formula  9. DVT prophylaxis; on heparin gtt  10. CAD, hyperlipid; stable co-morbitdities  11. DM; on insulin gtt    I updated his wife earlier in week  Catrina Ly MD  [facility]  [phone]      Subjective  On vent, on levo vaso  60% fio2  No urine   CRRT started 12/18 and ongoing     Now on high intensity heparin gtt and system ok   Went for CT yesterday and developed tension PTX and has CT    CT showed infiltrates and prob bilat renal cysts and ?bladder stone  Got IV contrast yesterday    7.36 36 47 21  Wt 230-->228      Objective    Vital signs in last 24 hours  Temp:  [96.8  F (36  C)-98.8  F (37.1  C)] 98.6  F (37  C)  Heart Rate:  [] 130  Resp:  [18-37] 32  BP: (103-131)/(49-56) 103/52  Arterial Line BP: ()/() 113/61  FiO2 (%):  [45 %-60 %] 60 %  Weight:   (!) 228 lb 13.4 oz (103.8 kg)    Intake/Output last 3 shifts  I/O last 3 completed shifts:  In: 2419.2 [I.V.:1789.2; NG/GT:530; IV Piggyback:100]  Out: 2848 [Other:2817; Chest  Tube:31]  Intake/Output this shift:  I/O this shift:  In: 495.6 [I.V.:365.6; NG/GT:130]  Out: 244 [Other:244]    Physical Exam  Seen at bedside.  Intubated and supine  Lungs decreased BS  Cor Reg on tele  abd soft  Ext warm and mild edema  Skin no rash        Pertinent Labs   Lab Results   Component Value Date    WBC 40.9 (HH) 12/24/2020    HGB 10.3 (L) 12/24/2020    HCT 31.9 (L) 12/24/2020    MCV 95 12/24/2020     12/24/2020     Lab Results   Component Value Date    CREATININE 1.18 12/24/2020    BUN 30 (H) 12/24/2020     (L) 12/24/2020    K 4.8 12/24/2020     12/24/2020    CO2 18 (L) 12/24/2020       Lab Results   Component Value Date    ALBUMIN 2.0 (L) 12/23/2020     Lab Results   Component Value Date    CALCIUM 8.8 12/24/2020    CALCIUM 8.8 12/24/2020    PHOS 5.1 (H) 12/24/2020     I reviewed all lab results  Catrina Ly

## 2021-06-14 NOTE — PROGRESS NOTES
Infectious Diseases Progress Note  [facility]    Date of visit: 12/26/2020      ASSESSMENT   74-year-old man with a history of diabetes, hypertension, BPH who is admitted with respiratory distress from COVID-19.    1. COVID-19.  Diagnosed on 11/30.  Developed rapid worsening of respiratory status, intubated on 12/10.  Completed 5-day course of remdesivir.  Completed 10-day course of dexamethasone on 12/15.  Course complicated by right-sided pneumothorax.  Now with acute hepatitis, new stroke, and renal failure.  2. Leukocytosis.  Mildly elevated white cells during admission, possibly from steroids.  Started to rise in earnest on 12/17, now markedly elevated with a left shift.  Blood and urine cultures negative.  Sputum growing Enterobacter cloacae.  C. difficile test negative.  Recently started on stress dose steroids, which may be contributing to rise.  May also be from general inflammatory process from severe Covid illness. Wbc coming down slowly  3. Renal failure.  Nephrology consulted on 12/17, now on dialysis.  4. Acute hepatitis.  Elevated transaminases.  ALT starting to rise around 12/16.  No signs of biliary obstruction on CT scan today.  Consider possible medications, recently started on amiodarone.  Shock liver?  5. Right PCA stroke. Followed by neurology. Critical illness myopathy.  6. Hypothermia. Infectious work-up as above. CRRT can be contributing. Autonomic dysregulation following stroke?    Principal Problem:    Pneumonia due to 2019 novel coronavirus  Active Problems:    Acute respiratory failure with hypoxia (H)    Acute kidney injury (H)    Acute respiratory distress syndrome (ARDS) due to COVID-19 virus (H)    Secondary spontaneous pneumothorax    Acute right PCA stroke (H)       PLAN   -Continue meropenem for Enterobacter in sputum  -repeat blood cultures  -would not treat yeast in sputum. Recent fungitell and galactomannan negative.  -poor prognosis     Angel Leon MD  [facility]  Direct messaging: [email or web address] Paging  On-Call ID provider: [phone], option: 9      ===========================================      SUBJECTIVE / INTERVAL HISTORY:     No acute events. Remains on pressors and CRRT. Vent setting stable. Hypothermic throughout the day, on warmer.    Review of Systems     Intubated and sedated.         Antibiotics   Meropenem 12/19-      Previous:  Ceftriaxone 12/17-12/18  Fluconazole 12/19-12/22  Zosyn 12/15-12/16  Remdesivir x5 days  Micafungin 12/23-  Vancomycin 12/16, 12/19-12/22      Physical Exam     Heart Rate:  [] 88  Resp:  [5-35] 33  Arterial Line BP: ()/() 122/56  FiO2 (%):  [45 %-50 %] 45 %    Vitals:    12/26/20 1015   BP:    Pulse: 88   Resp: (!) 33   Temp:    SpO2: 100%       Limited exam to conserve PPE. Patient seen from window. See intensivist note for full exam.  Lying in bed, intubated, supine, chest tube in place. No distress.    Cultures   12/12 sputum culture: Klebsiella  12/16 blood cultures x2: No growth to date  12/16 urine culture: Negative  12/16 sputum culture: Normal ernst and yeast  12/19 fungal blood culture: Pending  12/19 blood cultures x2: No growth to date  12/19 sputum culture: Enterobacter cloacae  12/22 sputum: GPB, culture: yeast   12/22 blood cultures x2: No growth to date      Pertinent Labs:     Results from last 7 days   Lab Units 12/26/20  0515 12/25/20  2222 12/25/20  1558 12/25/20  0547 12/24/20  0507 12/24/20  0507 12/23/20  0406 12/23/20  0406   LN-WHITE BLOOD CELL COUNT thou/uL 32.2*  --   --  35.1*  --  40.9*  --  37.6*  37.8*   LN-HEMOGLOBIN g/dL 9.7* 9.7* 9.5* 9.2*   < > 10.3*   < > 10.0*  10.0*   LN-PLATELET COUNT thou/uL 226 236 225 228   < > 279   < > 290  268   LN-MONOCYTES - REL (DIFF) %  --   --   --   --   --  7  --  7    < > = values in this interval not displayed.       Results from last 7 days   Lab Units 12/26/20  0518 12/25/20 2223 12/25/20 2222 12/25/20  1617  12/25/20  0547 12/24/20  0507 12/24/20  0507 12/23/20  0405 12/23/20  0405   LN-SODIUM mmol/L 135*  135* 136  --  136 135*   < > 135*   < > 135*   LN-POTASSIUM mmol/L 4.5  4.5 4.6  --  4.6 4.6   < > 4.8   < > 4.7   LN-CHLORIDE mmol/L 105  105 106  --  106 106   < > 105   < > 100   LN-CO2 mmol/L 16*  16* 17*  --  18* 18*   < > 18*   < > 22   LN-BLOOD UREA NITROGEN mg/dL 28  --   --   --  28  --  30*  --  24   LN-CREATININE mg/dL 0.94  --   --   --  1.11  --  1.18  --  1.22   LN-CALCIUM mg/dL 8.3*  8.3*  --  8.3* 8.4* 8.5   < > 8.8  8.8   < > 8.7   LN-ALBUMIN g/dL 2.4*  --   --   --  2.4*  --   --   --  2.0*   LN-PROTEIN TOTAL g/dL 6.0  --   --   --  5.8*  --   --   --  6.0   LN-BILIRUBIN TOTAL mg/dL 0.8  --   --   --  1.0  --   --   --  0.7   LN-ALKALINE PHOSPHATASE U/L 109  --   --   --  103  --   --   --  99   LN-ALT (SGPT) U/L 234*  --   --   --  268*  --   --   --  182*   LN-AST (SGOT) U/L 123*  --   --   --  216*  --   --   --  213*    < > = values in this interval not displayed.                 Imaging:     Xr Chest 1 View Portable    Result Date: 12/26/2020  EXAM: XR CHEST 1 VIEW PORTABLE LOCATION: [facility] DATE/TIME: 12/26/2020 8:45 AM INDICATION: ARDS, hypothermia COMPARISON: 12/23/2020     Endotracheal tube tip is at least 5.7 cm above the emerita. Feeding tube overlies the mediastinum with the tip below the inferior border of the image. There is an esophageal thermistor tip at the level of the clavicles, which can be advanced, depending on its intended clinical usage. Right internal jugular central line tip at the expected location of the cavoatrial junction. Left midline catheter tip projects over the scapula. There is a right apical pigtail chest tube. There is diffuse interstitial and airspace disease, but no large effusions or pneumothorax. Unchanged cardiac size. Aortic atherosclerosis.         Attestation:  I have reviewed today's Medications, Vital Signs, Imaging,  and Labs. Recommendations discussed with the primary service.

## 2021-06-14 NOTE — PROGRESS NOTES
NEUROLOGY PROGRESS NOTE     ASSESSMENT & PLAN   Hospital Day#: 21    Visit diagnosis: Cerebral infarction    Right posterior cerebral artery territory infarction    74 years old male with history of DM, HTN diagnosed with COVID-19 on 11/30/2020 with a rapid worsening of respiratory status, intubated on on 12/10/2020.  Patient completed course of remdesivir, Decadron    CT head done on 12/23/2020 shows right PCA distribution infarction with 3 mm punctate hyperdense focus in the right thalamus that likely a small amount of mineralization but hemorrhage is a possibility and follow-up CT scan was recommended.  Repeat CT today    Echocardiogram shows normal ejection fraction with no hemodynamically significant valvular heart abnormality    [facility] EEG shows nonspecific slowing with superimposed beta activity likely medication effect no sharp activity or rhythmic discharges seen to suggest seizures    Patient sedated currently on Versed and fentanyl    For secondary prevention on aspirin and also on heparin.  LDL is 56, no need for statin    Carotid ultrasound shows 50 to 60% bilateral ICA stenosis.  No surgical intervention, this requires aggressive treatment of risk factors    Due to prolonged illness patient is at increased risk of critical illness neuromyopathy and his tone is decreased    Neurology Discharge Planning :   ANDREW Vasquez MD  [facility]  (Previously, [facility]  Tel: [phone]     PROBLEM LIST      Patient Active Problem List    Diagnosis Date Noted     Acute right PCA stroke (H)      Secondary spontaneous pneumothorax      Acute respiratory distress syndrome (ARDS) due to COVID-19 virus (H) 12/10/2020     Acute respiratory failure with hypoxia (H)      Acute kidney injury (H)      Pneumonia due to 2019 novel coronavirus 12/06/2020     Diabetes mellitus (H) 06/24/2019     Hypertension 06/24/2019     Chronic pain disorder 03/27/2019      Primary osteoarthritis of right hip 01/26/2018     Primary osteoarthritis of right knee 01/26/2018     BPH with urinary obstruction 11/15/2017     Past Medical History:   Patient  has no past medical history on file.     SUBJECTIVE     Patient unresponsive with no meaningful response     OBJECTIVE     Vital signs in last 24 hours  Heart Rate:  [] 107  Resp:  [11-40] 32  Arterial Line BP: ()/(38-59) 131/57  FiO2 (%):  [45 %] 45 %    Weight:   191 lb 9.3 oz (86.9 kg)    I/O last 24 hours    Intake/Output Summary (Last 24 hours) at 12/27/2020 0930  Last data filed at 12/27/2020 0800  Gross per 24 hour   Intake 2383.5 ml   Output 2331 ml   Net 52.5 ml     Review of Systems   Pertinent items are noted in HPI.    General Physical Exam: Patient is alert and oriented x 0. Vital signs were reviewed and are documented in EMR. Neck was supple, no Carotid bruit, thyromegaly, JVD or lymphadenopathy noted.  Neurological Exam:  Patient intubated and sedated.  Pupil equal round, nonreactive no nystagmus.  No doll's eyes.  No withdrawal to painful stimuli reflexes absent     DIAGNOSTIC STUDIES     Pertinent Radiology   Radiology Results: Personally reviewed image/s and Personally reviewed impression/s    CT  12/23/2020  1.  Right PCA distribution infarct with involvement of the medial posterior right temporal lobe as well as the medial and inferior right occipital lobe. Marked hypoattenuation obscuring the gray-white junction favors an overall subacute nature. No   intracranial hemorrhage associated with this focus of infarction.     2.  3 mm punctate hyperdense focus in the lateral right thalamus is nonspecific and potentially relates to a small amount of mineralization. Punctate focus of acute intraparenchymal hemorrhage is possible, and attention on follow-up head CT will be of   benefit.     3.  Background age-related changes, as above.     4.  Diffuse paranasal sinus disease with fluid opacification in the  bilateral mastoid air cells    [facility] EEG  Nonspecific slowing with superimposed faster activity likely medication effect.  No seizures or focal slowing recorded    Echocardiogram    Technically difficult study due to poor acoustic windows and patient positioning.    Left ventricular ejection fraction is normal. The calculated left ventricular ejection fraction is 63%.    Normal right ventricular size and systolic function.    No hemodynamically significant valvular heart abnormalities.    Small circumferential pericardial effusion. No obvious echocardiographic evidence of cardiac tamponade.    No previous study for comparison.    Pertinent Labs   Lab Results: personally reviewed.   Recent Results (from the past 24 hour(s))   Anti-Xa Heparin Level    Collection Time: 12/26/20 12:41 PM   Result Value Ref Range    Anti-Xa Heparin Assay 0.56 See comments IU/mL   POCT Glucose    Collection Time: 12/26/20 12:45 PM    Specimen: Blood   Result Value Ref Range    Glucose 138 70 - 139 mg/dL   Blood Gases, Arterial    Collection Time: 12/26/20 12:46 PM   Result Value Ref Range    pH, Arterial 7.31 (L) 7.37 - 7.44    pCO2, Arterial 37 35 - 45 mm Hg    pO2, Arterial 87 (H) 75 - 85 mm Hg    Bicarbonate, Arterial Calc 18.6 (L) 23.0 - 29.0 mmol/L    O2 Sat, Arterial 96.6 (H) 95.0 - 96.0 %    Oxyhemoglobin 94.8 (L) 95.0 - 96.0 %    Base Excess, Arterial Calc -7.7 mmol/L    Ventilation Mode AC     FIO2 45.00     Sample Stabilized Temperature 37.0 degrees C   Calcium, Ionized, Measured    Collection Time: 12/26/20  1:57 PM   Result Value Ref Range    Calcium, Ionized Measured 1.13 1.11 - 1.30 mmol/L    Calcium, Ionized pH 7.4 1.07 (L) 1.11 - 1.30 mmol/L    pH 7.31 (L) 7.35 - 7.45   Calcium    Collection Time: 12/26/20  1:57 PM   Result Value Ref Range    Calcium 8.2 (L) 8.5 - 10.5 mg/dL   Electrolyte Panel    Collection Time: 12/26/20  1:57 PM   Result Value Ref Range    Sodium 135 (L) 136 - 145 mmol/L    Potassium 4.4 3.5 - 5.0  mmol/L    CO2 17 (L) 22 - 31 mmol/L    Chloride 105 98 - 107 mmol/L    Anion Gap, Calculation 13 5 - 18 mmol/L   Hemoglobin    Collection Time: 12/26/20  1:57 PM   Result Value Ref Range    Hemoglobin 9.4 (L) 14.0 - 18.0 g/dL   Magnesium    Collection Time: 12/26/20  1:57 PM   Result Value Ref Range    Magnesium 2.4 1.8 - 2.6 mg/dL   Phosphorus    Collection Time: 12/26/20  1:57 PM   Result Value Ref Range    Phosphorus 5.3 (H) 2.5 - 4.5 mg/dL   Platelet Count    Collection Time: 12/26/20  1:57 PM   Result Value Ref Range    Platelets 221 140 - 440 thou/uL   POCT Glucose    Collection Time: 12/26/20  3:48 PM    Specimen: Blood   Result Value Ref Range    Glucose 174 (H) 70 - 139 mg/dL   Blood Gases, Arterial    Collection Time: 12/26/20  6:01 PM   Result Value Ref Range    pH, Arterial 7.31 (L) 7.37 - 7.44    pCO2, Arterial 37 35 - 45 mm Hg    pO2, Arterial 82 75 - 85 mm Hg    Bicarbonate, Arterial Calc 18.5 (L) 23.0 - 29.0 mmol/L    O2 Sat, Arterial 96.0 95.0 - 96.0 %    Oxyhemoglobin 94.2 (L) 95.0 - 96.0 %    Base Excess, Arterial Calc -7.9 mmol/L    Ventilation Mode BiLevel     Rate 32 rr/min    FIO2 45.00     Peep 14 cm H2O    Sample Stabilized Temperature 37.0 degrees C    Ventilator Tidal Volume 520 mL   Anti-Xa Heparin Level    Collection Time: 12/26/20  7:46 PM   Result Value Ref Range    Anti-Xa Heparin Assay 0.51 See comments IU/mL   POCT Glucose    Collection Time: 12/26/20  8:04 PM    Specimen: Blood   Result Value Ref Range    Glucose 110 70 - 139 mg/dL   Calcium, Ionized, Measured    Collection Time: 12/26/20  9:21 PM   Result Value Ref Range    Calcium, Ionized Measured 1.06 (L) 1.11 - 1.30 mmol/L    Calcium, Ionized pH 7.4 1.00 (L) 1.11 - 1.30 mmol/L    pH 7.30 (L) 7.35 - 7.45   Calcium    Collection Time: 12/26/20  9:21 PM   Result Value Ref Range    Calcium 8.5 8.5 - 10.5 mg/dL   Electrolyte Panel    Collection Time: 12/26/20  9:21 PM   Result Value Ref Range    Sodium 136 136 - 145 mmol/L     Potassium 4.6 3.5 - 5.0 mmol/L    CO2 21 (L) 22 - 31 mmol/L    Chloride 104 98 - 107 mmol/L    Anion Gap, Calculation 11 5 - 18 mmol/L   Hemoglobin    Collection Time: 12/26/20  9:21 PM   Result Value Ref Range    Hemoglobin 10.0 (L) 14.0 - 18.0 g/dL   Magnesium    Collection Time: 12/26/20  9:21 PM   Result Value Ref Range    Magnesium 2.5 1.8 - 2.6 mg/dL   Phosphorus    Collection Time: 12/26/20  9:21 PM   Result Value Ref Range    Phosphorus 4.2 2.5 - 4.5 mg/dL   Platelet Count    Collection Time: 12/26/20  9:21 PM   Result Value Ref Range    Platelets 244 140 - 440 thou/uL   POCT Glucose    Collection Time: 12/27/20 12:22 AM    Specimen: Blood   Result Value Ref Range    Glucose 107 70 - 139 mg/dL   POCT Glucose    Collection Time: 12/27/20  4:18 AM    Specimen: Blood   Result Value Ref Range    Glucose 124 70 - 139 mg/dL   Basic Metabolic Panel    Collection Time: 12/27/20  5:32 AM   Result Value Ref Range    Sodium 134 (L) 136 - 145 mmol/L    Potassium 4.5 3.5 - 5.0 mmol/L    Chloride 105 98 - 107 mmol/L    CO2 16 (L) 22 - 31 mmol/L    Anion Gap, Calculation 13 5 - 18 mmol/L    Glucose 204 (H) 70 - 125 mg/dL    Calcium 8.1 (L) 8.5 - 10.5 mg/dL    BUN 30 (H) 8 - 28 mg/dL    Creatinine 0.93 0.70 - 1.30 mg/dL    GFR MDRD Af Amer >60 >60 mL/min/1.73m2    GFR MDRD Non Af Amer >60 >60 mL/min/1.73m2   HM2(CBC W/O DIFF)    Collection Time: 12/27/20  5:32 AM   Result Value Ref Range    WBC 24.2 (H) 4.0 - 11.0 thou/uL    RBC 3.07 (L) 4.40 - 6.20 mill/uL    Hemoglobin 9.6 (L) 14.0 - 18.0 g/dL    Hematocrit 30.3 (L) 40.0 - 54.0 %    MCV 99 80 - 100 fL    MCH 31.3 27.0 - 34.0 pg    MCHC 31.7 (L) 32.0 - 36.0 g/dL    RDW 17.2 (H) 11.0 - 14.5 %    Platelets 222 140 - 440 thou/uL    MPV 12.4 8.5 - 12.5 fL   Calcium, Ionized, Measured    Collection Time: 12/27/20  5:32 AM   Result Value Ref Range    Calcium, Ionized Measured 1.11 1.11 - 1.30 mmol/L    Calcium, Ionized pH 7.4 1.08 (L) 1.11 - 1.30 mmol/L    pH 7.35 7.35 - 7.45    Calcium    Collection Time: 12/27/20  5:32 AM   Result Value Ref Range    Calcium 8.1 (L) 8.5 - 10.5 mg/dL   Electrolyte Panel    Collection Time: 12/27/20  5:32 AM   Result Value Ref Range    Sodium 135 (L) 136 - 145 mmol/L    Potassium 4.5 3.5 - 5.0 mmol/L    CO2 16 (L) 22 - 31 mmol/L    Chloride 105 98 - 107 mmol/L    Anion Gap, Calculation 14 5 - 18 mmol/L   Magnesium    Collection Time: 12/27/20  5:32 AM   Result Value Ref Range    Magnesium 2.5 1.8 - 2.6 mg/dL   Phosphorus    Collection Time: 12/27/20  5:32 AM   Result Value Ref Range    Phosphorus 4.8 (H) 2.5 - 4.5 mg/dL   Anti-Xa Heparin Level    Collection Time: 12/27/20  5:32 AM   Result Value Ref Range    Anti-Xa Heparin Assay 0.51 See comments IU/mL   Blood Gases, Arterial    Collection Time: 12/27/20  5:33 AM   Result Value Ref Range    pH, Arterial 7.33 (L) 7.37 - 7.44    pCO2, Arterial 34 (L) 35 - 45 mm Hg    pO2, Arterial 74 (L) 75 - 85 mm Hg    Bicarbonate, Arterial Calc 18.5 (L) 23.0 - 29.0 mmol/L    O2 Sat, Arterial 94.7 (L) 95.0 - 96.0 %    Oxyhemoglobin 93.2 (L) 95.0 - 96.0 %    Base Excess, Arterial Calc -8.0 mmol/L    Ventilation Mode AC     Rate 32 rr/min    FIO2 45.00     Peep 14 cm H2O    Sample Stabilized Temperature 37.0 degrees C    Ventilator Tidal Volume 520 mL   POCT Glucose    Collection Time: 12/27/20  7:47 AM    Specimen: Blood   Result Value Ref Range    Glucose 188 (H) 70 - 139 mg/dL         HOSPITAL MEDICATIONS       amino acids-protein hydrolys  1 packet Enteral Tube BID     [START ON 12/30/2020] amiodarone  200 mg Oral DAILY     amiodarone  400 mg Oral BID     ascorbic acid (vitamin C)  1,000 mg Enteral Tube DAILY     aspirin  81 mg Enteral Tube DAILY     chlorhexidine  15 mL Topical Q12H     [Held by provider] docusate sodium (COLACE) 50 mg/5 mL oral liquid  100 mg Enteral Tube BID     hydrocortisone sod succ  50 mg Intravenous Q6H FIXED TIMES     insulin aspart (NovoLOG) injection   Subcutaneous Q4H FIXED TIMES     [Held  by provider] insulin NPH  55 Units Subcutaneous BID     meropenem  1 g Intravenous Q8H     omeprazole  20 mg Oral QAM AC    Or     omeprazole  20 mg Enteral Tube QAM AC    Or     pantoprazole  40 mg Intravenous QAM AC     [Held by provider] senna (SENOKOT) syrup  8.8 mg Enteral Tube BID     sodium chloride  10-30 mL Intravenous Q8H FIXED TIMES     vit B cmplx 3-FA-vit C-biotin  1 tablet Enteral Tube DAILY     white petrolatum-mineral oiL   Both Eyes Q8H        Total time spent for face to face visit, reviewing labs/imaging studies, counseling and coordination of care was: 30 Minutes More than 50% of this time was spent on counseling and coordination of care.        This note was dictated using voice recognition software.  Any grammatical or context distortions are unintentional and inherent to the software.

## 2021-06-14 NOTE — PROGRESS NOTES
Brief note:     Notified by RN of Hb 6.7 from 7.4 and from ongoing bleeding in mouth in addition to maroon stools. He has been having this intermittently for days per nursing. Will order 1 U pRBC, hold his heparin gtt, and start a two times a day protonix with ongoing monitoring. Repeat Hb check post-infusion ordered.     Abbey Dow MD  Pulmonary and Critical Care Medicine  Pager: [phone]

## 2021-06-14 NOTE — PROGRESS NOTES
12/30/20 1359   Patient Data   Vt Exp (mL) 516 mL   Minute Ventilation (L/min) 13.7 L/min   Total Resp Rate  30 BPM   PIP Observed (cm H2O) 27 cm H2O   MAP (cm H2O) 18   Auto/Intrinsic PEEP Observed (cm H2O) 1.8 cm H2O   Plateau Pressure (cm H2O) 25 cm H2O   Dynamic Compliance (L/cm H2O) 43.2 L/cm H2O   Airway Resistance 5.9   SpO2 100 %   Heart Rate 85

## 2021-06-14 NOTE — PROGRESS NOTES
RENAL PROGRESS NOTE      CHIEF COMPLAINT: NICKY, COVID-19 infection      ASSESSMENT/PLAN:  Acute kidney injury - With normal underlying renal function and now with hemodynamic NICKY/ATN. S/p CRRT and transitioned to intermittent hemodialysis this week. On TTS schedule    Hypervolemia - UF as able with dialysis and on diuretics with some urine output    COVID-19 infection - With respiratory failure. On ventilator. S/p 5 days of remdesivir and 10 days of dexamethasone. Course has been complicated by right sided pneumothorax    Anemia - With no active losses;hemoglobin generally in upper 7 range    Hypotension - Weaning levo, started on midodrine    Nutrition - On tube feeds on renal formulation. If phosphorus persistently elevated will start binder    DVT prophylaxis - On heparin drip    Coronary artery disease, hyperlipidemia    Diabetes mellitus - Receiving insulin    Right PCA CVA - Followed by neurology      SUBJECTIVE: Viewed through window    OBJECTIVE:    Vital signs in last 24 hours  Temp:  [98.2  F (36.8  C)-98.6  F (37  C)] 98.2  F (36.8  C)  Heart Rate:  [] 89  Resp:  [26-47] 30  BP: ()/(43-55) 113/48  Arterial Line BP: ()/() 116/40  FiO2 (%):  [40 %] 40 %  Weight:   202 lb 9.6 oz (91.9 kg)    Intake/Output last 3 shifts  I/O last 3 completed shifts:  In: 2109.6 [I.V.:599.6; NG/GT:1260; IV Piggyback:250]  Out: 40 [Chest Tube:40]  Intake/Output this shift:  I/O this shift:  In: 446.9 [I.V.:66.9; NG/GT:380]  Out: -     PHYSICAL EXAM:  Given ongoing community spread on COVD-19 and patient's presentation, physical exam and ROS have been deferred. Please refer to intensivist note for more details      Pertinent Labs   Lab Results   Component Value Date    WBC 14.4 (H) 01/01/2021    HGB 7.8 (L) 01/01/2021    HCT 24.4 (L) 01/01/2021    MCV 98 01/01/2021     01/01/2021     Lab Results   Component Value Date    CREATININE 5.00 (H) 01/01/2021     (H) 01/01/2021     01/01/2021     K 3.3 (L) 01/01/2021     01/01/2021    CO2 18 (L) 01/01/2021       Lab Results   Component Value Date    ALBUMIN 2.4 (L) 12/26/2020     Lab Results   Component Value Date    CALCIUM 7.7 (L) 01/01/2021    PHOS 5.4 (H) 01/01/2021         Katie Roche PA-C  [facility]  [phone]

## 2021-06-14 NOTE — PROGRESS NOTES
Events of day include: See event note. Remains intubated, sedated, and on CRRT.    Tolerated sedation of  Fentanyl and Versed. Paralytic discontinued. Neuro exam per flowsheets.     Respiratory status progressing with vent support continued at same rate, PEEP, and TV. FiO2 decreased to 55%. See ABGs.     Vital signs tenuous. Drips weaned per specified parameters, see eMar. Remains hypothermic. Continued use of heating blanket. Insulin drip discontinued and started on sliding scale with two times a day novolin.    Family updated and all questions answered; wife Yuridia able to video conference with patient.

## 2021-06-14 NOTE — PROGRESS NOTES
NEUROLOGY PROGRESS NOTE      ASSESSMENT & PLAN       Right PCA stroke 12/23/2020 first noted.    ARDS due to COVID-19 syndrome    Metabolic toxic encephalopathy.    Renal insufficiency    Abnormal liver function progressing    Severe anemia and poor nutritional status.    Hypotension on midodrine 10 mg 3 times a day    Elevated CK.  Will repeat blood test though renal insufficiency may also partly contributing.    Plans:  On heparin and aspirin  Repeat CK  Ideally keeping hemoglobin level above 9 would be the best for acute ischemic stroke.      Work-ups:  BMP showing hyponatremia, markedly elevated creatinine.  CBC showing severe anemia  Abnormal progressive liver function abnormalities.  Depressed albumin level.  Elevated CPK on 12/25/2020 at 706.    IMPRESSION: Head CT 1/3/2021  1.  No new acute intracranial abnormality.  2.  Continued expected evolution of the right posterior cerebral artery territory infarction without complication.    IMPRESSION: CT head 12/23/2020  1.  Right PCA distribution infarct with involvement of the medial posterior right temporal lobe as well as the medial and inferior right occipital lobe. Marked hypoattenuation obscuring the gray-white junction favors an overall subacute nature. No   intracranial hemorrhage associated with this focus of infarction.     2.  3 mm punctate hyperdense focus in the lateral right thalamus is nonspecific and potentially relates to a small amount of mineralization. Punctate focus of acute intraparenchymal hemorrhage is possible, and attention on follow-up head CT will be of   benefit.    Principal Problem:    Pneumonia due to 2019 novel coronavirus  Active Problems:    Acute respiratory failure with hypoxia (H)    Acute kidney injury (H)    Acute respiratory distress syndrome (ARDS) due to COVID-19 virus (H)    Secondary spontaneous pneumothorax    Acute right PCA stroke (H)    Encephalopathy      SUBJECTIVE     Admitted 12/6/2024 COVID-19 with respiratory  failure.  Found to have right PCA stroke on CT scan.    OBJECTIVE     Vital signs in last 24 hours    Vitals:    01/04/21 0300 01/04/21 0400 01/04/21 0500 01/04/21 0600   BP:       Pulse: 97 93 92 94   Resp: (!) 30 (!) 33 (!) 29 (!) 29   Temp:  98  F (36.7  C)     TempSrc:  Axillary     SpO2: 100% 100% 100% 100%   Weight:   (!) 223 lb 5.2 oz (101.3 kg) (!) 223 lb 5.2 oz (101.3 kg)   Height:               Review of Systems   Pertinent items are noted in HPI.  Review of systems not obtained due to inability to communicate with the patient.     Physical Exam  No acute distress on ventilator supine in bed.  Per nursing staff at times moving 4 extremities.  Does not open eyes and does not track.  Does not follow any directions.  Pale and edematous look in extremities.    DATA REVIEW     Scheduled Meds:    amino acids-protein hydrolys  1 packet Enteral Tube DAILY     amiodarone  200 mg Oral DAILY     aspirin  81 mg Enteral Tube DAILY     chlorhexidine  15 mL Topical Q12H     [Held by provider] docusate sodium (COLACE) 50 mg/5 mL oral liquid  100 mg Enteral Tube BID     hydrocortisone sod succ  50 mg Intravenous Q6H FIXED TIMES     insulin aspart (NovoLOG) injection   Subcutaneous Q4H FIXED TIMES     insulin glargine  45 Units Subcutaneous BID     midodrine  10 mg Oral TID with meals     omeprazole  20 mg Oral QAM AC    Or     omeprazole  20 mg Enteral Tube QAM AC    Or     pantoprazole  40 mg Intravenous QAM AC     oxyCODONE  10 mg Enteral Tube Q4H     [Held by provider] senna (SENOKOT) syrup  8.8 mg Enteral Tube BID     sodium chloride  10-30 mL Intravenous Q8H FIXED TIMES     vit B cmplx 3-FA-vit C-biotin  1 tablet Enteral Tube DAILY     white petrolatum-mineral oiL   Both Eyes Q8H     Continuous Infusions:    dextrose 10%       fentaNYL citrate (PF) Stopped (01/03/21 0802)     heparin 1,150 Units/hr (01/04/21 0600)     insulin infusion (1 unit/mL) Stopped (12/30/20 1400)     norepinephrine 0.081 mcg/kg/min (01/04/21  0600)     vasopressin Stopped (12/31/20 1235)     PRN Meds:.acetaminophen, albuterol, bisacodyL, dextrose 10%, dextrose 50 % (D50W), fentaNYL pf, glucagon (human recombinant), hydrALAZINE, insulin infusion (1 unit/mL), labetalol, lipase-protease-amylase **AND** sodium bicarbonate, magnesium hydroxide, midazolam, naloxone **OR** naloxone **OR** naloxone **OR** naloxone, ondansetron **OR** ondansetron, polyethylene glycol, polyvinyl alcohol, sodium chloride bacteriostatic, sodium chloride, sodium chloride     Pertinent Labs   Lab Results: personally reviewed.   Lab Results   Component Value Date     (L) 01/04/2021     (L) 01/03/2021     01/02/2021    K 4.3 01/04/2021    K 3.8 01/03/2021    K 3.8 01/02/2021    CO2 19 (L) 01/04/2021    CO2 20 (L) 01/03/2021    CO2 17 (L) 01/02/2021     (H) 01/04/2021     (H) 01/03/2021     (H) 01/02/2021    CREATININE 5.75 (H) 01/04/2021    CREATININE 4.67 (H) 01/03/2021    CREATININE 6.30 (HH) 01/02/2021    CALCIUM 7.6 (L) 01/04/2021    CALCIUM 7.5 (L) 01/03/2021    CALCIUM 7.7 (L) 01/02/2021          Pertinent Radiology   Radiology Results: Personally reviewed impression/s        Aida Marin MD  [facility]

## 2021-06-14 NOTE — PROGRESS NOTES
12/25/20 0818   Patient Data   Vt Exp (mL) 527 mL   Minute Ventilation (L/min) 14.5 L/min   Total Resp Rate  32 BPM   PIP Observed (cm H2O) 32 cm H2O   MAP (cm H2O) 22   Auto/Intrinsic PEEP Observed (cm H2O) 1.3 cm H2O   Plateau Pressure (cm H2O) 30 cm H2O   Dynamic Compliance (L/cm H2O) 50 L/cm H2O   Airway Resistance 8   SpO2 100 %   Heart Rate 76

## 2021-06-14 NOTE — PROGRESS NOTES
12/27/20 1915   Patient Data   Total Resp Rate  32 BPM   PIP Observed (cm H2O) 26 cm H2O   MAP (cm H2O) 17   Plateau Pressure (cm H2O) 22 cm H2O

## 2021-06-14 NOTE — PLAN OF CARE
Problem: Pain  Goal: Patient's pain/discomfort is manageable  Outcome: Progressing     Problem: Psychosocial Needs  Goal: Demonstrates ability to cope with hospitalization/illness  Outcome: Progressing     Problem: Knowledge Deficit  Goal: Patient/family/caregiver demonstrates understanding of disease process, treatment plan, medications, and discharge instructions  Outcome: Progressing     Problem: Potential for Compromised Skin Integrity  Goal: Skin integrity is maintained or improved  Outcome: Progressing  Goal: Nutritional status is improving  Outcome: Progressing     Problem: Inadequate Gas Exchange  Goal: Patient will achieve/maintain normal respiratory rate/effort  Outcome: Progressing

## 2021-06-14 NOTE — PROGRESS NOTES
01/02/21 1513   Patient Data   PIP Observed (cm H2O) 25 cm H2O   MAP (cm H2O) 15   Auto/Intrinsic PEEP Observed (cm H2O) 3 cm H2O   Plateau Pressure (cm H2O) 26 cm H2O   Dynamic Compliance (L/cm H2O) 46 L/cm H2O   Airway Resistance 9   SpO2 98 %   Heart Rate 94

## 2021-06-14 NOTE — PROGRESS NOTES
Events of day include: CT of chest, abd, and pelvis. See transport note. Remains intubated and sedated. CRRT restarted after imaging complete.     Tolerated sedation of  Versed and Fentanyl. Neuro exam per flowsheets. CT showed signs of new ischemic stroke, time unknown.     Respiratory status wax and wane with ventilator changes and discontinuing of veletri with vent support 45%/PEEP 16/, and RR 32..     Vital signs tenuous. Remains on vasoactive agents. Drips weaned per specified parameters, see eMar.     Family updated and all questions answered; wife able to video conference with patient. Daughter called again asking for information, however was unable to give her any because she is not on the list of access to release information.

## 2021-06-14 NOTE — PROGRESS NOTES
Pulm/Critical Care  1/6/2021  4:27 PM     Notified by RN that patient had large BM with bright red blood.   Last hemoglobin at 1300 was 7.6.   Will send stat hemoglobin prior to transport.   Notified ICU team at [facility] (EDILMA Acosta); may need CTA.     Ermelinda Bragg, CNP  [facility] Pulmonary/Critical Care

## 2021-06-14 NOTE — PROCEDURES
Hemodialysis Treatment Note    Pre dialysis weight:  101.3 kg                               Run length: 2.0 hrs    Total fluid removed (ml) 1600 ml    Blood Volume Processed: 0 L ( Sequential tx )      Vascular Access: Right femoral non-tunneled with dressing clean, dry, intact, both Lumens aspirated and flush well, lines reversed later due to high arterial pressures, 400 blood flow rate maintained.       Treatment Summary:  K 3, Na 138, Ca 2.25, Bicarb 35, Blood flow rate 400, Dialysate flow rate none ( sequential tx )  Pt was hemodynamically stable at the beginning of run, Seen by Dr Sotomayor before treatment, later Labile BP's, Norepi used for bp management,  MAP dropped below 60 at maximum pressor dose, Dr Sotomayor paged and Ok to stop tx with 20 minutes left, Primary RN informed and report given, see HE flow sheets for details.    Interventions:  Vital signs Monitoring every 15 minutes and PRN, goal adjustment per critline and hemodynamics.      Plan: Per renal Team      Idalia Wang RN

## 2021-06-14 NOTE — PLAN OF CARE
Problem: Pain  Goal: Patient's pain/discomfort is manageable  Outcome: Progressing     Problem: Safety  Goal: Patient will be injury free during hospitalization  Outcome: Progressing     Problem: Daily Care  Goal: Daily care needs are met  Outcome: Progressing     Problem: Psychosocial Needs  Goal: Demonstrates ability to cope with hospitalization/illness  Outcome: Not Applicable this Shift  Goal: Collaborate with patient/family/caregiver to identify patient specific goals for this hospitalization  Outcome: Not Progressing     Problem: Discharge Barriers  Goal: Patient's discharge needs are met  Outcome: Not Progressing     Problem: Glucose Imbalance  Goal: Achieve optimal glucose control  Outcome: Progressing     Problem: Knowledge Deficit  Goal: Patient/family/caregiver demonstrates understanding of disease process, treatment plan, medications, and discharge instructions  Outcome: Progressing     Problem: Potential for Compromised Skin Integrity  Goal: Skin integrity is maintained or improved  Outcome: Progressing  Goal: Nutritional status is improving  Outcome: Not Progressing     Problem: Urinary Incontinence  Goal: Perineal skin integrity is maintained or improved  Outcome: Not Applicable this Shift     Problem: Inadequate Gas Exchange  Goal: Patient will achieve/maintain normal respiratory rate/effort  Outcome: Not Progressing     Problem: Impaired Gas Exchange  Goal: Demonstrate improved ventilation and adequate oxygenation of tissues as evidenced by absence of respiratory distress  Outcome: Progressing     Problem: Breathing  Goal: Patient will maintain patent airway  Outcome: Progressing  Goal: Patient will utilize incentive spirometer  Outcome: Not Applicable this Shift     Problem: Knowlegde Deficit  Goal: Verbalize understanding of condition/disease process and treatment, participate in lifestyle changes and treatment regimen  Outcome: Not Progressing     Problem: Risk for Infection  Goal: Identify and  demonstrate techniques, lifestyle changes to prevent/reduce risk of infection and promote safe environment  Outcome: Progressing     Problem: Potential for Falls  Goal: Patient will remain free of falls  Outcome: Progressing

## 2021-06-14 NOTE — ANESTHESIA PROCEDURE NOTES
Feeding Tube Insertion    Performing CRNA:  Linn Castellanos CRNA  Tube Type:  Nasojejunal  Tube Size:  10 Fr  Placement/position confirmation: cortrak.  Number of Attempts:  3  Difficulty:  Moderate  Secured with: deya.

## 2021-06-14 NOTE — PROGRESS NOTES
[facility] COVID RT PROGRESS NOTE    DATA:    VENT DAY#  18    CURRENT SETTINGS:  VENT SETTINGS   Vent Mode: AC  FiO2 (%):  [45 %-60 %] 60 %  S RR:  [28] 28  S VT:  [520 mL] 520 mL  PEEP/CPAP (cm H2O):  [12 cm H2O] 12 cm H2O  Minute Ventilation (L/min):  [14.2 L/min-15.1 L/min] 14.7 L/min  PIP:  [28 cm H2O-36 cm H2O] 36 cm H2O  MN SUP:  [8 cm H20] 8 cm H20  MAP (cm H2O):  [18-20] 20    PATIENT PARAMETERS:    PIP 36  Pmean:  20  SECRETIONS:  Scant tan/bloody secretions  02 SATS:  95-96%    ETT SIZE 8.5  Secured at  26 cm at teeth    Securing device changed / tube re-taped date 12/28/2020    NOTE / PLAN:   Pt remains on full support on current settings. No significant changes made throughout shift. Plan for 8 AM ABG. Will continue to monitor.

## 2021-06-14 NOTE — PRE-PROCEDURE
Procedure Name: Tunneled HD Cath placement  Date/Time: 1/5/2021 12:57 PM    Verbal consent obtained?: Yes  Written consent obtained?: Yes  Risks and benefits: Risks, benefits and alternatives were discussed  Consent given by: patient  Expected level of sedation: moderate  ASA Class: Class 3- Severe systemic disease, definite functional limitations  Mallampati: N/A- Alternate secured airway  Patient states understanding of procedure being performed: Yes  Patient's understanding of procedure matches consent: Yes  Procedure consent matches procedure scheduled: Yes  Appropriately NPO: yes  Lungs: other (comment)  Lung exam comment: deffered w/ covid see progress note from primary team for today's exam  Heart: normal heart sounds and rate  History & Physical reviewed: History and physical reviewed and no updates needed  Statement of review: I have reviewed the lab findings, diagnostic data, medications, and the plan for sedation

## 2021-06-14 NOTE — PROGRESS NOTES
[facility] COVID RT PROGRESS NOTE    DATA:    VENT DAY#  20    CURRENT SETTINGS:  VENT SETTINGS   AC 28/520/+12        FIO2:   45%    PATIENT PARAMETERS:    PIP 28  Pplat:  26  Pmean:  18  SBT: None  SECRETIONS:  Small/Moderate bloody/yellow thick  02 SATS:  99%    ETT SIZE 8.5  Secured at  26 cm at teeth    Securing device changed / tube re-taped date 12/29    Respiratory Medications: None     ABG: Results for GISELLE VILLANUEVA (MRN [id number]) as of 12/31/2020 03:48   Ref. Range 12/31/2020 03:38   pH, Arterial Latest Ref Range: 7.37 - 7.44  7.35 (L)   pCO2, Arterial Latest Ref Range: 35 - 45 mm Hg 33 (L)   pO2, Arterial Latest Ref Range: 75 - 85 mm Hg 147 (H)   Bicarbonate, Arterial Calc Latest Ref Range: 23.0 - 29.0 mmol/L 18.8 (L)   O2 Sat, Arterial Latest Ref Range: 95.0 - 96.0 % 99.9 (H)   Oxyhemoglobin Latest Ref Range: 95.0 - 96.0 % 97.9 (H)   POC Base Excess Calc Latest Units: mmol/L -7.6   Ventilation Mode Unknown AC   Peep Latest Units: cm H2O 12   Sample Stabilized Temperature Latest Units: degrees C 37.0   Rate Latest Units: rr/min 28   FIO2 Unknown 0.45       NOTE / PLAN:   The patient remains on full ventilatory support.  No acute events overnight.  Bloody/yellow thick secretions noted.  BS remained diminished/coarse.  Will continue to monitor and assess for weaning readiness.

## 2021-06-14 NOTE — PROGRESS NOTES
ICU PROGRESS NOTE:  1/4/2020      Assessment/Plan:  Felipe Cruz is a 74 year old male with a PMH of CAD, HTN, HLD, DM2, and BPH who was admitted to [facility] on 12/6/2020.  He was admitted to the general care floor. He had increased oxygenation needs, started on HFNC, and transferred to the ICU on 12/8. He was intubated for worsening hypoxia on 12/10. He self extubated on 12/10, required reintubation, and again self-extubated on 12/14. He was reintubated 12/16 for worsening hypoxia. Course complicated by anuric NICKY requiring CRRT, profound shock requiring vasopressors, enterobacter PNA, and CT head on 12/23 showed right PCA infarct. Abx course ended 1/1/21. Vaso pressors trending down, however now receiving HD and continues to appear fluid up. Intermittent neurologic responses.     Changes today:  - Consult to palliative pending  - Decrease Lantus  - Decrease Oxycodone frequency    Neuro  Acute pain  Agitiation/anxiety  Delirium  Encephalopathy  Right PCA infarct - CT head 12/23  - Neurology following, appreciated  - Ceribell with non specific slowing with superimposed beta activity, no sharp or rhythmic discharges  - Repeat CT head 12/27 - Evolving infarct in right PCA, no hemorrhagic conversion  - Did not tolerate Precedex and Propofol previously   - Fentanyl weaned off this AM  - Scheduled oxycodone 10mg q 4 hours --> decrease to every 6 hours.   - RAAS goal 0 to -1, following commands. Limit sedating medications as able  - Minimal response from patient at bedside, not following commands, intermittently will squeeze hands bilaterally    CV  Shock, septic  Atrila fibrillation   - 12/16: Echo: EF > 63%, Normal RV And LV fn, normal valves. No Tamponade   - Continue norepinephrine for MAP >65, wean as tolerated  - Started midodrine 12/31  - Stress dose steroids restarted 12/20    Resp:  Acute hypoxic respiratory failure and ARDS 2/2 COVID19 and bacterial PNA   Right apical pneumothorax s/p  pigtail insertion 12/20  Right tension pneumothorax on 12/23 with CT clamped for procedure - Resolved when placed back to suction  - Keep CT in place until no longer on PPV  - CT on w/s, + tidaling,  very small airleak noted. Placed to water seal 1/3/2020  - Continue lung protective ventilation, wean as tolerated  - Start PST  - Ventilation    Vent Mode: AC  FiO2 (%):  [40 %] 40 %  S RR:  [28] 28  S VT:  [520 mL] 520 mL  PEEP/CPAP (cm H2O):  [8 cm H2O] 8 cm H2O  Minute Ventilation (L/min):  [13.1 L/min-15.7 L/min] 13.1 L/min  PIP:  [24 cm H2O-26 cm H2O] 26 cm H2O  MAP (cm H2O):  [13-14] 14      GI/Nutrition  Severe protein calorie malnutrition   Diarrhea - C diff negative 12/22  Cholelithiasis - discovered on CT 12/23, non obstructive  Elevated LFTs - likely r/t shock possibly a component of Amiodarone. Alk phos and bili with normal parameters. CT abdomen completed 12/23 with no obstruction  - RD following    - protein BID  - bowel regimen on hold  -Concern for maroon colored stools several days ago. Likely related to rectal uler 2/2 to rectal tube. Heparin had been held and rectal tube removed. No bleeding noted today     Renal  Anuric NICKY   Persistent metabolic acidosis  Bilateral 2 cm renal masses - found incidentally on CT  Bladder wall ? Stone 3mm in size - found incidentally on CT  - CRRT 12/18-12/28  - HD started 12/29 - TTHS schedule per renal, added HD on for tomorrow  - Anuric at this time  - Bladder scan PRN and straight cath if PVR > 250  - If will continue restorative cares, will need to exchange femoral line to tunneled dialysis catheter.     ID  COVID 19   Klebsiella VAP- treated  Enterobacter cloacae VAP- active.   Septic shock  Severe Hypothermia  - WBC 10)  - Profoundly hypothermic on numerous occassions.   - ID following. Appreciate cares and recommendations's  - Meropenem 14 day course for enterobacter PNA completed - ended 1/1/21      MICRO:  12/23 Fungitell negative.   12/22 c.diff  "negative  12/22 sputum gram stain with prelim 1+ GPB  12/22 BC with NGTD  12/19 fungitell (pending)  galactomannan antigen (negative)  12/19 BC x2 with NGTD  12/19 Sputum with Enterobacter cloacae (sensitive to Meropenem)  12/19 A BAL with GS, AFB, KOH, fungal culture were ordered. Very minimal secretions on bronch 12/19.   12/12 sputum: Klebsiella pneumoniae  12/12  Blood cultures- NGTD  12/11 urine NGTD  12/11 BC with NGTD  12/6 blood staph epi, contaminant     Antimicrobials:  12/16 zosyn 12/16-12/17  Ceftriaxone 12/17-12/19 12/19- 1/2 Meropenem   12/19- 12/23 Fluconazole  12/23 Fluconazole broadened to Micafungin     Other Workup:  12/23: CT C/A/P  12/23 lipase normal  12/23 No e/o ischemic limbs     COVID:  Completed remdesivir course   Decadron 6 mg IV X 10 days, remains on stress dose steroids      Endocrine  DM II  Stress and steroid induced hyperglycemia  HgbA1C on admit 8.2  - Lantus decreased from 45 to 35 units two times a day.   - Resistant sliding scale insulin ordered    Heme:  Anemia of critical illness  Coagulopathy due to COVID -19  - Hemoglobin 7.4  - Heparin gtt, low intensity.      Lines/Drains/Tubes:  - Right internal jugular TLC 12/22  - L art line, placed 12/10  - R fem dialysis line 12/19  - Right pigtail chest tube    Mary De La Mater  CC time 40 minutes    Subjective:  Pt intubated, minimal if any response to verbal stimuli, not appropriately following commands.     Objective:  Physical Exam:  Vent settings for last 24 hours:  Vent Mode: AC  FiO2 (%):  [40 %] 40 %  S RR:  [28] 28  S VT:  [520 mL] 520 mL  PEEP/CPAP (cm H2O):  [8 cm H2O] 8 cm H2O  Minute Ventilation (L/min):  [13.1 L/min-15.7 L/min] 13.1 L/min  PIP:  [24 cm H2O-26 cm H2O] 26 cm H2O  MAP (cm H2O):  [13-14] 14    /69   Pulse 84   Temp 97.7  F (36.5  C) (Axillary)   Resp 23   Ht 6' 2\" (1.88 m)   Wt (!) 223 lb 5.2 oz (101.3 kg)   SpO2 100%   BMI 28.67 kg/m      Intake/Output last 3 shifts:  I/O last 3 completed " shifts:  In: 1923.2 [I.V.:446.2; NG/GT:1477]  Out: 120 [Chest Tube:120]  Intake/Output this shift:  I/O this shift:  In: 344.8 [I.V.:74.8; NG/GT:270]  Out: 0     Physical Exam      Lab:  Results from last 7 days   Lab Units 01/04/21  0415   LN-WHITE BLOOD CELL COUNT thou/uL 10.0   LN-HEMOGLOBIN g/dL 7.4*   LN-HEMATOCRIT % 22.6*   LN-PLATELET COUNT thou/uL 236     Results from last 7 days   Lab Units 01/04/21  0415 01/03/21 0415 01/02/21  0607   LN-SODIUM mmol/L 135* 135* 136   LN-POTASSIUM mmol/L 4.3 3.8 3.8   LN-CHLORIDE mmol/L 102 102 104   LN-CO2 mmol/L 19* 20* 17*   LN-BLOOD UREA NITROGEN mg/dL 136* 110* 145*   LN-CREATININE mg/dL 5.75* 4.67* 6.30*   LN-CALCIUM mg/dL 7.6* 7.5* 7.7*       Radiology:  Echo Complete    Result Date: 12/16/2020    Technically difficult study due to poor acoustic windows and patient positioning.   Left ventricular ejection fraction is normal. The calculated left ventricular ejection fraction is 63%.   Normal right ventricular size and systolic function.   No hemodynamically significant valvular heart abnormalities.   Small circumferential pericardial effusion. No obvious echocardiographic evidence of cardiac tamponade.   No previous study for comparison.      Xr Chest 1 View Portable    Result Date: 1/2/2021  EXAM: XR CHEST 1 VIEW PORTABLE LOCATION: [facility] DATE/TIME: 1/2/2021 9:35 AM INDICATION: follow up pneumothorax, right COMPARISON: 12/26/2020.     Chest tube at the right apex is unchanged. No pneumothorax is identified. Endotracheal tube is approximately 5 cm above the emerita. Central venous catheter is in the superior vena cava near its junction with the right atrium. Bilateral airspace opacities consistent with bilateral pneumonia are again noted and not definitely changed from the prior study.    Xr Chest 1 View Portable    Result Date: 12/26/2020  EXAM: XR CHEST 1 VIEW PORTABLE LOCATION: [facility] DATE/TIME:  12/26/2020 8:45 AM INDICATION: ARDS, hypothermia COMPARISON: 12/23/2020     Endotracheal tube tip is at least 5.7 cm above the emerita. Feeding tube overlies the mediastinum with the tip below the inferior border of the image. There is an esophageal thermistor tip at the level of the clavicles, which can be advanced, depending on its intended clinical usage. Right internal jugular central line tip at the expected location of the cavoatrial junction. Left midline catheter tip projects over the scapula. There is a right apical pigtail chest tube. There is diffuse interstitial and airspace disease, but no large effusions or pneumothorax. Unchanged cardiac size. Aortic atherosclerosis.     Xr Chest 1 View Portable    Result Date: 12/23/2020  EXAM: XR CHEST 1 VIEW PORTABLE LOCATION: [facility] DATE/TIME: 12/23/2020 1:48 PM INDICATION: evaluate right PTX COMPARISON: 12/22/2020     Stable right chest tube, right internal jugular central venous catheter and 2 enteric tubes. No visible pneumothorax. No significant interval change in diffuse airspace opacities. Stable heart size.    Xr Chest 1 View Portable    Result Date: 12/22/2020  EXAM: XR CHEST 1 VIEW PORTABLE LOCATION: [facility] DATE/TIME: 12/22/2020 5:46 PM INDICATION: line placement COMPARISON: Portable AP view of the chest 12/22/2020 at 1049 hours     Satisfactory position of the endotracheal tube. Gastric and feeding tubes course below the diaphragmatic hiatus and outside the field-of-view. Right jugular approach central venous catheter terminates in the SVC. Right pigtail pleural drain is present. Crescentic lucency along the right diaphragmatic pleura reflects a small residual anterior pneumothorax. Unchanged multifocal bilateral airspace opacities consistent with infection/related inflammation.    Xr Chest 1 View Portable    Result Date: 12/22/2020  EXAM: XR CHEST 1 VIEW PORTABLE LOCATION: [facility] DATE/TIME: 12/22/2020 11:39 AM INDICATION: line placement COMPARISON: 11/20/2020     ET tube 3.5 cm from the emerita. A couple enteric tubes entering the stomach. Right chest tube with very tiny sliver of a right apical pneumothorax. Tiny left central line tip in the left axilla. Right PICC tip has a couple loops as it passes through the axillary and subclavian vein. This should be repositioned. Stable patchy bilateral pulmonary infiltrates. I will call the nurse's station with this report. NOTE: ABNORMAL REPORT THE DICTATION ABOVE DESCRIBES AN ABNORMALITY FOR WHICH FOLLOW-UP IS NEEDED. .    Xr Chest 1 View Portable    Result Date: 12/20/2020  EXAM: XR CHEST 1 VIEW PORTABLE LOCATION: [facility] DATE/TIME: 12/20/2020 7:57 PM INDICATION: chest tube, ETT placement COMPARISON: Earlier today at 1913 hours     A new or repositioned right pleural drain now projects in good position over the right lung apex. Resolution of the right pneumothorax. Moderate subcutaneous emphysema along right chest wall unchanged. ET tube and right PICC line remain in good position. Prominent mixed interstitial and alveolar infiltrates persist bilaterally. Heart size normal.    Xr Chest 1 View Portable    Result Date: 12/20/2020  EXAM: XR CHEST 1 VIEW PORTABLE LOCATION: [facility] DATE/TIME: 12/20/2020 7:27 PM INDICATION: chest tube placement COMPARISON: 12/20/2020 at 1759 hours     Extensive airspace infiltrates throughout both lungs, unchanged from earlier today. Endotracheal tube tip 1.5 cm above the emerita. Right PICC line tip mid SVC. Enteric tube extends into the stomach. Presumed left PICC line tip overlying the axilla, unchanged. Right chest tube is located within the subcutaneous tissues lateral to the chest wall, outside the chest cavity. Subcutaneous gas along the right chest wall. Tiny right apical pneumothorax.    Xr Chest 1 View Portable    Result Date:  12/20/2020  EXAM: XR CHEST 1 VIEW PORTABLE LOCATION: [facility] DATE/TIME: 12/20/2020 6:10 PM INDICATION: ARDS, please obtain while prone COMPARISON: 12/20/2020 at 1204 hours.     ET tube remains in good position with its tip approximately 2.5 cm above the emerita. Feeding tube extends at least into the stomach. Right PICC line in good position. Stable or slight increase in the prominent, mixed interstitial alveolar infiltrates present bilaterally. Heart size normal.    Xr Chest 1 View Portable    Result Date: 12/20/2020  EXAM: XR CHEST 1 VIEW PORTABLE LOCATION: [facility] DATE/TIME: 12/20/2020 12:19 PM INDICATION: ARDS COMPARISON: 12/16/2020     The endotracheal tube is 6.9 cm superior to the emerita. Enteric tube tip in the stomach. Stable bilateral PICC. No pneumothorax. Diffuse airspace disease, left greater than right, which is slightly decreased in the lung bases compared to prior. No pleural effusion. Normal heart size.    Xr Chest 1 View Portable    Result Date: 12/16/2020  EXAM: XR CHEST 1 VIEW PORTABLE LOCATION: [facility] DATE/TIME: 12/16/2020 12:35 PM INDICATION: confirm ET Tube position. COVID COMPARISON: 12/11/2020     Endotracheal tube tip 4.7 cm from emerita. Right PICC tip projects over the upper SVC. Left PICC projects over the axilla. The diffuse bilateral pulmonary infiltrates appear worse with obscuration of the left heart border. No definite pneumothorax. No significant effusion seen.    Xr Chest 1 View Portable    Result Date: 12/11/2020  EXAM: XR CHEST 1 VIEW PORTABLE LOCATION: [facility] DATE/TIME: 12/11/2020 5:26 AM INDICATION: Confirm ETT placement. COMPARISON: 12/10/2020.     Endotracheal tube tip 3.5 cm above the emerita. Enteric tube tip below the diaphragm. Extensive diffuse bilateral pulmonary infiltrates. Heart size within normal limits. Remainder unremarkable.    Xr Chest 1 View  Portable    Result Date: 12/10/2020  EXAM: XR CHEST 1 VIEW PORTABLE LOCATION: [facility] DATE/TIME: 12/10/2020 7:38 PM INDICATION: intubation, COVID pneumonia. COMPARISON: Film earlier today and older studies.     Endotracheal tube is in good position the mid trachea. Feeding tube can be seen coursing into the stomach. Right upper extremity PICC line catheter is in good position in the proximal SVC. No change in the moderate, bilateral interstitial and airspace opacities.    Xr Chest 1 View Portable    Result Date: 12/10/2020  EXAM: XR CHEST 1 VIEW PORTABLE LOCATION: [facility] DATE/TIME: 12/10/2020 10:53 AM INDICATION: increased O2 needs, COVID 19 COMPARISON: 12/06/2020     Right PICC tip projects over the upper SVC. Stable heart size. No effusion. Worsening bilateral lung infiltrates.    Xr Chest 1 View Portable    Result Date: 12/6/2020  EXAM: XR CHEST 1 VIEW PORTABLE LOCATION: [facility] DATE/TIME: 12/6/2020 5:26 PM INDICATION: Dyspnea/SOB. COMPARISON: 11/30/2020.     Interval development of bilateral interstitial and ground-glass opacities, likely to represent pneumonia in the proper clinical context, to include COVID, recommend follow-up to resolution. No pneumothorax. Cardiac silhouette mildly enlarged.  Tortuous atherosclerotic aorta.    Xr Abdomen Ap Portable    Result Date: 12/24/2020  EXAM: XR ABDOMEN AP PORTABLE LOCATION: [facility] DATE/TIME: 12/24/2020 7:26 PM INDICATION: NJ placement COMPARISON: Chest x-ray 12/23/2020 and CT abdomen 12/23/2020     Enteric feeding tube tip located within the gastric lumen. Adjacent superimposed enteric suction tube with the tip in the gastric lumen. Nonobstructive bowel gas pattern.    Xr Abdomen Ap Portable    Result Date: 12/22/2020  EXAM: XR ABDOMEN AP PORTABLE LOCATION: [facility] DATE/TIME: 12/22/2020 5:47 PM INDICATION: feeding  tube placement COMPARISON: Abdominal radiographs from earlier today at 1052 hours     Gastric tube is unchanged in position terminating in the stomach body. The feeding tube was repositioned, which extends to the distal stomach and then has an acute bend with parallel retrograde course of the tube now with tip in the proximal stomach directed retrograde. Tip of a right femoral catheter terminates over the low right sacrum. Nonobstructive bowel gas pattern.    Xr Abdomen Ap Portable    Result Date: 12/22/2020  EXAM: XR ABDOMEN AP PORTABLE LOCATION: [facility] DATE/TIME: 12/22/2020 11:40 AM INDICATION: feeding tube COMPARISON: None.     Feeding tube in the distal stomach near the pylorus. Additional enteric tube in the body the stomach.     Xr Abdomen Ap Portable    Result Date: 12/14/2020  EXAM: XR ABDOMEN AP PORTABLE LOCATION: [facility] DATE/TIME: 12/14/2020 3:32 PM INDICATION: feeding tube placement COMPARISON: 12/11/2020     The feeding tube tip is in the region of the gastric antrum. Bowel gas pattern is unremarkable. Partially consolidating opacities are noted in the right lower lobe laterally.    Xr Abdomen Ap Portable    Result Date: 12/11/2020  EXAM: XR ABDOMEN AP PORTABLE LOCATION: [facility] DATE/TIME: 12/11/2020 5:29 AM INDICATION: Check feeding tube placement after being looped back upon itself. COMPARISON: None.     Feeding tube with tip in the right upper quadrant. Based upon its course and location, this likely lies within the proximal duodenum. Nonspecific visualized bowel gas pattern. Degenerative changes in the spine.     Ct Head Without Contrast    Result Date: 1/3/2021  EXAM: CT HEAD WO CONTRAST LOCATION: [facility] DATE/TIME: 1/3/2021 12:51 PM INDICATION: Confusion. Less responsive. Stroke follow-up. COMPARISON: 12/27/2020. TECHNIQUE: Routine CT Head without IV contrast. Multiplanar  reformats. Dose reduction techniques were used. FINDINGS: INTRACRANIAL CONTENTS: No significant change in the evolving right posterior cerebral artery territory infarction. No significant mass effect or hemorrhagic transformation. No intracranial hemorrhage, extraaxial collection, or mass effect.  No acute large artery territory infarction. Mild presumed chronic small vessel ischemic changes. Mild generalized volume loss. No hydrocephalus. VISUALIZED ORBITS/SINUSES/MASTOIDS: No intraorbital abnormality. Decreased dependent fluid in the right maxillary sinus. Remaining paranasal sinuses are clear. Tympanomastoid cavities are clear. BONES/SOFT TISSUES: No acute abnormality.     1.  No new acute intracranial abnormality. 2.  Continued expected evolution of the right posterior cerebral artery territory infarction without complication.    Ct Head Without Contrast    Result Date: 12/27/2020  EXAM: CT HEAD WO CONTRAST LOCATION: [facility] DATE/TIME: 12/27/2020 10:33 AM INDICATION: Stroke, follow up CVA COMPARISON: CT head without contrast 12/23/2020. TECHNIQUE: Routine CT Head without IV contrast. Multiplanar reformats. Dose reduction techniques were used. FINDINGS: INTRACRANIAL CONTENTS: Again seen are changes associated with an evolving right PCA distribution infarct. No hemorrhagic transformation. No midline shift. Mild presumed chronic small vessel ischemic changes. Mild to moderate generalized volume loss. No hydrocephalus. VISUALIZED ORBITS/SINUSES/MASTOIDS: No intraorbital abnormality. There fluid level in the right maxillary sinus. Scattered fluid/membrane thickening in the mastoid air cells bilaterally. BONES/SOFT TISSUES: No acute abnormality.     1.  Evolving subacute infarct in the right PCA vascular distribution without hemorrhagic transformation. 2.  Air-fluid level in the right maxillary sinus and scattered fluid/membrane thickening in the bilateral mastoid air cells.    Ct Head  Without Contrast    Result Date: 12/23/2020  EXAM: CT HEAD WO CONTRAST LOCATION: [facility] DATE/TIME: 12/23/2020 9:14 AM INDICATION: Concern for stroke. COMPARISON: None. TECHNIQUE: Routine CT head without IV contrast. Multiplanar reformats. Dose reduction techniques were used. FINDINGS: INTRACRANIAL CONTENTS: Marked hypoattenuation obscuring the gray-white junction of the medial posterior right temporal lobe, as well as the medial and inferior right occipital lobe. This is compatible with a right PCA distribution infarct, with the overall degree of hypoattenuation favoring a subacute infarct. No intracranial hemorrhage associated with this focus of PCA distribution infarct. There is, however, a punctate hyperdense focus in the lateral right thalamus measuring up to 3 mm in diameter. While nonspecific, attention on follow-up imaging will be of benefit to exclude developing hemorrhage.  While edema partially effaces the atrium and occipital horn, there is no significant midline shift. Mild burden scattered chronic small vessel ischemic change. Ventricles are nondilated. Background mild to moderate diffuse parenchymal volume loss. Calcification of the distal internal carotid arteries bilaterally. Additional calcification along the falx. VISUALIZED ORBITS/SINUSES/MASTOIDS: No intraorbital abnormality. Air-fluid levels in the bilateral maxillary sinuses, larger on the right. Additional air-fluid level in the right frontal sinus, with opacification in the anterior right ethmoid air cells. Fluid in the bilateral mastoid air cells. BONES/SOFT TISSUES: Deformity of the left nasal bone. In the absence of pain on palpation, this is favored to be chronic in nature.     1.  Right PCA distribution infarct with involvement of the medial posterior right temporal lobe as well as the medial and inferior right occipital lobe. Marked hypoattenuation obscuring the gray-white junction favors an overall  subacute nature. No intracranial hemorrhage associated with this focus of infarction. 2.  3 mm punctate hyperdense focus in the lateral right thalamus is nonspecific and potentially relates to a small amount of mineralization. Punctate focus of acute intraparenchymal hemorrhage is possible, and attention on follow-up head CT will be of benefit. 3.  Background age-related changes, as above. 4.  Diffuse paranasal sinus disease with fluid opacification in the bilateral mastoid air cells. Findings and impression discussed with Morenita Barahona RN, by phone at 0935 hours on 12/23/2020.    Us Carotid Bilateral    Result Date: 12/25/2020  EXAM: US CAROTID BILATERAL LOCATION: [facility] DATE/TIME: 12/25/2020 9:49 AM INDICATION: Cerebrovascular accident. COMPARISON: None. TECHNIQUE: Duplex exam performed utilizing 2D gray-scale imaging, Doppler interrogation with color-flow and spectral waveform analysis. The percent diameter stenosis is determined using NASCET criteria and Society of Radiologists in Ultrasound Consensus Criteria. FINDINGS: Exam compromised from overlying IVs along the right neck and intubation tubing and limited patient movement. Portions of the right common carotid artery not seen. RIGHT: Mild-moderate plaque at the bifurcation. The peak systolic velocity in the ICA is less than 125 cm/sec. Normal velocities in the ECA. Vertebral artery not visualized. LEFT: Mild-moderate plaque at the bifurcation. The peak systolic velocity in the ICA is 125-230 cm/sec. Normal velocities in the ECA. Vertebral artery not visualized. VELOCITY CHART: Portions of the right common carotid artery not visualized, as above. CCA   Right: 50 cm/s   Left: 46 cm/s ICA   Right: 77 cm/s   Left: 84 cm/s ECA   Right: 64 cm/s   Left: 68 cm/s ICA/CCA PSV Ratio   Right: 1.54   Left: 1.83     1.  Portions of exam compromised as above. 2.  Mild-moderate plaque formation, velocities consistent with less than 50% stenosis  "in the right internal carotid artery. 3.  Mild-moderate plaque formation, velocities consistent with less than 50% stenosis in the left internal carotid artery by peak systolic velocity. The left ICA / CCA ratio falls within the 50-69% range. 4.  Vertebral arteries not seen.     Echo Limited W Doppler    Result Date: 12/28/2020    Left ventricular ejection fraction is normal. The calculated left ventricular ejection fraction is 71%.   Normal right ventricular size and systolic function.   No hemodynamically significant valvular heart abnormalities.   Trace pericardial effusion with no echocardiographic evidence of cardiac tamponade.   When compared to the previous study dated 12/16/2020, the pericardial effusion has decreased in size. Other findings are comparable.      Poc Us 3cg Picc Placement Guidance    Result Date: 12/9/2020  Exam was performed as guidance for PICC line insertion.  Click \"PACS images\" hyperlink below to view any stored images.  For specific procedure details, view procedure note authored by PICC/Vascular Access Nurse.    Ct Chest Abdomen Pelvis Without Oral With Iv Contrast    Result Date: 12/23/2020  EXAM: CT CHEST ABDOMEN PELVIS WO ORAL W IV CONTRAST LOCATION: [facility] DATE/TIME: 12/23/2020 9:17 AM INDICATION: Sepsis marked leukocytosis without source COMPARISON: 12/22/2020, 12/20/2020 and 12/16/2020 abdominal and chest radiography. No prior CT. TECHNIQUE: CT scan of the chest, abdomen, and pelvis was performed following injection of IV contrast. Multiplanar reformats were obtained. Dose reduction techniques were used. CONTRAST: Iohexol (Omni) 100 mL FINDINGS: LUNGS AND PLEURA: Interval development large right tension pneumothorax with depression of the right hemidiaphragm and shift of the mediastinum and heart from right to left (but no significant right lung atelectasis) despite the presence of a well-positioned chest tube (with no visible kinking) in the upper " right pleural space. No left pneumothorax or pneumomediastinum. No pleural fluid in either hemithorax. Trace amount of subcutaneous and intramuscular edema upper anterior right chest wall unchanged. Dense consolidation throughout the posterior segment right upper lobe with lesser involvement of the apical and anterior segments allowing for difference in technique unchanged. Innumerable additional smaller foci of consolidation, mild generalized hazy groundglass density opacity and interlobular septal thickening throughout both lungs also likely unchanged. MEDIASTINUM/AXILLAE: Endotracheal tube tip 3.5 cm above the emerita. Right IJ central venous catheter tip mid SVC. Benign calcified mediastinal and bilateral hilar lymph nodes now dave lymphadenopathy. Three-vessel coronary artery calcification. No pericardial effusion. Heart size appears to be within normal limits. Normal caliber thoracic aorta and central pulmonary arteries. No obvious central pulmonary arterial emboli with the remainder of the pulmonary arteries not well seen. HEPATOBILIARY: Mild diffuse hepatic steatosis. Liver otherwise normal. Cholelithiasis. No bile duct dilatation. PANCREAS: Normal. SPLEEN: Normal. ADRENAL GLANDS: Normal. KIDNEYS/BLADDER: Circumscribed round 2 cm low-attenuation mass mid to lower right kidney and a circumscribed 2 cm mildly complex mass with internal enhancement or calcification arising exophytically from the lower pole left kidney. A 3 mm hyperdense focus related to the left posterolateral bladder wall is nonspecific and could be a small stone or focus of enhancement. Kidneys, ureters and bladder otherwise normal. BOWEL: Nasogastric tube tip proximal stomach in good position. Nasoenteric feeding tube extends to the gastroduodenal junction and reverses course with tip in the gastric fundus. No bowel obstruction or inflammatory change. No free fluid, free air or intra-abdominal abscess. LYMPH NODES: No lymphadenopathy.  VASCULATURE: Moderate amount of calcified atheromatous plaque throughout the normal caliber abdominal aorta and at the origin of the renal and mesenteric arteries. Right common femoral vein catheter tip in the proximal external iliac vein PELVIC ORGANS: Normal. MUSCULOSKELETAL: Normal.     1.  Interval development large right tension pneumothorax with depression of the right hemidiaphragm and shift of the mediastinum and heart from right to left despite the presence of a well-positioned chest tube (with no visible kinking) in the upper right pleural space. Findings discussed with Morenita Barahona CNP 9:35 AM 12/23/2020. 2.  Dense consolidation posterior segment right upper lobe and innumerable additional smaller foci of consolidation, mild generalized hazy groundglass density opacity and interlobular septal thickening throughout both lungs appear unchanged. 3.  Cholelithiasis. 4.  Nasoenteric feeding tube extends to the gastroduodenal junction and reverses course with tip in the gastric fundus. 5.  A 3 mm hyperdense focus related to the left posterolateral bladder wall is nonspecific and could be a small stone or focus of enhancement. Recommend nonurgent CT urogram in several months. 6.  Bilateral 2 cm renal masses likely represent cysts. These could be evaluated at the same time as the nonurgent CT urogram. NOTE: ABNORMAL REPORT THE DICTATION ABOVE DESCRIBES AN ABNORMALITY FOR WHICH FOLLOW-UP IS NEEDED.

## 2021-06-14 NOTE — PROGRESS NOTES
12/28/20 0112   Patient Data   Total Resp Rate  28 BPM   PIP Observed (cm H2O) 27 cm H2O   MAP (cm H2O) 17   Plateau Pressure (cm H2O) 26 cm H2O

## 2021-06-14 NOTE — PROCEDURES
Felipe Cruz 75 y.o. male (Sequential) Dialysis treatment started at 1307 and ended at 1508; ran for 2hours.   UF goal set at 2500ml per Crit-line reading, see HD flow sheets, net Total removed 2kg after prime and rinse back.   Norepi turned off towards the end of tx. Vasopressin at 2.4 units/hr during procedure, see MAR.      Access Right femoral catheter, no access issues. Dressing dry and intact.  No Heparin given through HD machine. Post dwell only. Tolerated well, no complaints. Complications none. Stable run.   Post report given to Melquiades Francis RN.     Hepatitis B status. Surface antigen negative Date 12/29/2020.  Patient Consent Verified YES.

## 2021-06-14 NOTE — PROGRESS NOTES
[facility]    ICU PROGRESS NOTE:  DOS:  12/28/2020    Patient Summary:   Felipe Cruz is a 74 year old male with a PMH of CAD, HTN, HLD, DM2, and BPH who was admitted to [facility] on 12/6/2020.  He was admitted to the general care floor. He had increased oxygenation needs, started on HFNC, and transferred to the ICU on 12/8. He was intubated for worsening hypoxia on 12/10. He self extubated on 12/10, required reintubation, and again self-extubated on 12/14. He was reintubated 12/16 for worsening hypoxia. Course complicated by anuric NICKY requiring CRRT and now profound shock requiring two to three vasopressors.    Major Changes for Today    Insulin gtt due to hyperglycemia   Continue to hold sedation   Repeat Echo today   iHD     Assessment/Plan:     NEUROLOGIC:  # acute pain  # agitation/anxiety  # delirium  # encephalopathy   - Did not tolerate Precedex and Propofol previously   - Fentanyl and Versed gtts off. Stopped Seroquel   - Cisatracurium discontinued 12/22  - RAAS goal o to -1 but currently GCS 3T, no gag or cough reflex  - Discontinued statin given transaminitis.       # Right PCA infarct  Demonstrated on CTH 12/23 with concern for possible right thalamus hypodensity, potential 3 mm hemorrhagic transformation within the right PCA distribution  - Neurology consulted, appreciate recommendations  - Ceribell with non specific slowing with superimposed beta activity. No sharp or rhythmic discharges.    - repeat CT head 12/27/20:  Evolving infarct, in right PCA, no hemorrhagic conversion  - Carotid US with 50-50% bilateral ICA stenosis     HEMODYNAMICS/CV:  # Shock, septic  #Tachycrdia, resolved   # Atrial fibrillation   - 12/16: Echo: EF > 63%, Normal RV And LV fn, normal valves. No Tamponade   - repeat ECHO Today   - Continue norepinephrine for MAP >65.   - Stress dose steroids restarted 12/20  - trop remain in 0.3 range   - ID as below     RESPIRATORY:  # Acute hypoxic respiratory  failure and ARDS 2/2 COVID19  - intubated 12/10, self-extubated 12/14, reintubated 12/16  # Klebsiella PNA   # Right apical pneumothorax s/p pigtail insertion 12/20  # Right tension pneumothorax on 12/23 with CT clamped for procedure. Resolved when placed back to suction. Continue CT to suction for now.   - Continue lung protective ventilation  - PEEP to 12  - Vent Mode: AC  FiO2 (%):  [45 %] 45 %  S RR:  [28-32] 28  S VT:  [520 mL] 520 mL  PEEP/CPAP (cm H2O):  [14 cm H2O-16 cm H2O] 14 cm H2O  Minute Ventilation (L/min):  [12.6 L/min-17.2 L/min] 14.7 L/min  PIP:  [29 cm H2O-32 cm H2O] 29 cm H2O  MAP (cm H2O):  [19-21] 19  - Ventilator bundle  - Leave supinated- P/F <150. But given protracted course proning no longer likely beneficially and will not improve his outcome    - Off Veletri     GI:  # Severe protein calorie malnutrition   # Diarrhea-- C diff negative 12/22  # Concern for ileus- non obstructive bowel gas pattern on 12/22 KUB and on CT, now stooling.   # Cholelithiasis discovered on CT 12/23, non obstructive  # Elevated LFTs, likely r/t shock possibly a component of Amiodarone. Alk phos and bili with normal parameters. CT abdomen completed 12/23 with no e/o obstruction  - RD following.   - protein BID  - bowel regimen: hold given diarrhea.      RENAL:  # Anuric NICKY   # Persistent metabolic acidosis  - Started on CRRT 12/18. Stopped 12/28. Plan for iHD, assess need to return to CRRT.   - Electrolytes balanced  - On enteral sodium bicarb   - Bladder scan PRN and straight cath if PVR > 250.      # Bilateral 2 cm renal masses incidentally found on CT, recommend follow up at later time  # Bladder wall ? Stone 3mm in size incidentally found on CT. Recommended follow up      ID:  # COVID 19   # Klebsiella VAP- treated  #Enterobacter cloacae VAP- active.   # Septic shock  # Severe Hypothermia  -WBC:  40-->35.1-->32-->24, today 20K  - Profoundly hypothermic 12/26, 92 degrees. Discussed with ID. Blood cultures  repeated--> no growth to day.  - ID following. Appreciate cares and recommendationss  MICRO:  12/23 Fungitell negative.   12/22 c.diff negative  12/22 sputum gram stain with prelim 1+ GPB  12/22 BC with NGTD  12/19 fungitell (pending)  galactomannan antigen (negative)  12/19 BC x2 with NGTD  12/19 Sputum with Enterobacter cloacae (sensitive to Meropenem)  12/19 A BAL with GS, AFB, KOH, fungal culture were ordered; however, the microbiology lab does not currently have the sample. Very minimal secretions on bronch 12/19 (see my note), will not repeat at this time.   12/12 sputum: Klebsiella pneumoniae  12/12  Blood cultures- NGTD  12/11 urine NGTD  12/11 BC with NGTD  12/6 blood staph epi, contaminant     Antimicrobials:  12/16 zosyn 12/16-12/17  Ceftriaxone 12/17-12/19 12/19--> Meropenem   12/19- 12/23 Fluconazole  12/23 Fluconazole broadened to Micafungin     Other Workup:  12/23: CT C/A/P  12/23 lipase normal  12/23 No e/o ischemic limbs     COVID:  Completed remdesivir course   Decadron 6 mg IV X 10 days      HEME:  # anemia of critical illness  # coagulopathy due to COVID -19  - Hemoglobin stable   - Continue with Heparin gtt, increased to high intensity 12/22      ENDOCRINE:  # DM II  # Stress and steroid induced hyperglycemia  HgbA1C on admit 8.2  - Hold PTA Lantus and oral agents  - Hold NPH to 55 units two times a day  - persistently elevated glucoses, BG's > 200-300. Stop Lantus and s/s scale insulin. Start insulin drip.      MSK:  # acute critical care weakness/deconditioning   - hold OOB activity while heavily sedated/ tenuous; ROM     SOCIAL:  Overall poor prognosis. Palliative consult but wife refused.     PROPHYLAXIS:   DVT proph:Yes. Heparin gtt  GI proph: Yes.  Requires solorzano for strict I&O: Yes  Restraints required for safety: Yes     DISPOSITION:  - ICU     LINES/TUBES/DRAINS:  - Right internal jugular TLC 12/22  - L art line, placed 12/10  - R fem dialysis line 12/19  - Right pigtail chest  "tube     Total Critical Care Time : 40 Minutes     Oscar Holley      Overnight events:  No acute events overnight. Does not arouse to verbal or physical stimuli. Not able to obtain ROS.     Objective:  Physical Exam:  Vent settings for last 24 hours:  Vent Mode: AC  FiO2 (%):  [45 %] 45 %  S RR:  [28] 28  S VT:  [520 mL] 520 mL  PEEP/CPAP (cm H2O):  [12 cm H2O] 12 cm H2O  Minute Ventilation (L/min):  [14.2 L/min-15.4 L/min] 14.2 L/min  PIP:  [26 cm H2O-28 cm H2O] 28 cm H2O  MAP (cm H2O):  [17-18] 18    /60 (Patient Position: Semi-mueller)   Pulse (!) 109   Temp 98.6  F (37  C) (Esophageal)   Resp (!) 33   Ht 6' 2\" (1.88 m)   Wt 201 lb 4.5 oz (91.3 kg)   SpO2 (!) 89%   BMI 25.84 kg/m      Intake/Output last 3 shifts:  I/O last 3 completed shifts:  In: 2767.3 [I.V.:1247.3; NG/GT:1420; IV Piggyback:100]  Out: 366 [Other:86; Stool:250; Chest Tube:30]  Intake/Output this shift:  I/O this shift:  In: 501.4 [I.V.:186.4; NG/GT:250; IV Piggyback:65]  Out: 0     Physical Exam  Neuro: does not respond to name or physical stimuli   HEENT:  PERRLA. Pupils 3mm and equal. ETT present and secured   RESP: decreased at bases, right chest tube in place to suction   CV: S1, S2  GI: abdomen compressible   MSK: Extremities: Calves soft and compressible. Pulses 2+.     LAB:  Results from last 7 days   Lab Units 12/28/20  0405   LN-WHITE BLOOD CELL COUNT thou/uL 20.0*   LN-HEMOGLOBIN g/dL 9.0*   LN-HEMATOCRIT % 27.9*   LN-PLATELET COUNT thou/uL 208     Results from last 7 days   Lab Units 12/28/20  0405 12/27/20  1637 12/27/20  1406 12/27/20  0532 12/26/20  0518 12/26/20  0518 12/25/20  0547 12/25/20  0547 12/23/20  0405 12/23/20  0405   LN-SODIUM mmol/L 134* 134* 134* 135*  134*   < > 135*  135*   < > 135*   < > 135*   LN-POTASSIUM mmol/L 5.0 4.8  4.8 4.6 4.5  4.5   < > 4.5  4.5   < > 4.6   < > 4.7   LN-CHLORIDE mmol/L 105 104 103 105  105   < > 105  105   < > 106   < > 100   LN-CO2 mmol/L 16* 16* 16* 16*  16*   < > " 16*  16*   < > 18*   < > 22   LN-BLOOD UREA NITROGEN mg/dL 68* 51*  --  30*  --  28  --  28   < > 24   LN-CREATININE mg/dL 2.74* 1.76*  --  0.93  --  0.94  --  1.11   < > 1.22   LN-CALCIUM mg/dL 8.7 8.4* 8.2* 8.1*  8.1*   < > 8.3*  8.3*   < > 8.5   < > 8.7   LN-PROTEIN TOTAL g/dL  --   --   --   --   --  6.0  --  5.8*  --  6.0   LN-BILIRUBIN TOTAL mg/dL  --   --   --   --   --  0.8  --  1.0  --  0.7   LN-ALKALINE PHOSPHATASE U/L  --   --   --   --   --  109  --  103  --  99   LN-ALT (SGPT) U/L  --   --   --   --   --  234*  --  268*  --  182*   LN-AST (SGOT) U/L  --   --   --   --   --  123*  --  216*  --  213*    < > = values in this interval not displayed.

## 2021-06-14 NOTE — PROGRESS NOTES
01/04/21 1050   Vent Information   Vent Mode AC   Vent Settings   FiO2 (%) 40 %   Resp Rate (Set) 28   Insp Time (sec) 0.7 sec   Vt (Set, mL) 520 mL   PEEP/CPAP (cm H2O) 8 cm H2O   Patient Data   Minute Ventilation (L/min) 13.1 L/min   Total Resp Rate  28 BPM   PIP Observed (cm H2O) 26 cm H2O   MAP (cm H2O) 14   Plateau Pressure (cm H2O) 23 cm H2O   SpO2 100 %   Heart Rate 89

## 2021-06-14 NOTE — PROGRESS NOTES
Renal progress note   CC: ARF  Assessment and Plan:  74 y.o. male     1. ARF: pt has normal underlying renal function; now hemodynamic ARF/ATN and has progressive rise in CR; anuria and on CRRT since 12/18.  Severe acidosis improved.Remains anuric   Cont CRRT 30 ml/kg/hr until filter clots off,then transition to iHD  2. Hypervolemia-improved, Wt is trending down. Net 4.8 L,UF 0 ml/h .  3. COVID 19 infection with resp failure; on vent with full support;  Completed 5-day course of remdesivir.  Completed 10-day course of dexamethasone on 12/15.  Course complicated by right-sided pneumothorax  4. Hyponatremia- Serum sodium is stable at 135 this am  5. Acidosis;resp and metabolic- nowimproved with CRRT    6. Anemia; no active loss; following hgb  7. HoTN; Remains on Levophed drip but weaning   8. Nutrition; on TF; renal formula  9. DVT prophylaxis; on heparin gtt  10. CAD, hyperlipid; stable co-morbitdities  11. DM; on insulin gtt  12. Acute hepatitis.  Elevated transaminases.   No signs of biliary obstruction on CT scan   13.Right PCA stroke. Followed by neurology. Critical illness myopathy    Discussed with ICU team    We will follow    Gale Sotomayor MD  [facility]  [phone].        Subjective  No acute issues. Patient remains sedated, intubated, hypothermic, on vent. Remains on Levophed drip but weaning. Tolerating CRRT~ 0 ml/h. Remains anuric.    Objective    Vital signs in last 24 hours  Heart Rate:  [] 78  Resp:  [25-35] 35  Arterial Line BP: ()/() 109/48  FiO2 (%):  [45 %-50 %] 45 %  Weight:   198 lb 3.1 oz (89.9 kg)    Intake/Output last 3 shifts  I/O last 3 completed shifts:  In: 2637.3 [I.V.:1332.3; NG/GT:1155; IV Piggyback:150]  Out: 3004 [Other:2134; Stool:800; Chest Tube:70]  Intake/Output this shift:  I/O this shift:  In: 683.2 [I.V.:295.2; NG/GT:333; IV Piggyback:55]  Out: 682 [Other:601; Stool:75; Chest Tube:6]    Physical Exam  Seen at  bedside.  Intubated and supine  Lungs decreased BS  Cor Reg on tele  abd soft  Ext warm and mild edema  Skin no rash      Pertinent Labs   Lab Results   Component Value Date    WBC 32.2 (HH) 12/26/2020    HGB 9.7 (L) 12/26/2020    HCT 30.8 (L) 12/26/2020    MCV 97 12/26/2020     12/26/2020     Lab Results   Component Value Date    CREATININE 0.94 12/26/2020    BUN 28 12/26/2020     (L) 12/26/2020     (L) 12/26/2020    K 4.5 12/26/2020    K 4.5 12/26/2020     12/26/2020     12/26/2020    CO2 16 (L) 12/26/2020    CO2 16 (L) 12/26/2020       Lab Results   Component Value Date    ALBUMIN 2.4 (L) 12/26/2020     Lab Results   Component Value Date    CALCIUM 8.3 (L) 12/26/2020    CALCIUM 8.3 (L) 12/26/2020    PHOS 5.6 (H) 12/26/2020     I reviewed all lab results  Gale Sotomayor

## 2021-06-14 NOTE — PLAN OF CARE
Problem: Breathing  Goal: Patient will maintain patent airway  Outcome: Not Progressing     Problem: Mechanical Ventilation  Goal: Patient will maintain patent airway  Outcome: Not Progressing  Goal: Oral health is maintained or improved  Outcome: Progressing  Goal: Respiratory status - ventilation  Description: Movement of air in and out of the lungs.    Liberate from ventilator  Outcome: Not Progressing  Goal: ET tube will be managed safely  Outcome: Progressing  Goal: Ability to express needs and understand communication  Outcome: Not Progressing  Goal: Mobility/activity is maintained at optimum level for patient  Outcome: Not Applicable this Shift

## 2021-06-14 NOTE — PROGRESS NOTES
[facility] COVID RT PROGRESS NOTE    DATA:    VENT DAY#  17    CURRENT SETTINGS:  VENT SETTINGS   AC 28 / 520 / +12 / 45%            PATIENT PARAMETERS:    PIP 27    Pplat:  26  Pmean:  17  SBT: N  SECRETIONS:    02 SATS:  98%    ETT SIZE 8.5  Secured at  26 cm at teeth    Respiratory Medications: N     ABG: Results for GISELLE VILLANUEVA (MRN [id number]) as of 12/27/2020 16:55   Ref. Range 12/27/2020 14:05   pH, Arterial Latest Ref Range: 7.37 - 7.44  7.36 (L)   pCO2, Arterial Latest Ref Range: 35 - 45 mm Hg 33 (L)   pO2, Arterial Latest Ref Range: 75 - 85 mm Hg 84   Bicarbonate, Arterial Calc Latest Ref Range: 23.0 - 29.0 mmol/L 19.1 (L)   O2 Sat, Arterial Latest Ref Range: 95.0 - 96.0 % 96.8 (H)       NOTE / PLAN:   Patient remains on full vent support. He was taken to CT scan yesterday without issue.  Secretions are relatively scantl, but thick.

## 2021-06-14 NOTE — PROGRESS NOTES
[facility] COVID RT PROGRESS NOTE     DATA:     VENT DAY# 14     CURRENT SETTINGS:  VENT SETTINGS   AC 28/520/+10              FIO2:  40%    PATIENT PARAMETERS:     PIP 24  Pplat:  22  Pmean: 13   SBT: NO  SECRETIONS: moderate thick tan bloody  02 SATS:  97%     ETT SIZE  8.5@26     Securing device changed / tube re-taped date NO     Respiratory Medications: NONE     AB.35/33/147/18.8/100%     NOTE / PLAN:   Pt continues to require full ventilatory support. Mental status has improved. Following simple commands. Dialysis today.

## 2021-06-14 NOTE — PROGRESS NOTES
01/05/21 2123   Patient Data   PIP Observed (cm H2O) 29 cm H2O   MAP (cm H2O) 18   Plateau Pressure (cm H2O) 28 cm H2O   Dynamic Compliance (L/cm H2O) 27 L/cm H2O   Airway Resistance 6.3

## 2021-06-14 NOTE — CONSULTS
Consultation - Infectious Disease  [facility]  Felipe Cruz,  1945, MRN [id number]    Admitting Dx: 2019 novel coronavirus disease (COVID-19) [U07.1]    PCP: Wero Nolasco MD, [phone]       ASSESSMENT   74-year-old man with a history of diabetes, hypertension, BPH who is admitted with respiratory distress from COVID-19.    1. COVID-19.  Diagnosed on .  Developed rapid worsening of respiratory status, intubated on 12/10.  Completed 5-day course of remdesivir.  Completed 10-day course of dexamethasone on 12/15.  Course complicated by right-sided pneumothorax.  Now with acute hepatitis, new stroke, and renal failure.  2. Leukocytosis.  Mildly elevated white cells during admission, possibly from steroids.  Started to rise in earnest on , now markedly elevated to 37.6 with a left shift.  Blood and urine cultures negative.  Sputum growing Enterobacter cloacae.  C. difficile test negative.  Recently started on stress dose steroids, which may be contributing to rise.  May also be from general inflammatory process from severe Covid illness.  3. Renal failure.  Nephrology consulted on , now on dialysis.  4. Acute hepatitis.  Elevated transaminases.  ALT starting to rise around .  No signs of biliary obstruction on CT scan today.  Consider possible medications, recently started on amiodarone.  Shock liver?    Principal Problem:    Pneumonia due to 2019 novel coronavirus  Active Problems:    Acute respiratory failure with hypoxia (H)    Acute kidney injury (H)    Acute respiratory distress syndrome (ARDS) due to COVID-19 virus (H)    Secondary spontaneous pneumothorax       PLAN   -Continue meropenem for Enterobacter in sputum  -Discontinue vancomycin micafungin  -Check D-dimer  -Peripheral smear  -Check lipase      Thank you for this consult. Will follow.    Angel Leon MD  [facility]  Direct messaging: [employer] Paging  On-Call ID provider:  [phone], option: [phone]      ===========================================      Chief Complaint   Pneumonia due to 2019 novel coronavirus       HPI     We have been requested by Nicci Gilbert MD to evaluate Felipe Cruz for the above.    History was obtained from past medical records.    Felipe Cruz is a 74 y.o. man with a history of diabetes, hypertension, BPH who is admitted with respiratory failure secondary to COVID-19.  He was diagnosed on 11/30.  He decompensated and was transferred to the ICU on 12/8, requiring intubation on 12/10.  He self extubated x2, ultimately being reintubated on 12/16.  He completed a 5-day course of remdesivir and a 10-day course of dexamethasone.  His course has been complicated by worsening renal failure, now on dialysis.  He also had a right-sided pneumothorax on 12/20 requiring chest tube placement.  He has required intermittent proning, but lately has been supine. ID is consulted for rising white count which was elevated into the teens since admission, but has been starting to increase since around 12/17.  Work-up for infection with blood cultures has been negative.  Sputum cultures have grown Klebsiella, and now Enterobacter.  C. difficile testing has been negative.  CT scan of the chest, abdomen, pelvis, and head today was notable for new right PCA stroke.  He was also noted to have a right tension pneumothorax, due to clamping of his chest tube -improved on subsequent chest x-ray.  The patient has been on pressors and sedation.  He was started on stress dose steroids on 12/20.          Review of Systems   Unable to obtain due to intubation and sedation     Medical History  Active Ambulatory (Non-Hospital) Problems    Diagnosis     Diabetes mellitus (H)     Hypertension     Chronic pain disorder     Primary osteoarthritis of right hip     Primary osteoarthritis of right knee     BPH with urinary obstruction     History reviewed. No pertinent past medical history. Surgical  History  He  has a past surgical history that includes PICC Midline Insertion (12/9/2020).     Social History  Reviewed, and he    Social History     Social History Narrative     Not on file     Family History     unable to obtain due to intubation and sedation     Unable to obtain due to intubation and sedation      Allergies   No Known Allergies      Antibiotics   Meropenem 12/19-  Micafungin 12/23-  Vancomycin 12/16, 12/19-    Previous:  Ceftriaxone 12/17-12/18  Fluconazole 12/19-12/22  Zosyn 12/15-12/16  Remdesivir x5 days      Physical Exam     Temp:  [97.1  F (36.2  C)-99.1  F (37.3  C)] 97.9  F (36.6  C)  Heart Rate:  [] 103  Resp:  [14-45] 32  BP: (105-135)/(49-58) 127/54  Arterial Line BP: ()/() 135/63  FiO2 (%):  [45 %-55 %] 45 %    Vitals:    12/23/20 1400   BP:    Pulse: (!) 103   Resp: (!) 32   Temp:    SpO2: 100%       GENERAL:  well-developed, well-nourished, lying in bed in no acute distress.   HENT:  Head is normocephalic, atraumatic.  ET tube in place.  No lip lesions.    EYES:  Eyes have anicteric sclerae without conjunctival injection or stigmata of endocarditis.   NECK:  Supple.  LUNGS:  Clear to auscultation.  CARDIOVASCULAR:  Regular rate and rhythm with no murmurs, gallops or rubs.  ABDOMEN:  Normal bowel sounds, soft, nontender. No appreciable hepatosplenomegaly  EXT: Extremities warm and without edema.  Amputations of third, fourth, and fifth finger on the left hand.  Prominent left femoral head without step-off.  SKIN:  No acute rashes.  Right IJ line and PICC line is in place without any surrounding erythema. No stigmata of endocarditis.  NEUROLOGIC: Sedated      Cultures   12/12 sputum culture: Klebsiella  12/16 blood cultures x2: No growth to date  12/16 urine culture: Negative  12/16 sputum culture: Normal ernst and yeast  12/19 fungal blood culture: Pending  12/19 blood cultures x2: No growth to date  12/19 sputum culture: Enterobacter cloacae  12/22 sputum: GPC's,  culture pending  12/22 blood cultures x2: No growth to date      Laboratory results     Results from last 7 days   Lab Units 12/23/20  0808 12/23/20  0406 12/22/20  2344 12/22/20  0450 12/22/20  0450 12/21/20  0405 12/21/20  0405   LN-WHITE BLOOD CELL COUNT thou/uL  --  37.6*  37.8*  --   --  35.8*  --  28.6*   LN-HEMOGLOBIN g/dL 10.0* 10.0*  10.0* 10.4*   < > 10.7*   < > 10.8*   LN-HEMATOCRIT %  --  31.6*  31.2*  --   --  33.3*  --  35.5*   LN-PLATELET COUNT thou/uL 259 290  268 286   < > 277   < > 301    < > = values in this interval not displayed.        Results from last 7 days   Lab Units 12/23/20  0809 12/23/20  0405 12/22/20  0450 12/22/20 0450 12/21/20  0405 12/21/20  0405   LN-SODIUM mmol/L 135* 135*   < > 136  135*   < > 137   LN-POTASSIUM mmol/L 4.9 4.7   < > 4.3  4.3   < > 5.1*   LN-CHLORIDE mmol/L 100 100   < > 100  100   < > 103   LN-CO2 mmol/L 21* 22   < > 20*  20*   < > 18*   LN-BLOOD UREA NITROGEN mg/dL  --  24  --  26  --  35*   LN-CREATININE mg/dL  --  1.22  --  1.48*  --  1.96*    < > = values in this interval not displayed.       Lab Results   Component Value Date     (H) 12/23/2020     (H) 12/23/2020    ALKPHOS 99 12/23/2020    BILITOT 0.7 12/23/2020       Lab Results   Component Value Date    CRP 7.9 (H) 12/19/2020    CRP 8.8 (H) 12/10/2020    CRP 9.0 (H) 12/10/2020           Imaging   Radiology results reviewed    Echo Complete    Result Date: 12/16/2020    Technically difficult study due to poor acoustic windows and patient positioning.   Left ventricular ejection fraction is normal. The calculated left ventricular ejection fraction is 63%.   Normal right ventricular size and systolic function.   No hemodynamically significant valvular heart abnormalities.   Small circumferential pericardial effusion. No obvious echocardiographic evidence of cardiac tamponade.   No previous study for comparison.      Xr Chest 1 View Portable    Result Date: 12/23/2020  EXAM: XR CHEST 1  VIEW PORTABLE LOCATION: [facility] DATE/TIME: 12/23/2020 1:48 PM INDICATION: evaluate right PTX COMPARISON: 12/22/2020     Xr Chest 1 View Portable    Result Date: 12/22/2020  EXAM: XR CHEST 1 VIEW PORTABLE LOCATION: [facility] DATE/TIME: 12/22/2020 5:46 PM INDICATION: line placement COMPARISON: Portable AP view of the chest 12/22/2020 at 1049 hours     Xr Chest 1 View Portable    Result Date: 12/22/2020  EXAM: XR CHEST 1 VIEW PORTABLE LOCATION: [facility] DATE/TIME: 12/22/2020 11:39 AM INDICATION: line placement COMPARISON: 11/20/2020     Xr Chest 1 View Portable    Result Date: 12/20/2020  EXAM: XR CHEST 1 VIEW PORTABLE LOCATION: [facility] DATE/TIME: 12/20/2020 7:57 PM INDICATION: chest tube, ETT placement COMPARISON: Earlier today at 1913 hours     Xr Chest 1 View Portable    Result Date: 12/20/2020  EXAM: XR CHEST 1 VIEW PORTABLE LOCATION: [facility] DATE/TIME: 12/20/2020 7:27 PM INDICATION: chest tube placement COMPARISON: 12/20/2020 at 1759 hours     Xr Chest 1 View Portable    Result Date: 12/20/2020  EXAM: XR CHEST 1 VIEW PORTABLE LOCATION: [facility] DATE/TIME: 12/20/2020 6:10 PM INDICATION: ARDS, please obtain while prone COMPARISON: 12/20/2020 at 1204 hours.     Xr Chest 1 View Portable    Result Date: 12/20/2020  EXAM: XR CHEST 1 VIEW PORTABLE LOCATION: [facility] DATE/TIME: 12/20/2020 12:19 PM INDICATION: ARDS COMPARISON: 12/16/2020     Xr Abdomen Ap Portable    Result Date: 12/22/2020  EXAM: XR ABDOMEN AP PORTABLE LOCATION: [facility] DATE/TIME: 12/22/2020 5:47 PM INDICATION: feeding tube placement COMPARISON: Abdominal radiographs from earlier today at 1052 hours     Xr Abdomen Ap Portable    Result Date: 12/22/2020  EXAM: XR ABDOMEN AP PORTABLE LOCATION: [facility]  HOSPITAL DATE/TIME: 12/22/2020 11:40 AM INDICATION: feeding tube COMPARISON: None.     Ct Head Without Contrast    Result Date: 12/23/2020  EXAM: CT HEAD WO CONTRAST LOCATION: [facility] DATE/TIME: 12/23/2020 9:14 AM INDICATION: Concern for stroke. COMPARISON: None. TECHNIQUE: Routine CT head without IV contrast. Multiplanar reformats. Dose reduction techniques were used. FINDINGS: INTRACRANIAL CONTENTS: Marked hypoattenuation obscuring the gray-white junction of the medial posterior right temporal lobe, as well as the medial and inferior right occipital lobe. This is compatible with a right PCA distribution infarct, with the overall degree of hypoattenuation favoring a subacute infarct. No intracranial hemorrhage associated with this focus of PCA distribution infarct. There is, however, a punctate hyperdense focus in the lateral right thalamus measuring up to 3 mm in diameter. While nonspecific, attention on follow-up imaging will be of benefit to exclude developing hemorrhage.  While edema partially effaces the atrium and occipital horn, there is no significant midline shift. Mild burden scattered chronic small vessel ischemic change. Ventricles are nondilated. Background mild to moderate diffuse parenchymal volume loss. Calcification of the distal internal carotid arteries bilaterally. Additional calcification along the falx. VISUALIZED ORBITS/SINUSES/MASTOIDS: No intraorbital abnormality. Air-fluid levels in the bilateral maxillary sinuses, larger on the right. Additional air-fluid level in the right frontal sinus, with opacification in the anterior right ethmoid air cells. Fluid in the bilateral mastoid air cells. BONES/SOFT TISSUES: Deformity of the left nasal bone. In the absence of pain on palpation, this is favored to be chronic in nature.     Ct Chest Abdomen Pelvis Without Oral With Iv Contrast    Result Date: 12/23/2020  EXAM: CT CHEST ABDOMEN PELVIS WO ORAL W IV CONTRAST  LOCATION: [facility] DATE/TIME: 12/23/2020 9:17 AM INDICATION: Sepsis marked leukocytosis without source COMPARISON: 12/22/2020, 12/20/2020 and 12/16/2020 abdominal and chest radiography. No prior CT. TECHNIQUE: CT scan of the chest, abdomen, and pelvis was performed following injection of IV contrast. Multiplanar reformats were obtained. Dose reduction techniques were used. CONTRAST: Iohexol (Omni) 100 mL FINDINGS: LUNGS AND PLEURA: Interval development large right tension pneumothorax with depression of the right hemidiaphragm and shift of the mediastinum and heart from right to left (but no significant right lung atelectasis) despite the presence of a well-positioned chest tube (with no visible kinking) in the upper right pleural space. No left pneumothorax or pneumomediastinum. No pleural fluid in either hemithorax. Trace amount of subcutaneous and intramuscular edema upper anterior right chest wall unchanged. Dense consolidation throughout the posterior segment right upper lobe with lesser involvement of the apical and anterior segments allowing for difference in technique unchanged. Innumerable additional smaller foci of consolidation, mild generalized hazy groundglass density opacity and interlobular septal thickening throughout both lungs also likely unchanged. MEDIASTINUM/AXILLAE: Endotracheal tube tip 3.5 cm above the emerita. Right IJ central venous catheter tip mid SVC. Benign calcified mediastinal and bilateral hilar lymph nodes now dave lymphadenopathy. Three-vessel coronary artery calcification. No pericardial effusion. Heart size appears to be within normal limits. Normal caliber thoracic aorta and central pulmonary arteries. No obvious central pulmonary arterial emboli with the remainder of the pulmonary arteries not well seen. HEPATOBILIARY: Mild diffuse hepatic steatosis. Liver otherwise normal. Cholelithiasis. No bile duct dilatation. PANCREAS: Normal. SPLEEN: Normal.  ADRENAL GLANDS: Normal. KIDNEYS/BLADDER: Circumscribed round 2 cm low-attenuation mass mid to lower right kidney and a circumscribed 2 cm mildly complex mass with internal enhancement or calcification arising exophytically from the lower pole left kidney. A 3 mm hyperdense focus related to the left posterolateral bladder wall is nonspecific and could be a small stone or focus of enhancement. Kidneys, ureters and bladder otherwise normal. BOWEL: Nasogastric tube tip proximal stomach in good position. Nasoenteric feeding tube extends to the gastroduodenal junction and reverses course with tip in the gastric fundus. No bowel obstruction or inflammatory change. No free fluid, free air or intra-abdominal abscess. LYMPH NODES: No lymphadenopathy. VASCULATURE: Moderate amount of calcified atheromatous plaque throughout the normal caliber abdominal aorta and at the origin of the renal and mesenteric arteries. Right common femoral vein catheter tip in the proximal external iliac vein PELVIC ORGANS: Normal. MUSCULOSKELETAL: Normal.       Attestation:  I have reviewed today's Medications, Vital Signs, Imaging, and Labs. Recommendations discussed with the primary service.

## 2021-06-14 NOTE — PROGRESS NOTES
[facility] RT PROGRESS NOTE     DATA:     VENT DAY# 19     CURRENT SETTINGS:  VENT SETTINGS   AC 28/520/+12              FIO2:  45%    PATIENT PARAMETERS:     PIP 27  Pplat:  25  Pmean: 18   SBT: No  SECRETIONS: Large thick tan blood tinged  02 SATS:  100%     ETT SIZE  8.5 @ 26     Securing device changed / tube re-taped date 2020     Respiratory Medications: NONE     AB.37/37/108/21.4/99%     NOTE / PLAN:   Pt continues to require full ventilatory support. Pt now responding appropriately and follow simple commands.

## 2021-06-14 NOTE — PROGRESS NOTES
[facility] RT PROGRESS NOTE    DATA:    VENT DAY#  17    CURRENT SETTINGS:  VENT SETTINGS   AC 28/520/+10        FIO2:   40%    PATIENT PARAMETERS:    PIP 26-27  Pplat:  25-26  Pmean:  16  SBT: None  SECRETIONS:  Small tan  02 SATS:  97-99%    ETT SIZE 8.5  Secured at  25 cm at teeth    Securing device changed / tube re-taped date 12/29    Respiratory Medications: None     ABG: Results for GISELLE VILLANUEVA (MRN [id number]) as of 1/3/2021 04:24   Ref. Range 1/3/2021 04:15   pH, Arterial Latest Ref Range: 7.37 - 7.44  7.44   pCO2, Arterial Latest Ref Range: 35 - 45 mm Hg 32 (L)   pO2, Arterial Latest Ref Range: 75 - 85 mm Hg 100 (H)   Bicarbonate, Arterial Calc Latest Ref Range: 23.0 - 29.0 mmol/L 22.8 (L)   O2 Sat, Arterial Latest Ref Range: 95.0 - 96.0 % 99.2 (H)       NOTE / PLAN:   Patient remained on full vent support throughout the shift. Will continue to monitor.

## 2021-06-14 NOTE — PROGRESS NOTES
[facility]    ICU PROGRESS NOTE:  DOS:  12/25/2020    Patient Summary:   Felipe Cruz is a 74 year old male with a PMH of CAD, HTN, HLD, DM2, and BPH who was admitted to [facility] on 12/6/2020.  He was admitted to the general care floor. He had increased oxygenation needs, started on HFNC, and transferred to the ICU on 12/8. He was intubated for worsening hypoxia on 12/10. He self extubated on 12/10, required reintubation, and again self-extubated on 12/14. He was reintubated 12/16 for worsening hypoxia. Course complicated by anuric NICKY requiring CRRT and now profound shock requiring two to three vasopressors.    Major Changes for Today    EEG/Cerebrell today   Wean levophed as able, Vasopressin is a fixed dose, no titration  Hold NPH  Stop lactulose   Stop seroquel   TF as able     Assessment/Plan:    NEUROLOGIC:  # acute pain  # agitation/anxiety  # delirium  # encephalopathy   Did not tolerate Precedex and Propofol previously   - Fentanyl and versed drips with PRN boluses available. (Unable to decrease 2/2 hemodynamic instability when weaning)   - Cisatracurium discontinued 12/22  - Stop Seroquel    -RASS goal -4    # Right PCA infarct  Demonstrated on CTH 12/23 with concern for possible right thalamus hypodensity, potential 3 mm hemorrhagic transformation within the right PCA distribution  - Neurology consulted, appreciate recommendations      HEMODYNAMICS/CV:  #Shock, septic  #Tachycrdia, resolved   12/16: Echo: EF > 63%, Normal RV And LV fn, normal valves. No Tamponade   - Continue norepinephrine, vasopressin for MAP > 65  - Phenylephrine also available  - Stress dose steroids restarted 12/20  - ID as below     RESPIRATORY:  # Acute hypoxic respiratory failure and ARDS 2/2 COVID19  # Klebsiella PNA   # Right apical pneumothorax s/p pigtail insertion 12/20  # Right tension pneumothorax on 12/23 with CT clamped for procedure. Resolved when placed back to suction.   -  intubated 12/10, self-extubated 12/14, reintubated 12/16  - Continue lung protective ventilation  - Vent Mode: AC  FiO2 (%):  [55 %-60 %] 55 %  S RR:  [32] 32  S VT:  [520 mL] 520 mL  PEEP/CPAP (cm H2O):  [16 cm H2O] 16 cm H2O  Minute Ventilation (L/min):  [14.5 L/min-20 L/min] 14.5 L/min  PIP:  [31 cm H2O-39 cm H2O] 32 cm H2O  MAP (cm H2O):  [20-23] 22   - Titrate FiO2 to maintain SpO2 >92%  - Ventilator bundle  - Leave supinated- P/F <150 But given protracted course proning no longer likely beneficially and will not improve his outcome    - Leave CT to suction, air leak noted  - CXR stable   - ABG q6h  - Stopped Velitri    GI:  # Severe protein calorie malnutrition  # Constipation, now resolved   # Diarrhea  # Concern for ileus- non obstructive bowel gas pattern on 12/22 KUB and on CT   Last BM 12/22  Emesis afternoon 12/21- OG placed  # Cholelithiasis discovered on CT 12/23, non obstructive  # Elevated LFTs, likely r/t shock possibly a component of Amiodarone. Alk phos and bili with normal parameters. CT abdomen completed 12/23 with no e/o obstruction  - RD following. RD managing TF's- resume at trophic given high pressor requirement   - protein BID  - bowel regimen: miralax, senna, docusate sodium daily, add lactulose   - Received scheduled Reglan for 24 hours (12/22-12/23)  - C diff negative 12/22  - Replace postpyloric feeding tube, remains gastric. Trial trophic feeds      RENAL:  # Anuric NICKY   # Mixed metabolic and respiratory acidosis  Started on CRRT 12/18  - Unable to remove fluids 2/2 increasing pressor requirements   - Electrolytes balanced  - Persistently acidemic, improved with bicarb drip- now off infusion. Intermittent dosing per renal   - On enteral sodium bicarb, likely not absorbing  - Bladder scan PRN and straight cath if PVR > 250.     # Bilateral 2 cm renal masses incidentally found on CT, recommend follow up at later time  # Bladder wall ? Stone 3mm in size incidentally found on CT.  Recommended follow up     ID:  # COVID 19   # CAP with COVID19  # Klebsiella VAP  # Septic shock  # Severe Hypothermia, improved  # Leukocytosis, worsening  # Concern for aspiration- had emesis on 12/21 requiring OG placement  WBC:   16-->22K-->28K-->35K-->38K-->40-->35.1  Now normothermic  - Consult ID , appreciate recs    MICRO:  12/23 Fungitell resent  12/22 c.diff negative  12/22 sputum gram stain with prelim 1+ GPB  12/22 BC with NGTD  12/19 fungitell (pending)  galactomannan antigen (negative)  12/19 BC x2 with NGTD  12/19 Sputum with Enterobacter cloacae (sensitive to Meropenem)  12/19 A BAL with GS, AFB, KOH, fungal culture were ordered; however, the microbiology lab does not currently have the sample. Very minimal secretions on bronch 12/19 (see my note), will not repeat at this time.   12/12 sputum: Klebsiella pneumoniae  12/12  Blood cultures- NGTD  12/11 urine NGTD  12/11 BC with NGTD  12/6 blood staph epi, contaminant    Antimicrobials:  12/16 zosyn 12/16-12/17  Ceftriaxone 12/17-12/19 12/19-->** Vancomycin, Meropenem   12/19- 12/23 Fluconazole  12/23 Fluconazole broadened to Micafungin    Other Workup:  12/23: CT C/A/P  12/23 lipase normal  12/23 No e/o ischemic limbs    COVID:  Completed remdesivir course   Decadron 6 mg IV X 10 days      HEME:  # anemia of critical illness  # coagulopathy due to COVID -19  # Leukocytosis WBC:   16-->22K-->28K-->35K-->38K-->40K  - Hemoglobin stable   - Continue with Heparin gtt, increased to high intensity 12/22  - ID management as above     ENDOCRINE:  # DM II  # Stress and steroid induced hyperglycemia  HgbA1C on admit 8.2  - Hold PTA Lantus and oral agents  - Hold NPH to 55 units two times a day  - High resistance sliding scale insulin   - goal glucose < 180 for optimal healing    MSK:  # acute critical care weakness/deconditioning   - hold OOB activity while heavily sedated/ tenuous; ROM     SOCIAL:  Discussed patient's condition and grave prognosis with wife  "over phone 12/24. Again reiterated that prognosis is poor and he will not likely survive this hospital stay. It was expressed to her that we are offering everything we can for him and it will be up to him and his body to respond.  She still would like to proceed with aggressive cares including full code at this time at the direction of their previous discussions prior to hospitalization. Palliative care offered previously and was declined. Will continue to have ongoing discussions.    PROPHYLAXIS:   DVT proph:Yes. Heparin gtt  GI proph: Yes.  Requires solorzano for strict I&O: Yes  Restraints required for safety: Yes     DISPOSITION:  - ICU    LINES/TUBES/DRAINS:  - Right internal jugular TLC 12/22  - L art line, placed 12/10  - R fem dialysis line 12/19  - Right pigtail chest tube     Total Critical Care Time : 40 Minutes      Deandre Fontana       Overnight events: Course reviewed. No acute events. SHAD ROS     Physical Exam:  Vent settings for last 24 hours:  Vent Mode: AC  FiO2 (%):  [55 %-60 %] 55 %  S RR:  [32] 32  S VT:  [520 mL] 520 mL  PEEP/CPAP (cm H2O):  [16 cm H2O] 16 cm H2O  Minute Ventilation (L/min):  [14.5 L/min-20 L/min] 14.5 L/min  PIP:  [31 cm H2O-39 cm H2O] 32 cm H2O  MAP (cm H2O):  [20-23] 22    /52 (Patient Position: Semi-mueller)   Pulse 76   Temp 98.6  F (37  C) (Esophageal)   Resp (!) 32   Ht 6' 2\" (1.88 m)   Wt 200 lb 9.9 oz (91 kg)   SpO2 100%   BMI 25.76 kg/m      Intake/Output last 3 shifts:  I/O last 3 completed shifts:  In: 2134 [I.V.:1434; NG/GT:550; IV Piggyback:150]  Out: 2800 [Other:2220; Stool:500; Chest Tube:80]  Intake/Output this shift:  I/O this shift:  In: 77.3 [I.V.:52.3; NG/GT:25]  Out: 30 [Other:26; Chest Tube:4]    Physical Exam  Neuro: unresponsive  HEENT:  PERRLA. Pupils 2. ETT present and secured. NJ and OG present  RESP: BBS, lungs diminished throughout, no wheezes. Synchronous with ventilator  CV: extremities cool, pulses palpable.   GI: S/NT/ND; + BS; " rectal tube in place  : anuric   Extremities: +3 edema, pulses 2+     LAB:  Results from last 7 days   Lab Units 12/25/20  0547   LN-WHITE BLOOD CELL COUNT thou/uL 35.1*   LN-HEMOGLOBIN g/dL 9.2*   LN-HEMATOCRIT % 29.4*   LN-PLATELET COUNT thou/uL 228     Results from last 7 days   Lab Units 12/25/20  0547 12/25/20  0039 12/24/20  1557 12/24/20  0507 12/23/20  0405 12/23/20  0405 12/21/20  1122 12/21/20  1122   LN-SODIUM mmol/L 135* 134* 136 135*   < > 135*   < >  --    LN-POTASSIUM mmol/L 4.6 4.6 4.7 4.8   < > 4.7   < >  --    LN-CHLORIDE mmol/L 106 104 104 105   < > 100   < >  --    LN-CO2 mmol/L 18* 18* 19* 18*   < > 22   < >  --    LN-BLOOD UREA NITROGEN mg/dL 28  --   --  30*  --  24   < >  --    LN-CREATININE mg/dL 1.11  --   --  1.18  --  1.22   < >  --    LN-CALCIUM mg/dL 8.5 8.4* 8.5 8.8  8.8   < > 8.7   < >  --    LN-PROTEIN TOTAL g/dL 5.8*  --   --   --   --  6.0  --  6.7   LN-BILIRUBIN TOTAL mg/dL 1.0  --   --   --   --  0.7  --  0.8   LN-ALKALINE PHOSPHATASE U/L 103  --   --   --   --  99  --  86   LN-ALT (SGPT) U/L 268*  --   --   --   --  182*  --  77*   LN-AST (SGOT) U/L 216*  --   --   --   --  213*  --  62*    < > = values in this interval not displayed.

## 2021-06-14 NOTE — PROGRESS NOTES
[facility] COVID RT PROGRESS NOTE    DATA:    VENT DAY#  19    CURRENT SETTINGS:  VENT SETTINGS   AC 28/520/+12        FIO2:   60%    PATIENT PARAMETERS:    PIP 28  Pplat:  26  Pmean:  17  SBT: None  SECRETIONS:  Moderate yellow/blood tinged thick  02 SATS:  98%    ETT SIZE 8.5  Secured at  26 cm at teeth    Securing device changed / tube re-taped date 12/29/20    Respiratory Medications: None     ABG: Results for GISELLE VILLANUEVA (MRN [id number]) as of 12/29/2020 21:13   Ref. Range 12/29/2020 20:00   pH, Arterial Latest Ref Range: 7.37 - 7.44  7.37   pCO2, Arterial Latest Ref Range: 35 - 45 mm Hg 37   pO2, Arterial Latest Ref Range: 75 - 85 mm Hg 108 (H)   Bicarbonate, Arterial Calc Latest Ref Range: 23.0 - 29.0 mmol/L 21.4 (L)   O2 Sat, Arterial Latest Ref Range: 95.0 - 96.0 % 99.3 (H)   Oxyhemoglobin Latest Ref Range: 95.0 - 96.0 % 97.6 (H)   POC Base Excess Calc Latest Units: mmol/L -4.1   Ventilation Mode Unknown AC   Peep Latest Units: cm H2O 12   Sample Stabilized Temperature Latest Units: degrees C 37.0   Rate Latest Units: rr/min 28   FIO2 Unknown 0.60       NOTE / PLAN:   The patient remains on full ventilatory support. No acute events noted overnight.  Suctioning thick yellow/blood tinged secretions.  BS coarse diminished.  Will continue to monitor and assess for respiratory and ventilatory changes.

## 2021-06-14 NOTE — PROGRESS NOTES
01/03/21 0805   Vent Information   Vent Mode AC   Vent Settings   FiO2 (%) 40 %   Resp Rate (Set) 28   Insp Time (sec) 0.75 sec   Vt (Set, mL) 520 mL   PEEP/CPAP (cm H2O) 10 cm H2O   Patient Data   Minute Ventilation (L/min) 15.6 L/min   Total Resp Rate  31 BPM   PIP Observed (cm H2O) 30 cm H2O   MAP (cm H2O) 18   Plateau Pressure (cm H2O)   (Unable to assess.)   SpO2 100 %   Heart Rate (!) 103

## 2021-06-14 NOTE — PLAN OF CARE
Multiple dark maroon, loose stools throughout shift.  One small incontinent urine noted on pad.  Chest tube to water seal, dressing changed.  50 mL drainage in chamber.  HD completed today, 920 mL removed.  Pt BP dropped and levo was up to .20 to maintain BP during HD.  1 unit PRBCs given during dialysis for hgb 7.0/  palliative care consult placed. Per neurology, SBP should remain between 120-170.      Yuridia, spouse updated twice by RN.  Extensive time spent explaining in detail the correlation between CVA, Renal failure, and resp status in regards to goals of care.

## 2021-06-14 NOTE — PROGRESS NOTES
Pharmacy Consult to evaluate for medication related stroke core measures    Felipe Cruz, 75 y.o. male admitted on 12/6/2020, found to have an ischemic stroke on 12/23/2020.    TPA was not given.    VTE Prophylaxis: Heparin drip administered on 12/23/2020 per documentation as appropriate prior to end of Day 2.    Antithrombotic: aspirin was given on 12/23/2020 per documentation, as appropriate by end of Day 2. Continue antithrombotic therapy on discharge to meet quality measures, unless contraindicated.    Anticoagulation if history of A-fib/flutter: Patient on a heparin drip; continue anticoagulation on discharge to meet quality measures, unless contraindicated.    LDL Calculated   Date Value Ref Range Status   12/25/2020 56 <=129 mg/dL Final     Statin not required for stroke core measures.    Recommendations:   - Please ensure the reason for not administering tPA is clearly documented in a note.    Thank you for the consult.    Stephen Badillo, PharmD 1/1/2021 10:50 PM

## 2021-06-14 NOTE — PROGRESS NOTES
[facility]    ICU PROGRESS NOTE:  DOS:  12/30/2020    Patient Summary:   Felipe Cruz is a 74 year old male with a PMH of CAD, HTN, HLD, DM2, and BPH who was admitted to [facility] on 12/6/2020.  He was admitted to the general care floor. He had increased oxygenation needs, started on HFNC, and transferred to the ICU on 12/8. He was intubated for worsening hypoxia on 12/10. He self extubated on 12/10, required reintubation, and again self-extubated on 12/14. He was reintubated 12/16 for worsening hypoxia. Course complicated by anuric NICKY requiring CRRT and now profound shock vasopressors, enterobacter PNA.       Major Changes for Today    Stop insulin gtt and transition Lantus and sliding scale   Follow cultures results   Remove rectal tube  Decrease heparin to low intensity   Monitor H/H     Assessment/Plan:  NEUROLOGIC:  # acute pain  # agitation/anxiety  # delirium  # encephalopathy   - Did not tolerate Precedex and Propofol previously   - Fentanyl @ 25 mcg/hr-- restarted 12/28 due to tachypnea               - Versed gtts off.               - Stopped Seroquel   - Cisatracurium discontinued 12/22  - RAAS goal 0 to -1, pt more interactive today than days prior.      # Right PCA infarct  Demonstrated on CTH 12/23 with concern for possible right thalamus hypodensity, potential 3 mm hemorrhagic transformation within the right PCA distribution  - Neurology consulted, appreciate neurology following   - Ceribell with non specific slowing with superimposed beta activity. No sharp or rhythmic discharges.               - repeat CT head 12/27/20:  Evolving infarct, in right PCA, no hemorrhagic conversion  - Carotid US with 50-50% bilateral ICA stenosis     HEMODYNAMICS/CV:  # Shock, septic  #Tachycrdia, resolved   # Atrial fibrillation   - 12/16: Echo: EF > 63%, Normal RV And LV fn, normal valves. No Tamponade   - Continue norepinephrine for MAP >65.   - Stress dose steroids restarted 12/20  - trop  remain in 0.3 range. Echo 12/28 with no WMA, decrease pericardial effusion. No tamponade. Normal EF   - ID as below     RESPIRATORY:  # Acute hypoxic respiratory failure and ARDS 2/2 COVID19  - intubated 12/10, self-extubated 12/14, reintubated 12/16     # Klebsiella PNA  S/p abx course   # Right apical pneumothorax s/p pigtail insertion 12/20  # Right tension pneumothorax on 12/23 with CT clamped for procedure. Resolved when placed back to suction. Continue CT to suction for now. Keep CT in place until no longer on PPV  - Continue lung protective ventilation  - PEEP 12  Vent Mode: AC  FiO2 (%):  [50 %-60 %] 50 %  S RR:  [28] 28  S VT:  [520 mL] 520 mL  PEEP/CPAP (cm H2O):  [12 cm H2O] 12 cm H2O  Minute Ventilation (L/min):  [13.1 L/min-14.4 L/min] 13.1 L/min  PIP:  [25 cm H2O-33 cm H2O] 31 cm H2O  MAP (cm H2O):  [17-19] 17    - Leave supinated given protracted course proning no longer likely beneficially - Off inhaled  Veletri     GI:  # Severe protein calorie malnutrition   # Diarrhea-- C diff negative 12/22  # Cholelithiasis discovered on CT 12/23, non obstructive  # Elevated LFTs, likely r/t shock possibly a component of Amiodarone. Alk phos and bili with normal parameters. CT abdomen completed 12/23 with no obstruction  - RD following.   - protein BID  - bowel regimen on hold. Remove rectal tube concerns of skin integrity      RENAL:  # Anuric NICKY   # Persistent metabolic acidosis  - CRRT 12/18--12/28. iHD today run 12/29. TTHS schedule per renal   - Electrolytes balanced. Anuric at this time.   - Bladder scan PRN and straight cath if PVR > 250.      # Bilateral 2 cm renal masses incidentally found on CT, recommend follow up at later time  # Bladder wall ? Stone 3mm in size incidentally found on CT. Recommended follow up      ID:  # COVID 19   # Klebsiella VAP- treated  # Enterobacter cloacae VAP- active.   # Septic shock  # Severe Hypothermia  -WBC:  40-->35.1-->32-->24--> 20--> 19.3--> 16.7  - Profoundly  hypothermic 12/26, 92 degrees. Culture with NGTD   - ID following. Appreciate cares and recommendations's              - Plan for 14 days of antibiotics for enterobacter PNA.      MICRO:  12/23 Fungitell negative.   12/22 c.diff negative  12/22 sputum gram stain with prelim 1+ GPB  12/22 BC with NGTD  12/19 fungitell (pending)  galactomannan antigen (negative)  12/19 BC x2 with NGTD  12/19 Sputum with Enterobacter cloacae (sensitive to Meropenem)  12/19 A BAL with GS, AFB, KOH, fungal culture were ordered. Very minimal secretions on bronch 12/19.   12/12 sputum: Klebsiella pneumoniae  12/12  Blood cultures- NGTD  12/11 urine NGTD  12/11 BC with NGTD  12/6 blood staph epi, contaminant     Antimicrobials:  12/16 zosyn 12/16-12/17  Ceftriaxone 12/17-12/19 12/19--> Meropenem   12/19- 12/23 Fluconazole  12/23 Fluconazole broadened to Micafungin     Other Workup:  12/23: CT C/A/P  12/23 lipase normal  12/23 No e/o ischemic limbs     COVID:  Completed remdesivir course   Decadron 6 mg IV X 10 days      HEME:  # anemia of critical illness  # coagulopathy due to COVID -19  - Hemoglobin stable   - Continue with Heparin gtt, increased to high intensity 12/22, decrease back to low intensit given some bloody ETT secretions and bleeding around rectal tube, remove rectal tube      ENDOCRINE:  # DM II  # Stress and steroid induced hyperglycemia  HgbA1C on admit 8.2  - Hold PTA Lantus and oral agents  - total needs 112 units/24hours--> start with 45 units Lantus and resistant sliding scale insulin      MSK:  # acute critical care weakness/deconditioning   - hold OOB activity for now.   - ROM     SOCIAL: Palliative consult but wife refused.      PROPHYLAXIS:   DVT proph:Yes. Heparin gtt for embolic stroke and A-fib   GI proph: Yes.  Requires solorzano for strict I&O: Yes  Restraints required for safety: Yes     DISPOSITION:  - ICU     LINES/TUBES/DRAINS:  - Right internal jugular TLC 12/22  - L art line, placed 12/10  - R fem dialysis  "line 12/19  - Right pigtail chest tube     Total Critical Care Time : 45 Minutes      Oscar Holley PA-C      Overnight events:   Course reviewed. No acute events reported overnight. This am, pt with cough to suction and turned head away during oral cares. Reported to follow commands during nursing assessment later in the am.     Objective:  Physical Exam:  Vent settings for last 24 hours:  Vent Mode: AC  FiO2 (%):  [50 %-60 %] 50 %  S RR:  [28] 28  S VT:  [520 mL] 520 mL  PEEP/CPAP (cm H2O):  [12 cm H2O] 12 cm H2O  Minute Ventilation (L/min):  [13.1 L/min-14.4 L/min] 13.1 L/min  PIP:  [25 cm H2O-33 cm H2O] 31 cm H2O  MAP (cm H2O):  [17-19] 17    /68   Pulse 85   Temp 98.8  F (37.1  C) (Esophageal)   Resp 14   Ht 6' 2\" (1.88 m)   Wt 208 lb 5.4 oz (94.5 kg)   SpO2 100%   BMI 26.75 kg/m      Intake/Output last 3 shifts:  I/O last 3 completed shifts:  In: 2908.5 [I.V.:1103.5; NG/GT:1655; IV Piggyback:150]  Out: 3910 [Other:1500; Stool:2300; Chest Tube:110]  Intake/Output this shift:  I/O this shift:  In: 345.1 [I.V.:175.1; NG/GT:170]  Out: 260 [Stool:250; Chest Tube:10]    Physical Exam  Neuro: eyes open, moved head with suctioning and oral cares this am. Did not follow commands for me but did for nursing.   HEENT:  PERRLA. Pupils 3mm--blinked to threat during examination. ETT present and secured.lip ulcerations  (from prior proning)   RESP: BBS, lungs coarse,no wheezes,   CV: S1, S2  GI: abdomen soft and compressible. No masses.   : rectal tube with dark brown liquid stool.   MSK: Extremities: calves soft and compressible, pulses 2+.     LAB:  Results from last 7 days   Lab Units 12/30/20  0400   LN-WHITE BLOOD CELL COUNT thou/uL 16.7*   LN-HEMOGLOBIN g/dL 8.2*   LN-HEMATOCRIT % 25.1*   LN-PLATELET COUNT thou/uL 238     Results from last 7 days   Lab Units 12/30/20  0400 12/29/20  0508 12/28/20  0405 12/26/20  0518 12/26/20  0518 12/25/20  0547 12/25/20  0547   LN-SODIUM mmol/L 136 137 134*   < > 135*  " 135*   < > 135*   LN-POTASSIUM mmol/L 3.5 4.4 5.0   < > 4.5  4.5   < > 4.6   LN-CHLORIDE mmol/L 103 107 105   < > 105  105   < > 106   LN-CO2 mmol/L 19* 16* 16*   < > 16*  16*   < > 18*   LN-BLOOD UREA NITROGEN mg/dL 90* 109* 68*   < > 28  --  28   LN-CREATININE mg/dL 3.87* 4.15* 2.74*   < > 0.94  --  1.11   LN-CALCIUM mg/dL 7.6* 8.3* 8.7   < > 8.3*  8.3*   < > 8.5   LN-PROTEIN TOTAL g/dL  --   --   --   --  6.0  --  5.8*   LN-BILIRUBIN TOTAL mg/dL  --   --   --   --  0.8  --  1.0   LN-ALKALINE PHOSPHATASE U/L  --   --   --   --  109  --  103   LN-ALT (SGPT) U/L  --   --   --   --  234*  --  268*   LN-AST (SGOT) U/L  --   --   --   --  123*  --  216*    < > = values in this interval not displayed.

## 2021-06-14 NOTE — PROGRESS NOTES
Renal   Recheck appears stable after discontinue bicarb gtt. K/ bicarb and pH stable.     No changes.  Catrina Ly MD  [facility]  [phone]

## 2021-06-14 NOTE — PROGRESS NOTES
12/31/20 1350   Patient Data   Vt Exp (mL) 537 mL   Minute Ventilation (L/min) 15.4 L/min   Total Resp Rate  33 BPM   PIP Observed (cm H2O) 24 cm H2O   MAP (cm H2O) 13   Auto/Intrinsic PEEP Observed (cm H2O) 2.6 cm H2O   Plateau Pressure (cm H2O) 22 cm H2O   Dynamic Compliance (L/cm H2O) 30.6 L/cm H2O   Airway Resistance 2.8   SpO2 97 %   Heart Rate 92

## 2021-06-14 NOTE — PROGRESS NOTES
12/26/20 1600   Patient Data   Vt Exp (mL) 621 mL   Minute Ventilation (L/min) 12.6 L/min   Total Resp Rate  35 BPM   PIP Observed (cm H2O) 32 cm H2O   MAP (cm H2O) 20   Auto/Intrinsic PEEP Observed (cm H2O) 1 cm H2O   Plateau Pressure (cm H2O) 30 cm H2O   Dynamic Compliance (L/cm H2O) 35 L/cm H2O   Airway Resistance 2.5   SpO2 100 %   Heart Rate (!) 102

## 2021-06-14 NOTE — PROGRESS NOTES
Renal progress note   CC: ARF  Assessment and Plan:  74 y.o. male     1. ARF: pt has normal underlying renal function; now hemodynamic ARF/ATN and has progressive rise in CR; anuria and on CRRT since 12/18.  Severe acidosis improved.Remains anuric. Off CRRT since this am. We will transition to iHD if indicated  tomorrow  2. Hypervolemia-improved, Wt is trending down. Net 5.1 L  3. COVID 19 infection with resp failure; on vent with full support;  Completed 5-day course of remdesivir.  Completed 10-day course of dexamethasone on 12/15.  Course complicated by right-sided pneumothorax  4. Hyponatremia- Serum sodium is stable at 135 this am  5. Acidosis;resp and metabolic- now improved with CRRT    6. Anemia; no active loss; following hgb  7. HoTN; Remains on Levophed drip but weaning   8. Nutrition; on TF; renal formula  9. DVT prophylaxis; on heparin gtt  10. CAD, hyperlipid; stable co-morbitdities  11. DM; on insulin gtt  12. Acute hepatitis.  Elevated transaminases.   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    Discussed with ICU team    We will follow    Gale Sotomayor MD  [facility]  [phone].        Subjective  No acute issues. Patient remains intubated, on vent. Weaning of sedation but mental status is not improving. Remains on Levophed drip .Off CRRT this am. Remains anuric. 20cc on bladder scan today.    Objective    Vital signs in last 24 hours  Heart Rate:  [] 114  Resp:  [11-40] 32  Arterial Line BP: ()/(38-58) 100/44  FiO2 (%):  [45 %] 45 %  Weight:   191 lb 9.3 oz (86.9 kg)    Intake/Output last 3 shifts  I/O last 3 completed shifts:  In: 2426.8 [I.V.:948.8; NG/GT:1323; IV Piggyback:155]  Out: 2518 [Other:2392; Stool:100; Chest Tube:26]  Intake/Output this shift:  I/O this shift:  In: 264 [I.V.:94; NG/GT:170]  Out: 86 [Other:86]    Physical Exam  Seen at bedside.  Intubated and  supine  Lungs decreased BS  Cor Reg on tele  abd soft  Ext warm and mild edema  Skin no rash      Pertinent Labs   Lab Results   Component Value Date    WBC 24.2 (H) 12/27/2020    HGB 9.6 (L) 12/27/2020    HCT 30.3 (L) 12/27/2020    MCV 99 12/27/2020     12/27/2020     Lab Results   Component Value Date    CREATININE 0.93 12/27/2020    BUN 30 (H) 12/27/2020     (L) 12/27/2020     (L) 12/27/2020    K 4.5 12/27/2020    K 4.5 12/27/2020     12/27/2020     12/27/2020    CO2 16 (L) 12/27/2020    CO2 16 (L) 12/27/2020       Lab Results   Component Value Date    ALBUMIN 2.4 (L) 12/26/2020     Lab Results   Component Value Date    CALCIUM 8.1 (L) 12/27/2020    CALCIUM 8.1 (L) 12/27/2020    PHOS 4.8 (H) 12/27/2020     I reviewed all lab results  Gale Sotomayor

## 2021-06-14 NOTE — PROGRESS NOTES
Events of day include: remains intubated and sedated, on CRRT    Tolerated sedation of  Versed and Fentanyl. Neuro exam per flowsheets.     Respiratory status wax and wane with vent support AC 60% FiO2//PEEP 16/ RR 32.     Vital signs tenuous. Drips weaned per specified parameters, see eMar.     Family updated and all questions answered; Wife Yuridia came in person for a visit due to possible imminent death. Seems hopeful for recovery.

## 2021-06-14 NOTE — PLAN OF CARE
Patient's BS have been well controlled today and hasn't needed any sliding scale coverage so far. He was maintaining a BP of 130-145 throughout the shift so his vasopressin was reduced to 1.2 and then stopped after he was given midodrine. HGB has remained stable his last HGB was 7.9. Currently the dialysis nurse is in the room with the patient. He did require a dose of albumin from the dialysis nurse because he wasn't shedding much liquid.

## 2021-06-14 NOTE — PROGRESS NOTES
NEUROLOGY PROGRESS NOTE     ASSESSMENT & PLAN   Hospital Day#: 22    Impression: Right posterior cerebral artery distribution stroke.  Most likely cause of strokes in this distribution and are generally cardioembolic in nature.  No evidence of hemorrhage on CT scan.  EEG had showed slowing of background activity but no epileptiform activity.  Did speak with nursing staff, discussed that typically with right PCA distribution CVA as would expect a left field cut to occur.  Potentially there can be some confusion with occipital lobe ischemia and infrequently there may be contralateral weakness though this is not the norm.  Continue to follow.        Patient Active Problem List    Diagnosis Date Noted     Acute right PCA stroke (H)      Secondary spontaneous pneumothorax      Acute respiratory distress syndrome (ARDS) due to COVID-19 virus (H) 12/10/2020     Acute respiratory failure with hypoxia (H)      Acute kidney injury (H)      Pneumonia due to 2019 novel coronavirus 12/06/2020     Diabetes mellitus (H) 06/24/2019     Hypertension 06/24/2019     Chronic pain disorder 03/27/2019     Primary osteoarthritis of right hip 01/26/2018     Primary osteoarthritis of right knee 01/26/2018     BPH with urinary obstruction 11/15/2017     Past Medical History: Patient  has no past medical history on file.     SUBJECTIVE     Patient seen in follow-up for Dr. Vasquez regarding right posterior cerebral artery distribution stroke.  Patient currently undergoing echocardiogram regarding pericardial effusion.     OBJECTIVE     Vital signs in last 24 hours  Temp:  [98.4  F (36.9  C)-100  F (37.8  C)] 98.8  F (37.1  C)  Heart Rate:  [] 113  Resp:  [24-37] 32  BP: (120-153)/(45-63) 151/60  Arterial Line BP: ()/(36-67) 97/44  FiO2 (%):  [45 %] 45 %    Weight:   201 lb 4.5 oz (91.3 kg)    I/O last 24 hours    Intake/Output Summary (Last 24 hours) at 12/28/2020 1217  Last data filed at 12/28/2020 1200  Gross per 24 hour   Intake  2665.49 ml   Output 280 ml   Net 2385.49 ml       Review of Systems   Unable    General Physical Exam: Patient is not responsive.  Patient intubated.  Vital signs were reviewed and are documented in EMR. Neurological Exam:  No response to verbal command.  Eye opening.  Pupils reactive.  No movement spontaneously or to painful stimulation.  Babinski downgoing bilaterally.     DIAGNOSTIC STUDIES     Pertinent Radiology   Radiology Results: No results found.    Pertinent Labs   Lab Results: Reviewed  Recent Results (from the past 24 hour(s))   Blood Gases, Arterial    Collection Time: 12/27/20  2:05 PM   Result Value Ref Range    pH, Arterial 7.36 (L) 7.37 - 7.44    pCO2, Arterial 33 (L) 35 - 45 mm Hg    pO2, Arterial 84 75 - 85 mm Hg    Bicarbonate, Arterial Calc 19.1 (L) 23.0 - 29.0 mmol/L    O2 Sat, Arterial 96.8 (H) 95.0 - 96.0 %    Oxyhemoglobin 94.8 (L) 95.0 - 96.0 %    Base Excess, Arterial Calc -7.2 mmol/L    Ventilation Mode AC     Rate 28 rr/min    FIO2 45.00     Peep 14 cm H2O    Sample Stabilized Temperature 37.0 degrees C    Ventilator Tidal Volume 520 mL   Calcium, Ionized, Measured    Collection Time: 12/27/20  2:06 PM   Result Value Ref Range    Calcium, Ionized Measured 1.12 1.11 - 1.30 mmol/L    Calcium, Ionized pH 7.4 1.08 (L) 1.11 - 1.30 mmol/L    pH 7.33 (L) 7.35 - 7.45   Calcium    Collection Time: 12/27/20  2:06 PM   Result Value Ref Range    Calcium 8.2 (L) 8.5 - 10.5 mg/dL   Electrolyte Panel    Collection Time: 12/27/20  2:06 PM   Result Value Ref Range    Sodium 134 (L) 136 - 145 mmol/L    Potassium 4.6 3.5 - 5.0 mmol/L    CO2 16 (L) 22 - 31 mmol/L    Chloride 103 98 - 107 mmol/L    Anion Gap, Calculation 15 5 - 18 mmol/L   Hemoglobin    Collection Time: 12/27/20  2:06 PM   Result Value Ref Range    Hemoglobin 9.8 (L) 14.0 - 18.0 g/dL   Magnesium    Collection Time: 12/27/20  2:06 PM   Result Value Ref Range    Magnesium 2.5 1.8 - 2.6 mg/dL   Phosphorus    Collection Time: 12/27/20  2:06 PM    Result Value Ref Range    Phosphorus 5.4 (H) 2.5 - 4.5 mg/dL   Platelet Count    Collection Time: 12/27/20  2:06 PM   Result Value Ref Range    Platelets 233 140 - 440 thou/uL   ECG 12 lead MUSE    Collection Time: 12/27/20  4:07 PM   Result Value Ref Range    SYSTOLIC BLOOD PRESSURE      DIASTOLIC BLOOD PRESSURE      VENTRICULAR RATE 125 BPM    ATRIAL RATE 96 BPM    P-R INTERVAL      QRS DURATION 114 ms    Q-T INTERVAL 366 ms    QTC CALCULATION (BEZET) 528 ms    P Axis      R AXIS 254 degrees    T AXIS 85 degrees    MUSE DIAGNOSIS       ** Suspect arm lead reversal, interpretation assumes no reversal  Atrial fibrillation with rapid ventricular response  Right superior axis deviation  Inferior infarct , age undetermined  Anteroseptal infarct (cited on or before 06-DEC-2020)  Abnormal ECG  When compared with ECG of 23-DEC-2020 11:02,  Inferior infarct is now Present  Serial changes of evolving Anteroseptal infarct Present     Lactic Acid    Collection Time: 12/27/20  4:37 PM   Result Value Ref Range    Lactic Acid 1.1 0.7 - 2.0 mmol/L   Troponin I    Collection Time: 12/27/20  4:37 PM   Result Value Ref Range    Troponin I 0.32 (HH) 0.00 - 0.29 ng/mL   Potassium    Collection Time: 12/27/20  4:37 PM   Result Value Ref Range    Potassium 4.8 3.5 - 5.0 mmol/L   Basic Metabolic Panel    Collection Time: 12/27/20  4:37 PM   Result Value Ref Range    Sodium 134 (L) 136 - 145 mmol/L    Potassium 4.8 3.5 - 5.0 mmol/L    Chloride 104 98 - 107 mmol/L    CO2 16 (L) 22 - 31 mmol/L    Anion Gap, Calculation 14 5 - 18 mmol/L    Glucose 291 (H) 70 - 125 mg/dL    Calcium 8.4 (L) 8.5 - 10.5 mg/dL    BUN 51 (H) 8 - 28 mg/dL    Creatinine 1.76 (H) 0.70 - 1.30 mg/dL    GFR MDRD Af Amer 46 (L) >60 mL/min/1.73m2    GFR MDRD Non Af Amer 38 (L) >60 mL/min/1.73m2   POCT Glucose    Collection Time: 12/27/20  4:38 PM    Specimen: Blood   Result Value Ref Range    Glucose 284 (H) 70 - 139 mg/dL   POCT Glucose    Collection Time: 12/27/20   7:42 PM    Specimen: Blood   Result Value Ref Range    Glucose 234 (H) 70 - 139 mg/dL   Blood Gases, Arterial    Collection Time: 12/27/20  7:43 PM   Result Value Ref Range    pH, Arterial 7.38 7.37 - 7.44    pCO2, Arterial 33 (L) 35 - 45 mm Hg    pO2, Arterial 86 (H) 75 - 85 mm Hg    Bicarbonate, Arterial Calc 20.1 (L) 23.0 - 29.0 mmol/L    O2 Sat, Arterial 97.4 (H) 95.0 - 96.0 %    Oxyhemoglobin 95.5 95.0 - 96.0 %    Base Excess, Arterial Calc -5.9 mmol/L    Ventilation Mode AC     Rate 28 rr/min    FIO2 0.45     Peep 12 cm H2O    Sample Stabilized Temperature 37.0 degrees C    Ventilator Tidal Volume 520 mL   Troponin I    Collection Time: 12/27/20  9:52 PM   Result Value Ref Range    Troponin I 0.34 (HH) 0.00 - 0.29 ng/mL   POCT Glucose    Collection Time: 12/27/20 11:05 PM    Specimen: Blood   Result Value Ref Range    Glucose 291 (H) 70 - 139 mg/dL   Troponin I    Collection Time: 12/28/20  4:05 AM   Result Value Ref Range    Troponin I 0.37 (HH) 0.00 - 0.29 ng/mL   Basic Metabolic Panel    Collection Time: 12/28/20  4:05 AM   Result Value Ref Range    Sodium 134 (L) 136 - 145 mmol/L    Potassium 5.0 3.5 - 5.0 mmol/L    Chloride 105 98 - 107 mmol/L    CO2 16 (L) 22 - 31 mmol/L    Anion Gap, Calculation 13 5 - 18 mmol/L    Glucose 387 (H) 70 - 125 mg/dL    Calcium 8.7 8.5 - 10.5 mg/dL    BUN 68 (H) 8 - 28 mg/dL    Creatinine 2.74 (H) 0.70 - 1.30 mg/dL    GFR MDRD Af Amer 27 (L) >60 mL/min/1.73m2    GFR MDRD Non Af Amer 23 (L) >60 mL/min/1.73m2   HM2(CBC W/O DIFF)    Collection Time: 12/28/20  4:05 AM   Result Value Ref Range    WBC 20.0 (H) 4.0 - 11.0 thou/uL    RBC 2.87 (L) 4.40 - 6.20 mill/uL    Hemoglobin 9.0 (L) 14.0 - 18.0 g/dL    Hematocrit 27.9 (L) 40.0 - 54.0 %    MCV 97 80 - 100 fL    MCH 31.4 27.0 - 34.0 pg    MCHC 32.3 32.0 - 36.0 g/dL    RDW 17.6 (H) 11.0 - 14.5 %    Platelets 208 140 - 440 thou/uL    MPV 12.3 8.5 - 12.5 fL   Anti-Xa Heparin Level    Collection Time: 12/28/20  4:05 AM   Result Value  Ref Range    Anti-Xa Heparin Assay 0.78 See comments IU/mL   Magnesium    Collection Time: 12/28/20  4:05 AM   Result Value Ref Range    Magnesium 2.6 1.8 - 2.6 mg/dL   Phosphorus    Collection Time: 12/28/20  4:05 AM   Result Value Ref Range    Phosphorus 5.5 (H) 2.5 - 4.5 mg/dL   POCT Glucose    Collection Time: 12/28/20  4:08 AM    Specimen: Blood   Result Value Ref Range    Glucose 288 (H) 70 - 139 mg/dL   Blood Gases, Arterial    Collection Time: 12/28/20  7:53 AM   Result Value Ref Range    pH, Arterial 7.37 7.37 - 7.44    pCO2, Arterial 33 (L) 35 - 45 mm Hg    pO2, Arterial 74 (L) 75 - 85 mm Hg    Bicarbonate, Arterial Calc 19.6 (L) 23.0 - 29.0 mmol/L    O2 Sat, Arterial 95.6 95.0 - 96.0 %    Oxyhemoglobin 93.6 (L) 95.0 - 96.0 %    Base Excess, Arterial Calc -6.5 mmol/L    Ventilation Mode AC     Rate 28 rr/min    FIO2 45.00     Peep 12 cm H2O    Sample Stabilized Temperature 37.0 degrees C    Ventilator Tidal Volume 520 mL   POCT Glucose    Collection Time: 12/28/20  7:54 AM    Specimen: Blood   Result Value Ref Range    Glucose 286 (H) 70 - 139 mg/dL   POCT Glucose    Collection Time: 12/28/20 11:05 AM    Specimen: Blood   Result Value Ref Range    Glucose 274 (H) 70 - 139 mg/dL         HOSPITAL MEDICATIONS       [START ON 12/30/2020] amiodarone  200 mg Oral DAILY     amiodarone  400 mg Oral BID     ascorbic acid (vitamin C)  1,000 mg Enteral Tube DAILY     aspirin  81 mg Enteral Tube DAILY     chlorhexidine  15 mL Topical Q12H     [Held by provider] docusate sodium (COLACE) 50 mg/5 mL oral liquid  100 mg Enteral Tube BID     hydrocortisone sod succ  50 mg Intravenous Q6H FIXED TIMES     meropenem  1 g Intravenous Q8H     omeprazole  20 mg Oral QAM AC    Or     omeprazole  20 mg Enteral Tube QAM AC    Or     pantoprazole  40 mg Intravenous QAM AC     [Held by provider] senna (SENOKOT) syrup  8.8 mg Enteral Tube BID     sodium chloride  10-30 mL Intravenous Q8H FIXED TIMES     vit B cmplx 3-FA-vit C-biotin   1 tablet Enteral Tube DAILY     white petrolatum-mineral oiL   Both Eyes Q8H        Total time spent for face to face visit, reviewing labs/imaging studies, counseling and coordination of care was: 25 minutes More than 50% of this time was spent on counseling and coordination of care.    Gumaro Ramos MD  [facility]  Direct Secure Messaging: [email or web address]  Tel: [phone]

## 2021-06-14 NOTE — PLAN OF CARE
Spoke with wife - Yuridia. Explained a-fib, current medications, current neuro status, and different types of strokes. She asked if his daughters would be allowed to call as well as her. Explained that we need to have just one family spokesperson.

## 2021-06-14 NOTE — PROGRESS NOTES
01/01/21 1600   Patient Data   Total Resp Rate  30 BPM   PIP Observed (cm H2O) 26 cm H2O   MAP (cm H2O) 18   Auto/Intrinsic PEEP Observed (cm H2O) 1 cm H2O   Plateau Pressure (cm H2O) 24 cm H2O   Dynamic Compliance (L/cm H2O) 46 L/cm H2O   Airway Resistance 10   SpO2 99 %   Heart Rate 93

## 2021-06-14 NOTE — PLAN OF CARE
Problem: Pain  Goal: Patient's pain/discomfort is manageable  Outcome: Progressing     Problem: Safety  Goal: Patient will be injury free during hospitalization  Outcome: Progressing     Problem: Daily Care  Goal: Daily care needs are met  Outcome: Progressing     Problem: Psychosocial Needs  Goal: Collaborate with patient/family/caregiver to identify patient specific goals for this hospitalization  Outcome: Progressing     Problem: Glucose Imbalance  Goal: Achieve optimal glucose control  Outcome: Not Progressing     Problem: Knowledge Deficit  Goal: Patient/family/caregiver demonstrates understanding of disease process, treatment plan, medications, and discharge instructions  Outcome: Progressing     Problem: Potential for Compromised Skin Integrity  Goal: Skin integrity is maintained or improved  Outcome: Progressing  Goal: Nutritional status is improving  Outcome: Progressing     Problem: Inadequate Gas Exchange  Goal: Patient will achieve/maintain normal respiratory rate/effort  Outcome: Progressing     Problem: Ineffective Airway Clearance  Goal: Maintain airway patency  Outcome: Progressing     Problem: Impaired Gas Exchange  Goal: Demonstrate improved ventilation and adequate oxygenation of tissues as evidenced by absence of respiratory distress  Outcome: Progressing     Problem: Breathing  Goal: Patient will maintain patent airway  Outcome: Progressing  Goal: Patient will utilize incentive spirometer  Outcome: Not Applicable this Shift     Problem: Risk for Infection  Goal: Identify and demonstrate techniques, lifestyle changes to prevent/reduce risk of infection and promote safe environment  Outcome: Progressing     Problem: Mechanical Ventilation  Goal: Patient will maintain patent airway  Outcome: Progressing  Goal: Oral health is maintained or improved  Outcome: Progressing  Goal: Respiratory status - ventilation  Description: Movement of air in and out of the lungs.    Liberate from  ventilator  Outcome: Progressing  Goal: ET tube will be managed safely  Outcome: Progressing  Goal: Ability to express needs and understand communication  Outcome: Not Applicable this Shift  Goal: Mobility/activity is maintained at optimum level for patient  Outcome: Not Progressing     Problem: Potential for transmission of organism by Contact, Enteric, Droplet and/or Airborne routes  Goal: Prevent transmission of organisms  Outcome: Progressing

## 2021-06-14 NOTE — PROGRESS NOTES
Infectious Diseases Progress Note  [facility]    Date of visit: 12/30/2020      ASSESSMENT   74-year-old man with a history of diabetes, hypertension, BPH who is admitted with respiratory distress from COVID-19.    1. COVID-19.  Diagnosed on 11/30.  Developed rapid worsening of respiratory status, intubated on 12/10.  Completed 5-day course of remdesivir.  Completed 10-day course of dexamethasone on 12/15.  Course complicated by right-sided pneumothorax.  Now with acute hepatitis, new stroke, and renal failure.  2. Leukocytosis.  Mildly elevated white cells during admission, possibly from steroids.  Started to rise in earnest on 12/17, now markedly elevated with a left shift.  Blood and urine cultures negative.  Sputum growing Enterobacter cloacae.  C. difficile test negative.  Recently started on stress dose steroids, which may be contributing to rise.  May also be from general inflammatory process from severe Covid illness. Wbc coming down slowly  3. Renal failure.  Nephrology consulted on 12/17, now on dialysis.  4. Acute hepatitis.  Elevated transaminases.  ALT starting to rise around 12/16.  No signs of biliary obstruction on CT scan today.  Consider possible medications, recently started on amiodarone.  Shock liver? Improving   5. Right PCA stroke. Followed by neurology. Critical illness myopathy.  6. Hypothermia. Infectious work-up as above. CRRT can be contributing. Autonomic dysregulation following stroke? Erratic temperature curve, now more stable    Principal Problem:    Pneumonia due to 2019 novel coronavirus  Active Problems:    Acute respiratory failure with hypoxia (H)    Acute kidney injury (H)    Acute respiratory distress syndrome (ARDS) due to COVID-19 virus (H)    Secondary spontaneous pneumothorax    Acute right PCA stroke (H)       PLAN   -Continue meropenem for Enterobacter in sputum. Plan for 14-day course, end date already in  -Not much more to add from ID perspective for now      I will sign-off at this time. Please do not hesitate to call if there are any further questions or if there is a change in the patient's clinical status.         Angel Leon MD  [facility]  Direct messaging: [employer] P[employer]  On-Call ID provider: [phone], option: 9      ===========================================      SUBJECTIVE / INTERVAL HISTORY:     No acute events. On intermittent HD. temps stable. Following some commands this morning.    Review of Systems     Intubated and sedated.         Antibiotics   Meropenem 12/19-      Previous:  Ceftriaxone 12/17-12/18  Fluconazole 12/19-12/22  Zosyn 12/15-12/16  Remdesivir x5 days  Micafungin 12/23-  Vancomycin 12/16, 12/19-12/22      Physical Exam     Temp:  [98.4  F (36.9  C)-98.9  F (37.2  C)] 98.8  F (37.1  C)  Heart Rate:  [] 85  Resp:  [9-45] 14  BP: (107-151)/(45-71) 142/68  Arterial Line BP: ()/(37-80) 121/51  FiO2 (%):  [50 %-60 %] 50 %    Vitals:    12/30/20 1100   BP:    Pulse: 85   Resp: 14   Temp:    SpO2: 100%       Limited exam to conserve PPE. Patient seen from window. See intensivist note for full exam.  Gen: lying in bed, supine, chest tube in place on right. Intubated.    Cultures   12/12 sputum culture: Klebsiella  12/16 blood cultures x2: No growth to date  12/16 urine culture: Negative  12/16 sputum culture: Normal ernst and yeast  12/19 fungal blood culture: Pending  12/19 blood cultures x2: No growth to date  12/19 sputum culture: Enterobacter cloacae  12/22 sputum: GPB, culture: yeast   12/22 blood cultures x2: No growth to date  12/26 blood culture x 2: no growth to date  12/26 fungal blood culture: no growth to date       Pertinent Labs:     Results from last 7 days   Lab Units 12/30/20  0400 12/29/20  0508 12/28/20  1558 12/28/20  0405 12/24/20  0507 12/24/20  0507   LN-WHITE BLOOD CELL COUNT thou/uL 16.7* 19.3*  --  20.0*   < > 40.9*   LN-HEMOGLOBIN g/dL 8.2* 8.8* 8.8* 9.0*   < > 10.3*    LN-PLATELET COUNT thou/uL 238 215 223 208   < > 279   LN-MONOCYTES - REL (DIFF) %  --   --   --   --   --  7    < > = values in this interval not displayed.       Results from last 7 days   Lab Units 12/30/20  0400 12/29/20  0508 12/28/20  0405 12/26/20  0518 12/26/20  0518 12/25/20  0547 12/25/20  0547   LN-SODIUM mmol/L 136 137 134*   < > 135*  135*   < > 135*   LN-POTASSIUM mmol/L 3.5 4.4 5.0   < > 4.5  4.5   < > 4.6   LN-CHLORIDE mmol/L 103 107 105   < > 105  105   < > 106   LN-CO2 mmol/L 19* 16* 16*   < > 16*  16*   < > 18*   LN-BLOOD UREA NITROGEN mg/dL 90* 109* 68*   < > 28  --  28   LN-CREATININE mg/dL 3.87* 4.15* 2.74*   < > 0.94  --  1.11   LN-CALCIUM mg/dL 7.6* 8.3* 8.7   < > 8.3*  8.3*   < > 8.5   LN-ALBUMIN g/dL  --   --   --   --  2.4*  --  2.4*   LN-PROTEIN TOTAL g/dL  --   --   --   --  6.0  --  5.8*   LN-BILIRUBIN TOTAL mg/dL  --   --   --   --  0.8  --  1.0   LN-ALKALINE PHOSPHATASE U/L  --   --   --   --  109  --  103   LN-ALT (SGPT) U/L  --   --   --   --  234*  --  268*   LN-AST (SGOT) U/L  --   --   --   --  123*  --  216*    < > = values in this interval not displayed.                 Imaging:     No results found.      Attestation:  I have reviewed today's Medications, Vital Signs, and Labs.

## 2021-06-14 NOTE — PROGRESS NOTES
12/30/20 0138   Patient Data   Vt Exp (mL) 514 mL   Minute Ventilation (L/min) 13.7 L/min   Total Resp Rate  29 BPM   PIP Observed (cm H2O) 25 cm H2O   MAP (cm H2O) 17   Plateau Pressure (cm H2O) 26 cm H2O   Dynamic Compliance (L/cm H2O) 31 L/cm H2O   Airway Resistance 7

## 2021-06-14 NOTE — PLAN OF CARE
VSS this shift. Levophed titrated per MAR for MAP goal >65. Continues to follow commands. Small, thick tan secretions from ETT. TF continue at goal rate. Heparin gtt re-started per orders. Small bloody, jelly-like stool. Provider notified, no new orders received. IPad on in room throughout the day for patient's wife.     Problem: Pain  Goal: Patient's pain/discomfort is manageable  Outcome: Progressing     Problem: Safety  Goal: Patient will be injury free during hospitalization  Outcome: Progressing     Problem: Daily Care  Goal: Daily care needs are met  Outcome: Progressing     Problem: Glucose Imbalance  Goal: Achieve optimal glucose control  Outcome: Progressing     Problem: Potential for Compromised Skin Integrity  Goal: Skin integrity is maintained or improved  Outcome: Progressing     Problem: Ineffective Airway Clearance  Goal: Maintain airway patency  Outcome: Progressing     Problem: Mechanical Ventilation  Goal: Respiratory status - ventilation  Description: Movement of air in and out of the lungs.    Liberate from ventilator  Outcome: Progressing  Goal: ET tube will be managed safely  Outcome: Progressing

## 2021-06-14 NOTE — PROGRESS NOTES
Updates:    Pt became hypotensive, MAP in low 50s, tachycardic 118 with active Afib titrated Levo gtt. MD notified, ordered Vasopressin gtt. After Labs were completed, pt had alerts on Hgb and creatnine levels. MD notified, and ordered 1 unit PRBC, stopped Heparin gtt, and dose of Protonix. Pt also desated into 80s, RT came to bedside and increased O2 to 45%. Will continue to monitor.

## 2021-06-14 NOTE — PROGRESS NOTES
1500: MD team at bedside for trach placement.     Meds given:  2mg Versed  10mg Vecuronium  100mg Prop  1mg Dilaudid    STAT CXR ordered per MD team request

## 2021-06-14 NOTE — PROGRESS NOTES
[facility] COVID RT PROGRESS NOTE    DATA:    VENT DAY#  16    CURRENT SETTINGS:  VENT SETTINGS   AC 28 / 520 / +12 / 45%        FIO2:   45%    PATIENT PARAMETERS:    PIP 29    Pplat:  26  Pmean:  19  SBT: N  SECRETIONS:  Small; tan/pink tinged; thick  02 SATS:  98%    ETT SIZE 8.5  Secured at  26 cm at teeth    Respiratory Medications: N     ABG: Results for GISELLE VILLANUEVA (MRN [id number]) as of 12/27/2020 16:55   Ref. Range 12/27/2020 14:05   pH, Arterial Latest Ref Range: 7.37 - 7.44  7.36 (L)   pCO2, Arterial Latest Ref Range: 35 - 45 mm Hg 33 (L)   pO2, Arterial Latest Ref Range: 75 - 85 mm Hg 84   Bicarbonate, Arterial Calc Latest Ref Range: 23.0 - 29.0 mmol/L 19.1 (L)   O2 Sat, Arterial Latest Ref Range: 95.0 - 96.0 % 96.8 (H)       NOTE / PLAN:   Patient remains on full vent support. He was taken to CT scan this morning without issue. Set rate was reduced from 32 breaths per minute this morning and PEEP was decreased from 14 cmh2o this afternoon. An EKG was done late in the shift. Breath sounds are coarse and diminished. Secretions are relatively small, but thick.

## 2021-06-14 NOTE — PROCEDURES
Felipe Cruz 75 y.o. male Dialysis treatment started at 0914 and ended at 1215; ran for 3hours on a K bath of 3 per lab protocol. Initial UF goal started at 3500ml, later adjusted per Crit-line reading. 2000 ml, net Total removed 1.5kg.    Norepi tritiated up to support UF goal, see MAR.      Access Right femoral catheter, lines reversed to get 400cc/BFR.   No Heparin given through HD machine. Post dwell Heparin.   Tolerated well, no complaints.  Complications none after UF goal decreased.   Pt seen by Dr. Hutchison during treatment.      Post report given to GABE Wu.   Hepatitis B status. Surface antigen negative Date 12/29/2020.  Patient Consent Verified YES.

## 2021-06-14 NOTE — PROGRESS NOTES
[facility]    ICU PROGRESS NOTE:  DOS:  12/26/2020    Patient Summary:   Felipe Cruz is a 74 year old male with a PMH of CAD, HTN, HLD, DM2, and BPH who was admitted to [facility] on 12/6/2020.  He was admitted to the general care floor. He had increased oxygenation needs, started on HFNC, and transferred to the ICU on 12/8. He was intubated for worsening hypoxia on 12/10. He self extubated on 12/10, required reintubation, and again self-extubated on 12/14. He was reintubated 12/16 for worsening hypoxia. Course complicated by anuric NICKY requiring CRRT and now profound shock requiring two to three vasopressors.    Major Changes for Today    Repeat blood cultures  PEEP to 14  CXR  Add hepatic profile  Continue CRRT  Stop Vasopressin    Assessment/Plan:    NEUROLOGIC:  # acute pain  # agitation/anxiety  # delirium  # encephalopathy   Did not tolerate Precedex and Propofol previously   - Fentanyl and versed drips with PRN boluses available  - Cisatracurium discontinued 12/22  - Stop Seroquel    -lighten RASS goal to -3.   - Discontinue statin given transaminitis.      # Right PCA infarct  Demonstrated on CTH 12/23 with concern for possible right thalamus hypodensity, potential 3 mm hemorrhagic transformation within the right PCA distribution  - Neurology consulted, appreciate recommendations  - Ceribell with non specific slowing with superimposed beta activity. No sharp or rhythmic discharges.   - Carotid US with 50-50% bilateral ICA stenosis  - Plan to repeat CT in am.       HEMODYNAMICS/CV:  #Shock, septic  #Tachycrdia, resolved   12/16: Echo: EF > 63%, Normal RV And LV fn, normal valves. No Tamponade   - Continue norepinephrine, vasopressin for MAP > 65. Stop Vasopressin today.   - Stress dose steroids restarted 12/20  - ID as below     RESPIRATORY:  # Acute hypoxic respiratory failure and ARDS 2/2 COVID19  # Klebsiella PNA   # Right apical pneumothorax s/p pigtail insertion  12/20  # Right tension pneumothorax on 12/23 with CT clamped for procedure. Resolved when placed back to suction.   - intubated 12/10, self-extubated 12/14, reintubated 12/16  - Continue lung protective ventilation  - Wean PEEP to 14  - Vent Mode: AC  FiO2 (%):  [45 %-50 %] 45 %  S RR:  [32] 32  S VT:  [520 mL] 520 mL  PEEP/CPAP (cm H2O):  [14 cm H2O-16 cm H2O] 14 cm H2O  Minute Ventilation (L/min):  [14.5 L/min-15.9 L/min] 15 L/min  PIP:  [30 cm H2O-33 cm H2O] 33 cm H2O  MAP (cm H2O):  [22-24] 22  - Ventilator bundle  - Leave supinated- P/F <150. But given protracted course proning no longer likely beneficially and will not improve his outcome    - Leave CT to suction, no air leak noted.   - CXR stable   - ABG q6h  - Off Veletri    GI:  # Severe protein calorie malnutrition  # Constipation, now resolved   # Diarrhea  # Concern for ileus- non obstructive bowel gas pattern on 12/22 KUB and on CT, now stooling.   # Cholelithiasis discovered on CT 12/23, non obstructive  # Elevated LFTs, likely r/t shock possibly a component of Amiodarone. Alk phos and bili with normal parameters. CT abdomen completed 12/23 with no e/o obstruction  - RD following.   - protein BID  - bowel regimen: hold given diarrhea.   - C diff negative 12/22     RENAL:  # Anuric NICKY   # Mixed metabolic and respiratory acidosis  Started on CRRT 12/18  - Electrolytes balanced  - Persistently acidemic, continue enteral replacement. PH stable 7.3  - On enteral sodium bicarb  - Bladder scan PRN and straight cath if PVR > 250.     # Bilateral 2 cm renal masses incidentally found on CT, recommend follow up at later time  # Bladder wall ? Stone 3mm in size incidentally found on CT. Recommended follow up     ID:  # COVID 19   # Klebsiella VAP- treated  #Enterobacter cloacae VAP- active.   # Septic shock  # Severe Hypothermia  -WBC:  40-->35.1-->32  - Profoundly hypothermic, 92 degrees. Discussed with ID. Will obtain blood cultures and continue Meropenem.   -  "Consult ID , appreciate recs    MICRO:  12/23 Fungitell negative.   12/22 c.diff negative  12/22 sputum gram stain with prelim 1+ GPB  12/22 BC with NGTD  12/19 fungitell (pending)  galactomannan antigen (negative)  12/19 BC x2 with NGTD  12/19 Sputum with Enterobacter cloacae (sensitive to Meropenem)  12/19 A BAL with GS, AFB, KOH, fungal culture were ordered; however, the microbiology lab does not currently have the sample. Very minimal secretions on bronch 12/19 (see my note), will not repeat at this time.   12/12 sputum: Klebsiella pneumoniae  12/12  Blood cultures- NGTD  12/11 urine NGTD  12/11 BC with NGTD  12/6 blood staph epi, contaminant    Antimicrobials:  12/16 zosyn 12/16-12/17  Ceftriaxone 12/17-12/19 12/19--> Meropenem   12/19- 12/23 Fluconazole  12/23 Fluconazole broadened to Micafungin    Other Workup:  12/23: CT C/A/P  12/23 lipase normal  12/23 No e/o ischemic limbs    COVID:  Completed remdesivir course   Decadron 6 mg IV X 10 days      HEME:  # anemia of critical illness  # coagulopathy due to COVID -19  - Hemoglobin stable   - Continue with Heparin gtt, increased to high intensity 12/22  - ID management as above     ENDOCRINE:  # DM II  # Stress and steroid induced hyperglycemia  HgbA1C on admit 8.2  - Hold PTA Lantus and oral agents  - Hold NPH to 55 units two times a day  - High resistance sliding scale insulin   - goal glucose < 180 for optimal healing    MSK:  # acute critical care weakness/deconditioning   - hold OOB activity while heavily sedated/ tenuous; ROM     SOCIAL:  \" Discussed patient's condition and grave prognosis with wife over phone 12/24. Again reiterated that prognosis is poor and he will not likely survive this hospital stay. It was expressed to her that we are offering everything we can for him and it will be up to him and his body to respond.  She still would like to proceed with aggressive cares including full code at this time at the direction of their previous " "discussions prior to hospitalization. Palliative care offered previously and was declined. Will continue to have ongoing discussions\"----> per previous note.     PROPHYLAXIS:   DVT proph:Yes. Heparin gtt  GI proph: Yes.  Requires solorzano for strict I&O: Yes  Restraints required for safety: Yes     DISPOSITION:  - ICU    LINES/TUBES/DRAINS:  - Right internal jugular TLC 12/22  - L art line, placed 12/10  - R fem dialysis line 12/19  - Right pigtail chest tube     Total Critical Care Time : 40 Minutes      [facility]       Overnight events: Course reviewed. No acute events. SHAD ROS     Physical Exam:  Vent settings for last 24 hours:  Vent Mode: AC  FiO2 (%):  [45 %-50 %] 45 %  S RR:  [32] 32  S VT:  [520 mL] 520 mL  PEEP/CPAP (cm H2O):  [14 cm H2O-16 cm H2O] 14 cm H2O  Minute Ventilation (L/min):  [14.5 L/min-15.9 L/min] 15 L/min  PIP:  [30 cm H2O-33 cm H2O] 33 cm H2O  MAP (cm H2O):  [22-24] 22    /52 (Patient Position: Semi-mueller)   Pulse 87   Temp 98.6  F (37  C) (Esophageal)   Resp (!) 30   Ht 6' 2\" (1.88 m)   Wt 198 lb 3.1 oz (89.9 kg)   SpO2 100%   BMI 25.45 kg/m      Intake/Output last 3 shifts:  I/O last 3 completed shifts:  In: 2637.3 [I.V.:1332.3; NG/GT:1155; IV Piggyback:150]  Out: 3004 [Other:2134; Stool:800; Chest Tube:70]  Intake/Output this shift:  I/O this shift:  In: 611.6 [I.V.:263.6; NG/GT:293; IV Piggyback:55]  Out: 659 [Other:578; Stool:75; Chest Tube:6]    Physical Exam  Neuro: unresponsive, pupils equal and reactive, no movement of extremities.   HEENT:  PERRLA. Pupils 2. ETT present and secured. NJ and OG present  RESP: BBS, lungs diminished throughout, no wheezes. Synchronous with ventilator  CV: extremities cool, pulses palpable.   GI: S/NT/ND; + BS; rectal tube in place  : anuric   Extremities: +3 edema, pulses 2+     LAB:  Results from last 7 days   Lab Units 12/26/20  0515   LN-WHITE BLOOD CELL COUNT thou/uL 32.2*   LN-HEMOGLOBIN g/dL 9.7*   LN-HEMATOCRIT % 30.8* "   LN-PLATELET COUNT thou/uL 226     Results from last 7 days   Lab Units 12/26/20  0518 12/25/20  2223 12/25/20  2222 12/25/20  1617 12/25/20  0547 12/24/20  0507 12/24/20  0507 12/23/20  0405 12/23/20  0405   LN-SODIUM mmol/L 135*  135* 136  --  136 135*   < > 135*   < > 135*   LN-POTASSIUM mmol/L 4.5  4.5 4.6  --  4.6 4.6   < > 4.8   < > 4.7   LN-CHLORIDE mmol/L 105  105 106  --  106 106   < > 105   < > 100   LN-CO2 mmol/L 16*  16* 17*  --  18* 18*   < > 18*   < > 22   LN-BLOOD UREA NITROGEN mg/dL 28  --   --   --  28  --  30*  --  24   LN-CREATININE mg/dL 0.94  --   --   --  1.11  --  1.18  --  1.22   LN-CALCIUM mg/dL 8.3*  8.3*  --  8.3* 8.4* 8.5   < > 8.8  8.8   < > 8.7   LN-PROTEIN TOTAL g/dL 6.0  --   --   --  5.8*  --   --   --  6.0   LN-BILIRUBIN TOTAL mg/dL 0.8  --   --   --  1.0  --   --   --  0.7   LN-ALKALINE PHOSPHATASE U/L 109  --   --   --  103  --   --   --  99   LN-ALT (SGPT) U/L 234*  --   --   --  268*  --   --   --  182*   LN-AST (SGOT) U/L 123*  --   --   --  216*  --   --   --  213*    < > = values in this interval not displayed.

## 2021-06-14 NOTE — PROGRESS NOTES
[facility] COVID RT PROGRESS NOTE     DATA:     VENT DAY# 18     CURRENT SETTINGS:  VENT SETTINGS   AC 28/520/+12              FIO2:  60%    PATIENT PARAMETERS:     PIP 33  Pplat:  27  Pmean: 19   SBT: NO  SECRETIONS: Large bloody  02 SATS:  99%     ETT SIZE  8.5@26     Securing device changed / tube re-taped date:2020     Respiratory Medications: NO MEDS     AB.29/37/74/17.9/95%     NOTE / PLAN:   Pt continues to require full ventilatory support. Suctioned for large amount of  bloody secretions. Bloody secretions slowed t/o shift. [place] changed and placed to best accommodate skin abrasion/tear on left check.

## 2021-06-14 NOTE — PLAN OF CARE
Problem: Occupational Therapy  Goal: OT Goals  Note: Pt to meet goals by: 12/30/20 to maximize IND for safe d/c to next level of care     -Pt to demo UB dress with min A  -Pt to demo basic trf skills with min A, VSS  -Pt to demo bed mobility, from flat bed, with CGA w/demo PLB  -Pt to demo grooming task from sitting EOB for 5 minutes with CGA  -Pt to participate in 8 minutes of intermittent exercise/activity with VSS  -Pt to participate in cognitive assessments to assist with treatment and safe d/c planning.      VENITA Sánchez/SUSAN    Goals remain approp. Goal date adjusted d/t slower progress than expected. New goals date 1/7/21    VENITA Sánchez/SUSAN

## 2021-06-14 NOTE — PROGRESS NOTES
[facility] COVID RT PROGRESS NOTE    DATA:    VENT DAY#  13    CURRENT SETTINGS:  VENT SETTINGS   AC 32/520+16        FIO2:   60%    PATIENT PARAMETERS:    PIP 32  Pplat:  28  Pmean:  20  SBT: n/a  SECRETIONS:  Scant thick tan   02 SATS:  95    ETT SIZE 8.5  Secured at  26 cm at teeth     Respiratory Medications: none     ABG:Results for GISELLE VILLANUEVA (MRN [id number]) as of 12/24/2020 17:56   Ref. Range 12/24/2020 17:47   pH, Arterial Latest Ref Range: 7.37 - 7.44  7.37   pCO2, Arterial Latest Ref Range: 35 - 45 mm Hg 35   pO2, Arterial Latest Ref Range: 75 - 85 mm Hg 58 (L)   Bicarbonate, Arterial Calc Latest Ref Range: 23.0 - 29.0 mmol/L 20.8 (L)   O2 Sat, Arterial Latest Ref Range: 95.0 - 96.0 % 89.3 (L)   Oxyhemoglobin Latest Ref Range: 95.0 - 96.0 % 87.6 (L)   POC Base Excess Calc Latest Units: mmol/L -4.7   Sample Stabilized Temperature Latest Units: degrees C 37.0        NOTE / PLAN:   Pt received on current settings. Pt is stable no changes. Continue to monitor patient

## 2021-06-14 NOTE — PROGRESS NOTES
RENAL PROGRESS NOTE      CHIEF COMPLAINT: NICKY, COVID-19 infection        ASSESSMENT/PLAN:  Acute kidney injury - With normal underlying renal function and now with hemodynamic NICKY/ATN. Started dialysis 12/18. S/p CRRT and transitioned to intermittent hemodialysis this week.  We've had quite a bit of difficult with dialysis on Thursday and today.  Drops pressure markedly and only able to get minimal UF.  Essentially anuric. Would be reluctant to return to CRRT. Recommend palliative care consult given he has been on vent since 12/10 and is full code. Will trial UF only run tomorrow      Hypervolemia - Making minimal urine. UF with dialysis difficult with hypotension     COVID-19 infection - With respiratory failure. On ventilator. S/p 5 days of remdesivir and 10 days of dexamethasone. Course has been complicated by right sided pneumothorax     Anemia - Drifting down and s/p PRBCs yesterday     Hypotension - Remains on fairly significant pressor support and is on midodrine three times a day as well     Nutrition - On tube feeds on renal formulation. Phosphorus okay      DVT prophylaxis - On heparin drip     Coronary artery disease, hyperlipidemia, atrial fibrillation - On anticoagulation     Diabetes mellitus - Receiving insulin     Right PCA CVA - Followed by neurology     Disposition - Palliative care consulted; patient's spouse has not been open to palliative care input thus far        SUBJECTIVE: Viewed through window. Reviewed chart and notes. Minimally responsive today, not following commands    OBJECTIVE:    Vital signs in last 24 hours  Temp:  [97.6  F (36.4  C)-98.7  F (37.1  C)] 98.7  F (37.1  C)  Heart Rate:  [] 96  Resp:  [17-39] 28  Arterial Line BP: ()/(36-63) 128/47  FiO2 (%):  [40 %] 40 %  Weight:   210 lb 5.1 oz (95.4 kg)    Intake/Output last 3 shifts  I/O last 3 completed shifts:  In: 3152.2 [I.V.:1309.2; Blood:321; NG/GT:1472; IV Piggyback:50]  Out: 70 [Chest Tube:70]  Intake/Output this  shift:  I/O this shift:  In: 215 [I.V.:10; NG/GT:205]  Out: 60 [Chest Tube:60]    PHYSICAL EXAM:  Given ongoing community spread on COVD-19 and patient's presentation, physical exam and ROS have been deferred. Please refer to intensivist note for more details      Pertinent Labs   Lab Results   Component Value Date    WBC 12.7 (H) 01/03/2021    HGB 7.7 (L) 01/03/2021    HCT 23.2 (L) 01/03/2021    MCV 96 01/03/2021     01/03/2021     Lab Results   Component Value Date    CREATININE 4.67 (H) 01/03/2021     (H) 01/03/2021     (L) 01/03/2021    K 3.8 01/03/2021     01/03/2021    CO2 20 (L) 01/03/2021       Lab Results   Component Value Date    ALBUMIN 2.4 (L) 12/26/2020     Lab Results   Component Value Date    CALCIUM 7.5 (L) 01/03/2021    PHOS 4.2 01/03/2021         Katie Roche PA-C  [facility]  [phone]

## 2021-06-14 NOTE — PROGRESS NOTES
NEUROLOGY PROGRESS NOTE     ASSESSMENT & PLAN   Hospital Day#: 25    Impression: Right PCA stroke.  Slight decrease of hand grasp on left relative to right which could occur with right PCA strokes, though is typically in a minority of patients.  Strength overall better and responding better from a standpoint of his encephalopathy.  Mentioned previously, stroke most likely cardioembolic with being cortical stroke in posterior cerebral artery distribution is high risk area for cardioembolic strokes with atrial fibrillation.        Patient Active Problem List    Diagnosis Date Noted     Encephalopathy      Acute right PCA stroke (H)      Secondary spontaneous pneumothorax      Acute respiratory distress syndrome (ARDS) due to COVID-19 virus (H) 12/10/2020     Acute respiratory failure with hypoxia (H)      Acute kidney injury (H)      Pneumonia due to 2019 novel coronavirus 12/06/2020     Diabetes mellitus (H) 06/24/2019     Hypertension 06/24/2019     Chronic pain disorder 03/27/2019     Primary osteoarthritis of right hip 01/26/2018     Primary osteoarthritis of right knee 01/26/2018     BPH with urinary obstruction 11/15/2017     Past Medical History: Patient  has no past medical history on file.     SUBJECTIVE     Continuing on Tuesday Thursday Saturday schedule of dialysis as reviewed in nephrology note.     OBJECTIVE     Vital signs in last 24 hours  Temp:  [97.6  F (36.4  C)-98.4  F (36.9  C)] 98.4  F (36.9  C)  Heart Rate:  [] 95  Resp:  [0-82] 34  BP: ()/(43-56) 113/48  Arterial Line BP: ()/(43-74) 113/47  FiO2 (%):  [40 %-45 %] 40 %    Weight:   208 lb 15.9 oz (94.8 kg)    I/O last 24 hours    Intake/Output Summary (Last 24 hours) at 12/31/2020 1846  Last data filed at 12/31/2020 1800  Gross per 24 hour   Intake 2170.57 ml   Output 60 ml   Net 2110.57 ml       Review of Systems   Unable    General Physical Exam: Patient is alert  Vital signs were reviewed and are documented in  EMR.Neurological Exam:  Follow simple commands such as squeezing hands and wiggling toes.  Did better with squeezing of left arm/hand today as compared to yesterday though slightly weaker than right.  Eyes are in midline.  Pupils are reactive.  Did respond on one occasion to visual threat in the left field.  Babinski downgoing     DIAGNOSTIC STUDIES     Pertinent Radiology   Radiology Results: No results found.    Pertinent Labs   Lab Results: reviewed  Recent Results (from the past 24 hour(s))   Anti-Xa Heparin Level    Collection Time: 12/30/20  7:58 PM   Result Value Ref Range    Anti-Xa Heparin Assay <0.10 See comments IU/mL   POCT Glucose    Collection Time: 12/30/20  7:58 PM    Specimen: Blood   Result Value Ref Range    Glucose 328 (H) 70 - 139 mg/dL   Hemoglobin    Collection Time: 12/30/20  7:58 PM   Result Value Ref Range    Hemoglobin 7.5 (L) 14.0 - 18.0 g/dL   Blood Gases, Arterial    Collection Time: 12/30/20  8:18 PM   Result Value Ref Range    pH, Arterial 7.37 7.37 - 7.44    pCO2, Arterial 32 (L) 35 - 45 mm Hg    pO2, Arterial 163 (H) 75 - 85 mm Hg    Bicarbonate, Arterial Calc      O2 Sat, Arterial >100.0 (H) 95.0 - 96.0 %    Oxyhemoglobin 97.9 (H) 95.0 - 96.0 %    Base Excess, Arterial Calc -6.8 mmol/L    Ventilation Mode AC     Rate 28 rr/min    FIO2 45.00     Peep 12 cm H2O    Sample Stabilized Temperature 37.0 degrees C    Ventilator Tidal Volume 520 mL   POCT Glucose    Collection Time: 12/31/20 12:06 AM    Specimen: Blood   Result Value Ref Range    Glucose 212 (H) 70 - 139 mg/dL   Hemoglobin    Collection Time: 12/31/20 12:08 AM   Result Value Ref Range    Hemoglobin 7.8 (L) 14.0 - 18.0 g/dL   POCT Glucose    Collection Time: 12/31/20  3:34 AM    Specimen: Blood   Result Value Ref Range    Glucose 148 (H) 70 - 139 mg/dL   Basic Metabolic Panel    Collection Time: 12/31/20  3:35 AM   Result Value Ref Range    Sodium 136 136 - 145 mmol/L    Potassium 3.8 3.5 - 5.0 mmol/L    Chloride 104 98 -  107 mmol/L    CO2 17 (L) 22 - 31 mmol/L    Anion Gap, Calculation 15 5 - 18 mmol/L    Glucose 154 (H) 70 - 125 mg/dL    Calcium 7.9 (L) 8.5 - 10.5 mg/dL     (H) 8 - 28 mg/dL    Creatinine 5.21 (H) 0.70 - 1.30 mg/dL    GFR MDRD Af Amer 13 (L) >60 mL/min/1.73m2    GFR MDRD Non Af Amer 11 (L) >60 mL/min/1.73m2   HM2(CBC W/O DIFF)    Collection Time: 12/31/20  3:35 AM   Result Value Ref Range    WBC 12.9 (H) 4.0 - 11.0 thou/uL    RBC 2.48 (L) 4.40 - 6.20 mill/uL    Hemoglobin 7.8 (L) 14.0 - 18.0 g/dL    Hematocrit 25.4 (L) 40.0 - 54.0 %     (H) 80 - 100 fL    MCH 31.5 27.0 - 34.0 pg    MCHC 30.7 (L) 32.0 - 36.0 g/dL    RDW 19.7 (H) 11.0 - 14.5 %    Platelets 240 140 - 440 thou/uL    MPV 12.0 8.5 - 12.5 fL   Magnesium    Collection Time: 12/31/20  3:35 AM   Result Value Ref Range    Magnesium 2.4 1.8 - 2.6 mg/dL   Phosphorus    Collection Time: 12/31/20  3:35 AM   Result Value Ref Range    Phosphorus 5.8 (H) 2.5 - 4.5 mg/dL   Blood Gases, Arterial    Collection Time: 12/31/20  3:38 AM   Result Value Ref Range    pH, Arterial 7.35 (L) 7.37 - 7.44    pCO2, Arterial 33 (L) 35 - 45 mm Hg    pO2, Arterial 147 (H) 75 - 85 mm Hg    Bicarbonate, Arterial Calc 18.8 (L) 23.0 - 29.0 mmol/L    O2 Sat, Arterial 99.9 (H) 95.0 - 96.0 %    Oxyhemoglobin 97.9 (H) 95.0 - 96.0 %    Base Excess, Arterial Calc -7.6 mmol/L    Ventilation Mode AC     Rate 28 rr/min    FIO2 0.45     Peep 12 cm H2O    Sample Stabilized Temperature 37.0 degrees C    Ventilator Tidal Volume 520 mL   Hemoglobin    Collection Time: 12/31/20  8:08 AM   Result Value Ref Range    Hemoglobin 7.6 (L) 14.0 - 18.0 g/dL   POCT Glucose    Collection Time: 12/31/20  8:09 AM    Specimen: Blood   Result Value Ref Range    Glucose 101 70 - 139 mg/dL   POCT Glucose    Collection Time: 12/31/20 12:13 PM    Specimen: Blood   Result Value Ref Range    Glucose 119 70 - 139 mg/dL   Hemoglobin    Collection Time: 12/31/20 12:14 PM   Result Value Ref Range    Hemoglobin  7.9 (L) 14.0 - 18.0 g/dL   POCT Glucose    Collection Time: 12/31/20  4:54 PM    Specimen: Blood   Result Value Ref Range    Glucose 164 (H) 70 - 139 mg/dL         HOSPITAL MEDICATIONS       amino acids-protein hydrolys  1 packet Enteral Tube DAILY     amiodarone  200 mg Oral DAILY     aspirin  81 mg Enteral Tube DAILY     chlorhexidine  15 mL Topical Q12H     [Held by provider] docusate sodium (COLACE) 50 mg/5 mL oral liquid  100 mg Enteral Tube BID     hydrocortisone sod succ  50 mg Intravenous Q6H FIXED TIMES     insulin aspart (NovoLOG) injection   Subcutaneous Q4H FIXED TIMES     insulin glargine  45 Units Subcutaneous BID     meropenem  1 g Intravenous Q8H     midodrine  10 mg Oral TID with meals     omeprazole  20 mg Oral QAM AC    Or     omeprazole  20 mg Enteral Tube QAM AC    Or     pantoprazole  40 mg Intravenous QAM AC     [Held by provider] senna (SENOKOT) syrup  8.8 mg Enteral Tube BID     sodium chloride  10-30 mL Intravenous Q8H FIXED TIMES     vit B cmplx 3-FA-vit C-biotin  1 tablet Enteral Tube DAILY     white petrolatum-mineral oiL   Both Eyes Q8H        Total time spent for face to face visit, reviewing labs/imaging studies, counseling and coordination of care was: 15 minutes. More than 50% of this time was spent on counseling and coordination of care.    Gumaro Ramos MD  [facility]  Direct Secure Messaging: [email or web address]  Tel: [phone]

## 2021-06-14 NOTE — PROGRESS NOTES
RENAL PROGRESS NOTE      CHIEF COMPLAINT: NICKY, COVID-19 infection      ASSESSMENT/PLAN:  Acute kidney injury - With normal underlying renal function and now with hemodynamic NICKY/ATN. Started dialysis 12/18. S/p CRRT and transitioned to intermittent hemodialysis this week.  We've had quite a bit of difficult with dialysis on Thursday and today.  Drops pressure markedly and only able to get minimal UF.  Essentially anuric.  Recs:  - dialysis today  - I'm reluctant to return to CRRT  - recommend involving palliative care as he remains full code and has been been on the vent since 12/10    Hypervolemia - UF as able with dialysis.  He makes virtually no urine    COVID-19 infection - With respiratory failure. On ventilator. S/p 5 days of remdesivir and 10 days of dexamethasone. Course has been complicated by right sided pneumothorax    Anemia - drifting and being transfused today    Hypotension - remains on fairly significant pressor support and is on midodrine three times a day as well    Nutrition - On tube feeds on renal formulation. Phos in the 5s, adequate    DVT prophylaxis - On heparin drip    Coronary artery disease, hyperlipidemia, afib - on anticoagulation    Diabetes mellitus - Receiving insulin    Right PCA CVA - Followed by neurology    Disposition - I think his goals of care need to be reviewed and palliative should be consulted          SUBJECTIVE:  Patient new to me.  Seen on dialysis.  Not very responsive and gazing at the ceiling.  Reportedly more responsive earlier today.  Got RBCs and and albumin during the run and increased pressors to 0.2 of levophed and still hypotensive and not able to UF much.  Significantly volume overload on exam.      Discussed at length with ICU team today.   I'm not eager to return to CRRT in this 75 year old gentleman who has been admitted since 12/6 and intubated since 12/10a and continues to need ventilator support as well as significant pressor support.  His course has  been complicated by recent stroke. He remains full code and its unclear if this has been addressed with this family.  Putting him back on CRRT seems futile to some degree.  Recommend involving palliative care    OBJECTIVE:    Vital signs in last 24 hours  Temp:  [97.6  F (36.4  C)-99.2  F (37.3  C)] 98.6  F (37  C)  Heart Rate:  [] 94  Resp:  [15-51] 28  BP: ()/(43-80) 166/69  Arterial Line BP: ()/(38-72) 133/49  FiO2 (%):  [40 %] 40 %  Weight:   210 lb 5.1 oz (95.4 kg)    Intake/Output last 3 shifts  I/O last 3 completed shifts:  In: 3283.7 [I.V.:1319.7; Blood:321; NG/GT:1543; IV Piggyback:100]  Out: 150 [Chest Tube:150]  Intake/Output this shift:  No intake/output data recorded.    PHYSICAL EXAM:  Elderly gentleman, chronically ill appearing  HEENT: intubated  Lungs: coarse and mechanically ventilated  Cardiac - tachycardic on dialysis, afib, diffuse anasarca  Neuro: not responsive, reportedly more responsive earlier this morning  Right femoral CVC in place    Pertinent Labs   Lab Results   Component Value Date    WBC 12.8 (H) 01/02/2021    HGB 7.0 (L) 01/02/2021    HCT 21.7 (L) 01/02/2021     01/02/2021     01/02/2021     Lab Results   Component Value Date    CREATININE 6.30 (HH) 01/02/2021     (H) 01/02/2021     01/02/2021    K 3.8 01/02/2021     01/02/2021    CO2 17 (L) 01/02/2021       Lab Results   Component Value Date    ALBUMIN 2.4 (L) 12/26/2020     Lab Results   Component Value Date    CALCIUM 7.7 (L) 01/02/2021    PHOS 5.9 (H) 01/02/2021       Wilton Mike MD  [facility]  [phone]

## 2021-06-14 NOTE — PROGRESS NOTES
[facility] COVID RT PROGRESS NOTE    DATA:    VENT DAY#  12    CURRENT SETTINGS:  VENT SETTINGS  AC 32/520/+16        FIO2:   45%     PATIENT PARAMETERS:    PIP 32  Pplat:  N/a   Pmean:  22  SBT: n/a`   SECRETIONS:  Scant thich tan  02 SATS:  100    ETT SIZE 8.5  Secured at  26 cm at teeth    Securing device changed / tube re-taped date n/a    Respiratory Medications: veletri half dose     ABG: Results for GISELLE VILLANUEVA (MRN [id number]) as of 12/23/2020 16:28   Ref. Range 12/23/2020 08:14   pH, Arterial Latest Ref Range: 7.37 - 7.44  7.38   pCO2, Arterial Latest Ref Range: 35 - 45 mm Hg 36   pO2, Arterial Latest Ref Range: 75 - 85 mm Hg 108 (H)   Bicarbonate, Arterial Calc Latest Ref Range: 23.0 - 29.0 mmol/L 21.4 (L)   O2 Sat, Arterial Latest Ref Range: 95.0 - 96.0 % 98.4 (H)   Oxyhemoglobin Latest Ref Range: 95.0 - 96.0 % 96.6 (H)   POC Base Excess Calc Latest Units: mmol/L -4.1   Sample Stabilized Temperature Latest Units: degrees C 37.0   FIO2 Unknown 45.00       NOTE / PLAN:   Pt received on AC 32/520+16/55%, weaned fio2 to 45%.  Pt stable on current settings no changes. Transported to and from CT with no complications.. Veletri weaned to half and then stopped. No other changes

## 2021-06-14 NOTE — PLAN OF CARE
Pt care assumed at 1000  Pt assessment as charted. Pt has no purposeful carlos respose except to withdraw from oral care. VSS. Norepir for MAP>65. Pressure labile, and got soft during dialysis. UF able to remove ~1.5L fluid before having to stop. Per discussion with nephrology, stop pulling once pressor requirement doubles. Tube feeds at goal. Severl watery loose maroon Bm's. 1 Small void.  No Acute events.

## 2021-06-14 NOTE — PLAN OF CARE
Pt intubated and sedated. On multiple vasopressors and drips - see flowsheets for details. BP labile varying from hypo to hypertensive with minimal titrations.     Upon 1000 BGL assessment patient moving arm and squeezing hand. Following commands, weak however, moving all 4 extremities. Slow to answer however, shakes head yes or no to questions. Eyes remain dysconjugate but is moving head to sound of voice.  Pt denies pain or discomfort initially, reporting pain throughout day with little relief from PRN boluses. MONTSERRAT Moore aware orders received.     At 1200 position change and skin assessment of buttocks and rectal tube - blood noted on chux with dark red/maroon clots. MONTSERRAT Moore informed - rectal tube removed with large clots and Heparin infusion paused per order. Prior to restarting Heparin @ 1400 per order, pt noted to have large amount of watery blood on chux. MONTSERRAT Moore at bedside for assessment. Pt continues bleeding with clots from rectum throughout day. Heparin to remain on hold at this time.     Insulin infusion stopped @ 1400, 2 hours after Lantus administration per order.    Report given at time of shift change. See flowsheets for assessment details.     Problem: Pain  Goal: Patient's pain/discomfort is manageable  Outcome: Progressing     Problem: Glucose Imbalance  Goal: Achieve optimal glucose control  Outcome: Progressing     Problem: Potential for Compromised Skin Integrity  Goal: Skin integrity is maintained or improved  Outcome: Progressing  Goal: Nutritional status is improving  Outcome: Progressing     Problem: Inadequate Gas Exchange  Goal: Patient will achieve/maintain normal respiratory rate/effort  Outcome: Progressing     Problem: Ineffective Airway Clearance  Goal: Maintain airway patency  Outcome: Progressing     Problem: Impaired Gas Exchange  Goal: Demonstrate improved ventilation and adequate oxygenation of tissues as evidenced by absence of respiratory distress  Outcome: Progressing      Problem: Breathing  Goal: Patient will maintain patent airway  Outcome: Progressing     Problem: Potential for Falls  Goal: Patient will remain free of falls  Outcome: Progressing     Problem: Mechanical Ventilation  Goal: Patient will maintain patent airway  Outcome: Progressing  Goal: ET tube will be managed safely  Outcome: Progressing     Problem: Potential for transmission of organism by Contact, Enteric, Droplet and/or Airborne routes  Goal: Prevent transmission of organisms  Outcome: Progressing

## 2021-06-14 NOTE — PROGRESS NOTES
Renal progress note   CC: ARF  Assessment and Plan:  74 y.o. male     1. ARF: pt has normal underlying renal function; now hemodynamic ARF/ATN and has progressive rise in CR; anuria and on CRRT since 12/18.  Severe acidosis improved    Cont CRRT 30 ml/kg/hr.  2. Hypervolemia-improved, Wt is trending down. Hypotension limiting UF and no urine.  Currently net at 90 ml/hr  3. COVID 19 infection with resp failure; on vent with full support; on broad abx/ fluconazole and lines changed (klebsiella in sputum); on veletri  4. Hyponatremia- mild. Improved. Serum sodium trending up to 135 this am  5. Acidosis;resp and metabolic- now controlled with CRRT    6. Anemia; no active loss; following hgb  7. HoTN; Remains on Levophed drip  8. Nutrition; on TF; renal formula  9. DVT prophylaxis; on heparin gtt  10. CAD, hyperlipid; stable co-morbitdities  11. DM; on insulin gtt    Discussed with ICU team    We will follow    Gale Sotomayor MD  [facility]  [phone].        Subjective  No acute issues. Patient remains sedated, intubated, on vent. Remains on Levophed drip. Tolerating CRRT~ 90 ml/h. Remains anuric.        Objective    Vital signs in last 24 hours  Heart Rate:  [] 75  Resp:  [6-35] 25  Arterial Line BP: ()/() 110/49  FiO2 (%):  [55 %-60 %] 55 %  Weight:   200 lb 9.9 oz (91 kg)    Intake/Output last 3 shifts  I/O last 3 completed shifts:  In: 2134 [I.V.:1434; NG/GT:550; IV Piggyback:150]  Out: 2800 [Other:2220; Stool:500; Chest Tube:80]  Intake/Output this shift:  I/O this shift:  In: 304.3 [I.V.:104.3; NG/GT:200]  Out: 109 [Other:105; Chest Tube:4]    Physical Exam  Seen at bedside.  Intubated and supine  Lungs decreased BS  Cor Reg on tele  abd soft  Ext warm and mild edema  Skin no rash        Pertinent Labs   Lab Results   Component Value Date    WBC 35.1 (HH) 12/25/2020    HGB 9.2 (L) 12/25/2020    HCT 29.4 (L) 12/25/2020    MCV 97 12/25/2020     12/25/2020      Lab Results   Component Value Date    CREATININE 1.11 12/25/2020    BUN 28 12/25/2020     (L) 12/25/2020    K 4.6 12/25/2020     12/25/2020    CO2 18 (L) 12/25/2020       Lab Results   Component Value Date    ALBUMIN 2.4 (L) 12/25/2020     Lab Results   Component Value Date    CALCIUM 8.5 12/25/2020    PHOS 4.5 12/25/2020     I reviewed all lab results  Gale Sotomayor

## 2021-06-14 NOTE — PROGRESS NOTES
Clinical Nutrition Therapy Follow Up Note      Pt discussed on care rounds.    Current Nutrition Prescription:   Diet: NPO  Diet Supplements:   IV dextrose or Fluids:     cisatracurium (NIMBEX) infusion, Last Rate: Stopped (12/22/20 1439)       Phoxillum BK 4/2.5, Last Rate: 1,700 mL/hr (12/23/20 0528)       Phoxillum BK 4/2.5, Last Rate: 300 mL/hr (12/23/20 0105)       Phoxillum BK 4/2.5, Last Rate: 2,500 mL/hr (12/23/20 1123)       dextrose 10%       epoprostenol       fentaNYL citrate (PF), Last Rate: 100 mcg/hr (12/23/20 1200)       heparin, Last Rate: 1,650 Units/hr (12/23/20 1200)       insulin infusion (1 unit/mL), Last Rate: Stopped (12/22/20 1730)       midazolam, Last Rate: 5 mg/hr (12/23/20 1200)       norepinephrine, Last Rate: 0.07 mcg/kg/min (12/23/20 1204)       phenylephrine, Last Rate: Stopped (12/21/20 1434)       [Held by provider] sodium bicarbonate IV infusion in D5W, Last Rate: Stopped (12/23/20 1020)       sodium chloride 0.9%, Last Rate: 10 mL/hr (12/23/20 1200)       vasopressin, Last Rate: 2.4 Units/hr (12/23/20 1200)      Enteral: via Enteral nutrition access is a nasal gastric tube, with a placement date of 12/11/20.  Novasource renal continuous currently on hold d/t emesis and gastric retention noted  per nursing. ( goal rate ordered 35 ml/hr)  Modular: 1 no carb prosource bid  Flush: 100 ml H20 q 4 hours    Current Nutrition Intake:    Enteral nutrition currently on hold ( at goal provides: 840 ml, 1800 kcal, 106 g protein, 0 fiber, 153 g cho, 1196 ml H20 total. )     Received 784 ml in IVF and piggyback per I/0 p 24 h     GI Status/Output:   GI symptoms include: Diarrhea per nurse no output recorded  Bowel Sounds present per nurse  Last BM: 12/23/20 per nurse  OG placed 12/21/20: 1.5 liters out  C-Diff negative 12/22/20   CRRT started 12/18/20      Skin/Wound:   coccyx abrasion was noted per nursing and wound care 12/21/20    Medications:  Medications  reviewed.      Anthropometrics:  Most recent weight: :  Wt Readings from Last 3 Encounters:   12/23/20 (!) 229 lb 0.9 oz (103.9 kg)   1 + edema per nursing 12/23/20 12/21/20        234 #    Labs:  Labs reviewed :  Results from last 7 days   Lab Units 12/23/20  0808 12/23/20  0406   LN-WHITE BLOOD CELL COUNT thou/uL  --  37.6*  37.8*   LN-HEMOGLOBIN g/dL 10.0* 10.0*  10.0*   LN-HEMATOCRIT %  --  31.6*  31.2*   LN-PLATELET COUNT thou/uL 259 290  268     Results from last 7 days   Lab Units 12/23/20  0809 12/23/20  0405   LN-SODIUM mmol/L 135* 135*   LN-POTASSIUM mmol/L 4.9 4.7   LN-CHLORIDE mmol/L 100 100   LN-CO2 mmol/L 21* 22   LN-BLOOD UREA NITROGEN mg/dL  --  24   LN-CREATININE mg/dL  --  1.22   LN-CALCIUM mg/dL 8.0* 8.7     Results from last 7 days   Lab Units 12/23/20  0405   LN-ALBUMIN g/dL 2.0*   LN-PROTEIN TOTAL g/dL 6.0   LN-BILIRUBIN TOTAL mg/dL 0.7   LN-ALKALINE PHOSPHATASE U/L 99   LN-ALT (SGPT) U/L 182*   LN-AST (SGOT) U/L 213*             Results from last 7 days   Lab Units 12/23/20  0809   LN-MAGNESIUM mg/dL 2.3     Results from last 7 days   Lab Units 12/23/20  0809   LN-PHOSPHORUS mg/dL 4.6*     Results from last 7 days   Lab Units 12/23/20  0405 12/22/20  0450 12/21/20  0405 12/20/20  0557 12/19/20  0430 12/18/20  0519 12/17/20  1951   LN-GLUCOSE mg/dL 211* 219* 198* 154* 124 170* 155*     Results from last 7 days   Lab Units 12/19/20  0758   LN-C-REACTIVE PROTEIN mg/dL 7.9*     Estimated Nutrition Needs:     Energy Needs: 8225-2170 kcals daily, 14-17kcal/kg current weight of 107.7 kg  Protein Needs: 103-129g daily, 1.2-1.5 g/kg IBW 86.3 kg- recalculated for NICKY  Fluid Needs: 1 ml/kcal or per Md     Malnutrition: Noted previously Moderate     Nutrition Risk Level: high risk     Nutrition dx:  Impaired swallow function r/t increased respiratory needs evidenced by NPO/intubation     Malnutrition r/t acute illness evidenced by 7% weight loss x 1 week, mild fluid accumulation, mild fat and muscle  loss     Altered nutrient utilization r/t NICKY evidenced by increased renal labs, fluid retention, decrease UOP     Goal Status:  Regular bowels - progressing  Nutrition intake > 75% of estimated needs- not  Met w/ enteral on hold  Tolerate  TF at goal. - not met  No increase in renal labs d/t intake - not met   New goal 12/23/20: resume enteral feeding as soon as able    Intervention:  Remains NPO with enteral feeding on hold. Per MD plan to replace feeding tube w/ post pyloric today    Monitoring/Evaluation:  TF tolerance, wt, labs, stooling, pressor use and ability to  Resume  Enteral feeding     Nutrition History:  Information from family/caregiver and chart.  Diet prior to admission: Regular. He was eating ~50% of usual intake for 1-2 days PTA and slightly less than normal for a few days before that. He typically eats foods like sandwiches, eggs and soup with crackers and drinks diet pop, Gatorade and water.  Recent food/fluid intake: SHAD-no PO recorded  COVID GI sx of occasional nausea and poor appetite        See Care Plan for Problems, Goals, and Interventions.

## 2021-06-14 NOTE — PROGRESS NOTES
Clinical Nutrition Therapy Follow Up Note      Current Nutrition Prescription:   Diet: Tube Feeding, No Tray  Formula: Novasource Renal @ 35ml/hr  Flushes: 100ml water every 4 hours  Diet Supplements: 1 pkt Prosource daily  IV dextrose or Fluids:     dextrose 10%       fentaNYL citrate (PF), Last Rate: 50 mcg/hr (12/27/20 1510)       heparin, Last Rate: 1,550 Units/hr (12/27/20 1200)       norepinephrine, Last Rate: 0.06 mcg/kg/min (12/27/20 1812)       sodium chloride 0.9%, Last Rate: 10 mL/hr (12/27/20 1200)       vasopressin, Last Rate: 2.4 Units/hr (12/27/20 1650)      Current Nutrition Intake:  Enteral nutrition access is a nasal gastric tube, with a placement date of 12/24/20. The current tube feeding order will provide 1680 kcals, 76 grams protein, 0 grams fiber, 154 grams carbohydrate, 602mls free water from formula, 600 mls from fluid flush, for a total of 1202 mls free water daily.    Anthropometrics:  Admission weight: 255 lb  Weight: 191 lb 9.3 oz (86.9 kg) +1 pitting edema UEs, +2 pitting edema LEs and generalized, net fluid down 3.9L since admission per I/Os (About 8.6lbs) 15.2% dry wt loss in 1 week    GI Status/Output:   GI symptoms include: Diarrhea per nurse and Abdominal distention per nurse  Bowel Sounds present per nurse  Last BM: 12/27/20 per nurse 9 stools so far today.     Skin/Wound:  Pressure ulcer/Decubitus ulcer was noted.    Medications:  Medications reviewed: Prosource, 1000 units vitamin C (diarrhea), calcium chloride, hydrocortisone, Novolog, Lantus, merrem, prilosec, nephro-bipin    Labs:  Labs reviewed: BG , Na 134, BUN 51, Cr 1.76, GFR 38, Phos 5.4    Estimated Nutrition Needs:   current weight of 86.9 kg    Energy Needs: 8674-2589 kcals daily, 25-30 kcal/kg   Protein Needs: 104-122g daily, 1.2-1.4 g/kg   Fluid Needs: Output + 1000ml    Malnutrition: Patient meets diagnostic criteria for severe protein calorie malnutrition in the context of acute illness as evidenced by   unintentional weight loss and fluid accumulation    Nutrition Risk Level: high risk    Nutrition dx:  Impaired swallow function r/t increased respiratory needs evidenced by NPO/intubation     Malnutrition r/t acute illness evidenced by 7% weight loss x 1 week, mild fluid accumulation, mild fat and muscle loss     Altered nutrient utilization r/t NICKY evidenced by increased renal labs, fluid retention, decrease UOP    Goal Status:  Regular bowels - not progressing  Nutrition intake > 75% of estimated needs- progressing  Tolerate  TF at goal. - progressing  No increase in renal labs d/t intake - not met  New goal 12/23/20: resume enteral feeding as soon as able    Intervention:  Recommend discontinue Vitamin C or reduce dose if possible issac since Nephro-bipin also contains Vitamin C. (RDA for Vitamin c is 90mg/day for males > 70 years old. The tolerable upper intake level is 2000mg/day. Patient is getting 1000mg/day. Vitamin C toxicity can cause diarrhea.)     Reassessed needs for significant weight loss and patient no longer having obesity.     Increased Novasource Renal to 50ml/hr w/ 40ml water flushes every 8 hours to provide 2400kcals, 109g protein, 220g CHO, 120g fat, 860ml free water, 120ml water from flushes for a total of 980ml fluid daily    Discontinued Prosource as patient will meet needs w/ TF    Monitoring/Evaluation:  TF tolerance

## 2021-06-14 NOTE — PLAN OF CARE
Pt unresponsive, no movement, no gag. Decreasing fent/versed as able.  Tolerating CRRT, not removing extra fluid.  Tolerating cares and turns, temp remains low 90's despite CRRT heater and warming blanket.  Remains A-fib 70-90's.

## 2021-06-14 NOTE — PROGRESS NOTES
[facility] COVID RT PROGRESS NOTE     DATA:  VENT DAY#  18 of 3rd intubation     CURRENT SETTINGS:   AC 28, 520, +8, 40%     PATIENT PARAMETERS:     PIP:   25  Pplat:  18  Pmean:  14     SBT:  none     SECRETIONS:  small pink tinge/bloody      02 SATS:  %     ETT SIZE 8.5  Secured at  25 cm at teeth     Securing device changed / tube re-taped date:        Respiratory Medications:        AB.42/30/100/21.1    Note:  Pt stable overnight on mechanical ventilation.  No changes made.  Bloody secretions suctioned after midnight.

## 2021-06-14 NOTE — PROCEDURES
FIBEROPTIC BRONCHOSCOPY PROCEDURE NOTE (NON-OR)  Date of Procedure: 1/5/2021  Performing Provider: Mary Dove  Pre-Procedure Diagnosis:   Chronic respiratory failure  Post-Procedure Diagnosis:  Same as Pre-Procedure Diagnosis  Procedure:   Flexible Fiberoptic Bronchoscopy for percutaneous tracheostomy  Indications:  Felipe Cruz is a 75 y.o. male with chronic respiratory failure in need of a tracheostomy.   Preop evaluation:  Procedure:  Intravenous Sedation.    Expected level:  Moderate sedation  ASA Class:  ASA 4 - Patient with severe systemic disease that is a constant threat to life  Anesthesia:   Versed IV, Dilaudid IV, Propofol IV  Specimen:  None  Estimated Blood Loss:  Minimal ml  Complications:  none    Findings:  Trachea  Normal    Nishi  Normal    Right Bronchial Tree  thick mucopurulent secretions present    Left Bronchial Tree  thick mucopurulent secretions present        Procedure Details:   The patient was seen and the risks, benefits, complications, treatment options, and expected outcomes were discussed with WIFE   . The risks and potential complications of their problem and proposed treatment include but are not limited to infection, bleeding, pain, adverse drug reaction, pulmonary aspiration, the need for additional procedures, failure to diagnose a condition, creating a complication requiring transfusion or operation, and complication secondary to the anesthetic.  The patient/alternate (see above) concurred with the proposed plan, giving informed consent.  The patient was identified as Felipe Cruz with Date of Birth 1945 and the procedure verified as Diagnostic Flexible Fiberoptic Bronchoscopy .  A Time Out was held and the above information confirmed.    The patient was placed in the supine position and the bronchoscope was passed through the ETT. The scope was then passed into the trachea.  Careful inspection of the tracheal lumen was accomplished. The scope was  Maintained in  place for direct visualization for percutaneous tracheostomy. See separate operative note by Dr. Thurston. After placement, the scope was reintroduced to ensure adequate intraluminal placement. The scope was sequentially passed into the left main and then left upper and lower bronchi and segmental bronchi.   Findings and specimen details recorded above.  The scope was then withdrawn and advanced into the right main bronchus and then into the RUL, RML, and RLL bronchi and segmental bronchi.   Findings and specimen details recorded above. Moderate amount of mucopurulent secretions were cleared. SpO2 remained at 100%, post procedural peak pressures 38. CXR pending.     Additional Procedures:     None    The patient tolerated the procedure well.      Mary Dove, 1/5/2021, 5:04 PM    Referring Physician: * No referring provider recorded for this case *  Attending Physician: Nicci Gilbert MD  Primary Care Physician: Wero Nolasco MD

## 2021-06-14 NOTE — PROGRESS NOTES
No neuro changes. Pt remains completely unresponsive. Zero gag reflex. Unable to wean sedation further due to asynchrony with ventilator.  Lungs CTA with decreased bilateral breath sounds. CT right anterior chest wall to -20 cm suction with intermittent small air leak. No crepitus noted. Anuric. Tolerating CRRT without complications. BP wnl with low dose levophed gtt. Watery stools have slowed to just a scant amount in FMS tubing only. Not enough to measure. Improvement with temperature regulation noted.

## 2021-06-14 NOTE — PROGRESS NOTES
[facility] COVID RT PROGRESS NOTE     DATA:     VENT DAY# 12     CURRENT SETTINGS:  VENT SETTINGS   AC 32 520 +16              FIO2:  55%     PATIENT PARAMETERS:     PIP: 30   Pplat: unable   Pmean: 21   SBT: NO  SECRETIONS: small, tan thick  02 SATS: 96%     ETT SIZE  8.5 @ 26     Securing device changed / tube re-taped date 12/21/2020     Respiratory Medications: None     ABG:  Results for GISELLE VILLANUEVA (MRN [id number]) as of 12/23/2020 04:42   Ref. Range 12/23/2020 04:23   pH, Arterial Latest Ref Range: 7.37 - 7.44  7.35 (L)   pCO2, Arterial Latest Ref Range: 35 - 45 mm Hg 43   pO2, Arterial Latest Ref Range: 75 - 85 mm Hg 77   Bicarbonate, Arterial Calc Latest Ref Range: 23.0 - 29.0 mmol/L 22.6 (L)   O2 Sat, Arterial Latest Ref Range: 95.0 - 96.0 % 95.3   Oxyhemoglobin Latest Ref Range: 95.0 - 96.0 % 94.2 (L)   POC Base Excess Calc Latest Units: mmol/L -2.2   Ventilation Mode Unknown AC   Peep Latest Units: cm H2O 16   Sample Stabilized Temperature Latest Units: degrees C 37.0   Rate Latest Units: rr/min 32   FIO2 Unknown 55.00            12/23/20 0330   Vent Settings   FiO2 (%) 55 %   Heater Temperature 98.6  F (37  C)   Resp Rate (Set) 32   Insp Time (sec) 0.7 sec   Vt (Set, mL) 520 mL   PEEP/CPAP (cm H2O) 16 cm H2O   Trigger Sensitivity Flow (L/min) 2 L/min   I:E Ratio 1:1.7   Humidification Heater   Patient Data   Vt Exp (mL) 555 mL   Minute Ventilation (L/min) 16.2 L/min   Total Resp Rate  32 BPM   PIP Observed (cm H2O) 30 cm H2O   MAP (cm H2O) 21   Dynamic Compliance (L/cm H2O) 43.7 L/cm H2O   Airway Resistance 7.2   SpO2 100 %   Heart Rate (!) 109     NOTE / PLAN:       Will continue current therapy and continue to monitor.

## 2021-06-14 NOTE — PROGRESS NOTES
Care Management Discharge Note    Discharge Planning:  Expected Discharge Date:  1/6/21  Concerns to be Addressed / Barriers to Discharge:  resolved      Anticipated Discharge Disposition:    [facility]    Anticipated Discharge Services:  continued hospital level care      Selected Continued Care - Admitted Since 12/6/2020    No services have been selected for the patient.       History: Patient admitted for covid 19 pneumonia, hypoxia requiring vent 12/10, extubated 12/14 and re-intubated 12/16. Tube feeding. Nephrology following for ARF requiring dialysis. Neurology following for cerebral infarction 12/23. Tracheostomy 1/5. Palliative has been in close contact with spouse. Care Management has not been contacting spouse regarding discharge planning due to prognosis has been guarded.       Patient independent at baseline and lives in an apartment with spouse.           Disposition Comments:  Patient to transfer to [facility] for continued hospital level care with additional need for GI work up. MD has updated spouse. Since patient has been trached, LTACH referrals to [place] and [facility] made. May be premature however was sent. CM to contact spouse with LTACH option once seems appropriate to discuss.     [facility]. PCS completed.          Kathi Giang, RN

## 2021-06-14 NOTE — PROGRESS NOTES
12/24/20 0822   Patient Data   PIP Observed (cm H2O) 32 cm H2O   MAP (cm H2O) 20   Plateau Pressure (cm H2O) 28 cm H2O   Dynamic Compliance (L/cm H2O) 51.6 L/cm H2O   Airway Resistance 7.1   SpO2 95 %

## 2021-06-14 NOTE — PROGRESS NOTES
Care Management Follow Up Note    Length of Stay (days) 19     Patient plan of care discussed at Interdisciplinary Rounds: yes    Expected Discharge Date: (TBD Intubated)  Concerns to be Addressed / Barriers to Discharge:  covid care    Anticipated Discharge Disposition:  TBD  Anticipated Discharge Services:    TBD  Selected Continued Care - Admitted Since 12/6/2020    No services have been selected for the patient.       Plan: Patient admitted for covid 19 pneumonia, hypoxia requiring vent 12/10, extubated 12/14 and re-intubated 12/16. Tube feeding on hold, concerns for ileus. Neurology following for cerebral infarction 12/23. Prognosis MD mariann discussed with spouse 12/24. Spouse would like to continue aggressive cares. Palliative support has been declined.    Assessment History: Patient independent at baseline and lives in an apartment with spouse.     Final discharge plan pending hospital progression. Last CM contact with spouse 12/21. Due to grave prognosis, discharge discussion not appropriate at this time.           Kathi Giang RN

## 2021-06-14 NOTE — PROGRESS NOTES
[facility] PALLIATIVE CARE PROGRESS NOTE        Chief Complaint: covid 19 pneumonia    Key Palliative Symptom Data:  # Pain severity in the last 12 hours: none  # Dyspnea severity in the last 12 hours: none  # Nausea severity in the last 12 hours: none  # Anxiety severity in the last 12 hours: none  Per bedside nurse    Patient is on opioids: assessed and bowels ok/no needed changes to plan of care today.      Prognosis, Goals, or Advance Care Planning was addressed today with: Yes.    Mood, coping, and/or meaning in the context of serious illness were addressed today: Yes.    Palliative Encounter Summary/Comments:  I received a call from Red's wife Yuridia. She wanted to discuss the decision about placing a tracheostomy or not. She had spoken with some friends who are nurses and wanted to test what she learned with me.  Yuridia says the one thing she knows for sure that Red would not want is to be on a ventilator long term and/or to be confined in a nursing home.  Our conversation was then interrupted by a call from the [facility] ICU about Yuridia consenting to a tracheostomy for Red.      I called Yuridia back and we spoke a little longer.  She relates that she consented to the trache and understands that there will be many additional decision points along the way if Red survives where she can assess proposed care against Red's stated wishes.  For example, if he is not able to be weaned from a vent, even with a trache, she would face a decision about nursing home placement or hospice/comfort care, etc.  Yuridia expressed understanding that.    Yuridia also expressed gratitude for how I explained her role as a surrogate decision maker for Red.  She felt that it takes a lot of 'burden off her shoulders.'    Yuridia says she is very assertive and will reach back out to me if she needs to discuss other questions or needs more information like she did this morning.  She did not welcome a routine follow up call in a few  days.    TT: I have personally spent a total of > 25  minutes  today on the unit in review of medical record, consultation with the medical providers (Mary Onofre) and assessment of patient today, with more than 50% of this time spent in counseling, coordination of care, and two telephonic conversations re:   prognosis, symptom management, risks and benefits of management options, emotional support and development of plan of care.    Laci Hoff MD MS FAAFP  [facility]  Office [phone]  Fax [phone]

## 2021-06-14 NOTE — PROGRESS NOTES
ICU PROGRESS NOTE:  1/5/2020      Assessment/Plan:  Felipe Cruz is a 74 year old male with a PMH of CAD, HTN, HLD, DM2, and BPH who was admitted to [facility] on 12/6/2020.  He was admitted to the general care floor. He had increased oxygenation needs, started on HFNC, and transferred to the ICU on 12/8. He was intubated for worsening hypoxia on 12/10. He self extubated on 12/10, required reintubation, and again self-extubated on 12/14. He was reintubated 12/16 for worsening hypoxia. Course complicated by anuric NICKY requiring CRRT, profound shock requiring vasopressors, enterobacter PNA, and CT head on 12/23 showed right PCA infarct. Abx course ended 1/1/21. Vaso pressors trending down, however now receiving HD and continues to appear fluid up. Intermittent neurologic responses.     Changes today:  - Repeat hemoglobin  - IR for tunneled dialysis catheter  - Tracheostomy today  - lactate, fibrinogen    Neuro  Acute pain  Agitiation/anxiety  Delirium  Encephalopathy  Right PCA infarct - CT head 12/23  - Neurology following, appreciated  - Ceribell with non specific slowing with superimposed beta activity, no sharp or rhythmic discharges  - Repeat CT head 12/27 - Evolving infarct in right PCA, no hemorrhagic conversion  - Did not tolerate Precedex and Propofol previously   - Scheduled oxycodone 10mg q 6 hours --> last decreased 1/4  - RAAS goal 0 to -1 Limit sedating medications as able  - Minimal response from patient at bedside, not following commands, intermittently will squeeze hands bilaterally    CV  Shock, septic  Atrila fibrillation   - 12/16: Echo: EF > 63%, Normal RV And LV fn, normal valves. No Tamponade   - Continue norepinephrine and vasopressin for MAP >65, wean as tolerated  - Started midodrine 12/31  - Stress dose steroids restarted 12/20    Resp:  Acute hypoxic respiratory failure and ARDS 2/2 COVID19 and bacterial PNA   Right apical pneumothorax s/p pigtail insertion 12/20  Right  tension pneumothorax on 12/23 with CT clamped for procedure - Resolved when placed back to suction  - Keep CT in place until no longer on PPV.  Placed to water seal 1/3/2020  - Continue lung protective ventilation, wean as tolerated   - PST  - Tracheostomy today    - Ventilation    Vent Mode: AC  FiO2 (%):  [40 %-45 %] 45 %  S RR:  [28] 28  S VT:  [520 mL] 520 mL  PEEP/CPAP (cm H2O):  [8 cm H2O] 8 cm H2O  Minute Ventilation (L/min):  [13.2 L/min-14.4 L/min] 13.2 L/min  PIP:  [24 cm H2O-27 cm H2O] 27 cm H2O  MAP (cm H2O):  [14-15] 15      GI/Nutrition  Severe protein calorie malnutrition   Diarrhea - C diff negative 12/22  Cholelithiasis - discovered on CT 12/23, non obstructive  Elevated LFTs - likely r/t shock possibly a component of Amiodarone. Alk phos and bili with normal parameters. CT abdomen completed 12/23 with no obstruction  - RD following    - Protein BID  - Bowel regimen on hold  - Concern for maroon colored stools several days ago and had resolved.   - Now with maroon stool overnight with worsening anemia. Anticoagulation held. Increase PPI to two times a day. Now having brown stools.     Renal  Anuric NICKY   Persistent metabolic acidosis  Bilateral 2 cm renal masses - found incidentally on CT  Bladder wall ? Stone 3mm in size - found incidentally on CT  - CRRT 12/18-12/28  - HD started 12/29 - TTHS schedule per renal, last iHD run 1/4  - Anuric at this time  - Bladder scan PRN and straight cath if PVR > 250  - IR consult for tunneled dialysis catheter.     ID  COVID 19   Klebsiella VAP- treated  Enterobacter cloacae VAP- active.   Septic shock  Severe Hypothermia  - WBC 11.8  - Profoundly hypothermic on numerous occassions.   - ID following. Appreciate cares and recommendations's  - Meropenem 14 day course for enterobacter PNA completed - ended 1/1/21      MICRO:  12/23 Fungitell negative.   12/22 c.diff negative  12/22 sputum gram stain with prelim 1+ GPB  12/22 BC with NGTD  12/19 fungitell  "(pending)  galactomannan antigen (negative)  12/19 BC x2 with NGTD  12/19 Sputum with Enterobacter cloacae (sensitive to Meropenem)  12/19 A BAL with GS, AFB, KOH, fungal culture were ordered. Very minimal secretions on bronch 12/19.   12/12 sputum: Klebsiella pneumoniae  12/12  Blood cultures- NGTD  12/11 urine NGTD  12/11 BC with NGTD  12/6 blood staph epi, contaminant     Antimicrobials:  12/16 zosyn 12/16-12/17  Ceftriaxone 12/17-12/19 12/19- 1/2 Meropenem   12/19- 12/23 Fluconazole  12/23 Fluconazole broadened to Micafungin     Other Workup:  12/23: CT C/A/P  12/23 lipase normal     COVID:  Completed remdesivir course   Decadron 6 mg IV X 10 days, remains on stress dose steroids      Endocrine  DM II  Stress and steroid induced hyperglycemia  HgbA1C on admit 8.2  - Lantus currently on hold while NPO pending procedures.   - Resistant sliding scale insulin ordered    Heme:  Anemia of critical illness  Coagulopathy due to COVID -19  - Hemoglobin 7.4-->6.7, 1 unit of PRBC, up to 7.2 Give additional unit.   - Heparin gtt, low intensity on hold  - INR 1.09 fibrinogen 312    Lines/Drains/Tubes:  - Right internal jugular TLC 12/22  - L art line, placed 12/10  - R fem dialysis line 12/19  - Right pigtail chest tube    [facility] La [facility]ater  CC time 40 minutes exclusive of procedures    Subjective:  Pt intubated, minimal if any response to verbal stimuli, not appropriately following commands.     Objective:  Physical Exam:  Vent settings for last 24 hours:  Vent Mode: AC  FiO2 (%):  [40 %-45 %] 45 %  S RR:  [28] 28  S VT:  [520 mL] 520 mL  PEEP/CPAP (cm H2O):  [8 cm H2O] 8 cm H2O  Minute Ventilation (L/min):  [13.2 L/min-14.4 L/min] 13.2 L/min  PIP:  [24 cm H2O-27 cm H2O] 27 cm H2O  MAP (cm H2O):  [14-15] 15    /52   Pulse 85   Temp 97.1  F (36.2  C) (Axillary)   Resp 28   Ht 6' 2\" (1.88 m)   Wt (!) 261 lb 11 oz (118.7 kg)   SpO2 100%   BMI 33.60 kg/m      Intake/Output last 3 shifts:  I/O last 3 completed " shifts:  In: 2776.5 [I.V.:336.5; Blood:1000; NG/GT:1440]  Out: 50 [Chest Tube:50]  Intake/Output this shift:  I/O this shift:  In: 299 [I.V.:99; NG/GT:100; IV Piggyback:100]  Out: 60 [Chest Tube:60]    Physical Exam  General: NAD  Resp: coarse bilaterally  CV: atrial fibrillation, rate controlled  GI: abdomen flat, soft  : no solorzano  Neuro: pupils equal and reactive, minimal movement of extremities, eyes open, no tracking  Extremities: warm, well perfused.     Lab:  Results from last 7 days   Lab Units 01/05/21  0911 01/05/21  0305   LN-WHITE BLOOD CELL COUNT thou/uL  --  11.8*   LN-HEMOGLOBIN g/dL 7.2* 6.7*   LN-HEMATOCRIT %  --  20.2*   LN-PLATELET COUNT thou/uL  --  253     Results from last 7 days   Lab Units 01/05/21  0912 01/05/21  0305 01/04/21  0415 01/03/21  0415   LN-SODIUM mmol/L  --  134* 135* 135*   LN-POTASSIUM mmol/L  --  4.7 4.3 3.8   LN-CHLORIDE mmol/L  --  101 102 102   LN-CO2 mmol/L  --  16* 19* 20*   LN-BLOOD UREA NITROGEN mg/dL  --  166* 136* 110*   LN-CREATININE mg/dL  --  6.59* 5.75* 4.67*   LN-CALCIUM mg/dL  --  7.7* 7.6* 7.5*   LN-PROTEIN TOTAL g/dL 4.0*  --   --   --    LN-BILIRUBIN TOTAL mg/dL 0.5  --   --   --    LN-ALKALINE PHOSPHATASE U/L 48  --   --   --    LN-ALT (SGPT) U/L <9  --   --   --    LN-AST (SGOT) U/L 17  --   --   --        Radiology:  Echo Complete    Result Date: 12/16/2020    Technically difficult study due to poor acoustic windows and patient positioning.   Left ventricular ejection fraction is normal. The calculated left ventricular ejection fraction is 63%.   Normal right ventricular size and systolic function.   No hemodynamically significant valvular heart abnormalities.   Small circumferential pericardial effusion. No obvious echocardiographic evidence of cardiac tamponade.   No previous study for comparison.      Xr Chest 1 View Portable    Result Date: 1/2/2021  EXAM: XR CHEST 1 VIEW PORTABLE LOCATION: [facility] DATE/TIME: 1/2/2021  9:35 AM INDICATION: follow up pneumothorax, right COMPARISON: 12/26/2020.     Chest tube at the right apex is unchanged. No pneumothorax is identified. Endotracheal tube is approximately 5 cm above the emerita. Central venous catheter is in the superior vena cava near its junction with the right atrium. Bilateral airspace opacities consistent with bilateral pneumonia are again noted and not definitely changed from the prior study.    Xr Chest 1 View Portable    Result Date: 12/26/2020  EXAM: XR CHEST 1 VIEW PORTABLE LOCATION: [facility] DATE/TIME: 12/26/2020 8:45 AM INDICATION: ARDS, hypothermia COMPARISON: 12/23/2020     Endotracheal tube tip is at least 5.7 cm above the emerita. Feeding tube overlies the mediastinum with the tip below the inferior border of the image. There is an esophageal thermistor tip at the level of the clavicles, which can be advanced, depending on its intended clinical usage. Right internal jugular central line tip at the expected location of the cavoatrial junction. Left midline catheter tip projects over the scapula. There is a right apical pigtail chest tube. There is diffuse interstitial and airspace disease, but no large effusions or pneumothorax. Unchanged cardiac size. Aortic atherosclerosis.     Xr Chest 1 View Portable    Result Date: 12/23/2020  EXAM: XR CHEST 1 VIEW PORTABLE LOCATION: [facility] DATE/TIME: 12/23/2020 1:48 PM INDICATION: evaluate right PTX COMPARISON: 12/22/2020     Stable right chest tube, right internal jugular central venous catheter and 2 enteric tubes. No visible pneumothorax. No significant interval change in diffuse airspace opacities. Stable heart size.    Xr Chest 1 View Portable    Result Date: 12/22/2020  EXAM: XR CHEST 1 VIEW PORTABLE LOCATION: [facility] DATE/TIME: 12/22/2020 5:46 PM INDICATION: line placement COMPARISON: Portable AP view of the chest 12/22/2020 at 1049  hours     Satisfactory position of the endotracheal tube. Gastric and feeding tubes course below the diaphragmatic hiatus and outside the field-of-view. Right jugular approach central venous catheter terminates in the SVC. Right pigtail pleural drain is present. Crescentic lucency along the right diaphragmatic pleura reflects a small residual anterior pneumothorax. Unchanged multifocal bilateral airspace opacities consistent with infection/related inflammation.    Xr Chest 1 View Portable    Result Date: 12/22/2020  EXAM: XR CHEST 1 VIEW PORTABLE LOCATION: [facility] DATE/TIME: 12/22/2020 11:39 AM INDICATION: line placement COMPARISON: 11/20/2020     ET tube 3.5 cm from the emerita. A couple enteric tubes entering the stomach. Right chest tube with very tiny sliver of a right apical pneumothorax. Tiny left central line tip in the left axilla. Right PICC tip has a couple loops as it passes through the axillary and subclavian vein. This should be repositioned. Stable patchy bilateral pulmonary infiltrates. I will call the nurse's station with this report. NOTE: ABNORMAL REPORT THE DICTATION ABOVE DESCRIBES AN ABNORMALITY FOR WHICH FOLLOW-UP IS NEEDED. .    Xr Chest 1 View Portable    Result Date: 12/20/2020  EXAM: XR CHEST 1 VIEW PORTABLE LOCATION: [facility] DATE/TIME: 12/20/2020 7:57 PM INDICATION: chest tube, ETT placement COMPARISON: Earlier today at 1913 hours     A new or repositioned right pleural drain now projects in good position over the right lung apex. Resolution of the right pneumothorax. Moderate subcutaneous emphysema along right chest wall unchanged. ET tube and right PICC line remain in good position. Prominent mixed interstitial and alveolar infiltrates persist bilaterally. Heart size normal.    Xr Chest 1 View Portable    Result Date: 12/20/2020  EXAM: XR CHEST 1 VIEW PORTABLE LOCATION: [facility] DATE/TIME: 12/20/2020  7:27 PM INDICATION: chest tube placement COMPARISON: 12/20/2020 at 1759 hours     Extensive airspace infiltrates throughout both lungs, unchanged from earlier today. Endotracheal tube tip 1.5 cm above the emerita. Right PICC line tip mid SVC. Enteric tube extends into the stomach. Presumed left PICC line tip overlying the axilla, unchanged. Right chest tube is located within the subcutaneous tissues lateral to the chest wall, outside the chest cavity. Subcutaneous gas along the right chest wall. Tiny right apical pneumothorax.    Xr Chest 1 View Portable    Result Date: 12/20/2020  EXAM: XR CHEST 1 VIEW PORTABLE LOCATION: [facility] DATE/TIME: 12/20/2020 6:10 PM INDICATION: ARDS, please obtain while prone COMPARISON: 12/20/2020 at 1204 hours.     ET tube remains in good position with its tip approximately 2.5 cm above the emerita. Feeding tube extends at least into the stomach. Right PICC line in good position. Stable or slight increase in the prominent, mixed interstitial alveolar infiltrates present bilaterally. Heart size normal.    Xr Chest 1 View Portable    Result Date: 12/20/2020  EXAM: XR CHEST 1 VIEW PORTABLE LOCATION: [facility] DATE/TIME: 12/20/2020 12:19 PM INDICATION: ARDS COMPARISON: 12/16/2020     The endotracheal tube is 6.9 cm superior to the emerita. Enteric tube tip in the stomach. Stable bilateral PICC. No pneumothorax. Diffuse airspace disease, left greater than right, which is slightly decreased in the lung bases compared to prior. No pleural effusion. Normal heart size.    Xr Chest 1 View Portable    Result Date: 12/16/2020  EXAM: XR CHEST 1 VIEW PORTABLE LOCATION: [facility] DATE/TIME: 12/16/2020 12:35 PM INDICATION: confirm ET Tube position. COVID COMPARISON: 12/11/2020     Endotracheal tube tip 4.7 cm from emerita. Right PICC tip projects over the upper SVC. Left PICC projects over the axilla. The diffuse bilateral  pulmonary infiltrates appear worse with obscuration of the left heart border. No definite pneumothorax. No significant effusion seen.    Xr Chest 1 View Portable    Result Date: 12/11/2020  EXAM: XR CHEST 1 VIEW PORTABLE LOCATION: [facility] DATE/TIME: 12/11/2020 5:26 AM INDICATION: Confirm ETT placement. COMPARISON: 12/10/2020.     Endotracheal tube tip 3.5 cm above the emerita. Enteric tube tip below the diaphragm. Extensive diffuse bilateral pulmonary infiltrates. Heart size within normal limits. Remainder unremarkable.    Xr Chest 1 View Portable    Result Date: 12/10/2020  EXAM: XR CHEST 1 VIEW PORTABLE LOCATION: [facility] DATE/TIME: 12/10/2020 7:38 PM INDICATION: intubation, COVID pneumonia. COMPARISON: Film earlier today and older studies.     Endotracheal tube is in good position the mid trachea. Feeding tube can be seen coursing into the stomach. Right upper extremity PICC line catheter is in good position in the proximal SVC. No change in the moderate, bilateral interstitial and airspace opacities.    Xr Chest 1 View Portable    Result Date: 12/10/2020  EXAM: XR CHEST 1 VIEW PORTABLE LOCATION: [facility] DATE/TIME: 12/10/2020 10:53 AM INDICATION: increased O2 needs, COVID 19 COMPARISON: 12/06/2020     Right PICC tip projects over the upper SVC. Stable heart size. No effusion. Worsening bilateral lung infiltrates.    Xr Chest 1 View Portable    Result Date: 12/6/2020  EXAM: XR CHEST 1 VIEW PORTABLE LOCATION: [facility] DATE/TIME: 12/6/2020 5:26 PM INDICATION: Dyspnea/SOB. COMPARISON: 11/30/2020.     Interval development of bilateral interstitial and ground-glass opacities, likely to represent pneumonia in the proper clinical context, to include COVID, recommend follow-up to resolution. No pneumothorax. Cardiac silhouette mildly enlarged.  Tortuous atherosclerotic aorta.    Xr Abdomen Ap  Portable    Result Date: 12/24/2020  EXAM: XR ABDOMEN AP PORTABLE LOCATION: [facility] DATE/TIME: 12/24/2020 7:26 PM INDICATION: NJ placement COMPARISON: Chest x-ray 12/23/2020 and CT abdomen 12/23/2020     Enteric feeding tube tip located within the gastric lumen. Adjacent superimposed enteric suction tube with the tip in the gastric lumen. Nonobstructive bowel gas pattern.    Xr Abdomen Ap Portable    Result Date: 12/22/2020  EXAM: XR ABDOMEN AP PORTABLE LOCATION: [facility] DATE/TIME: 12/22/2020 5:47 PM INDICATION: feeding tube placement COMPARISON: Abdominal radiographs from earlier today at 1052 hours     Gastric tube is unchanged in position terminating in the stomach body. The feeding tube was repositioned, which extends to the distal stomach and then has an acute bend with parallel retrograde course of the tube now with tip in the proximal stomach directed retrograde. Tip of a right femoral catheter terminates over the low right sacrum. Nonobstructive bowel gas pattern.    Xr Abdomen Ap Portable    Result Date: 12/22/2020  EXAM: XR ABDOMEN AP PORTABLE LOCATION: [facility] DATE/TIME: 12/22/2020 11:40 AM INDICATION: feeding tube COMPARISON: None.     Feeding tube in the distal stomach near the pylorus. Additional enteric tube in the body the stomach.     Xr Abdomen Ap Portable    Result Date: 12/14/2020  EXAM: XR ABDOMEN AP PORTABLE LOCATION: [facility] DATE/TIME: 12/14/2020 3:32 PM INDICATION: feeding tube placement COMPARISON: 12/11/2020     The feeding tube tip is in the region of the gastric antrum. Bowel gas pattern is unremarkable. Partially consolidating opacities are noted in the right lower lobe laterally.    Xr Abdomen Ap Portable    Result Date: 12/11/2020  EXAM: XR ABDOMEN AP PORTABLE LOCATION: [facility] DATE/TIME: 12/11/2020 5:29 AM INDICATION: Check feeding tube  placement after being looped back upon itself. COMPARISON: None.     Feeding tube with tip in the right upper quadrant. Based upon its course and location, this likely lies within the proximal duodenum. Nonspecific visualized bowel gas pattern. Degenerative changes in the spine.     Ct Head Without Contrast    Result Date: 1/3/2021  EXAM: CT HEAD WO CONTRAST LOCATION: [facility] DATE/TIME: 1/3/2021 12:51 PM INDICATION: Confusion. Less responsive. Stroke follow-up. COMPARISON: 12/27/2020. TECHNIQUE: Routine CT Head without IV contrast. Multiplanar reformats. Dose reduction techniques were used. FINDINGS: INTRACRANIAL CONTENTS: No significant change in the evolving right posterior cerebral artery territory infarction. No significant mass effect or hemorrhagic transformation. No intracranial hemorrhage, extraaxial collection, or mass effect.  No acute large artery territory infarction. Mild presumed chronic small vessel ischemic changes. Mild generalized volume loss. No hydrocephalus. VISUALIZED ORBITS/SINUSES/MASTOIDS: No intraorbital abnormality. Decreased dependent fluid in the right maxillary sinus. Remaining paranasal sinuses are clear. Tympanomastoid cavities are clear. BONES/SOFT TISSUES: No acute abnormality.     1.  No new acute intracranial abnormality. 2.  Continued expected evolution of the right posterior cerebral artery territory infarction without complication.    Ct Head Without Contrast    Result Date: 12/27/2020  EXAM: CT HEAD WO CONTRAST LOCATION: [facility] DATE/TIME: 12/27/2020 10:33 AM INDICATION: Stroke, follow up CVA COMPARISON: CT head without contrast 12/23/2020. TECHNIQUE: Routine CT Head without IV contrast. Multiplanar reformats. Dose reduction techniques were used. FINDINGS: INTRACRANIAL CONTENTS: Again seen are changes associated with an evolving right PCA distribution infarct. No hemorrhagic transformation. No midline shift. Mild  presumed chronic small vessel ischemic changes. Mild to moderate generalized volume loss. No hydrocephalus. VISUALIZED ORBITS/SINUSES/MASTOIDS: No intraorbital abnormality. There fluid level in the right maxillary sinus. Scattered fluid/membrane thickening in the mastoid air cells bilaterally. BONES/SOFT TISSUES: No acute abnormality.     1.  Evolving subacute infarct in the right PCA vascular distribution without hemorrhagic transformation. 2.  Air-fluid level in the right maxillary sinus and scattered fluid/membrane thickening in the bilateral mastoid air cells.    Ct Head Without Contrast    Result Date: 12/23/2020  EXAM: CT HEAD WO CONTRAST LOCATION: [facility] DATE/TIME: 12/23/2020 9:14 AM INDICATION: Concern for stroke. COMPARISON: None. TECHNIQUE: Routine CT head without IV contrast. Multiplanar reformats. Dose reduction techniques were used. FINDINGS: INTRACRANIAL CONTENTS: Marked hypoattenuation obscuring the gray-white junction of the medial posterior right temporal lobe, as well as the medial and inferior right occipital lobe. This is compatible with a right PCA distribution infarct, with the overall degree of hypoattenuation favoring a subacute infarct. No intracranial hemorrhage associated with this focus of PCA distribution infarct. There is, however, a punctate hyperdense focus in the lateral right thalamus measuring up to 3 mm in diameter. While nonspecific, attention on follow-up imaging will be of benefit to exclude developing hemorrhage.  While edema partially effaces the atrium and occipital horn, there is no significant midline shift. Mild burden scattered chronic small vessel ischemic change. Ventricles are nondilated. Background mild to moderate diffuse parenchymal volume loss. Calcification of the distal internal carotid arteries bilaterally. Additional calcification along the falx. VISUALIZED ORBITS/SINUSES/MASTOIDS: No intraorbital abnormality. Air-fluid levels in  the bilateral maxillary sinuses, larger on the right. Additional air-fluid level in the right frontal sinus, with opacification in the anterior right ethmoid air cells. Fluid in the bilateral mastoid air cells. BONES/SOFT TISSUES: Deformity of the left nasal bone. In the absence of pain on palpation, this is favored to be chronic in nature.     1.  Right PCA distribution infarct with involvement of the medial posterior right temporal lobe as well as the medial and inferior right occipital lobe. Marked hypoattenuation obscuring the gray-white junction favors an overall subacute nature. No intracranial hemorrhage associated with this focus of infarction. 2.  3 mm punctate hyperdense focus in the lateral right thalamus is nonspecific and potentially relates to a small amount of mineralization. Punctate focus of acute intraparenchymal hemorrhage is possible, and attention on follow-up head CT will be of benefit. 3.  Background age-related changes, as above. 4.  Diffuse paranasal sinus disease with fluid opacification in the bilateral mastoid air cells. Findings and impression discussed with Morenita Barahona RN, by phone at 0935 hours on 12/23/2020.    Us Carotid Bilateral    Result Date: 12/25/2020  EXAM: US CAROTID BILATERAL LOCATION: [facility] DATE/TIME: 12/25/2020 9:49 AM INDICATION: Cerebrovascular accident. COMPARISON: None. TECHNIQUE: Duplex exam performed utilizing 2D gray-scale imaging, Doppler interrogation with color-flow and spectral waveform analysis. The percent diameter stenosis is determined using NASCET criteria and Society of Radiologists in Ultrasound Consensus Criteria. FINDINGS: Exam compromised from overlying IVs along the right neck and intubation tubing and limited patient movement. Portions of the right common carotid artery not seen. RIGHT: Mild-moderate plaque at the bifurcation. The peak systolic velocity in the ICA is less than 125 cm/sec. Normal velocities in the ECA.  "Vertebral artery not visualized. LEFT: Mild-moderate plaque at the bifurcation. The peak systolic velocity in the ICA is 125-230 cm/sec. Normal velocities in the ECA. Vertebral artery not visualized. VELOCITY CHART: Portions of the right common carotid artery not visualized, as above. CCA   Right: 50 cm/s   Left: 46 cm/s ICA   Right: 77 cm/s   Left: 84 cm/s ECA   Right: 64 cm/s   Left: 68 cm/s ICA/CCA PSV Ratio   Right: 1.54   Left: 1.83     1.  Portions of exam compromised as above. 2.  Mild-moderate plaque formation, velocities consistent with less than 50% stenosis in the right internal carotid artery. 3.  Mild-moderate plaque formation, velocities consistent with less than 50% stenosis in the left internal carotid artery by peak systolic velocity. The left ICA / CCA ratio falls within the 50-69% range. 4.  Vertebral arteries not seen.     Echo Limited W Doppler    Result Date: 12/28/2020    Left ventricular ejection fraction is normal. The calculated left ventricular ejection fraction is 71%.   Normal right ventricular size and systolic function.   No hemodynamically significant valvular heart abnormalities.   Trace pericardial effusion with no echocardiographic evidence of cardiac tamponade.   When compared to the previous study dated 12/16/2020, the pericardial effusion has decreased in size. Other findings are comparable.      Poc Us 3cg Picc Placement Guidance    Result Date: 12/9/2020  Exam was performed as guidance for PICC line insertion.  Click \"PACS images\" hyperlink below to view any stored images.  For specific procedure details, view procedure note authored by PICC/Vascular Access Nurse.    Ct Chest Abdomen Pelvis Without Oral With Iv Contrast    Result Date: 12/23/2020  EXAM: CT CHEST ABDOMEN PELVIS WO ORAL W IV CONTRAST LOCATION: [facility] DATE/TIME: 12/23/2020 9:17 AM INDICATION: Sepsis marked leukocytosis without source COMPARISON: 12/22/2020, 12/20/2020 and 12/16/2020 " abdominal and chest radiography. No prior CT. TECHNIQUE: CT scan of the chest, abdomen, and pelvis was performed following injection of IV contrast. Multiplanar reformats were obtained. Dose reduction techniques were used. CONTRAST: Iohexol (Omni) 100 mL FINDINGS: LUNGS AND PLEURA: Interval development large right tension pneumothorax with depression of the right hemidiaphragm and shift of the mediastinum and heart from right to left (but no significant right lung atelectasis) despite the presence of a well-positioned chest tube (with no visible kinking) in the upper right pleural space. No left pneumothorax or pneumomediastinum. No pleural fluid in either hemithorax. Trace amount of subcutaneous and intramuscular edema upper anterior right chest wall unchanged. Dense consolidation throughout the posterior segment right upper lobe with lesser involvement of the apical and anterior segments allowing for difference in technique unchanged. Innumerable additional smaller foci of consolidation, mild generalized hazy groundglass density opacity and interlobular septal thickening throughout both lungs also likely unchanged. MEDIASTINUM/AXILLAE: Endotracheal tube tip 3.5 cm above the emerita. Right IJ central venous catheter tip mid SVC. Benign calcified mediastinal and bilateral hilar lymph nodes now dave lymphadenopathy. Three-vessel coronary artery calcification. No pericardial effusion. Heart size appears to be within normal limits. Normal caliber thoracic aorta and central pulmonary arteries. No obvious central pulmonary arterial emboli with the remainder of the pulmonary arteries not well seen. HEPATOBILIARY: Mild diffuse hepatic steatosis. Liver otherwise normal. Cholelithiasis. No bile duct dilatation. PANCREAS: Normal. SPLEEN: Normal. ADRENAL GLANDS: Normal. KIDNEYS/BLADDER: Circumscribed round 2 cm low-attenuation mass mid to lower right kidney and a circumscribed 2 cm mildly complex mass with internal enhancement  or calcification arising exophytically from the lower pole left kidney. A 3 mm hyperdense focus related to the left posterolateral bladder wall is nonspecific and could be a small stone or focus of enhancement. Kidneys, ureters and bladder otherwise normal. BOWEL: Nasogastric tube tip proximal stomach in good position. Nasoenteric feeding tube extends to the gastroduodenal junction and reverses course with tip in the gastric fundus. No bowel obstruction or inflammatory change. No free fluid, free air or intra-abdominal abscess. LYMPH NODES: No lymphadenopathy. VASCULATURE: Moderate amount of calcified atheromatous plaque throughout the normal caliber abdominal aorta and at the origin of the renal and mesenteric arteries. Right common femoral vein catheter tip in the proximal external iliac vein PELVIC ORGANS: Normal. MUSCULOSKELETAL: Normal.     1.  Interval development large right tension pneumothorax with depression of the right hemidiaphragm and shift of the mediastinum and heart from right to left despite the presence of a well-positioned chest tube (with no visible kinking) in the upper right pleural space. Findings discussed with Morenita Barahona CNP 9:35 AM 12/23/2020. 2.  Dense consolidation posterior segment right upper lobe and innumerable additional smaller foci of consolidation, mild generalized hazy groundglass density opacity and interlobular septal thickening throughout both lungs appear unchanged. 3.  Cholelithiasis. 4.  Nasoenteric feeding tube extends to the gastroduodenal junction and reverses course with tip in the gastric fundus. 5.  A 3 mm hyperdense focus related to the left posterolateral bladder wall is nonspecific and could be a small stone or focus of enhancement. Recommend nonurgent CT urogram in several months. 6.  Bilateral 2 cm renal masses likely represent cysts. These could be evaluated at the same time as the nonurgent CT urogram. NOTE: ABNORMAL REPORT THE DICTATION ABOVE DESCRIBES AN  ABNORMALITY FOR WHICH FOLLOW-UP IS NEEDED.

## 2021-06-14 NOTE — PROGRESS NOTES
"ICU PROGRESS NOTE:      Assessment/Plan:  Felipe Cruz is a 74 year old male with a PMH of CAD, HTN, HLD, DM2, and BPH who was admitted to [facility] on 12/6/2020.  He was admitted to the general care floor. He had increased oxygenation needs, started on HFNC, and transferred to the ICU on 12/8. He was intubated for worsening hypoxia on 12/10. He self extubated on 12/10, required reintubation, and again self-extubated on 12/14. He was reintubated 12/16 for worsening hypoxia. Course complicated by anuric NICKY requiring CRRT, profound shock requiring vasopressors, enterobacter PNA, and CT head on 12/23 showed right PCA infarct. Abx course ended 1/1/21. Vaso pressors trending down, however now receiving HD and continues to appear fluid up. Intermittent neurologic responses.     Changes today:  - Consult to palliative pending, pt's wife has not been interested in talking with them in past  - Again with little to no response at bedside, fentanyl gtt weaned off this AM  - Decreased PEEP to 8 this AM  - BP labile     ADDENDUM:  Long discussion had with Yuridia. She is battling with herself on \"where to go from here\". She seemed to realize today that we may be causing him unnecessary suffering and mentioned would like to speak with palliative to help her sort through this process. I again brought up making him DNR, she stated she's leaning toward that but would like to discuss with palliative prior to making if official.     Neuro  Acute pain  Agitiation/anxiety  Delirium  Encephalopathy  Right PCA infarct - CT head 12/23  - Neurology following, appreciated  - Ceribell with non specific slowing with superimposed beta activity, no sharp or rhythmic discharges  - Repeat CT head 12/27 - Evolving infarct in right PCA, no hemorrhagic conversion  - Did not tolerate Precedex and Propofol previously   - Fentanyl weaned off this AM  - Scheduled oxycodone 10mg q 4 hours to assist with fentanyl wean   - RAAS " goal 0 to -1, following commands. Limit sedating medications as able  - Minimal response from patient at bedside, not following commands, intermittently will squeeze hands bilaterally    CV  Shock, septic  Tachycardia, resolved  Atrila fibrillation   - 12/16: Echo: EF > 63%, Normal RV And LV fn, normal valves. No Tamponade   - Continue norepinephrine for MAP >65, wean as tolerated. Vasopressin off 12/31  - Started midodrine 12/31  - Stress dose steroids restarted 12/20    Resp:  Acute hypoxic respiratory failure and ARDS 2/2 COVID19 and bacterial PNA   Right apical pneumothorax s/p pigtail insertion 12/20  Right tension pneumothorax on 12/23 with CT clamped for procedure - Resolved when placed back to suction  - Keep CT in place until no longer on PPV  - CT on w/s, + tidaling,  very small airleak noted, 110ml sliding scale output in 24hrs. If decompensates place back to suction.   - Continue lung protective ventilation, wean as tolerated  - Ventilation    Vent Mode: AC  FiO2 (%):  [40 %] 40 %  S RR:  [28] 28  S VT:  [520 mL] 520 mL  PEEP/CPAP (cm H2O):  [8 cm H2O-10 cm H2O] 8 cm H2O  Minute Ventilation (L/min):  [14.5 L/min-16.1 L/min] 16.1 L/min  PIP:  [25 cm H2O-30 cm H2O] 25 cm H2O  MAP (cm H2O):  [14-18] 14  - Consider PST as able  - Remain supinated given protracted course and proning no longer likely beneficial     GI/Nutrition  Severe protein calorie malnutrition   Diarrhea - C diff negative 12/22  Cholelithiasis - discovered on CT 12/23, non obstructive  Elevated LFTs - likely r/t shock possibly a component of Amiodarone. Alk phos and bili with normal parameters. CT abdomen completed 12/23 with no obstruction  - RD following   - protein BID  - bowel regimen on hold  -Concern for maroon colored stools several days ago. Likely related to rectal uler 2/2 to rectal tube. Heparin had been held and rectal tube removed. No bleeding noted today     Renal  Anuric NICKY   Persistent metabolic acidosis  Bilateral 2 cm  renal masses - found incidentally on CT  Bladder wall ? Stone 3mm in size - found incidentally on CT  - CRRT 12/18-12/28  - HD started 12/29 - TTHS schedule per renal, added HD on for tomorrow  - Anuric at this time  - Bladder scan PRN and straight cath if PVR > 250  - Per nephrology, pt poorly tolerating HD and are hesitant to restart CRRT given prognosis    ID  COVID 19   Klebsiella VAP- treated  Enterobacter cloacae VAP- active.   Septic shock  Severe Hypothermia  - WBC 12.7 (12.8)  - Profoundly hypothermic 12/26, 92 degrees. Culture with NGTD   - ID following. Appreciate cares and recommendations's  - Meropenum 14 day course for enterobacter PNA completed - ended 1/1/21      MICRO:  12/23 Fungitell negative.   12/22 c.diff negative  12/22 sputum gram stain with prelim 1+ GPB  12/22 BC with NGTD  12/19 fungitell (pending)  galactomannan antigen (negative)  12/19 BC x2 with NGTD  12/19 Sputum with Enterobacter cloacae (sensitive to Meropenem)  12/19 A BAL with GS, AFB, KOH, fungal culture were ordered. Very minimal secretions on bronch 12/19.   12/12 sputum: Klebsiella pneumoniae  12/12  Blood cultures- NGTD  12/11 urine NGTD  12/11 BC with NGTD  12/6 blood staph epi, contaminant     Antimicrobials:  12/16 zosyn 12/16-12/17  Ceftriaxone 12/17-12/19 12/19- 1/2 Meropenem   12/19- 12/23 Fluconazole  12/23 Fluconazole broadened to Micafungin     Other Workup:  12/23: CT C/A/P  12/23 lipase normal  12/23 No e/o ischemic limbs     COVID:  Completed remdesivir course   Decadron 6 mg IV X 10 days, remains on stress dose steroids      Endocrine  DM II  Stress and steroid induced hyperglycemia  HgbA1C on admit 8.2  - Lantus decreased from 50 to 45 units  - Resistant sliding scale insulin ordered    Heme:  Anemia of critical illness  Coagulopathy due to COVID -19  - Hemoglobin 7 this AM, give 1 U RBC with HD   - Resume Heparin gtt, low intensity.      Lines/Drains/Tubes:  - Right internal jugular TLC 12/22  - L art line,  "placed 12/10  - R fem dialysis line 12/19  - Right pigtail chest tube    Subjective:  Pt intubated, minimal if any response to verbal stimuli, not appropriately following commands.     Objective:  Physical Exam:  Vent settings for last 24 hours:  Vent Mode: AC  FiO2 (%):  [40 %] 40 %  S RR:  [28] 28  S VT:  [520 mL] 520 mL  PEEP/CPAP (cm H2O):  [8 cm H2O-10 cm H2O] 8 cm H2O  Minute Ventilation (L/min):  [14.5 L/min-16.1 L/min] 16.1 L/min  PIP:  [25 cm H2O-30 cm H2O] 25 cm H2O  MAP (cm H2O):  [14-18] 14    /69   Pulse (!) 103   Temp 98  F (36.7  C) (Axillary)   Resp (!) 29   Ht 6' 2\" (1.88 m)   Wt 210 lb 5.1 oz (95.4 kg)   SpO2 98%   BMI 27.00 kg/m      Intake/Output last 3 shifts:  I/O last 3 completed shifts:  In: 3152.2 [I.V.:1309.2; Blood:321; NG/GT:1472; IV Piggyback:50]  Out: 70 [Chest Tube:70]  Intake/Output this shift:  I/O this shift:  In: 215 [I.V.:10; NG/GT:205]  Out: 60 [Chest Tube:60]    Physical Exam      Lab:  Results from last 7 days   Lab Units 01/03/21  0415   LN-WHITE BLOOD CELL COUNT thou/uL 12.7*   LN-HEMOGLOBIN g/dL 7.7*   LN-HEMATOCRIT % 23.2*   LN-PLATELET COUNT thou/uL 243     Results from last 7 days   Lab Units 01/03/21  0415 01/02/21  0607 01/01/21  0540   LN-SODIUM mmol/L 135* 136 136   LN-POTASSIUM mmol/L 3.8 3.8 3.3*   LN-CHLORIDE mmol/L 102 104 104   LN-CO2 mmol/L 20* 17* 18*   LN-BLOOD UREA NITROGEN mg/dL 110* 145* 117*   LN-CREATININE mg/dL 4.67* 6.30* 5.00*   LN-CALCIUM mg/dL 7.5* 7.7* 7.7*       Radiology:  Echo Complete    Result Date: 12/16/2020    Technically difficult study due to poor acoustic windows and patient positioning.   Left ventricular ejection fraction is normal. The calculated left ventricular ejection fraction is 63%.   Normal right ventricular size and systolic function.   No hemodynamically significant valvular heart abnormalities.   Small circumferential pericardial effusion. No obvious echocardiographic evidence of cardiac tamponade.   No previous " study for comparison.      Xr Chest 1 View Portable    Result Date: 1/2/2021  EXAM: XR CHEST 1 VIEW PORTABLE LOCATION: [facility] DATE/TIME: 1/2/2021 9:35 AM INDICATION: follow up pneumothorax, right COMPARISON: 12/26/2020.     Chest tube at the right apex is unchanged. No pneumothorax is identified. Endotracheal tube is approximately 5 cm above the emerita. Central venous catheter is in the superior vena cava near its junction with the right atrium. Bilateral airspace opacities consistent with bilateral pneumonia are again noted and not definitely changed from the prior study.    Xr Chest 1 View Portable    Result Date: 12/26/2020  EXAM: XR CHEST 1 VIEW PORTABLE LOCATION: [facility] DATE/TIME: 12/26/2020 8:45 AM INDICATION: ARDS, hypothermia COMPARISON: 12/23/2020     Endotracheal tube tip is at least 5.7 cm above the emerita. Feeding tube overlies the mediastinum with the tip below the inferior border of the image. There is an esophageal thermistor tip at the level of the clavicles, which can be advanced, depending on its intended clinical usage. Right internal jugular central line tip at the expected location of the cavoatrial junction. Left midline catheter tip projects over the scapula. There is a right apical pigtail chest tube. There is diffuse interstitial and airspace disease, but no large effusions or pneumothorax. Unchanged cardiac size. Aortic atherosclerosis.     Xr Chest 1 View Portable    Result Date: 12/23/2020  EXAM: XR CHEST 1 VIEW PORTABLE LOCATION: [facility] DATE/TIME: 12/23/2020 1:48 PM INDICATION: evaluate right PTX COMPARISON: 12/22/2020     Stable right chest tube, right internal jugular central venous catheter and 2 enteric tubes. No visible pneumothorax. No significant interval change in diffuse airspace opacities. Stable heart size.    Xr Chest 1 View Portable    Result Date: 12/22/2020  EXAM: XR CHEST 1 VIEW  PORTABLE LOCATION: [facility] DATE/TIME: 12/22/2020 5:46 PM INDICATION: line placement COMPARISON: Portable AP view of the chest 12/22/2020 at 1049 hours     Satisfactory position of the endotracheal tube. Gastric and feeding tubes course below the diaphragmatic hiatus and outside the field-of-view. Right jugular approach central venous catheter terminates in the SVC. Right pigtail pleural drain is present. Crescentic lucency along the right diaphragmatic pleura reflects a small residual anterior pneumothorax. Unchanged multifocal bilateral airspace opacities consistent with infection/related inflammation.    Xr Chest 1 View Portable    Result Date: 12/22/2020  EXAM: XR CHEST 1 VIEW PORTABLE LOCATION: [facility] DATE/TIME: 12/22/2020 11:39 AM INDICATION: line placement COMPARISON: 11/20/2020     ET tube 3.5 cm from the emerita. A couple enteric tubes entering the stomach. Right chest tube with very tiny sliver of a right apical pneumothorax. Tiny left central line tip in the left axilla. Right PICC tip has a couple loops as it passes through the axillary and subclavian vein. This should be repositioned. Stable patchy bilateral pulmonary infiltrates. I will call the nurse's station with this report. NOTE: ABNORMAL REPORT THE DICTATION ABOVE DESCRIBES AN ABNORMALITY FOR WHICH FOLLOW-UP IS NEEDED. .    Xr Chest 1 View Portable    Result Date: 12/20/2020  EXAM: XR CHEST 1 VIEW PORTABLE LOCATION: [facility] DATE/TIME: 12/20/2020 7:57 PM INDICATION: chest tube, ETT placement COMPARISON: Earlier today at 1913 hours     A new or repositioned right pleural drain now projects in good position over the right lung apex. Resolution of the right pneumothorax. Moderate subcutaneous emphysema along right chest wall unchanged. ET tube and right PICC line remain in good position. Prominent mixed interstitial and alveolar infiltrates persist bilaterally.  Heart size normal.    Xr Chest 1 View Portable    Result Date: 12/20/2020  EXAM: XR CHEST 1 VIEW PORTABLE LOCATION: [facility] DATE/TIME: 12/20/2020 7:27 PM INDICATION: chest tube placement COMPARISON: 12/20/2020 at 1759 hours     Extensive airspace infiltrates throughout both lungs, unchanged from earlier today. Endotracheal tube tip 1.5 cm above the emerita. Right PICC line tip mid SVC. Enteric tube extends into the stomach. Presumed left PICC line tip overlying the axilla, unchanged. Right chest tube is located within the subcutaneous tissues lateral to the chest wall, outside the chest cavity. Subcutaneous gas along the right chest wall. Tiny right apical pneumothorax.    Xr Chest 1 View Portable    Result Date: 12/20/2020  EXAM: XR CHEST 1 VIEW PORTABLE LOCATION: [facility] DATE/TIME: 12/20/2020 6:10 PM INDICATION: ARDS, please obtain while prone COMPARISON: 12/20/2020 at 1204 hours.     ET tube remains in good position with its tip approximately 2.5 cm above the emerita. Feeding tube extends at least into the stomach. Right PICC line in good position. Stable or slight increase in the prominent, mixed interstitial alveolar infiltrates present bilaterally. Heart size normal.    Xr Chest 1 View Portable    Result Date: 12/20/2020  EXAM: XR CHEST 1 VIEW PORTABLE LOCATION: [facility] DATE/TIME: 12/20/2020 12:19 PM INDICATION: ARDS COMPARISON: 12/16/2020     The endotracheal tube is 6.9 cm superior to the emerita. Enteric tube tip in the stomach. Stable bilateral PICC. No pneumothorax. Diffuse airspace disease, left greater than right, which is slightly decreased in the lung bases compared to prior. No pleural effusion. Normal heart size.    Xr Chest 1 View Portable    Result Date: 12/16/2020  EXAM: XR CHEST 1 VIEW PORTABLE LOCATION: [facility] DATE/TIME: 12/16/2020 12:35 PM INDICATION: confirm ET Tube position.  COVID COMPARISON: 12/11/2020     Endotracheal tube tip 4.7 cm from emerita. Right PICC tip projects over the upper SVC. Left PICC projects over the axilla. The diffuse bilateral pulmonary infiltrates appear worse with obscuration of the left heart border. No definite pneumothorax. No significant effusion seen.    Xr Chest 1 View Portable    Result Date: 12/11/2020  EXAM: XR CHEST 1 VIEW PORTABLE LOCATION: [facility] DATE/TIME: 12/11/2020 5:26 AM INDICATION: Confirm ETT placement. COMPARISON: 12/10/2020.     Endotracheal tube tip 3.5 cm above the emerita. Enteric tube tip below the diaphragm. Extensive diffuse bilateral pulmonary infiltrates. Heart size within normal limits. Remainder unremarkable.    Xr Chest 1 View Portable    Result Date: 12/10/2020  EXAM: XR CHEST 1 VIEW PORTABLE LOCATION: [facility] DATE/TIME: 12/10/2020 7:38 PM INDICATION: intubation, COVID pneumonia. COMPARISON: Film earlier today and older studies.     Endotracheal tube is in good position the mid trachea. Feeding tube can be seen coursing into the stomach. Right upper extremity PICC line catheter is in good position in the proximal SVC. No change in the moderate, bilateral interstitial and airspace opacities.    Xr Chest 1 View Portable    Result Date: 12/10/2020  EXAM: XR CHEST 1 VIEW PORTABLE LOCATION: [facility] DATE/TIME: 12/10/2020 10:53 AM INDICATION: increased O2 needs, COVID 19 COMPARISON: 12/06/2020     Right PICC tip projects over the upper SVC. Stable heart size. No effusion. Worsening bilateral lung infiltrates.    Xr Chest 1 View Portable    Result Date: 12/6/2020  EXAM: XR CHEST 1 VIEW PORTABLE LOCATION: [facility] DATE/TIME: 12/6/2020 5:26 PM INDICATION: Dyspnea/SOB. COMPARISON: 11/30/2020.     Interval development of bilateral interstitial and ground-glass opacities, likely to represent pneumonia in the proper clinical  context, to include COVID, recommend follow-up to resolution. No pneumothorax. Cardiac silhouette mildly enlarged.  Tortuous atherosclerotic aorta.    Xr Abdomen Ap Portable    Result Date: 12/24/2020  EXAM: XR ABDOMEN AP PORTABLE LOCATION: [facility] DATE/TIME: 12/24/2020 7:26 PM INDICATION: NJ placement COMPARISON: Chest x-ray 12/23/2020 and CT abdomen 12/23/2020     Enteric feeding tube tip located within the gastric lumen. Adjacent superimposed enteric suction tube with the tip in the gastric lumen. Nonobstructive bowel gas pattern.    Xr Abdomen Ap Portable    Result Date: 12/22/2020  EXAM: XR ABDOMEN AP PORTABLE LOCATION: [facility] DATE/TIME: 12/22/2020 5:47 PM INDICATION: feeding tube placement COMPARISON: Abdominal radiographs from earlier today at 1052 hours     Gastric tube is unchanged in position terminating in the stomach body. The feeding tube was repositioned, which extends to the distal stomach and then has an acute bend with parallel retrograde course of the tube now with tip in the proximal stomach directed retrograde. Tip of a right femoral catheter terminates over the low right sacrum. Nonobstructive bowel gas pattern.    Xr Abdomen Ap Portable    Result Date: 12/22/2020  EXAM: XR ABDOMEN AP PORTABLE LOCATION: [facility] DATE/TIME: 12/22/2020 11:40 AM INDICATION: feeding tube COMPARISON: None.     Feeding tube in the distal stomach near the pylorus. Additional enteric tube in the body the stomach.     Xr Abdomen Ap Portable    Result Date: 12/14/2020  EXAM: XR ABDOMEN AP PORTABLE LOCATION: [facility] DATE/TIME: 12/14/2020 3:32 PM INDICATION: feeding tube placement COMPARISON: 12/11/2020     The feeding tube tip is in the region of the gastric antrum. Bowel gas pattern is unremarkable. Partially consolidating opacities are noted in the right lower lobe laterally.    Xr Abdomen Ap  Portable    Result Date: 12/11/2020  EXAM: XR ABDOMEN AP PORTABLE LOCATION: [facility] DATE/TIME: 12/11/2020 5:29 AM INDICATION: Check feeding tube placement after being looped back upon itself. COMPARISON: None.     Feeding tube with tip in the right upper quadrant. Based upon its course and location, this likely lies within the proximal duodenum. Nonspecific visualized bowel gas pattern. Degenerative changes in the spine.     Ct Head Without Contrast    Result Date: 12/27/2020  EXAM: CT HEAD WO CONTRAST LOCATION: [facility] DATE/TIME: 12/27/2020 10:33 AM INDICATION: Stroke, follow up CVA COMPARISON: CT head without contrast 12/23/2020. TECHNIQUE: Routine CT Head without IV contrast. Multiplanar reformats. Dose reduction techniques were used. FINDINGS: INTRACRANIAL CONTENTS: Again seen are changes associated with an evolving right PCA distribution infarct. No hemorrhagic transformation. No midline shift. Mild presumed chronic small vessel ischemic changes. Mild to moderate generalized volume loss. No hydrocephalus. VISUALIZED ORBITS/SINUSES/MASTOIDS: No intraorbital abnormality. There fluid level in the right maxillary sinus. Scattered fluid/membrane thickening in the mastoid air cells bilaterally. BONES/SOFT TISSUES: No acute abnormality.     1.  Evolving subacute infarct in the right PCA vascular distribution without hemorrhagic transformation. 2.  Air-fluid level in the right maxillary sinus and scattered fluid/membrane thickening in the bilateral mastoid air cells.    Ct Head Without Contrast    Result Date: 12/23/2020  EXAM: CT HEAD WO CONTRAST LOCATION: [facility] DATE/TIME: 12/23/2020 9:14 AM INDICATION: Concern for stroke. COMPARISON: None. TECHNIQUE: Routine CT head without IV contrast. Multiplanar reformats. Dose reduction techniques were used. FINDINGS: INTRACRANIAL CONTENTS: Marked hypoattenuation obscuring the gray-white  junction of the medial posterior right temporal lobe, as well as the medial and inferior right occipital lobe. This is compatible with a right PCA distribution infarct, with the overall degree of hypoattenuation favoring a subacute infarct. No intracranial hemorrhage associated with this focus of PCA distribution infarct. There is, however, a punctate hyperdense focus in the lateral right thalamus measuring up to 3 mm in diameter. While nonspecific, attention on follow-up imaging will be of benefit to exclude developing hemorrhage.  While edema partially effaces the atrium and occipital horn, there is no significant midline shift. Mild burden scattered chronic small vessel ischemic change. Ventricles are nondilated. Background mild to moderate diffuse parenchymal volume loss. Calcification of the distal internal carotid arteries bilaterally. Additional calcification along the falx. VISUALIZED ORBITS/SINUSES/MASTOIDS: No intraorbital abnormality. Air-fluid levels in the bilateral maxillary sinuses, larger on the right. Additional air-fluid level in the right frontal sinus, with opacification in the anterior right ethmoid air cells. Fluid in the bilateral mastoid air cells. BONES/SOFT TISSUES: Deformity of the left nasal bone. In the absence of pain on palpation, this is favored to be chronic in nature.     1.  Right PCA distribution infarct with involvement of the medial posterior right temporal lobe as well as the medial and inferior right occipital lobe. Marked hypoattenuation obscuring the gray-white junction favors an overall subacute nature. No intracranial hemorrhage associated with this focus of infarction. 2.  3 mm punctate hyperdense focus in the lateral right thalamus is nonspecific and potentially relates to a small amount of mineralization. Punctate focus of acute intraparenchymal hemorrhage is possible, and attention on follow-up head CT will be of benefit. 3.  Background age-related changes, as above.  4.  Diffuse paranasal sinus disease with fluid opacification in the bilateral mastoid air cells. Findings and impression discussed with Morenita Barahona RN, by phone at 0935 hours on 12/23/2020.    Us Carotid Bilateral    Result Date: 12/25/2020  EXAM: US CAROTID BILATERAL LOCATION: [facility] DATE/TIME: 12/25/2020 9:49 AM INDICATION: Cerebrovascular accident. COMPARISON: None. TECHNIQUE: Duplex exam performed utilizing 2D gray-scale imaging, Doppler interrogation with color-flow and spectral waveform analysis. The percent diameter stenosis is determined using NASCET criteria and Society of Radiologists in Ultrasound Consensus Criteria. FINDINGS: Exam compromised from overlying IVs along the right neck and intubation tubing and limited patient movement. Portions of the right common carotid artery not seen. RIGHT: Mild-moderate plaque at the bifurcation. The peak systolic velocity in the ICA is less than 125 cm/sec. Normal velocities in the ECA. Vertebral artery not visualized. LEFT: Mild-moderate plaque at the bifurcation. The peak systolic velocity in the ICA is 125-230 cm/sec. Normal velocities in the ECA. Vertebral artery not visualized. VELOCITY CHART: Portions of the right common carotid artery not visualized, as above. CCA   Right: 50 cm/s   Left: 46 cm/s ICA   Right: 77 cm/s   Left: 84 cm/s ECA   Right: 64 cm/s   Left: 68 cm/s ICA/CCA PSV Ratio   Right: 1.54   Left: 1.83     1.  Portions of exam compromised as above. 2.  Mild-moderate plaque formation, velocities consistent with less than 50% stenosis in the right internal carotid artery. 3.  Mild-moderate plaque formation, velocities consistent with less than 50% stenosis in the left internal carotid artery by peak systolic velocity. The left ICA / CCA ratio falls within the 50-69% range. 4.  Vertebral arteries not seen.     Echo Limited W Doppler    Result Date: 12/28/2020    Left ventricular ejection fraction is normal. The  "calculated left ventricular ejection fraction is 71%.   Normal right ventricular size and systolic function.   No hemodynamically significant valvular heart abnormalities.   Trace pericardial effusion with no echocardiographic evidence of cardiac tamponade.   When compared to the previous study dated 12/16/2020, the pericardial effusion has decreased in size. Other findings are comparable.      Poc Us 3cg Picc Placement Guidance    Result Date: 12/9/2020  Exam was performed as guidance for PICC line insertion.  Click \"PACS images\" hyperlink below to view any stored images.  For specific procedure details, view procedure note authored by PICC/Vascular Access Nurse.    Ct Chest Abdomen Pelvis Without Oral With Iv Contrast    Result Date: 12/23/2020  EXAM: CT CHEST ABDOMEN PELVIS WO ORAL W IV CONTRAST LOCATION: [facility] DATE/TIME: 12/23/2020 9:17 AM INDICATION: Sepsis marked leukocytosis without source COMPARISON: 12/22/2020, 12/20/2020 and 12/16/2020 abdominal and chest radiography. No prior CT. TECHNIQUE: CT scan of the chest, abdomen, and pelvis was performed following injection of IV contrast. Multiplanar reformats were obtained. Dose reduction techniques were used. CONTRAST: Iohexol (Omni) 100 mL FINDINGS: LUNGS AND PLEURA: Interval development large right tension pneumothorax with depression of the right hemidiaphragm and shift of the mediastinum and heart from right to left (but no significant right lung atelectasis) despite the presence of a well-positioned chest tube (with no visible kinking) in the upper right pleural space. No left pneumothorax or pneumomediastinum. No pleural fluid in either hemithorax. Trace amount of subcutaneous and intramuscular edema upper anterior right chest wall unchanged. Dense consolidation throughout the posterior segment right upper lobe with lesser involvement of the apical and anterior segments allowing for difference in technique unchanged. " Innumerable additional smaller foci of consolidation, mild generalized hazy groundglass density opacity and interlobular septal thickening throughout both lungs also likely unchanged. MEDIASTINUM/AXILLAE: Endotracheal tube tip 3.5 cm above the emerita. Right IJ central venous catheter tip mid SVC. Benign calcified mediastinal and bilateral hilar lymph nodes now dave lymphadenopathy. Three-vessel coronary artery calcification. No pericardial effusion. Heart size appears to be within normal limits. Normal caliber thoracic aorta and central pulmonary arteries. No obvious central pulmonary arterial emboli with the remainder of the pulmonary arteries not well seen. HEPATOBILIARY: Mild diffuse hepatic steatosis. Liver otherwise normal. Cholelithiasis. No bile duct dilatation. PANCREAS: Normal. SPLEEN: Normal. ADRENAL GLANDS: Normal. KIDNEYS/BLADDER: Circumscribed round 2 cm low-attenuation mass mid to lower right kidney and a circumscribed 2 cm mildly complex mass with internal enhancement or calcification arising exophytically from the lower pole left kidney. A 3 mm hyperdense focus related to the left posterolateral bladder wall is nonspecific and could be a small stone or focus of enhancement. Kidneys, ureters and bladder otherwise normal. BOWEL: Nasogastric tube tip proximal stomach in good position. Nasoenteric feeding tube extends to the gastroduodenal junction and reverses course with tip in the gastric fundus. No bowel obstruction or inflammatory change. No free fluid, free air or intra-abdominal abscess. LYMPH NODES: No lymphadenopathy. VASCULATURE: Moderate amount of calcified atheromatous plaque throughout the normal caliber abdominal aorta and at the origin of the renal and mesenteric arteries. Right common femoral vein catheter tip in the proximal external iliac vein PELVIC ORGANS: Normal. MUSCULOSKELETAL: Normal.     1.  Interval development large right tension pneumothorax with depression of the right  hemidiaphragm and shift of the mediastinum and heart from right to left despite the presence of a well-positioned chest tube (with no visible kinking) in the upper right pleural space. Findings discussed with Morenita Barahona CNP 9:35 AM 12/23/2020. 2.  Dense consolidation posterior segment right upper lobe and innumerable additional smaller foci of consolidation, mild generalized hazy groundglass density opacity and interlobular septal thickening throughout both lungs appear unchanged. 3.  Cholelithiasis. 4.  Nasoenteric feeding tube extends to the gastroduodenal junction and reverses course with tip in the gastric fundus. 5.  A 3 mm hyperdense focus related to the left posterolateral bladder wall is nonspecific and could be a small stone or focus of enhancement. Recommend nonurgent CT urogram in several months. 6.  Bilateral 2 cm renal masses likely represent cysts. These could be evaluated at the same time as the nonurgent CT urogram. NOTE: ABNORMAL REPORT THE DICTATION ABOVE DESCRIBES AN ABNORMALITY FOR WHICH FOLLOW-UP IS NEEDED.         Total Critical Care Time : 1 Hour      Woodrow Oseguera PA-C

## 2021-06-14 NOTE — PROGRESS NOTES
Pt breathing asynchronous with the ventilator. Very irregular. Versed gtt resumed at 1 mg hour for now. Hypothermia resolving. See flowsheet.

## 2021-06-14 NOTE — CONSULTS
[facility]      Assessment /Plan:    Right PCA stroke  Unresponsiveness, concerning for more significant brain injury  Clearly he remains critically ill with COVID-19 syndrome.    Will check further work-up including EEG via cerebellum  Will review lab studies and add any that may be helpful for ruling out specific issues  Certainly not stable enough to go for MRI  Repeat CT would not be fruitful at this point.    The right PCA infarct would cause a left visual field deficit and possibly some sensory deficits and ataxia on the left side, it would not explain his current unresponsiveness.  Would continue anticoagulation with heparin (thrombotic events including stroke are frequently seen with COVID-19).  Echocardiogram on December 16 shows ejection fraction of 63%, no wall motion abnormality  Hold on further stroke work-up for now.    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  This will complicate and prolong any possible recovery.    Discussed with patient's wife here at the bedside today.      Subjective:    I saw Red for neurologic evaluation at [facility].  This is a 74-year-old man who was admitted here on December 6 with a 1 week history of increasing shortness of breath, cough, fatigue, nausea.  He tested positive for COVID-19.  He was intubated a couple times on December 10 and again on the 16th and he remains intubated now.  He continues to be unresponsive off of sedation, not on paralytics.  Sedation is being used to help stabilize vitals.  Currently on Versed and fentanyl.    CT scan of the head was done yesterday and shows a right PCA infarct, no other acute changes.  CT of the chest at the same time showed tension pneumothorax on the right, notes indicate this developed with the transport.  CO2 is okay, but oxygenation continues to be a problem.  He continues to require vasopressors as  "well.      Objective:      /52 (Patient Position: Semi-mueller)   Pulse (!) 126   Temp 98.6  F (37  C) (Esophageal)   Resp (!) 32   Ht 6' 2\" (1.88 m)   Wt (!) 228 lb 13.4 oz (103.8 kg)   SpO2 96%   BMI 29.38 kg/m       Supine in bed, no acute distress, breathing comfortably  Sclera are anicteric, no conjunctival injection  Oropharynx shows no lesions or exudate  No wheezing  Heart has regular rate and rhythm, no rub  Abdomen is soft and nontender  Skin shows no bruising or rash  Fingers of the left hand missing past PIP joints     Intubated, sedated in ICU  Cranial nerves II - XII pupils 3 mm, nonreactive, no nystagmus, gaze conjugate  No doll's eyes seen  Neck is supple, no bruits  No voluntary or involuntary movement  No withdrawal to pain, no response to voice  Reflexes are absent throughout  Coordination testing cannot be done        Patient Active Problem List   Diagnosis     Pneumonia due to 2019 novel coronavirus     Acute respiratory failure with hypoxia (H)     Acute kidney injury (H)     BPH with urinary obstruction     Chronic pain disorder     Diabetes mellitus (H)     Hypertension     Primary osteoarthritis of right hip     Primary osteoarthritis of right knee     Acute respiratory distress syndrome (ARDS) due to COVID-19 virus (H)     Secondary spontaneous pneumothorax       Current Facility-Administered Medications   Medication Dose Route Frequency Provider Last Rate Last Admin     acetaminophen 500 mg/15.62 mL solution 1,000 mg (TYLENOL)  1,000 mg Enteral Tube Q8H PRN Nicci Gilbert MD         albuterol inhaler 2 puff (PROAIR HFA;PROVENTIL HFA;VENTOLIN HFA)  2 puff Inhalation Q4H PRN Yemi Marmolejo MD   2 puff at 12/08/20 0317     amino acids-protein hydrolys 15-60 gram-kcal/30 mL packet 1 packet (ProSource No Carb)  1 packet Enteral Tube BID Nicci Gilbert MD   1 packet at 12/24/20 0814     [START ON 12/30/2020] amiodarone tablet 200 mg (PACERONE)  200 mg Oral DAILY " Morenita Barahona CNP         amiodarone tablet 400 mg (PACERONE)  400 mg Oral BID Morenita Barahona CNP   400 mg at 12/24/20 0813     ascorbic acid (vitamin C) tablet 1,000 mg (VITAMIN C)  1,000 mg Enteral Tube DAILY Nunu Umanzor CNP   1,000 mg at 12/24/20 0813     aspirin chewable tablet 81 mg  81 mg Enteral Tube DAILY Nunu Umanzor CNP   81 mg at 12/24/20 0814     bacitracin ointment packet 1 packet  1 packet Topical Once PRN Nunu Umanzor CNP         bisacodyL suppository 10 mg (DULCOLAX)  10 mg Rectal Daily PRN Yemi Marmolejo MD         calcium gluconate 100 mg/mL (10%) 2 g in sodium chloride 0.9% 100 mL  2 g Intravenous PRN Krystal Licea  mL/hr at 12/24/20 0209 2 g at 12/24/20 0209     calcium gluconate 100 mg/mL (10%) 4 g in sodium chloride 0.9% 250 mL  4 g Intravenous PRN Krystal Licea MD         chlorhexidine 0.12 % solution 15 mL (PERIDEX)  15 mL Topical Q12H Nunu Umanzor CNP   15 mL at 12/24/20 0814     CRRT premixed replacement solution (Phoxillum 4/2.5)  1,700 mL/hr CRRT Continuous Krystal Licea MD 1,700 mL/hr at 12/24/20 1636 1,700 mL/hr at 12/24/20 1636     CRRT premixed replacement solution (Phoxillum 4/2.5)  300 mL/hr CRRT Continuous Krystal Licea  mL/hr at 12/24/20 1435 300 mL/hr at 12/24/20 1435     CRRT premixed replacement solution (Phoxillum 4/2.5)  2,500 mL/hr Per Dialyzer Continuous Catrina Ly MD 2,500 mL/hr at 12/24/20 1655 2,500 mL/hr at 12/24/20 1655     dextrose 10%  80 mL/hr Intravenous Continuous PRN Nicci Gilbert MD         dextrose 50 % (D50W) syringe 20-50 mL  20-50 mL Intravenous Q15 Min PRN Yemi Marmolejo MD         docusate sodium 100 mg/10 mL oral liquid 100 mg (COLACE)  100 mg Enteral Tube BID Nicci Gilbert MD         fentaNYL 2500 mcg/50 mL CADD infusion (50 mcg/mL)   mcg/hr Intravenous Continuous Nunu Umanzor CNP 4 mL/hr at 12/24/20 1600 200 mcg/hr at 12/24/20 1600     fentaNYL pf injection  mcg (SUBLIMAZE)    mcg Intravenous Q1H PRN Nunu Umanzor CNP   50 mcg at 12/17/20 2110     glucagon (human recombinant) injection 1 mg  1 mg Subcutaneous Q15 Min PRN Yemi Marmolejo MD         heparin 25,000 units in 0.45% sodium chloride (100 units/ml) 250 mL ANTICOAGULANT infusion  0-5,000 Units/hr Intravenous Continuous Morenita Barahona CNP 16.5 mL/hr at 12/24/20 1600 1,650 Units/hr at 12/24/20 1600     heparin ANTICOAGULANT injection 2,000 Units  2,000 Units Intracatheter PRN Krystal Licea MD   2,000 Units at 12/23/20 0754     hydrALAZINE injection 10-20 mg (APRESOLINE)  10-20 mg Intravenous Q6H PRN Nunu Umanzor CNP   20 mg at 12/15/20 0538     hydrocortisone sod succ (PF) injection 50 mg  50 mg Intravenous Q6H FIXED TIMES Mary Harrell CNP   50 mg at 12/24/20 1117     insulin aspart U-100 injection pen (NovoLOG)   Subcutaneous Q4H FIXED TIMES Nicci Gilbert MD   6 Units at 12/23/20 1147     insulin NPH injection 55 Units (NovoLIN)  55 Units Subcutaneous BID Morenita Barahona CNP   55 Units at 12/24/20 0813     labetaloL injection 20 mg (NORMODYNE,TRANDATE)  20 mg Intravenous Q6H PRN Nunu Umanzor CNP   20 mg at 12/13/20 2216     lactulose 20 gram/30 mL solution 20 g (ENULOSE)  20 g Enteral Tube TID Deandre Fontana CNP   20 g at 12/24/20 1432     lipase-protease-amylase 5,000-17,000- 24,000 unit capsule 2 capsule (ZENPEP)  2 capsule Enteral Tube PRN Nunu Umanzor CNP   2 capsule at 12/22/20 2208    And     sodium bicarbonate tablet 325 mg  325 mg Enteral Tube PRN Nunu Umanzor CNP         magnesium hydroxide suspension 30 mL (MILK OF MAG)  30 mL Enteral Tube Daily PRN Nunu Umanzor CNP         magnesium sulfate in water 2 gram/50 mL (4 %) IVPB 2 g  2 g Intravenous PRN Krystal Licea MD         magnesium sulfate in water 4 gram/50 mL (8 %) IVPB 4 g  4 g Intravenous PRN Krystal Licea MD         meropenem 1 g in NaCl 0.9 % 50 mL (MINI-BAG Plus) (MERREM)  1 g Intravenous Q8H de La Mater,  Mary,  mL/hr at 12/24/20 1117 1 g at 12/24/20 1117     midazolam (PF) injection 1-2 mg (VERSED)  1-2 mg Intravenous Q2H PRN Nunu Umanzor CNP         midazolam 100 mg/100 mL (1 mg/mL) infusion (VERSED)  0.25-15 mg/hr Intravenous Continuous Luis Antonio Kerns MD 7 mL/hr at 12/24/20 1600 7 mg/hr at 12/24/20 1600     naloxone injection 0.2 mg (NARCAN)  0.2 mg Intravenous Q2 Min PRN Yemi Marmolejo MD        Or     naloxone injection 0.2 mg (NARCAN)  0.2 mg Intramuscular Q2 Min PRN Yemi Marmolejo MD        Or     naloxone injection 0.4 mg (NARCAN)  0.4 mg Intravenous Q2 Min PRN Yemi Marmolejo MD        Or     naloxone injection 0.4 mg (NARCAN)  0.4 mg Intramuscular Q2 Min PRN Yemi Marmolejo MD         norepinephrine 16 mg/250 ml in NS (0.064 mg/ml) - CENTRAL  0.01-0.4 mcg/kg/min Intravenous Continuous Nunu Umanzor CNP 17.3 mL/hr at 12/24/20 1600 0.17 mcg/kg/min at 12/24/20 1600     omeprazole capsule 20 mg (PriLOSEC)  20 mg Oral QAM AC Nunu Umanzor CNP        Or     omeprazole suspension 20 mg (PriLOSEC)  20 mg Enteral Tube QAM Nunu Bazan CNP   20 mg at 12/20/20 0751    Or     pantoprazole 40 mg injection  40 mg Intravenous QAM Nunu Bazan CNP   40 mg at 12/24/20 0812     ondansetron 4 mg/5 mL solution 8 mg (ZOFRAN)  8 mg Enteral Tube Q8H PRN Rao Lawrence MD        Or     ondansetron injection 4 mg (ZOFRAN)  4 mg Intravenous Q4H PRN Rao Lawrence MD         polyethylene glycol packet 17 g (MIRALAX)  17 g Enteral Tube BID PRN Arjun Sampson CNP   17 g at 12/23/20 0733     polyvinyl alcohol 1.4 % ophthalmic solution 1-2 drop (LIQUIFILM TEARS)  1-2 drop Both Eyes Q1H PRN Nunu Umanzor CNP         potassium chloride in water 20 mEq IVPB central IV  20 mEq Intravenous PRN Krystal Licea MD         potassium chloride in water 20 mEq IVPB central IV  20 mEq Intravenous PRN Krystal Licea MD         potassium chloride in water 20 mEq IVPB central IV  20 mEq  Intravenous PRKrystal Velez MD         povidone-iodine 10 % external solution 118.3 mL (BETADINE)  1 application Topical Krystal Tijerina MD         sennosides syrup 8.8 mg (for SENOKOT)  8.8 mg Enteral Tube BID Nicci Gilbert MD         sodium chloride 0.9% 1-1,000 mL  1-1,000 mL CRRT Krystal Tijerina MD         sodium chloride 0.9%  10 mL/hr Intravenous Continuous Her-Oscar Chappell PA-C 10 mL/hr at 12/24/20 1600 10 mL/hr at 12/24/20 1600     sodium chloride bacteriostatic 0.9 % injection 1-5 mL  1-5 mL Intradermal Once PRN Nunu Umanzor CNP         sodium chloride flush 10-30 mL (NS)  10-30 mL Intravenous PRN Nunu Umanzor CNP         sodium chloride flush 10-30 mL (NS)  10-30 mL Intravenous Q8H FIXED TIMES Nunu Umanzor CNP   10 mL at 12/24/20 1400     sodium chloride flush 20 mL (NS)  20 mL Intravenous PRN Nunu Umanzor CNP         sodium phosphate 15 mmol in sodium chloride 0.9% 250 mL  15 mmol Intravenous PRN Krystal Licea MD         sodium phosphate 20 mmol in sodium chloride 0.9% 250 mL  20 mmol Intravenous PRKrystal Velez MD         vasopressin standard infusion 20 units in  mL  2.4 Units/hr Intravenous Continuous [place], Shaggy Dean MD 12 mL/hr at 12/24/20 1600 2.4 Units/hr at 12/24/20 1600     vit B comp no.3-folic-C-biotin 1- mg-mg-mcg tablet 1 tablet (NEPHRO-PO)  1 tablet Enteral Tube DAILY Morenita Barahona CNP   1 tablet at 12/24/20 0813     white petrolatum-mineral oiL 56.8-42.5 % ophthalmic ointment (REFRESH LACRI-LUBE)   Both Eyes Q8H Morenita Barahona CNP   1 application at 12/24/20 1200       History reviewed. No pertinent past medical history.    Social History     Socioeconomic History     Marital status:      Spouse name: Not on file     Number of children: Not on file     Years of education: Not on file     Highest education level: Not on file   Occupational History     Not on file   Social Needs     Financial resource strain: Not on file     Food  insecurity     Worry: Not on file     Inability: Not on file     Transportation needs     Medical: Not on file     Non-medical: Not on file   Tobacco Use     Smoking status: Not on file   Substance and Sexual Activity     Alcohol use: Not on file     Drug use: Not on file     Sexual activity: Not on file   Lifestyle     Physical activity     Days per week: Not on file     Minutes per session: Not on file     Stress: Not on file   Relationships     Social connections     Talks on phone: Not on file     Gets together: Not on file     Attends [employer] service: Not on file     Active member of club or organization: Not on file     Attends meetings of clubs or organizations: Not on file     Relationship status: Not on file     Intimate partner violence     Fear of current or ex partner: Not on file     Emotionally abused: Not on file     Physically abused: Not on file     Forced sexual activity: Not on file   Other Topics Concern     Not on file   Social History Narrative     Not on file       History reviewed. No pertinent family history.   Unknown Father       Unknown Mother             No Known Allergies    Review of Systems - unable to obtain due to unresponsiveness

## 2021-06-14 NOTE — PROGRESS NOTES
12/31/20 0739   Patient Data   Vt Exp (mL) 428 mL   Minute Ventilation (L/min) 14.5 L/min   Total Resp Rate  30 BPM   PIP Observed (cm H2O) 25 cm H2O   MAP (cm H2O) 14   Auto/Intrinsic PEEP Observed (cm H2O) 1.5 cm H2O   Plateau Pressure (cm H2O) 12 cm H2O   Dynamic Compliance (L/cm H2O) 37.6 L/cm H2O   Airway Resistance 7.1   SpO2 97 %

## 2021-06-14 NOTE — PROGRESS NOTES
12/23/20 0708   Patient Data   PIP Observed (cm H2O) 32 cm H2O   MAP (cm H2O) 22   Auto/Intrinsic PEEP Observed (cm H2O) 1.6 cm H2O   Plateau Pressure (cm H2O)   (unable )   Dynamic Compliance (L/cm H2O) 38.4 L/cm H2O   Airway Resistance 6.2

## 2021-06-14 NOTE — PROGRESS NOTES
12/27/20 0327   Patient Data   Total Resp Rate  36 BPM   PIP Observed (cm H2O) 32 cm H2O   MAP (cm H2O) 21   Plateau Pressure (cm H2O) 27 cm H2O

## 2021-06-14 NOTE — PROGRESS NOTES
RENAL PROGRESS NOTE      CHIEF COMPLAINT: NICKY, COVID-19 infection        ASSESSMENT/PLAN:  Acute kidney injury - With normal underlying renal function and now with hemodynamic NICKY/ATN. Started dialysis 12/18. S/p CRRT , then transitioned to intermittent hemodialysis last week.    -We've had quite a bit of difficult with tolerating dialysis due to intradialytic hypotension and only able to get minimal UF. No signs of renal recovery. Serum creatinine is trending up between dialysis treatments. Remains anuric.   -Last HD 01/02/20. Trial of UF run today. Consider restart CRRT if unable to tolerate iHD.   -I spoke with patient's spouse Yuridia Cruz over the phone. Multiple questions were answered to satisfaction    Access: right femoral CVC catheter placed 17 days ago. No reported issues.   -we will plan tunneled catheter placement by IR this week    Mild hyponatremia -serum sodium is stable at 135 this am. Could be secondary to hypervolemia. Monitor      Hypervolemia - Net 12L positive since admission. Wt is trending up  -UF with dialysis has been difficult with hypotension. Try to remove 2 kgs with UF as tolerated.  -daily weight  -strict I/O     COVID-19 infection - With respiratory failure. On ventilator since 12/10. S/p 5 days of remdesivir and 10 days of dexamethasone. Course has been complicated by right sided pneumothorax     Anemia - Hgb is Drifting down, s/p PRBCs . Transfusions as per ICU team     Hypotension - Remains on Norepi drip and is on midodrine 10 mg three times a day as well     Nutrition - On tube feeds on renal formulation. Phosphorus is acceptable at 5.6     DVT prophylaxis - On heparin drip     Coronary artery disease/ atrial fibrillation - On anticoagulation     Diabetes mellitus - On insulin     Right PCA CVA - Followed by neurology     Disposition - Palliative care is following, input appreciated    Discussed with ICU team    We will continue to follow  Gale Sotomayor MD  [facility]  [phone].    SUBJECTIVE:   No acute issues. Patient remains intubated. On Norepi drip. Minimal response to verbal stimuli.  Patient was seen at bedside and events were reviewed with RN.    OBJECTIVE:    Vital signs in last 24 hours  Temp:  [97.7  F (36.5  C)-99.6  F (37.6  C)] 97.7  F (36.5  C)  Heart Rate:  [] 84  Resp:  [15-45] 23  Arterial Line BP: (108-176)/(46-70) 126/50  FiO2 (%):  [40 %] 40 %  Weight:   (!) 223 lb 5.2 oz (101.3 kg)    Intake/Output last 3 shifts  I/O last 3 completed shifts:  In: 1923.2 [I.V.:446.2; NG/GT:1477]  Out: 120 [Chest Tube:120]  Intake/Output this shift:  I/O this shift:  In: 344.8 [I.V.:74.8; NG/GT:270]  Out: 0     PHYSICAL EXAM:  Gen: NAD, Intubated and supine  HEENT: ETT and NJ in place  Lungs: CTA b/l in anterior fields  CV: RRR  Abd soft  Ext : warm , non pitting edema of the upper extremities, trace edema of the lower extremities b/l  :No Funk catheter  Skin no rash  Neuro: eyes are open, moves upper extremities spontaneously , dose not follow commands      Pertinent Labs   Lab Results   Component Value Date    WBC 10.0 01/04/2021    HGB 7.4 (L) 01/04/2021    HCT 22.6 (L) 01/04/2021    MCV 97 01/04/2021     01/04/2021     Lab Results   Component Value Date    CREATININE 5.75 (H) 01/04/2021     (H) 01/04/2021     (L) 01/04/2021    K 4.3 01/04/2021     01/04/2021    CO2 19 (L) 01/04/2021       Lab Results   Component Value Date    ALBUMIN 2.4 (L) 12/26/2020     Lab Results   Component Value Date    CALCIUM 7.6 (L) 01/04/2021    PHOS 5.6 (H) 01/04/2021         Katie Roche PA-C  [facility]  [phone]

## 2021-06-14 NOTE — PROGRESS NOTES
[facility] COVID RT PROGRESS NOTE    DATA:    VENT DAY#  14    CURRENT SETTINGS: A/C  VENT SETTINGS   32/520/16        FIO2:  50%    PATIENT PARAMETERS:    PIP 30  Pplat:  29  Pmean:  22  SBT: None  SECRETIONS:  None   02 SATS:  97    ETT SIZE 8.5  Secured at  26 cm at teeth    Securing device changed / tube re-taped date.     Respiratory Medications: none     ABG:         NOTE / PLAN:   To remain on full ventilatory support for the night.12/25/2020  .Guillermo Marshall

## 2021-06-14 NOTE — PROGRESS NOTES
12/31/20 0157   Patient Data   Vt Exp (mL) 506 mL   Minute Ventilation (L/min) 13 L/min   Total Resp Rate  28 BPM   PIP Observed (cm H2O) 28 cm H2O   MAP (cm H2O) 18   Auto/Intrinsic PEEP Observed (cm H2O) 1.6 cm H2O   Plateau Pressure (cm H2O) 26 cm H2O   Dynamic Compliance (L/cm H2O) 33.6 L/cm H2O   Airway Resistance 7.4

## 2021-06-14 NOTE — PROCEDURES
[facility]    Procedure: TDC placement    Date/Time: 1/5/2021 2:40 PM  Performed by: Wayne Aragon MD  Authorized by: Wayne Aragon MD       Universal Protocol    Site marked: Yes    Prior images obtained and reviewed: Yes    Required items: required blood products, implants, devices, and special equipment available    Patient identity confirmed: arm band, provided demographic data, hospital-assigned identification number and anonymous protocol, patient vented/unresponsive    Reevaluation: Patient was reevaluated immediately before administering moderate or deep sedation or anesthesia    Confirmation checklist: patient's identity using two indicators, relevant allergies, procedure was appropriate and matched the consent or emergent situation and correct equipment/implants were available    Time out: Immediately prior to procedure a time out was called to verify the correct patient, procedure, equipment, support staff and site/side marked as required    Universal Protocol: Joint Commission Universal Protocol was followed    Preparation: Patient was prepped and draped in the usual sterile fashion    ESBL (mL): 5    Anesthesia    Local anesthesia used?: Yes    Anesthesia: local infiltration    Local anesthetic: lidocaine 1% without epinephrine    Anesthetic total (mL): 10    Sedation    Patient sedation: Yes    Sedation type: moderate (conscious) sedation    Sedation: midazolam, fentanyl and see MAR for details    Vital signs: Vital signs monitored during sedation    Post-procedure    Description of procedure: Sucessful placement of a left IJV TDC HD catheter.  Tip at the right atrium.  Cath is flushed and ready to use.    Patient tolerance: Patient tolerated the procedure well with no immediate complications and patient tolerated the procedure well with no immediate complications   Length of time physician present for 1:1 monitoring during sedation: 15

## 2021-06-14 NOTE — PLAN OF CARE
Problem: Mechanical Ventilation  Goal: Patient will maintain patent airway  Outcome: Progressing     Problem: Mechanical Ventilation  Goal: ET tube will be managed safely  Outcome: Progressing      SJ COVID RT PROGRESS NOTE    DATA:  VENT DAY#  18    CURRENT SETTINGS:   AC 28, 520, +8, 40%    PATIENT PARAMETERS:    PIP:   24-26  Pplat:  18-23  Pmean:  14    SBT:   Planned vent wean was held per RN request d/t pt receiving dialysis.    SECRETIONS:   Pt sx'd x3 by RN for sm amts of pink-tinged, py secretions.    02 SATS:   100%    ETT SIZE 8.5  Secured at  25 cm at teeth    Securing device changed / tube re-taped date:   12/29    Respiratory Medications:   N/A     ABG:  Results for GISELLE VILLANUEVA (MRN [id number])   1/4/2021 04:17   pH, Arterial 7.42   pCO2, Arterial 30 (L)   pO2, Arterial 100 (H)   Bicarbonate, Arterial Calc 21.1 (L)   O2 Sat, Arterial 99.2 (H)   Oxyhemoglobin 96.9 (H)   POC Base Excess Calc -4.6   Ventilation Mode AC   Peep 8   Rate 28   Vt 520   FIO2 0.40   P/F ratio 250     NOTE / PLAN:   Continue resp support as needed, monitor closely, and wean as tolerated.

## 2021-06-14 NOTE — PROGRESS NOTES
Renal progress note   CC: ARF  Assessment and Plan:  74 y.o. male     1. ARF: pt has normal underlying renal function; now hemodynamic ARF/ATN and has progressive rise in CR; anuria and on CRRT since 12/19.  severe acidosis a little better but requiring inc CRRT dose and large amounts of extra base.  Agree with stopping bicarb drip and follow labs. Cont CRRT.  2. Volume status up but hypotension limiting UF and no urine. Plan try UF as shock permits. Pressor requirement slightly decreased  3. COVID 19 infection with resp failure; on vent with full support; on broad abx/ fluconazole and lines changed (klebsiella in sputum); on veletri  4. Hypernatremia; better on CRRT  5. Acidosis;resp and metabolica;on oral and IV supple bicarb and CRRT    6. Anemia; no active loss; following hgb  7. HoTN; slightly reduced pressor need.  8. Nutrition; on TF; renal formula  9. DVT prophylaxis; on heparin gtt  10. CAD, hyperlipid; stable co-morbitdities  11. DM; on insulin gtt    Updated his wife yesterday.   Cont CRRT today.   Catrina Ly MD  [facility]  [phone]      Subjective  On vent, paralyzed and proned, on levo vaso    No urine   CRRT started 12/18     Now on high intensity heparin gtt and system ok   Large NG output  Went for CT earlier and developed tension PTX and has CT    CT showed infiltrates and prob bilat renal cysts and ?bladder stone  Got IV contrast today       Objective    Vital signs in last 24 hours  Temp:  [97.1  F (36.2  C)-99.1  F (37.3  C)] 97.9  F (36.6  C)  Heart Rate:  [] 103  Resp:  [14-45] 32  BP: (105-135)/(49-58) 127/54  Arterial Line BP: ()/() 135/63  FiO2 (%):  [45 %-55 %] 45 %  Weight:   (!) 229 lb 0.9 oz (103.9 kg)    Intake/Output last 3 shifts  I/O last 3 completed shifts:  In: 6829.2 [I.V.:5501.7; NG/GT:755; IV Piggyback:572.5]  Out: 8141 [Emesis/NG output:405; Other:7616; Chest Tube:120]  Intake/Output this shift:  I/O this shift:  In: 893.8  [I.V.:783.8; NG/GT:110]  Out: 714 [Other:714]    Physical Exam  Seen at bedside.  Intubated and supine  Lungs decreased  Cor Reg on tele  abd soft  Ext warm and mild edema  Skin no rash        Pertinent Labs   Lab Results   Component Value Date    WBC 37.8 (HH) 12/23/2020    WBC 37.6 (HH) 12/23/2020    HGB 10.0 (L) 12/23/2020    HCT 31.2 (L) 12/23/2020    HCT 31.6 (L) 12/23/2020    MCV 95 12/23/2020    MCV 97 12/23/2020     12/23/2020     Lab Results   Component Value Date    CREATININE 1.22 12/23/2020    BUN 24 12/23/2020     (L) 12/23/2020    K 4.9 12/23/2020     12/23/2020    CO2 21 (L) 12/23/2020       Lab Results   Component Value Date    ALBUMIN 2.0 (L) 12/23/2020     Lab Results   Component Value Date    CALCIUM 8.0 (L) 12/23/2020    PHOS 4.6 (H) 12/23/2020     I reviewed all lab results  Catrina Ly

## 2021-06-14 NOTE — PROGRESS NOTES
01/06/21 0153   Patient Data   PIP Observed (cm H2O) 27 cm H2O   MAP (cm H2O) 15   Plateau Pressure (cm H2O) 24 cm H2O   Dynamic Compliance (L/cm H2O) 41 L/cm H2O   Airway Resistance 10

## 2021-06-14 NOTE — PROGRESS NOTES
"Wound Ostomy  WOC Assessment        Allergies:  No Known Allergies    Diagnosis:   Patient Active Problem List    Diagnosis Date Noted     Encephalopathy      Acute right PCA stroke (H)      Secondary spontaneous pneumothorax      Acute respiratory distress syndrome (ARDS) due to COVID-19 virus (H) 12/10/2020     Acute respiratory failure with hypoxia (H)      Acute kidney injury (H)      Pneumonia due to 2019 novel coronavirus 12/06/2020     Diabetes mellitus (H) 06/24/2019     Hypertension 06/24/2019     Chronic pain disorder 03/27/2019     Primary osteoarthritis of right hip 01/26/2018     Primary osteoarthritis of right knee 01/26/2018     BPH with urinary obstruction 11/15/2017       Height:  6' 2\" (1.88 m)    Weight:   208 lb 5.4 oz (94.5 kg)    Labs:  Recent Labs     12/30/20  0400   HGB 8.2*       Mode:  Mode Scale Score: 12    Specialty Bed:  [place]    Wound culture obtained: No    Edema:  Not assessed today    Anatomic Site/Laterality: Body    Reason for ongoing care:   Skin integrity and plan of care    Encounter Type:  Subsequent Encounter Wound Type:   Body    Patient noted to have several areas of injury to face. L upper eyelid/eye brow with red discoloration measuring 1 cm x 2 cm; bottom lip with multiple areas of dried drainage across lip; and B ears noted to have scattered areas of dried drainage R>L. Keep open to air and routine oral cares.    Prone Positioning recommendations:    Prior  Apply Mepilex sacral dressings for protection if pt has bony prominences (shoulders, iliac crest) - may leave in place when not proned.  Ensure tongue is in the mouth, remove bite block for pressure injury prevention.  Ensure no lines/tubes/drains (as much as able) will be under patient when prone.    Prone position  Use pillows or Z maxx pads to off load pressure to bony prominences.  Position head so that ears are not folded and so that head/neck are aligned.  Reposition head every one hour.  Use swim " position per protocol for positioning when supine.    Prone Skin Folds  Use Interdry under pannus/breasts prior to placing patient in prone position. This needs to lay flat (do not bunch up under fold)   If an antifungal is needed: obtain order for miconazole powder from provider and use flat pillowcase in place of Interdry.     Mepilex dressings to cheeks when in prone position - may leave in place when not proned. Change every 5 days or sooner if soiled.    Prevention measures for mucosal integrity:  1. No bite block when proning. (rationale - the hard plastic will leave the patient at greater risk for pressure injury)  2. Hourly head reposition and moisturizer to exposed portion of tongue (moisturizer which comes with the oral care kits). (rationale - remove pressure to tongue/lips and face and maintain mucosal integrity for the tongue)  3. Use tongue blade when repositioning ETT, so tongue doesn t move along with the tube. (rationale - to ensure pressure is not on the same area of the tongue, even though the ETT is technically repositioned)     Nursing care provided was - none at this time.    Discussed plan of care with patient as able.    Outcomes and treatment recommendations are to promote skin integrity.    Actions taken by WOC RN: Orders entered.    Planned Follow Up: Weekly.    Plan for next visit: Reassess skin integrity.

## 2021-06-14 NOTE — PROCEDURES
HemodialysisTreatment    Pre wt.  118.7kg   Post wt 117.7kg post wt     Pre tx access assessment: pcad, patent, clean, dry and intact without s/sx of infection,      Post tx access assessment: CVC was locked with heparin and capped and labeled per policy    BVP: 52.6    Treatment Summary: Pt completed 3 hour HD tx, lines reversed and BFR decreased to 300 due to arterial spasms, K3 bath used due to a fib, 1 L removed, primary RN was updated    Interventions: VS q15min, mariaelena Sotelo, RN

## 2021-06-14 NOTE — PROGRESS NOTES
12/29/20 1315   Patient Data   Vt Exp (mL) 463 mL   Minute Ventilation (L/min) 14.2 L/min   Total Resp Rate  31 BPM   PIP Observed (cm H2O) 33 cm H2O   MAP (cm H2O) 19   Auto/Intrinsic PEEP Observed (cm H2O) 2.2 cm H2O   Plateau Pressure (cm H2O) 27 cm H2O   Dynamic Compliance (L/cm H2O) 19.4 L/cm H2O   Airway Resistance 6.9   SpO2 99 %   Heart Rate (!) 101

## 2021-06-14 NOTE — PLAN OF CARE
Patient on ventilator tolerating well. No acute changes throughout the shift. See flowsheet and MAR for all activities of the shift. Will continue to monitor and report to oncoming shift.     Problem: Pain  Goal: Patient's pain/discomfort is manageable  Outcome: Progressing     Problem: Safety  Goal: Patient will be injury free during hospitalization  Outcome: Progressing     Problem: Daily Care  Goal: Daily care needs are met  Outcome: Progressing     Problem: Psychosocial Needs  Goal: Demonstrates ability to cope with hospitalization/illness  Outcome: Progressing  Goal: Collaborate with patient/family/caregiver to identify patient specific goals for this hospitalization  Outcome: Progressing     Problem: Discharge Barriers  Goal: Patient's discharge needs are met  Outcome: Progressing     Problem: Glucose Imbalance  Goal: Achieve optimal glucose control  Outcome: Progressing     Problem: Knowledge Deficit  Goal: Patient/family/caregiver demonstrates understanding of disease process, treatment plan, medications, and discharge instructions  Outcome: Progressing     Problem: Potential for Compromised Skin Integrity  Goal: Skin integrity is maintained or improved  Outcome: Progressing  Goal: Nutritional status is improving  Outcome: Progressing     Problem: Inadequate Gas Exchange  Goal: Patient will achieve/maintain normal respiratory rate/effort  Outcome: Progressing     Problem: Ineffective Airway Clearance  Goal: Maintain airway patency  Outcome: Progressing     Problem: Impaired Gas Exchange  Goal: Demonstrate improved ventilation and adequate oxygenation of tissues as evidenced by absence of respiratory distress  Outcome: Progressing     Problem: Breathing  Goal: Patient will maintain patent airway  Outcome: Progressing     Problem: Knowlegde Deficit  Goal: Verbalize understanding of condition/disease process and treatment, participate in lifestyle changes and treatment regimen  Outcome: Progressing     Problem:  Risk for Infection  Goal: Identify and demonstrate techniques, lifestyle changes to prevent/reduce risk of infection and promote safe environment  Outcome: Progressing     Problem: Potential for Falls  Goal: Patient will remain free of falls  Outcome: Progressing     Problem: Cognitive Impairment or Disorientation  Goal: Patient will maintain or return to normal baseline cognitive function  Outcome: Progressing     Problem: Decreased Mental Status Causing Increased Need for Safety  Goal: Decrease patient's symptoms  Outcome: Progressing  Goal: To ensure patient safety, patient will abstain from any threats or actions to harm self or others  Outcome: Progressing     Problem: Mechanical Ventilation  Goal: Patient will maintain patent airway  Outcome: Progressing  Goal: Oral health is maintained or improved  Outcome: Progressing  Goal: Respiratory status - ventilation  Description: Movement of air in and out of the lungs.    Liberate from ventilator  Outcome: Progressing  Goal: ET tube will be managed safely  Outcome: Progressing  Goal: Ability to express needs and understand communication  Outcome: Progressing  Goal: Mobility/activity is maintained at optimum level for patient  Outcome: Progressing     Problem: Potential for transmission of organism by Contact, Enteric, Droplet and/or Airborne routes  Goal: Prevent transmission of organisms  Outcome: Progressing

## 2021-06-14 NOTE — PROGRESS NOTES
RENAL PROGRESS NOTE      CHIEF COMPLAINT: NICKY, COVID-19 infection        ASSESSMENT/PLAN:  Acute kidney injury - With normal underlying renal function and now with hemodynamic NICKY/ATN. Started dialysis 12/18. S/p CRRT , then transitioned to intermittent hemodialysis last week.    -We've had quite a bit of difficult with tolerating dialysis due to intradialytic hypotension and only able to get minimal UF. No signs of renal recovery. Serum creatinine continues trending up between dialysis treatments. Remains anuric.   -Last HD 01/06/20 as per TTS schedule . Patient is scheduled for UF run today.   -incontinent of urine. Daily bladder scan to monitor for signs of renal recovery    Access: left IV TDC placed by IR 01/05, lines were reveres and BFR was decreased to 00 ml/h due to arterial spasm.    Electrolytes:  Mild hyponatremia -serum sodium is trending down to 131 this am. Could be secondary to hypervolemia. Should improve with RRT. Monitor  Normals serum potassium of 4.1.      Hyperphosphatemia-serum phosphorus is trending down to 5.9 this am. Con't Sevelamer 8000 mg three times a day via NG tube    Acid base status-ABG this am consistent with respiratory alkalosis and concomitant metabolic acidosis. PH 7.48, pCO2 30, HCO3 19.      Hypervolemia - Net 17L positive since admission but I/O are not accurate. No UF recorded on 12/30-01/4.   Wt is trending down this am  -UF with dialysis has been difficult with hypotension. Daily UF. Try to remove 2-3 kgs with UF as tolerated. Keep MAP>65  -daily weight  -strict I/O     COVID-19 infection - With respiratory failure. On ventilator since 12/10 s/p tracheostomy 01/05. S/p 5 days of remdesivir and 10 days of dexamethasone. Course has been complicated by right sided pneumothorax. Management as per ICU team      Anemia - s/p PRBCs . Hgb is trending up this am. Transfusions as per ICU team     Hypotension - Started on Vasopressin drip in addition to Norepi drip last night.  Remains on midodrine 10 mg three times a day as well    GI bleed-possible transfer to [facility] for endoscopy.     Nutrition - alb 1.8. On tube feeds on renal formulation.      DVT prophylaxis - Off heparin drip due to GI bleed and bleeding in mouth.     Coronary artery disease/ atrial fibrillation     Diabetes mellitus - On insulin     Right PCA CVA - Followed by neurology     Disposition - Palliative care is following, input appreciated    Discussed with ICU team    We will continue to follow  Gale Sotomayor MD  [facility]  [phone].    SUBJECTIVE:   Underwent TDC placement by IR. Underwent tracheostomy placement. Tolerated HD w/o issues,1000 ml removed with UF. Became hypotensive and started on Vasoppressin in addition to Norepi drip. Continues to have kellie stools.  Patient was seen at bedside and events were reviewed with RN. Patient is awake, follows commands. Possible transfer to [facility] for endoscopy.    OBJECTIVE:    Vital signs in last 24 hours  Temp:  [97.3  F (36.3  C)-99.3  F (37.4  C)] 99.3  F (37.4  C)  Heart Rate:  [] 81  Resp:  [14-61] 28  BP: (108-175)/(52-78) 175/73  Arterial Line BP: ()/(42-78) 138/52  FiO2 (%):  [45 %-80 %] 50 %  Weight:   (!) 253 lb 8.5 oz (115 kg)    Intake/Output last 3 shifts  I/O last 3 completed shifts:  In: 1059.9 [I.V.:239.9; NG/GT:720; IV Piggyback:100]  Out: 1070 [Other:1000; Chest Tube:70]  Intake/Output this shift:  I/O this shift:  In: 129 [I.V.:29; NG/GT:100]  Out: -     PHYSICAL EXAM:  Gen: NAD,supine  HEENT: trac in place,  NJ in place, dry blood in mouth  Lungs: Coarse breath sounds b/l in anterior fields  CV: RRR  Abd soft, non tender, non distended  Ext : warm , non pitting edema dorsum of both hands, trace edema of the lower extremities b/l  :No Funk catheter, incontinent of urine  Skin no rash  Neuro: eyes are open,  follows commands      Pertinent Labs   Lab Results   Component Value Date    WBC 10.7  01/06/2021    HGB 8.7 (L) 01/06/2021    HCT 25.8 (L) 01/06/2021    MCV 92 01/06/2021     01/06/2021     Lab Results   Component Value Date    CREATININE 4.77 (H) 01/06/2021     (H) 01/06/2021     (L) 01/06/2021    K 4.1 01/06/2021    CL 97 (L) 01/06/2021    CO2 19 (L) 01/06/2021       Lab Results   Component Value Date    ALBUMIN 1.8 (L) 01/05/2021     Lab Results   Component Value Date    CALCIUM 7.3 (L) 01/06/2021    PHOS 5.9 (H) 01/06/2021         Katie Roche PA-C  [facility]  [phone]

## 2021-06-14 NOTE — PLAN OF CARE
Neuro: Patient currently sedated on fentanyl and versed. See flowsheet for titrations, titrated for RASS -3. Pupils equal and reactive, CPOT 0. Furrows brow and moves jaw in response to painful stimuli. No other movement noted. Remains synchronous with the ventilator primarily but will overbreathe if stimulated. No spontaneous movements noted and does not move extremities to painful stimuli. Plan to wean as tolerating. Lack of rectal tone noted at 0400 assessment.     Resp: 8.5 ET tube, 25 at the teeth. PEEP 16, , FiO2 at 60%. Suction and mouth care q 2 hourly, mouth dry with no oral secretions . Small thick, tan secretions from ET tube. AM ABGs critical results. Reported to MD. FIO2 increased from 0.45 to 0.6. Orders received to not recheck and instead follow the [id number] trends.   ABG:  Recent Labs     12/24/20  0507   PHART 7.36*   OXYHB 79.5*   BEARTCALC -4.9   POCPEEP 16   TEMP 37.0   POCRATE 32        CV: Afib with PVCs, heartrate variable, BP 90-150s systolic, titrating Levophed  for MAPs >65. Extremities cool, with weak palpable pulses throughout. Vasopressin at 2.4 units/min,       GI: OGT being used for tube feedings which were restarted at a trickle rate of 10mL/hr. BS hypoactive in all quadrants, bowel regimen in place. Large BM x1     : Patient anuric. CRRT continues. HD catheter remains positional. See CRRT flowsheet for details. Bladderscan Qshift showed 18mL in bladder     Endo:  BS 100s,  Acu-checks q4H, insulin via sliding scale and Novolin two times a day.     Skin: Areas of denuded skin on left cheek. Wound care consulted. Turn and reposition for comfort as patient tolerates. Mepilex remains in place to coccyx area.    Restraints discontinued due to patient's neurological status. Long conversation regarding patient's plan of care with wife Yuridia. Visit to be set up in future to see patient.       Problem: Pain  Goal: Patient's pain/discomfort is manageable  Outcome: Progressing      Problem: Safety  Goal: Patient will be injury free during hospitalization  Outcome: Progressing     Problem: Daily Care  Goal: Daily care needs are met  Outcome: Progressing     Problem: Glucose Imbalance  Goal: Achieve optimal glucose control  Outcome: Progressing     Problem: Potential for Compromised Skin Integrity  Goal: Skin integrity is maintained or improved  Outcome: Progressing     Problem: Urinary Incontinence  Goal: Perineal skin integrity is maintained or improved  Outcome: Progressing     Problem: Inadequate Gas Exchange  Goal: Patient will achieve/maintain normal respiratory rate/effort  Outcome: Progressing     Problem: Ineffective Airway Clearance  Goal: Maintain airway patency  Outcome: Progressing     Problem: Impaired Gas Exchange  Goal: Demonstrate improved ventilation and adequate oxygenation of tissues as evidenced by absence of respiratory distress  Outcome: Progressing     Problem: Breathing  Goal: Patient will maintain patent airway  Outcome: Progressing     Problem: Risk for Infection  Goal: Identify and demonstrate techniques, lifestyle changes to prevent/reduce risk of infection and promote safe environment  Outcome: Progressing     Problem: Potential for Falls  Goal: Patient will remain free of falls  Outcome: Progressing     Problem: Decreased Mental Status Causing Increased Need for Safety  Goal: Provide a safe environment for patient (Implement appropriate elements in plan of care)  Outcome: Progressing  Goal: Decrease patient's symptoms  Outcome: Progressing  Goal: To ensure patient safety, patient will abstain from any threats or actions to harm self or others  Outcome: Progressing     Problem: Mechanical Ventilation  Goal: Patient will maintain patent airway  Outcome: Progressing  Goal: Oral health is maintained or improved  Outcome: Progressing  Goal: Respiratory status - ventilation  Description: Movement of air in and out of the lungs.    Liberate from ventilator  Outcome:  Progressing  Goal: ET tube will be managed safely  Outcome: Progressing     Problem: Potential for transmission of organism by Contact, Enteric, Droplet and/or Airborne routes  Goal: Prevent transmission of organisms  Outcome: Progressing     Problem: Psychosocial Needs  Goal: Demonstrates ability to cope with hospitalization/illness  Outcome: Not Progressing  Goal: Collaborate with patient/family/caregiver to identify patient specific goals for this hospitalization  Outcome: Not Progressing     Problem: Knowledge Deficit  Goal: Patient/family/caregiver demonstrates understanding of disease process, treatment plan, medications, and discharge instructions  Outcome: Not Progressing     Problem: Potential for Compromised Skin Integrity  Goal: Nutritional status is improving  Outcome: Not Progressing     Problem: Breathing  Goal: Patient will utilize incentive spirometer  Outcome: Not Progressing     Problem: Knowlegde Deficit  Goal: Verbalize understanding of condition/disease process and treatment, participate in lifestyle changes and treatment regimen  Outcome: Not Progressing     Problem: Cognitive Impairment or Disorientation  Goal: Patient will maintain or return to normal baseline cognitive function  Outcome: Not Progressing     Problem: Mechanical Ventilation  Goal: Ability to express needs and understand communication  Outcome: Not Progressing  Goal: Mobility/activity is maintained at optimum level for patient  Outcome: Not Progressing     Problem: Discharge Barriers  Goal: Patient's discharge needs are met  Outcome: Not Applicable this Shift

## 2021-06-14 NOTE — PROGRESS NOTES
[facility]    ICU PROGRESS NOTE:  DOS:  01/01/2021    Patient Summary:   Felipe Cruz is a 74 year old male with a PMH of CAD, HTN, HLD, DM2, and BPH who was admitted to [facility] on 12/6/2020.  He was admitted to the general care floor. He had increased oxygenation needs, started on HFNC, and transferred to the ICU on 12/8. He was intubated for worsening hypoxia on 12/10. He self extubated on 12/10, required reintubation, and again self-extubated on 12/14. He was reintubated 12/16 for worsening hypoxia. Course complicated by anuric NICKY requiring CRRT and now profound shock vasopressors, enterobacter PNA.       Major Changes for Today    Albumin 25g 25%  Schedule oxycodone   Restart heparin infusion, low dose without loading bolus   Increase lantus dosing to 50 U       Assessment/Plan:  NEUROLOGIC:  # acute pain  # agitation/anxiety  # delirium  # encephalopathy   - Did not tolerate Precedex and Propofol previously   - Fentanyl @ 25 mcg/hr-- restarted 12/28 due to tachypnea   -Start scheduled oxycodone 10mg q 4 hours. Wean fentanyl as able   - RAAS goal 0 to -1, following commands.Limit sedating medications as able      # Right PCA infarct  Demonstrated on CTH 12/23 with concern for possible right thalamus hypodensity, potential 3 mm hemorrhagic transformation within the right PCA distribution  - Neurology consulted, appreciate neurology following   - Ceribell with non specific slowing with superimposed beta activity. No sharp or rhythmic discharges.   - repeat CT head 12/27/20:  Evolving infarct, in right PCA, no hemorrhagic conversion  - Carotid US with 50-50% bilateral ICA stenosis     HEMODYNAMICS/CV:  #Shock, septic  #Tachycrdia, resolved   # Atrial fibrillation   - 12/16: Echo: EF > 63%, Normal RV And LV fn, normal valves. No Tamponade   - Continue norepinephrine for MAP >65. Vasopressin off 12/31  - Started midodrine 12/31  - Stress dose steroids restarted  12/20       RESPIRATORY:  # Acute hypoxic respiratory failure and ARDS 2/2 COVID19 and bacterial PNA   # Right apical pneumothorax s/p pigtail insertion 12/20  # Right tension pneumothorax on 12/23 with CT clamped for procedure. Resolved when placed back to suction. Continue CT to suction for now. Keep CT in place until no longer on PPV  - Continue lung protective ventilation  Vent Mode: AC  FiO2 (%):  [40 %] 40 %  S RR:  [28] 28  S VT:  [520 mL] 520 mL  PEEP/CPAP (cm H2O):  [10 cm H2O] 10 cm H2O  Minute Ventilation (L/min):  [13.2 L/min-15.5 L/min] 13.2 L/min  PIP:  [24 cm H2O-33 cm H2O] 25 cm H2O  MAP (cm H2O):  [13-15] 15  - Consider PST as able   - Leave supinated given protracted course proning no longer likely beneficially - Off inhaled  Veletri     GI:  # Severe protein calorie malnutrition   # Diarrhea-- C diff negative 12/22  #Cholelithiasis discovered on CT 12/23, non obstructive  #Elevated LFTs, likely r/t shock possibly a component of Amiodarone. Alk phos and bili with normal parameters. CT abdomen completed 12/23 with no obstruction  - RD following.   - protein BID  - bowel regimen on hold. Remove rectal tube concerns of skin integrity.  -Concern for maroon colored stools several days ago. Likely related to rectal uler 2/2 to rectal tube. Heparin had been held and rectal tube removed. No current signs of active bleeding noted.      RENAL:  # Anuric NICKY   # Persistent metabolic acidosis  - CRRT 12/18--12/28. iHD today run 12/29. TTHS schedule per renal   - Electrolytes balanced. Anuric at this time.   - Bladder scan PRN and straight cath if PVR > 250.      # Bilateral 2 cm renal masses incidentally found on CT, recommend follow up at later time  # Bladder wall ? Stone 3mm in size incidentally found on CT. Recommended follow up      ID:  # COVID 19   # Klebsiella VAP- treated  # Enterobacter cloacae VAP- active.   # Septic shock  # Severe Hypothermia  -WBC:  40-->35.1-->32-->24--> 20--> 19.3--> 16.7  -  Profoundly hypothermic 12/26, 92 degrees. Culture with NGTD   - ID following. Appreciate cares and recommendations's              - Plan for 14 days of antibiotics for enterobacter PNA.      MICRO:  12/23 Fungitell negative.   12/22 c.diff negative  12/22 sputum gram stain with prelim 1+ GPB  12/22 BC with NGTD  12/19 fungitell (pending)  galactomannan antigen (negative)  12/19 BC x2 with NGTD  12/19 Sputum with Enterobacter cloacae (sensitive to Meropenem)  12/19 A BAL with GS, AFB, KOH, fungal culture were ordered. Very minimal secretions on bronch 12/19.   12/12 sputum: Klebsiella pneumoniae  12/12  Blood cultures- NGTD  12/11 urine NGTD  12/11 BC with NGTD  12/6 blood staph epi, contaminant     Antimicrobials:  12/16 zosyn 12/16-12/17  Ceftriaxone 12/17-12/19 12/19- 1/2 Meropenem   12/19- 12/23 Fluconazole  12/23 Fluconazole broadened to Micafungin     Other Workup:  12/23: CT C/A/P  12/23 lipase normal  12/23 No e/o ischemic limbs     COVID:  Completed remdesivir course   Decadron 6 mg IV X 10 days, remains on stress dose steroids      HEME:  # anemia of critical illness  # coagulopathy due to COVID -19  - Hemoglobin stable   - Resume Heparin gtt, low intensity.      ENDOCRINE:  # DM II  # Stress and steroid induced hyperglycemia  HgbA1C on admit 8.2  - Hold PTA Lantus and oral agents  - 50 units Lantus and resistant sliding scale insulin      MSK:  # acute critical care weakness/deconditioning   - ROM     SOCIAL: Palliative consult but wife refused.      PROPHYLAXIS:   DVT proph:Yes. Heparin gtt for embolic stroke and A-fib   GI proph: Yes.  Requires solorzano for strict I&O: Yes  Restraints required for safety: Yes     DISPOSITION:  - ICU     LINES/TUBES/DRAINS:  - Right internal jugular TLC 12/22  - L art line, placed 12/10  - R fem dialysis line 12/19  - Right pigtail chest tube     Total Critical Care Time : 45 Minutes      Deandre Fontana        Overnight events: Course reviewed. No acute events reported  "overnight. Following commands     Objective:  Physical Exam:  Vent settings for last 24 hours:  Vent Mode: AC  FiO2 (%):  [40 %] 40 %  S RR:  [28] 28  S VT:  [520 mL] 520 mL  PEEP/CPAP (cm H2O):  [10 cm H2O] 10 cm H2O  Minute Ventilation (L/min):  [13.2 L/min-15.5 L/min] 13.2 L/min  PIP:  [24 cm H2O-33 cm H2O] 25 cm H2O  MAP (cm H2O):  [13-15] 15    /48   Pulse (!) 102   Temp 98.6  F (37  C) (Axillary)   Resp (!) 45   Ht 6' 2\" (1.88 m)   Wt 202 lb 9.6 oz (91.9 kg)   SpO2 100%   BMI 26.01 kg/m      Intake/Output last 3 shifts:  I/O last 3 completed shifts:  In: 2109.6 [I.V.:599.6; NG/GT:1260; IV Piggyback:250]  Out: 40 [Chest Tube:40]  Intake/Output this shift:  I/O this shift:  In: 154.4 [I.V.:14.4; NG/GT:140]  Out: -     Physical Exam   Constitutional: He appears well-developed and well-nourished.   HENT:   Head: Normocephalic and atraumatic.   Eyes: Pupils are equal, round, and reactive to light. Conjunctivae are normal.   Cardiovascular: Normal rate and regular rhythm.   Pulmonary/Chest: No respiratory distress. He has no wheezes.   Abdominal: He exhibits no distension. There is no abdominal tenderness.   Musculoskeletal:         General: No tenderness or edema.   Neurological: He displays atrophy. GCS eye subscore is 3. GCS verbal subscore is 1. GCS motor subscore is 6.   Skin: Skin is warm and dry.         LAB:  Results from last 7 days   Lab Units 01/01/21  0540   LN-WHITE BLOOD CELL COUNT thou/uL 14.8*   LN-HEMOGLOBIN g/dL 8.0*   LN-HEMATOCRIT % 24.7*   LN-PLATELET COUNT thou/uL 264     Results from last 7 days   Lab Units 01/01/21  0540 12/31/20  0335 12/30/20  0400 12/26/20  0518 12/26/20  0518   LN-SODIUM mmol/L 136 136 136   < > 135*  135*   LN-POTASSIUM mmol/L 3.3* 3.8 3.5   < > 4.5  4.5   LN-CHLORIDE mmol/L 104 104 103   < > 105  105   LN-CO2 mmol/L 18* 17* 19*   < > 16*  16*   LN-BLOOD UREA NITROGEN mg/dL 117* 124* 90*   < > 28   LN-CREATININE mg/dL 5.00* 5.21* 3.87*   < > 0.94 "   LN-CALCIUM mg/dL 7.7* 7.9* 7.6*   < > 8.3*  8.3*   LN-PROTEIN TOTAL g/dL  --   --   --   --  6.0   LN-BILIRUBIN TOTAL mg/dL  --   --   --   --  0.8   LN-ALKALINE PHOSPHATASE U/L  --   --   --   --  109   LN-ALT (SGPT) U/L  --   --   --   --  234*   LN-AST (SGOT) U/L  --   --   --   --  123*    < > = values in this interval not displayed.

## 2021-06-14 NOTE — PROGRESS NOTES
Patient remains intubated and sedated throughout night. No acute events. Patient had several loose, bloody bowel movements throughout night. Levophed needed to be increased to 0.14mcg.

## 2021-06-14 NOTE — PROGRESS NOTES
Infectious Diseases Progress Note  [facility]    Date of visit: 12/28/2020      ASSESSMENT   74-year-old man with a history of diabetes, hypertension, BPH who is admitted with respiratory distress from COVID-19.    1. COVID-19.  Diagnosed on 11/30.  Developed rapid worsening of respiratory status, intubated on 12/10.  Completed 5-day course of remdesivir.  Completed 10-day course of dexamethasone on 12/15.  Course complicated by right-sided pneumothorax.  Now with acute hepatitis, new stroke, and renal failure.  2. Leukocytosis.  Mildly elevated white cells during admission, possibly from steroids.  Started to rise in earnest on 12/17, now markedly elevated with a left shift.  Blood and urine cultures negative.  Sputum growing Enterobacter cloacae.  C. difficile test negative.  Recently started on stress dose steroids, which may be contributing to rise.  May also be from general inflammatory process from severe Covid illness. Wbc coming down slowly  3. Renal failure.  Nephrology consulted on 12/17, now on dialysis.  4. Acute hepatitis.  Elevated transaminases.  ALT starting to rise around 12/16.  No signs of biliary obstruction on CT scan today.  Consider possible medications, recently started on amiodarone.  Shock liver? Improving   5. Right PCA stroke. Followed by neurology. Critical illness myopathy.  6. Hypothermia. Infectious work-up as above. CRRT can be contributing. Autonomic dysregulation following stroke? Erratic temperature curve, stable this morning    Principal Problem:    Pneumonia due to 2019 novel coronavirus  Active Problems:    Acute respiratory failure with hypoxia (H)    Acute kidney injury (H)    Acute respiratory distress syndrome (ARDS) due to COVID-19 virus (H)    Secondary spontaneous pneumothorax    Acute right PCA stroke (H)       PLAN   -Continue meropenem for Enterobacter in sputum  -poor prognosis     Angel Leon MD  [facility]  Direct  messaging: [facility] Paging  On-Call ID provider: [phone], option: 9      ===========================================      SUBJECTIVE / INTERVAL HISTORY:     No acute events. Continues to have highly variable temps, stable this morning. CRRT stopped yesterday, may initiate HD today.     Review of Systems     Intubated and sedated.         Antibiotics   Meropenem 12/19-      Previous:  Ceftriaxone 12/17-12/18  Fluconazole 12/19-12/22  Zosyn 12/15-12/16  Remdesivir x5 days  Micafungin 12/23-  Vancomycin 12/16, 12/19-12/22      Physical Exam     Temp:  [98.4  F (36.9  C)-100  F (37.8  C)] 98.8  F (37.1  C)  Heart Rate:  [] 113  Resp:  [24-37] 32  BP: (120-153)/(45-63) 151/60  Arterial Line BP: ()/(36-67) 97/44  FiO2 (%):  [45 %] 45 %    Vitals:    12/28/20 1145   BP:    Pulse: (!) 113   Resp: (!) 32   Temp:    SpO2: 91%       Limited exam to conserve PPE. Patient seen from window. See intensivist note for full exam.  Gen: lying in bed, supine, chest tube in place on right. Intubated.    Cultures   12/12 sputum culture: Klebsiella  12/16 blood cultures x2: No growth to date  12/16 urine culture: Negative  12/16 sputum culture: Normal ernst and yeast  12/19 fungal blood culture: Pending  12/19 blood cultures x2: No growth to date  12/19 sputum culture: Enterobacter cloacae  12/22 sputum: GPB, culture: yeast   12/22 blood cultures x2: No growth to date  12/26 blood culture x 2: no growth to date  12/26 fungal blood culture: no growth to date       Pertinent Labs:     Results from last 7 days   Lab Units 12/28/20  0405 12/27/20  1406 12/27/20  0532 12/26/20  0515 12/26/20  0515 12/24/20  0507 12/24/20  0507 12/23/20  0406 12/23/20  0406   LN-WHITE BLOOD CELL COUNT thou/uL 20.0*  --  24.2*  --  32.2*   < > 40.9*  --  37.6*  37.8*   LN-HEMOGLOBIN g/dL 9.0* 9.8* 9.6*   < > 9.7*   < > 10.3*   < > 10.0*  10.0*   LN-PLATELET COUNT thou/uL 208 233 222   < > 226   < > 279   < > 290  268   LN-MONOCYTES - REL (DIFF)  %  --   --   --   --   --   --  7  --  7    < > = values in this interval not displayed.       Results from last 7 days   Lab Units 12/28/20  0405 12/27/20  1637 12/27/20  1406 12/27/20  0532 12/26/20  0518 12/26/20  0518 12/25/20  0547 12/25/20  0547 12/23/20  0405 12/23/20  0405   LN-SODIUM mmol/L 134* 134* 134* 135*  134*   < > 135*  135*   < > 135*   < > 135*   LN-POTASSIUM mmol/L 5.0 4.8  4.8 4.6 4.5  4.5   < > 4.5  4.5   < > 4.6   < > 4.7   LN-CHLORIDE mmol/L 105 104 103 105  105   < > 105  105   < > 106   < > 100   LN-CO2 mmol/L 16* 16* 16* 16*  16*   < > 16*  16*   < > 18*   < > 22   LN-BLOOD UREA NITROGEN mg/dL 68* 51*  --  30*  --  28  --  28   < > 24   LN-CREATININE mg/dL 2.74* 1.76*  --  0.93  --  0.94  --  1.11   < > 1.22   LN-CALCIUM mg/dL 8.7 8.4* 8.2* 8.1*  8.1*   < > 8.3*  8.3*   < > 8.5   < > 8.7   LN-ALBUMIN g/dL  --   --   --   --   --  2.4*  --  2.4*  --  2.0*   LN-PROTEIN TOTAL g/dL  --   --   --   --   --  6.0  --  5.8*  --  6.0   LN-BILIRUBIN TOTAL mg/dL  --   --   --   --   --  0.8  --  1.0  --  0.7   LN-ALKALINE PHOSPHATASE U/L  --   --   --   --   --  109  --  103  --  99   LN-ALT (SGPT) U/L  --   --   --   --   --  234*  --  268*  --  182*   LN-AST (SGOT) U/L  --   --   --   --   --  123*  --  216*  --  213*    < > = values in this interval not displayed.                 Imaging:     No results found.      Attestation:  I have reviewed today's Medications, Vital Signs, and Labs.

## 2021-06-14 NOTE — PROGRESS NOTES
Renal progress note   CC: ARF  Assessment and Plan:  74 y.o. male     1. ARF: pt has normal underlying renal function; now hemodynamic ARF/ATN and has progressive rise in CR; anuria and on CRRT since 12/18.    2. Hypervolemia-improved, Wt is trending down. Net 5.1 L  3. COVID 19 infection with resp failure; on vent with full support;  Completed 5-day course of remdesivir.  Completed 10-day course of dexamethasone on 12/15.  Course complicated by right-sided pneumothorax  4. Hyponatremia- Serum sodium is stable mid 130s  5. Acidosis; mixed  6. Anemia; no active loss; following hgb  7. HoTN; Remains on Levophed drip but weaning   8. Nutrition; on TF; renal formula  9. DVT prophylaxis; on heparin gtt  10. CAD, hyperlipid; stable co-morbitdities  11. DM; on insulin gtt  12. Acute hepatitis.  Elevated transaminases.   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    Discussed with ICU team    We will follow          Subjective  No signs of recovering renal function  CRRT was stopped yesterday    Metabolic parameters okay this morning aside from mild increase in potassium at 5    Still requiring pressor support with low-dose norepinephrine and vasopressin    Kayexalate ordered to make sure potassium stays down overnight and he will be on the schedule for conventional intermittent hemodialysis tomorrow.  We will see how his blood pressures handle conventional dialysis    Objective    Vital signs in last 24 hours  Temp:  [98.4  F (36.9  C)-100  F (37.8  C)] 98.6  F (37  C)  Heart Rate:  [] 109  Resp:  [24-37] 33  BP: (120-153)/(45-63) 151/60  Arterial Line BP: ()/(36-67) 114/47  FiO2 (%):  [45 %] 45 %  Weight:   201 lb 4.5 oz (91.3 kg)    Intake/Output last 3 shifts  I/O last 3 completed shifts:  In: 2767.3 [I.V.:1247.3; NG/GT:1420; IV Piggyback:100]  Out: 366 [Other:86; Stool:250; Chest Tube:30]  Intake/Output this shift:  I/O this  shift:  In: 501.4 [I.V.:186.4; NG/GT:250; IV Piggyback:65]  Out: 0     Physical Exam  Viewed through window  Intubated on ventilator  Off CRRT  Not examined and reports of other providers exams reviewed      Pertinent Labs   Lab Results   Component Value Date    WBC 20.0 (H) 12/28/2020    HGB 9.0 (L) 12/28/2020    HCT 27.9 (L) 12/28/2020    MCV 97 12/28/2020     12/28/2020     Lab Results   Component Value Date    CREATININE 2.74 (H) 12/28/2020    BUN 68 (H) 12/28/2020     (L) 12/28/2020    K 5.0 12/28/2020     12/28/2020    CO2 16 (L) 12/28/2020       Lab Results   Component Value Date    ALBUMIN 2.4 (L) 12/26/2020     Lab Results   Component Value Date    CALCIUM 8.7 12/28/2020    PHOS 5.5 (H) 12/28/2020     I reviewed all lab results  Eren Hutchison

## 2021-06-14 NOTE — PROGRESS NOTES
Renal progress note   CC: ARF  Assessment and Plan:  74 y.o. male     1. ARF: pt has normal underlying renal function; now hemodynamic ARF/ATN and has progressive rise in CR; anuria and on CRRT since 12/18.    2. Hypervolemia-improved, Wt is trending down. Net 5.1 L  3. COVID 19 infection with resp failure; on vent with full support;  Completed 5-day course of remdesivir.  Completed 10-day course of dexamethasone on 12/15.  Course complicated by right-sided pneumothorax  4. Hyponatremia- Serum sodium is stable mid 130s  5. Acidosis; mixed  6. Anemia; no active loss; following hgb  7. HoTN; Remains on Levophed drip but weaning   8. Nutrition; on TF; renal formula  9. DVT prophylaxis; on heparin gtt  10. CAD, hyperlipid; stable co-morbitdities  11. DM; on insulin gtt  12. Acute hepatitis.  Elevated transaminases.   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      We will follow          Subjective    Overall course reasonably stable.    Respiratory status largely unchanged and on 60% FiO2   ; Has required modest increase in norepinephrine for blood pressure support   dialysis today has gone reasonably well but we are managing to get some reasonable UF    Potassium prior to dialysis less than 5 and pH 7.29 with PCO2 37.    So far it looks like he will tolerate intermittent hemodialysis okay, and plan to continue Tuesday Thursday Saturday schedule for now        Objective    Vital signs in last 24 hours  Temp:  [98.2  F (36.8  C)-99.6  F (37.6  C)] 98.9  F (37.2  C)  Heart Rate:  [] 112  Resp:  [25-38] 30  BP: ()/(45-71) 135/56  Arterial Line BP: ()/(39-72) 142/65  FiO2 (%):  [45 %-60 %] 60 %  Weight:   207 lb 14.3 oz (94.3 kg)    Intake/Output last 3 shifts  I/O last 3 completed shifts:  In: 3034.1 [I.V.:1079.1; NG/GT:1790; IV Piggyback:165]  Out: 868 [Stool:800; Chest Tube:68]  Intake/Output this shift:  I/O this shift:  In:  367.2 [I.V.:92.2; NG/GT:275]  Out: 2.1 [Other:2.1]    Physical Exam  Viewed through window  Intubated on ventilator  Currently receiving dialysis; reviewed situation with dialysis nurse  Not examined and reports of other providers exams reviewed      Pertinent Labs   Lab Results   Component Value Date    WBC 19.3 (H) 12/29/2020    HGB 8.8 (L) 12/29/2020    HCT 27.7 (L) 12/29/2020    MCV 98 12/29/2020     12/29/2020     Lab Results   Component Value Date    CREATININE 4.15 (H) 12/29/2020     (H) 12/29/2020     12/29/2020    K 4.4 12/29/2020     12/29/2020    CO2 16 (L) 12/29/2020       Lab Results   Component Value Date    ALBUMIN 2.4 (L) 12/26/2020     Lab Results   Component Value Date    CALCIUM 8.3 (L) 12/29/2020    PHOS 5.9 (H) 12/29/2020     I reviewed all lab results  Eren Hutchison

## 2021-06-14 NOTE — PROGRESS NOTES
[facility]    ICU PROGRESS NOTE:  DOS:  12/31/2020    Patient Summary:   Felipe Cruz is a 74 year old male with a PMH of CAD, HTN, HLD, DM2, and BPH who was admitted to [facility] on 12/6/2020.  He was admitted to the general care floor. He had increased oxygenation needs, started on HFNC, and transferred to the ICU on 12/8. He was intubated for worsening hypoxia on 12/10. He self extubated on 12/10, required reintubation, and again self-extubated on 12/14. He was reintubated 12/16 for worsening hypoxia. Course complicated by anuric NICKY requiring CRRT and now profound shock vasopressors, enterobacter PNA.       Major Changes for Today    Wean PEEP   Possible PST   Wean vaso to 1.2  iHD today  Add midodrine     Assessment/Plan:  NEUROLOGIC:  # acute pain  # agitation/anxiety  # delirium  # encephalopathy   - Did not tolerate Precedex and Propofol previously   - Fentanyl @ 25 mcg/hr-- restarted 12/28 due to tachypnea               - Versed gtts off.               - Stopped Seroquel   - Cisatracurium discontinued 12/22  - RAAS goal 0 to -1, following commands.Limit sedating medications as able      # Right PCA infarct  Demonstrated on CTH 12/23 with concern for possible right thalamus hypodensity, potential 3 mm hemorrhagic transformation within the right PCA distribution  - Neurology consulted, appreciate neurology following   - Ceribell with non specific slowing with superimposed beta activity. No sharp or rhythmic discharges.   - repeat CT head 12/27/20:  Evolving infarct, in right PCA, no hemorrhagic conversion  - Carotid US with 50-50% bilateral ICA stenosis     HEMODYNAMICS/CV:  #Shock, septic  #Tachycrdia, resolved   # Atrial fibrillation   - 12/16: Echo: EF > 63%, Normal RV And LV fn, normal valves. No Tamponade   - Continue norepinephrine and vasopresson (try 1.2 today)for MAP >65.   - Stress dose steroids restarted 12/20       RESPIRATORY:  # Acute hypoxic respiratory failure and  ARDS 2/2 COVID19 and bacterial PNA   # Right apical pneumothorax s/p pigtail insertion 12/20  # Right tension pneumothorax on 12/23 with CT clamped for procedure. Resolved when placed back to suction. Continue CT to suction for now. Keep CT in place until no longer on PPV  - Continue lung protective ventilation  Vent Mode: AC  FiO2 (%):  [40 %-45 %] 40 %  S RR:  [28] 28  S VT:  [520 mL] 520 mL  PEEP/CPAP (cm H2O):  [10 cm H2O-12 cm H2O] 10 cm H2O  Minute Ventilation (L/min):  [13 L/min-15.2 L/min] 14.5 L/min  PIP:  [25 cm H2O-28 cm H2O] 25 cm H2O  MAP (cm H2O):  [14-18] 14  - Consider PST as able   - Leave supinated given protracted course proning no longer likely beneficially - Off inhaled  Veletri     GI:  # Severe protein calorie malnutrition   # Diarrhea-- C diff negative 12/22  #Cholelithiasis discovered on CT 12/23, non obstructive  #Elevated LFTs, likely r/t shock possibly a component of Amiodarone. Alk phos and bili with normal parameters. CT abdomen completed 12/23 with no obstruction  - RD following.   - protein BID  - bowel regimen on hold. Remove rectal tube concerns of skin integrity      RENAL:  # Anuric NICKY   # Persistent metabolic acidosis  - CRRT 12/18--12/28. iHD today run 12/29. TTHS schedule per renal   - Electrolytes balanced. Anuric at this time.   - Bladder scan PRN and straight cath if PVR > 250.      # Bilateral 2 cm renal masses incidentally found on CT, recommend follow up at later time  # Bladder wall ? Stone 3mm in size incidentally found on CT. Recommended follow up      ID:  # COVID 19   # Klebsiella VAP- treated  # Enterobacter cloacae VAP- active.   # Septic shock  # Severe Hypothermia  -WBC:  40-->35.1-->32-->24--> 20--> 19.3--> 16.7  - Profoundly hypothermic 12/26, 92 degrees. Culture with NGTD   - ID following. Appreciate cares and recommendations's              - Plan for 14 days of antibiotics for enterobacter PNA.      MICRO:  12/23 Fungitell negative.   12/22 c.diff  negative  12/22 sputum gram stain with prelim 1+ GPB  12/22 BC with NGTD  12/19 fungitell (pending)  galactomannan antigen (negative)  12/19 BC x2 with NGTD  12/19 Sputum with Enterobacter cloacae (sensitive to Meropenem)  12/19 A BAL with GS, AFB, KOH, fungal culture were ordered. Very minimal secretions on bronch 12/19.   12/12 sputum: Klebsiella pneumoniae  12/12  Blood cultures- NGTD  12/11 urine NGTD  12/11 BC with NGTD  12/6 blood staph epi, contaminant     Antimicrobials:  12/16 zosyn 12/16-12/17  Ceftriaxone 12/17-12/19 12/19--> Meropenem   12/19- 12/23 Fluconazole  12/23 Fluconazole broadened to Micafungin     Other Workup:  12/23: CT C/A/P  12/23 lipase normal  12/23 No e/o ischemic limbs     COVID:  Completed remdesivir course   Decadron 6 mg IV X 10 days      HEME:  # anemia of critical illness  # coagulopathy due to COVID -19  - Hemoglobin stable   - Continue with Heparin gtt, increased to high intensity 12/22, decrease back to low intensit given some bloody ETT secretions and bleeding around rectal tube, remove rectal tube      ENDOCRINE:  # DM II  # Stress and steroid induced hyperglycemia  HgbA1C on admit 8.2  - Hold PTA Lantus and oral agents  - total needs 112 units/24hours--> 45 units Lantus and resistant sliding scale insulin      MSK:  # acute critical care weakness/deconditioning   - hold OOB activity for now.   - ROM     SOCIAL: Palliative consult but wife refused.      PROPHYLAXIS:   DVT proph:Yes. Heparin gtt for embolic stroke and A-fib   GI proph: Yes.  Requires solorzano for strict I&O: Yes  Restraints required for safety: Yes     DISPOSITION:  - ICU     LINES/TUBES/DRAINS:  - Right internal jugular TLC 12/22  - L art line, placed 12/10  - R fem dialysis line 12/19  - Right pigtail chest tube     Total Critical Care Time : 45 Minutes      Deandre Fontana        Overnight events: Course reviewed. No acute events reported overnight. Following commands     Objective:  Physical Exam:  Vent  "settings for last 24 hours:  Vent Mode: AC  FiO2 (%):  [40 %-45 %] 40 %  S RR:  [28] 28  S VT:  [520 mL] 520 mL  PEEP/CPAP (cm H2O):  [10 cm H2O-12 cm H2O] 10 cm H2O  Minute Ventilation (L/min):  [13 L/min-15.2 L/min] 14.5 L/min  PIP:  [25 cm H2O-28 cm H2O] 25 cm H2O  MAP (cm H2O):  [14-18] 14    /56   Pulse 88   Temp 97.6  F (36.4  C) (Axillary)   Resp 17   Ht 6' 2\" (1.88 m)   Wt 208 lb 15.9 oz (94.8 kg)   SpO2 98%   BMI 26.83 kg/m      Intake/Output last 3 shifts:  I/O last 3 completed shifts:  In: 2528.4 [I.V.:758.4; NG/GT:1670; IV Piggyback:100]  Out: 520 [Stool:400; Chest Tube:120]  Intake/Output this shift:  No intake/output data recorded.    Physical Exam   Constitutional: He appears well-developed and well-nourished.   HENT:   Head: Normocephalic and atraumatic.   Eyes: Pupils are equal, round, and reactive to light. Conjunctivae are normal.   Cardiovascular: Normal rate and regular rhythm.   Pulmonary/Chest: No respiratory distress. He has no wheezes.   Abdominal: He exhibits no distension. There is no abdominal tenderness.   Musculoskeletal:         General: No tenderness or edema.   Neurological: He displays atrophy. GCS eye subscore is 3. GCS verbal subscore is 1. GCS motor subscore is 6.   Skin: Skin is warm and dry.         LAB:  Results from last 7 days   Lab Units 12/31/20  0808 12/31/20  0335   LN-WHITE BLOOD CELL COUNT thou/uL  --  12.9*   LN-HEMOGLOBIN g/dL 7.6* 7.8*   LN-HEMATOCRIT %  --  25.4*   LN-PLATELET COUNT thou/uL  --  240     Results from last 7 days   Lab Units 12/31/20  0335 12/30/20  0400 12/29/20  0508 12/26/20  0518 12/26/20  0518 12/25/20  0547 12/25/20  0547   LN-SODIUM mmol/L 136 136 137   < > 135*  135*   < > 135*   LN-POTASSIUM mmol/L 3.8 3.5 4.4   < > 4.5  4.5   < > 4.6   LN-CHLORIDE mmol/L 104 103 107   < > 105  105   < > 106   LN-CO2 mmol/L 17* 19* 16*   < > 16*  16*   < > 18*   LN-BLOOD UREA NITROGEN mg/dL 124* 90* 109*   < > 28  --  28   LN-CREATININE " mg/dL 5.21* 3.87* 4.15*   < > 0.94  --  1.11   LN-CALCIUM mg/dL 7.9* 7.6* 8.3*   < > 8.3*  8.3*   < > 8.5   LN-PROTEIN TOTAL g/dL  --   --   --   --  6.0  --  5.8*   LN-BILIRUBIN TOTAL mg/dL  --   --   --   --  0.8  --  1.0   LN-ALKALINE PHOSPHATASE U/L  --   --   --   --  109  --  103   LN-ALT (SGPT) U/L  --   --   --   --  234*  --  268*   LN-AST (SGOT) U/L  --   --   --   --  123*  --  216*    < > = values in this interval not displayed.

## 2021-06-14 NOTE — PROCEDURES
Hemodialysis Treatment Note    Pre weight: 98.2 kg            Post  Weight: 95.4 kg    Run length: 3    Total fluid removed (ml) 915    Blood Volume Processed: 52.9    Vascular Access:  Right femoral catheter.  Dressing clean, dry, intact. Last changed on 1/1/21  Aspirates and flushes well  Initiated at 400 BFR. Arterial collapse so BFR decreased to 325  Post dialysis: heparin 1:1000 instill to catheter limb specific volume (1.2 ml arterial port, 1.3 ml venous port). Clamped, capped, wrapped.  Treatment Summary:  K 3, calcium 2.25, sodium 138, bicarb 35, -350,   Goal reduced during treatment due to hypotension. Patient stabilized with goal at 920 ml. SBPs 90s-100s most of treatment.  Patient responding to commands early on treatment  But not responding to commands last half of run.  Seen by Dr. Mike during treatment.  Interventions:  Vital signs every 15 minutes and prn  Crit line monitoring  50 ml 25% albumin given to support BP resulting in small improvement in BP.   Received 1 unit PRCs during treatment.  Plan:  Per renal MD Yi Hernández RN

## 2021-06-14 NOTE — PLAN OF CARE
Problem: Mechanical Ventilation  Goal: Patient will maintain patent airway  Outcome: Progressing     Problem: Mechanical Ventilation  Goal: ET tube will be managed safely  Outcome: Progressing      [facility] COVID RT PROGRESS NOTE    DATA:  VENT DAY#  19 of 3rd intubation    CURRENT SETTINGS:   AC 28, 520, +10, 80% (PEEP & FIO2 increased post trach tube placement per provider.)    PATIENT PARAMETERS:    PIP:   26-34  Pplat:  22-32  Pmean:  14-18    SBT:   Held d/t pt's multiple procedures today.    SECRETIONS:   Pt sx'd 3-4x by RN/RT for sm amts of pink-tinged secretions.    02 SATS:  100%    Pt transport to & from IR today for placement of dialysis access.    #8 Shiley PERC trach tube placed today (01/05).    Respiratory Medications:   Duoneb / Mucomyst Q6 newly ordered.    ABG:  Results for GISELLE VILLANUEVA (MRN [id number])   1/5/2021 08:37   pH, Arterial 7.40   pCO2, Arterial 29 (L)   pO2, Arterial 107 (H)   Bicarbonate, Arterial Calc 19.3 (L)   O2 Sat, Arterial 99.3 (H)   Oxyhemoglobin 96.5 (H)   POC Base Excess Calc -6.9   Ventilation Mode AC   Peep 8   Rate 28   Vt 520   FIO2 0.45   P/F ratio 238     NOTE / PLAN:   Continue resp support as needed, monitor closely, & wean as tolerated.

## 2021-06-14 NOTE — PROGRESS NOTES
"Clinical Nutrition Therapy Follow Up Note      Receiving HD  Current Nutrition Prescription:   Diet:TF: Novasource renal goal 50 ml/hr, increased 12/27 for weight loss   Flush: 40 ml H20 q 8 hours  IV dextrose or Fluids:     dextrose 10%       fentaNYL citrate (PF), Last Rate: 75 mcg/hr (12/31/20 0600)       insulin infusion (1 unit/mL), Last Rate: Stopped (12/30/20 1400)       norepinephrine, Last Rate: 0.065 mcg/kg/min (12/31/20 0600)       sodium chloride 0.9%, Last Rate: 10 mL/hr (12/31/20 0600)       vasopressin, Last Rate: 1.2 Units/hr (12/31/20 0950)      Current Nutrition Intake:  Enteral nutrition access is a nasal gastric tube, with a placement date of 12/11/20.   Enteral nutrition at goal provides: 2400 kcals, 109 grams protein, 0 grams fiber, 259 grams carbohydrate, 830 mls free water from formula, 120 mls from fluid flush, for a total of 950 mls free water daily.     Received 858 ml in IVF carriers yesterday    Pt had been meeting estimated nutrition needs when TF at goal.     Anthropometrics:  Height: 6'2\"  Admission weight: 255 lb  Weight: 208 lb 15.9 oz (94.8 kg)12/31, stable x , stable x 3 days.  +2 generalized, +1 UE edema per nsg, increased. I>O yesterday, net =.   Bed zeroed at 256 lb 12/10  BMI:26.8  BMI indication:overweight  Ideal body weight 190 lb +/- 10%  18% weight loss x 3 weeks if current weights are accurate    GI Status/Output:   Bowel Sounds present per nurse  Rectal tube removed yesterday d/t new bleeding rectal ulcer: 400 ml OP yesterday. 2 small today.  On 2 pressors    Physical Assessment:   Seen through the window: Mild temporal and orbital fat and muscle loss    Medications:  Medications reviewed:  prilosec/pantoprazole, colace bid, senna bid, iv abx,  hydrocortisone, nephrovite  Colace and senna on hold. Insulin drip dc'd and ssi/lantus added yesterday. Vit C dc'd today as recommended d/t renal failure      Labs:  Lab Results   Component Value Date/Time    ALBUMIN 2.4 (L) " 2020 05:18 AM     2020 03:35 AM    K 3.8 2020 03:35 AM     (H) 2020 03:35 AM    CREATININE 5.21 (H) 2020 03:35 AM     (H) 2020 03:35 AM    PHOS 5.8 (H) 2020 03:35 AM    MG 2.4 2020 03:35 AM   BUn/CR, phos increased-renal following  B-328 mg/dl past 24 hours.  In poor control on steroid, off of insulin drip. Improved < 150 last 2 checks with increased lantus    Estimated Nutrition Needs: Reassesed  d/t weight loss  Last low weight of 86.9 kg     Energy Needs: 2341-8180 kcals daily, 25-30 kcal/kg   Protein Needs: 104-122g daily, 1.2-1.4 g/kg   Fluid Needs: Output + 1000ml     Malnutrition: Noted previously Severe    Nutrition Risk Level: high risk    Nutrition dx:  Impaired swallow function r/t increased respiratory needs evidenced by NPO/intubation    Malnutrition r/t acute illness evidenced by 18% weight loss x 3 weeks, moderate fluid accumulation, mild fat and muscle loss    Altered nutrient utilization r/t NICKY evidenced by increased renal labs, need for Hd    Goal Status:  Regular bowels - progressing  Nutrition intake > 75% of estimated needs- met  No increase in renal labs d/t intake - progressing    Intervention:  Add 1 no carb prosource to better meet protein needs on HD and for wound healing + TF = 2460 kcal, 119 g protein    Not yet appropriate to add fiber for loose stools d/t pressors- monitor affect of d/c of vit C    Monitoring/Evaluation:  TF tolerance, wt, labs, stooling, pressor use and ability to increase TF back to goal rate    Nutrition History:  Information from family/caregiver and chart.  Diet prior to admission: Regular. He was eating ~50% of usual intake for 1-2 days PTA and slightly less than normal for a few days before that. He typically eats foods like sandwiches, eggs and soup with crackers and drinks diet pop, Gatorade and water.  Recent food/fluid intake: SHAD-no PO recorded  COVID GI sx of occasional nausea  and poor appetite

## 2021-06-14 NOTE — PROGRESS NOTES
"Clinical Nutrition Therapy Follow Up Note      Receiving HD  Current Nutrition Prescription:   Diet:TF: Novasource renal goal 50 ml/hr, increased 12/27 for weight loss   Modular: 1 no carb prosource  Flush: 40 ml H20 q 8 hours  IV dextrose or Fluids:     dextrose 10%       fentaNYL citrate (PF), Last Rate: Stopped (01/03/21 0802)       heparin, Last Rate: 1,150 Units/hr (01/04/21 0800)       insulin infusion (1 unit/mL), Last Rate: Stopped (12/30/20 1400)       norepinephrine, Last Rate: 0.081 mcg/kg/min (01/04/21 0800)       vasopressin, Last Rate: Stopped (12/31/20 1235)      Current Nutrition Intake:  Enteral nutrition access is a nasal gastric tube, with a placement date of 12/11/20.   Enteral nutrition at goal provides: 2460 kcals, 119 grams protein, 0 grams fiber, 259 grams carbohydrate, 830 mls free water from formula, 120 mls from fluid flush, for a total of 950 mls free water daily.     Received 446 ml in IVF carriers yesterday    Pt had been meeting estimated nutrition needs when TF at goal.     Anthropometrics:  Height: 6'2\"  Admission weight: 255 lb  Weight: (!) 223 lb 5.2 oz (101.3 kg)1/4, up 13 lb from 2 days prior? Fluid up, I>O, net + 9.4 d/t renal failure and limited ability to do UF per renal.    +2 generalized edema per nsg. Stable.  Bed zeroed at 256 lb 12/10  Low weight: 191 lb 12/27  BMI:26.8  BMI indication:overweight  Ideal body weight 190 lb +/- 10%  18% weight loss x 3 weeks if current weights are accurate    GI Status/Output:   Bowel Sounds present per nurse  5 small/smear maroon BM yesterday d/t rectal ulcer  On 1 pressors    Physical Assessment:   Seen through the window: Mild temporal and orbital fat and muscle loss    Medications:  Medications reviewed:  prilosec/pantoprazole, colace bid(on hold) senna bid (on hold), nephrovite, ssi, lantus   Oxycodone q 4 hr added. Iv abx, hydrocortisone dc'd    Labs:  Lab Results   Component Value Date/Time    ALBUMIN 2.4 (L) 12/26/2020 05:18 AM    "  (L) 01/04/2021 04:15 AM    K 4.3 01/04/2021 04:15 AM     (H) 01/04/2021 04:15 AM    CREATININE 5.75 (H) 01/04/2021 04:15 AM     01/04/2021 04:15 AM    PHOS 5.6 (H) 01/04/2021 04:15 AM    MG 2.2 01/04/2021 04:15 AM   BUn/CR, phos increased-renal following  BG: WNL mg/dl past 24 hours.  Improved control off of steroid    Estimated Nutrition Needs: Reassesed 12/27 d/t weight loss  Last low weight of 86.9 kg     Energy Needs: 2272-3731 kcals daily, 25-30 kcal/kg   Protein Needs: 104-122g daily, 1.2-1.4 g/kg   Fluid Needs: Output + 1000ml     Malnutrition: Noted previously Severe    Nutrition Risk Level: high risk    Nutrition dx:  Impaired swallow function r/t increased respiratory needs evidenced by NPO/intubation    Malnutrition r/t acute illness evidenced by 18% weight loss x 3 weeks, moderate fluid accumulation, mild fat and muscle loss    Altered nutrient utilization r/t NICKY evidenced by increased renal labs, need for Hd    Goal Status:  Regular bowels - progressing  No increase in renal labs d/t intake - progressing    Intervention:  Continue current nutrition RX    Monitoring/Evaluation:  TF tolerance, wt, labs, stooling    Nutrition History:  Information from family/caregiver and chart.  Diet prior to admission: Regular. He was eating ~50% of usual intake for 1-2 days PTA and slightly less than normal for a few days before that. He typically eats foods like sandwiches, eggs and soup with crackers and drinks diet pop, Gatorade and water.  Recent food/fluid intake: SHAD-no PO recorded  COVID GI sx of occasional nausea and poor appetite

## 2021-06-14 NOTE — PROGRESS NOTES
01/03/21 2020   Vent Information   Interface Invasive   Patient Data   Vt Exp (mL) 534 mL   Minute Ventilation (L/min) 15.7 L/min   Total Resp Rate  30 BPM   PIP Observed (cm H2O) 24 cm H2O   MAP (cm H2O) 13   Auto/Intrinsic PEEP Observed (cm H2O) 2.4 cm H2O   Plateau Pressure (cm H2O)   (unable to obtain)   Dynamic Compliance (L/cm H2O) 37.8 L/cm H2O   Airway Resistance 4.1

## 2021-06-14 NOTE — PROGRESS NOTES
[facility] COVID RT PROGRESS NOTE     DATA:     VENT DAY# 15     CURRENT SETTINGS:  VENT SETTINGS   AC 28/520/+10              FIO2:  40%    PATIENT PARAMETERS:     PIP 33  Pplat:  25  Pmean: 136  SBT: NO  SECRETIONS: moderate thick tan bloody  02 SATS: %     ETT SIZE  8.5@26     Securing device changed / tube re-taped date NO     Respiratory Medications: NONE     ABG: Results for GISELLE VILLANUEVA (MRN [id number]) as of 1/1/2021 05:58   Ref. Range 1/1/2021 05:40   pH, Arterial Latest Ref Range: 7.37 - 7.44  7.36 (L)   pCO2, Arterial Latest Ref Range: 35 - 45 mm Hg 34 (L)   pO2, Arterial Latest Ref Range: 75 - 85 mm Hg 85   Bicarbonate, Arterial Calc Latest Ref Range: 23.0 - 29.0 mmol/L 19.7 (L)   O2 Sat, Arterial Latest Ref Range: 95.0 - 96.0 % 97.4 (H)   Oxyhemoglobin Latest Ref Range: 95.0 - 96.0 % 95.2   POC Base Excess Calc Latest Units: mmol/L -6.3   Ventilation Mode Unknown AC   Peep Latest Units: cm H2O 10   Sample Stabilized Temperature Latest Units: degrees C 37.0   Rate Latest Units: rr/min 28   FIO2 Unknown 40.00        NOTE / PLAN:   Pt continues to require full ventilatory support. Mental status has improved. Following simple commands.

## 2021-06-14 NOTE — PROGRESS NOTES
Infectious Diseases Progress Note  [facility]    Date of visit: 12/24/2020      ASSESSMENT   74-year-old man with a history of diabetes, hypertension, BPH who is admitted with respiratory distress from COVID-19.    1. COVID-19.  Diagnosed on 11/30.  Developed rapid worsening of respiratory status, intubated on 12/10.  Completed 5-day course of remdesivir.  Completed 10-day course of dexamethasone on 12/15.  Course complicated by right-sided pneumothorax.  Now with acute hepatitis, new stroke, and renal failure.  2. Leukocytosis.  Mildly elevated white cells during admission, possibly from steroids.  Started to rise in earnest on 12/17, now markedly elevated with a left shift.  Blood and urine cultures negative.  Sputum growing Enterobacter cloacae.  C. difficile test negative.  Recently started on stress dose steroids, which may be contributing to rise.  May also be from general inflammatory process from severe Covid illness.  3. Renal failure.  Nephrology consulted on 12/17, now on dialysis.  4. Acute hepatitis.  Elevated transaminases.  ALT starting to rise around 12/16.  No signs of biliary obstruction on CT scan today.  Consider possible medications, recently started on amiodarone.  Shock liver?    Principal Problem:    Pneumonia due to 2019 novel coronavirus  Active Problems:    Acute respiratory failure with hypoxia (H)    Acute kidney injury (H)    Acute respiratory distress syndrome (ARDS) due to COVID-19 virus (H)    Secondary spontaneous pneumothorax       PLAN   -Continue meropenem for Enterobacter in sputum  -would not treat yeast in sputum. Recent fungitell and galactomannan negative.      Angel Leon MD  [facility]  Direct messaging: [place] Paging  On-Call ID provider: [phone], option: 9      ===========================================      SUBJECTIVE / INTERVAL HISTORY:     Worsening oxygenation, increased fio2. Needing more pressors today. No fevers.  Intubated, on CRRT.       Review of Systems     Intubated and sedated.           Antibiotics   Meropenem 12/19-      Previous:  Ceftriaxone 12/17-12/18  Fluconazole 12/19-12/22  Zosyn 12/15-12/16  Remdesivir x5 days  Micafungin 12/23-  Vancomycin 12/16, 12/19-12/22      Physical Exam     Temp:  [96.8  F (36  C)-98.8  F (37.1  C)] 98.6  F (37  C)  Heart Rate:  [] 130  Resp:  [18-37] 32  BP: (103-131)/(49-56) 103/52  Arterial Line BP: ()/() 113/61  FiO2 (%):  [45 %-60 %] 60 %    Vitals:    12/24/20 1300   BP:    Pulse: (!) 130   Resp: (!) 32   Temp:    SpO2: 97%       Limited exam to conserve PPE. Patient seen from window. See intensivist note for full exam.  Lying in bed, intubated, supine, chest tube in place. No distress.    Cultures   12/12 sputum culture: Klebsiella  12/16 blood cultures x2: No growth to date  12/16 urine culture: Negative  12/16 sputum culture: Normal ernst and yeast  12/19 fungal blood culture: Pending  12/19 blood cultures x2: No growth to date  12/19 sputum culture: Enterobacter cloacae  12/22 sputum: GPB, culture: yeast   12/22 blood cultures x2: No growth to date      Pertinent Labs:     Results from last 7 days   Lab Units 12/24/20  0507 12/24/20  0014 12/23/20  1542 12/23/20  0406 12/23/20  0406 12/22/20  0450 12/22/20  0450   LN-WHITE BLOOD CELL COUNT thou/uL 40.9*  --   --   --  37.6*  37.8*  --  35.8*   LN-HEMOGLOBIN g/dL 10.3* 10.1* 9.8*   < > 10.0*  10.0*   < > 10.7*   LN-PLATELET COUNT thou/uL 279 278 269   < > 290  268   < > 277   LN-MONOCYTES - REL (DIFF) % 7  --   --   --  7  --   --     < > = values in this interval not displayed.       Results from last 7 days   Lab Units 12/24/20  0507 12/24/20  0014 12/24/20  0013 12/23/20  1542 12/23/20  0405 12/23/20  0405 12/22/20  0450 12/22/20  0450 12/21/20  1122 12/21/20  1122 12/20/20  0557 12/20/20  0557   LN-SODIUM mmol/L 135* 136  --  136   < > 135*   < > 136  135*   < >  --    < > 137  137   LN-POTASSIUM  mmol/L 4.8 4.8  --  4.7   < > 4.7   < > 4.3  4.3   < >  --    < > 5.0  5.1*   LN-CHLORIDE mmol/L 105 104  --  104   < > 100   < > 100  100   < >  --    < > 105  105   LN-CO2 mmol/L 18* 19*  --  20*   < > 22   < > 20*  20*   < >  --    < > 18*  18*   LN-BLOOD UREA NITROGEN mg/dL 30*  --   --   --   --  24  --  26  --   --    < > 38*   LN-CREATININE mg/dL 1.18  --   --   --   --  1.22  --  1.48*  --   --    < > 2.59*   LN-CALCIUM mg/dL 8.8  8.8  --  8.4* 8.5   < > 8.7   < > 8.6  8.6   < >  --    < > 8.4*  8.4*   LN-ALBUMIN g/dL  --   --   --   --   --  2.0*  --   --   --  2.4*  --  2.5*   LN-PROTEIN TOTAL g/dL  --   --   --   --   --  6.0  --   --   --  6.7  --  6.7   LN-BILIRUBIN TOTAL mg/dL  --   --   --   --   --  0.7  --   --   --  0.8  --  0.8   LN-ALKALINE PHOSPHATASE U/L  --   --   --   --   --  99  --   --   --  86  --  75   LN-ALT (SGPT) U/L  --   --   --   --   --  182*  --   --   --  77*  --  84*   LN-AST (SGOT) U/L  --   --   --   --   --  213*  --   --   --  62*  --  39    < > = values in this interval not displayed.       Results from last 7 days   Lab Units 12/19/20  0758   LN-C-REACTIVE PROTEIN mg/dL 7.9*           Imaging:     Xr Chest 1 View Portable    Result Date: 12/23/2020  EXAM: XR CHEST 1 VIEW PORTABLE LOCATION: [facility] DATE/TIME: 12/23/2020 1:48 PM INDICATION: evaluate right PTX COMPARISON: 12/22/2020     Stable right chest tube, right internal jugular central venous catheter and 2 enteric tubes. No visible pneumothorax. No significant interval change in diffuse airspace opacities. Stable heart size.        Attestation:  I have reviewed today's Medications, Vital Signs, and Labs.

## 2021-06-14 NOTE — PROGRESS NOTES
[facility] COVID RT PROGRESS NOTE     DATA:     VENT DAY# 13     CURRENT SETTINGS:  VENT SETTINGS:  AC 32 520 +16              FIO2:  60%     PATIENT PARAMETERS:     PIP: 31   Pplat: unable   Pmean: 21   SBT: NO  SECRETIONS: small, tan thick  02 SATS: 96%     ETT SIZE  8.5 @ 26     Securing device changed / tube re-taped date 12/21/2020     Respiratory Medications: None    ABG:  Results for GISELLE VILLANUEVA (MRN [id number]) as of 12/24/2020 06:15   Ref. Range 12/24/2020 05:07   pH, Arterial Latest Ref Range: 7.37 - 7.44  7.36 (L)   pCO2, Arterial Latest Ref Range: 35 - 45 mm Hg 36   pO2, Arterial Latest Ref Range: 75 - 85 mm Hg 47 (LL)   Bicarbonate, Arterial Calc Latest Ref Range: 23.0 - 29.0 mmol/L 20.5 (L)   O2 Sat, Arterial Latest Ref Range: 95.0 - 96.0 % 81.2 (LL)   Oxyhemoglobin Latest Ref Range: 95.0 - 96.0 % 79.5 (LL)   POC Base Excess Calc Latest Units: mmol/L -4.9   Ventilation Mode Unknown AC   Peep Latest Units: cm H2O 16   Sample Stabilized Temperature Latest Units: degrees C 37.0   Rate Latest Units: rr/min 32   FIO2 Unknown 0.45        12/24/20 0300   Vent Settings   FiO2 (%) 45 %   Heater Temperature 98.6  F (37  C)   Resp Rate (Set) 32   Insp Time (sec) 0.7 sec   Vt (Set, mL) 520 mL   PEEP/CPAP (cm H2O) 16 cm H2O   Trigger Sensitivity Flow (L/min) 2 L/min   I:E Ratio 1:1.7   Humidification Heater   Patient Data   Vt Exp (mL) 568 mL   Minute Ventilation (L/min) 14.3 L/min   Total Resp Rate  34 BPM   PIP Observed (cm H2O) 31 cm H2O   MAP (cm H2O) 21   Auto/Intrinsic PEEP Observed (cm H2O) 2.1 cm H2O   Dynamic Compliance (L/cm H2O) 48.6 L/cm H2O   Airway Resistance 6.4   SpO2 100 %   Heart Rate (!) 103     NOTE / PLAN:    Patient with low PaO2 on this mornings ABG. Oxygen increased from 45% to 60%. MD aware. Will continue current therapy and continue to monitor.

## 2021-06-14 NOTE — PROGRESS NOTES
12/31/20 2000   Patient Data   Total Resp Rate  33 BPM   PIP Observed (cm H2O) 25 cm H2O   MAP (cm H2O) 14   Plateau Pressure (cm H2O) 20 cm H2O

## 2021-06-14 NOTE — PROGRESS NOTES
12/23/20 2015   Patient Data   Vt Exp (mL) 615 mL   Minute Ventilation (L/min) 6.6 L/min   Total Resp Rate  32 BPM   PIP Observed (cm H2O) 31 cm H2O   MAP (cm H2O) 22   Auto/Intrinsic PEEP Observed (cm H2O) 2.6 cm H2O   Plateau Pressure (cm H2O) 26 cm H2O   Dynamic Compliance (L/cm H2O) 46.7 L/cm H2O   Airway Resistance 6.7   SpO2 100 %  (Simultaneous filing. User may not have seen previous data.)   peep 16 45%

## 2021-06-14 NOTE — PROGRESS NOTES
[facility] COVID RT PROGRESS NOTE    DATA:    VENT DAY#  16    CURRENT SETTINGS: A/C  VENT SETTINGS   32/520/16        FIO2:  45%    PATIENT PARAMETERS:    PIP 32  Pplat: N/A  Pmean: 21  SBT: None  SECRETIONS:  None   02 SATS:  97    ETT SIZE 8.5  Secured at  26 cm at teeth    Securing device changed / tube re-taped date.     Respiratory Medications: none     ABG:         NOTE / PLAN:   To remain on full ventilatory support for the night.12/26/2020  .Esteban Oleary

## 2021-06-14 NOTE — PLAN OF CARE
Neuro: Patient currently only receiving fentanyl. All other sedation has been stopped. See flowsheet for neuro assessment details. No cough, gag or blink reflexes noted. Spontaneous cough and head movement noted x1. Pupils equal and nonreactive, CPOT 0. No movement to extremities noted.      Resp: 8.5 ET tube, 26 at the teeth. PEEP 12, , Fi02 continues at 50%, weaning as tolerating.  Suction and mouth care q 2 hourly, scant oral secretions noted. Scant bloody, tan secretions from ET tube and orally.     CV: Afib rates , -130s systolic, titrating Levophed  for MAPs >65. Extremities warm and well perfused, palpable pulses throughout.      GI: Enteroflex remains in nare, bridle for securement. Tube feeds currently running at 50cc, which is goal. 40 cc free water q 4 hourly. BS active in all quadrants, Dignacare remains in place with continued high output. Scant bloody streaking in dignacare as well as around tube slowed.     : Anuric. Bladderscan 8mL. Hemodialysis yesterday with 2.1L removed.     Endo:  BS 100s, insulin gtt continues.       ID: Tmax 98.8 F, tylenol given q8H PRN, cooling blanket in place. WBC decreasing slowly.      Skin: Pressure sores noted per assessment. No new skin breakdown noted. Turn and reposition for comfort. Mepilex remains in place to coccyx area. Turn and reposition q 2 hourly.       Problem: Pain  Goal: Patient's pain/discomfort is manageable  Outcome: Progressing     Problem: Safety  Goal: Patient will be injury free during hospitalization  Outcome: Progressing     Problem: Daily Care  Goal: Daily care needs are met  Outcome: Progressing     Problem: Glucose Imbalance  Goal: Achieve optimal glucose control  Outcome: Progressing     Problem: Potential for Compromised Skin Integrity  Goal: Skin integrity is maintained or improved  Outcome: Progressing  Goal: Nutritional status is improving  Outcome: Progressing     Problem: Inadequate Gas Exchange  Goal: Patient will  achieve/maintain normal respiratory rate/effort  Outcome: Progressing     Problem: Ineffective Airway Clearance  Goal: Maintain airway patency  Outcome: Progressing     Problem: Impaired Gas Exchange  Goal: Demonstrate improved ventilation and adequate oxygenation of tissues as evidenced by absence of respiratory distress  Outcome: Progressing     Problem: Breathing  Goal: Patient will maintain patent airway  Outcome: Progressing     Problem: Risk for Infection  Goal: Identify and demonstrate techniques, lifestyle changes to prevent/reduce risk of infection and promote safe environment  Outcome: Progressing     Problem: Potential for Falls  Goal: Patient will remain free of falls  Outcome: Progressing     Problem: Decreased Mental Status Causing Increased Need for Safety  Goal: To ensure patient safety, patient will abstain from any threats or actions to harm self or others  Outcome: Progressing     Problem: Mechanical Ventilation  Goal: Patient will maintain patent airway  Outcome: Progressing  Goal: Oral health is maintained or improved  Outcome: Progressing  Goal: Respiratory status - ventilation  Description: Movement of air in and out of the lungs.    Liberate from ventilator  Outcome: Progressing  Goal: ET tube will be managed safely  Outcome: Progressing  Goal: Ability to express needs and understand communication  Outcome: Progressing  Goal: Mobility/activity is maintained at optimum level for patient  Outcome: Progressing     Problem: Potential for transmission of organism by Contact, Enteric, Droplet and/or Airborne routes  Goal: Prevent transmission of organisms  Outcome: Progressing     Problem: Psychosocial Needs  Goal: Demonstrates ability to cope with hospitalization/illness  Outcome: Not Progressing  Goal: Collaborate with patient/family/caregiver to identify patient specific goals for this hospitalization  Outcome: Not Progressing     Problem: Discharge Barriers  Goal: Patient's discharge needs are  met  Outcome: Not Progressing     Problem: Knowledge Deficit  Goal: Patient/family/caregiver demonstrates understanding of disease process, treatment plan, medications, and discharge instructions  Outcome: Not Progressing     Problem: Knowlegde Deficit  Goal: Verbalize understanding of condition/disease process and treatment, participate in lifestyle changes and treatment regimen  Outcome: Not Progressing     Problem: Cognitive Impairment or Disorientation  Goal: Patient will maintain or return to normal baseline cognitive function  Outcome: Not Progressing     Problem: Decreased Mental Status Causing Increased Need for Safety  Goal: Decrease patient's symptoms  Outcome: Not Progressing     Problem: Breathing  Goal: Patient will utilize incentive spirometer  Outcome: Completed  Variance Not applicable  Note: Not an appropriate intervention for this patient

## 2021-06-14 NOTE — PROGRESS NOTES
Care Management Follow Up Note    Length of Stay (days) 29     Patient plan of care discussed at Interdisciplinary Rounds: yes     Expected Discharge Date: (TBD, intubated)     Concerns to be Addressed / Barriers to Discharge:  Covid-19 infection, ICU care, Palliative involved.     Anticipated Discharge Disposition:  TBD pend goals of care decision    Anticipated Discharge Services:      Selected Continued Care - Admitted Since 12/6/2020    No services have been selected for the patient.        Anticipated Discharge DME:      Plan:  Patient is  and independent with activities of daily living at baseline. His wife Yuridia is primary family contact. He tested positive for COVID-10 on 11/30/2020.  CM will continue to monitor progression of care, review team recommendations and provide discharge planning assist as needed.  However, prognosis is guarded and discharge is not appropriate at this point in time. Palliative is involved and has discussed goals of care, per Dr. Hoff's notes, they will f/u with pt's wife again Wed.     CM to follow and assist with discharge planning as needed.    Rosangela Spear RN Care Manager

## 2021-06-14 NOTE — PLAN OF CARE
Problem: Mechanical Ventilation  Goal: Patient will maintain patent airway  Outcome: Progressing     Problem: Mechanical Ventilation  Goal: ET tube will be managed safely  Outcome: Progressing      [facility] COVID RT PROGRESS NOTE    DATA:  VENT DAY#  17 of 3rd intubation    CURRENT SETTINGS:   AC 28, 520, +8, 40%    PATIENT PARAMETERS:    PIP:   30-25  Pplat:  25-22  Pmean:  18-14    SBT:   N/A    SECRETIONS:  Pt sx'd 2-3x by RN/RT (w/ NS lavage x2) for sm amts of pink-tinged, tan secretions. Good, spont cough.    02 SATS:  %    ETT SIZE 8.5  Secured at  25 cm at teeth    Securing device changed / tube re-taped date:   12/29    Respiratory Medications:   PRN available.     ABG:  Results for GISELLE VILLANUEVA (MRN [id number])   1/3/2021 04:15   pH, Arterial 7.44   pCO2, Arterial 32 (L)   pO2, Arterial 100 (H)   Bicarbonate, Arterial Calc 22.8 (L)   O2 Sat, Arterial 99.2 (H)   Oxyhemoglobin 96.7 (H)   POC Base Excess Calc -2.3   Ventilation Mode AC   Peep 10   Rate 28   Vt 520   FIO2 0.40   P/F ratio 250     NOTE / PLAN:   Continue resp support as needed & monitor closely.

## 2021-06-14 NOTE — PROCEDURES
ELECTROENCEPHALOGRAM (EEG) REPORT     Date of Dictation: 12/26/20  Principal Diagnosis: Pneumonia due to 2019 novel coronavirus     History : Patient is being evaluated for altered mental status. Medication listed includes no anticonvulsant    Description of the Recording:  This is a long-term multichannel digital EEG recording done using the [employer] EEG system.  Study begins on 12/25/2020 and concludes the following day with a total recording time of 12 hours.  Technical quality of the EEG is adequate    Background of the recording consists of irregular 5 to 7 Hz activity with an amplitude of 40 to 80  V.  In addition superimposed faster activity in 8 to 10 Hz activity is also seen.  No attenuation is noted.  No transient sleep patterns are recorded.  No environmental artifact were noted.    During this recording, no sharp discharges, spikes or electrographic seizure activity was recorded.    Impression: This is an abnormal long-term EEG recording using [employer] that suggest nonspecific generalized cerebral dysfunction.  Superimposed faster activity likely is medication effect.  No sharp discharges, rhythmic activity was seen to suggest seizures.    Classification: Dysrhythmia grade II generalized      Ramiro Vasquez MD  [facility]  (Previously, [facility]  Tel: [phone]    This note was dictated using voice recognition software.  Any grammatical or context distortions are unintentional and inherent to the software.

## 2021-06-14 NOTE — PROCEDURES
Felipe HERI Anthony 75 y.o. male    HEMODIALYSIS TREATMENT NOTE    Hepatitis B Surface Antigen result neg  Result Date 12/29/2020  Length of Hemodialysis treatment was 3 hours  Prescribed Ultrafiltration goal 0.5 kg  Net Ultrafiltration 0 kg Dr Hutchison was updated.  Access Non-Tunneled right femoral     Access complications, line were reversed.  Total Heparin infused, none.  Interventions, staff RN gave Midodrine 1 hour before HD. Albumin 25% was given in an attempt to pull fluid into vascular space. Cardiac dysrhythmias worsened and hypotension present. Increased K bath, decreased BFR. Pt ran in a profile C for the majority of time.  Medications Given by HD RN, Albumin 25%.  Assessment, pt did not tolerated fluid removal. BP would drop and HR would increase, with increase in dysrhythmias. Pt would become diaphoretic. HD today cleaned blood without fluid off.  Water alarm functional and in place throughout treatment.  Sandra Hou RN

## 2021-06-14 NOTE — PROGRESS NOTES
Care Management Follow Up Note    Length of Stay (days) 28    Patient plan of care discussed at Interdisciplinary Rounds: yes                Expected Discharge Date: (TBD, intubated)      Concerns to be Addressed / Barriers to Discharge: ICU level of care due to hypoxic respiratory failure requiring oral intubation/ventilatory support and drips.  Alteration in renal function requiring dialysis on a Xzlyecb-[id number]-[id number] schedule. Nephrology following. Alteration in neuro status (Right PCA stroke), neuro following. Spouse to review Code status with MDs today.     Follow up from Rounds: Not medically ready for transfer or discharge at this time. Patient's status is guarded.     Anticipated Discharge Disposition: Discharge goal is pending response to treatment, medical needs and mobility closer to discharge.   Anticipated Discharge Services:  To be determined by destination, patient/family preferences, medical needs and mobility status closer to the time of discharge.  Anticipated Discharge DME: To be determined.     Plan:  Patient is  and independent with activities of daily living at baseline. His wife Yuridia is primary family contact. He tested positive for COVID-10 on 11/30/2020.  CM will continue to monitor progression of care, review team recommendations and provide discharge planning assist as needed.  However, prognosis is guarded and discharge is not appropriate at this point in time.     Ana Duncan RN    Abbreviation  Code:  [facility] Wheelchair ([facility] WC), [facility] Stretcher ([facility] STR), Patient (pt), Transitional Care Unit (TCU), Skilled Nursing Facility (SNF), Long Term Care (LTC), Assisted Living (snf or AL), Care Management (CM), Physical Therapy (PT), Occupational Therapy (OT), Speech Therapy (ST), Respiratory Therapy (RT).

## 2021-06-16 PROBLEM — G89.4 CHRONIC PAIN DISORDER: Status: ACTIVE | Noted: 2019-03-27

## 2021-06-16 PROBLEM — M16.11 PRIMARY OSTEOARTHRITIS OF RIGHT HIP: Status: ACTIVE | Noted: 2018-01-26

## 2021-06-16 PROBLEM — N13.8 BPH WITH URINARY OBSTRUCTION: Status: ACTIVE | Noted: 2017-11-15

## 2021-06-16 PROBLEM — M17.11 PRIMARY OSTEOARTHRITIS OF RIGHT KNEE: Status: ACTIVE | Noted: 2018-01-26

## 2021-06-16 PROBLEM — J80 ACUTE RESPIRATORY DISTRESS SYNDROME (ARDS) DUE TO COVID-19 VIRUS (H): Status: ACTIVE | Noted: 2020-01-01

## 2021-06-16 PROBLEM — U07.1 PNEUMONIA DUE TO 2019 NOVEL CORONAVIRUS: Status: ACTIVE | Noted: 2020-01-01

## 2021-06-16 PROBLEM — N17.9 ACUTE KIDNEY FAILURE, UNSPECIFIED (H): Status: ACTIVE | Noted: 2021-01-01

## 2021-06-16 PROBLEM — N40.1 BPH WITH URINARY OBSTRUCTION: Status: ACTIVE | Noted: 2017-11-15

## 2021-06-16 PROBLEM — U07.1 ACUTE RESPIRATORY DISTRESS SYNDROME (ARDS) DUE TO COVID-19 VIRUS (H): Status: ACTIVE | Noted: 2020-01-01

## 2021-06-16 PROBLEM — J12.82 PNEUMONIA DUE TO 2019 NOVEL CORONAVIRUS: Status: ACTIVE | Noted: 2020-01-01

## 2021-06-16 PROBLEM — K92.1 HEMATOCHEZIA: Status: ACTIVE | Noted: 2021-01-01

## 2021-06-16 PROBLEM — E11.9 DIABETES MELLITUS (H): Status: ACTIVE | Noted: 2019-06-24

## 2021-06-16 PROBLEM — I10 HYPERTENSION: Status: ACTIVE | Noted: 2019-06-24

## 2021-06-16 PROBLEM — J96.00 ARF (ACUTE RESPIRATORY FAILURE) (H): Status: ACTIVE | Noted: 2021-01-01

## 2021-06-30 NOTE — PROGRESS NOTES
"Progress Notes by Shelli Danielson Chi, RPA at 1/5/2021  7:37 AM     Author: Shelli Danielson Chi, RPA Service: Interventional Radiology Author Type: Physician Assistant    Filed: 1/5/2021  8:17 AM Date of Service: 1/5/2021  7:37 AM Status: Signed    : Shelli Danielson Chi, RPA (Physician Assistant)         Interventional Radiology - Pre-Procedure/Chart Review Note:  1/5/2021    Procedure Requested: IR tunneled dialysis catheter placement  Requested by:  Mary Harrell CNP     History and Physical Reviewed: H&P documented within 30 days (by Laci Hoff MD on 1/4/2020).  I have personally reviewed the patient's medical history and have updated the medical record as necessary.    Brief HPI: Felipe Cruz is a 75 y.o. old male with history of HTN, HLD, DM2, CAD admitted 12/6/2020 with COVID-19 iunfection and acute hypoxic respiratory failure currently intubated and sedated with course c/b Right PCA infarct, enterobacter PNA (abx completed 1/1/21), right apical PTX/tension PTX with right chest tube 12/20/2020, anuric NICKY requiring CRRT now requested for tunneled dialysis catheter placement for HD.       IMAGING:  No recent relevant imaging     NPO:  midnight   ANTICOAGULANTS: daily asa and IV heparin last dose 01/05/21 0409  ANTIBIOTICS: ancef 2 gram IV ordered     ALLERGIES  Patient has no known allergies.    LABS:  INR (no units)   Date Value   01/05/2021 1.09     Hemoglobin (g/dL)   Date Value   01/05/2021 6.7 (LL)     Platelets (thou/uL)   Date Value   01/05/2021 253     Creatinine (mg/dL)   Date Value   01/05/2021 6.59 (HH)     Potassium (mmol/L)   Date Value   01/05/2021 4.7       COVID-19 PCR Results    COVID-19 PCR Results   No data to display.           EXAM:  /52   Pulse 84   Temp 98.1  F (36.7  C) (Axillary)   Resp (!) 29   Ht 6' 2\" (1.88 m)   Wt (!) 261 lb 11 oz (118.7 kg)   SpO2 99%   BMI 33.60 kg/m      ASSESSMENT:  NICKY in need of dialysis.  Has had maroon colored " stools for past few days, bleeding from mouth, IV heparin held since 4AM today and pt has received transfusion with plan to repeat labs.      Labs, medications, imaging, nutritional status, clinical information and history obtained and reviewed.     Discussed with floor RN taking care of patient. There is Central line on the right.    Risks, benefits, sedation discussed and consent has been obtained via [employer] [phone] with RT witness and consent faxed to [facility].      PLAN:   Discussed plan for LEFT Image Guided Tunneled Dialysis Catheter Placement with sedation     Shelli Danielson PA-C  Interventional Radiology  01/05/21     Additional history, physical examination, and completion of presedation evaluation will be assessed by performing interventional radiology physician.

## 2021-06-30 NOTE — PROGRESS NOTES
"Progress Notes by Hollie Tsang Pharmacy Intern at 12/6/2020  6:32 PM     Author: Hollie Tsang Pharmacy Intern Service: Pharmacy Author Type: Pharmacy Intern    Filed: 12/6/2020  6:37 PM Date of Service: 12/6/2020  6:32 PM Status: Attested    : Hollie Tsang Pharmacy Intern (Pharmacy Intern) Cosigner: Anita Morse, PharmD at 12/6/2020  7:07 PM    Attestation signed by Anita Morse, PharmD at 12/6/2020  7:07 PM    Although I was not present for the medication history interview, to my knowledge, the intern has compiled the PTA medication list to the best of his/her ability given the information available at the present time.    Anita Morse, PharmD     12/6/2020     7:07 PM                  Pharmacy Note - Admission Medication History     Pertinent Provider Information:    - Patient knows medication regimen well. He states the albuterol inhaler gives him a \"hyper\" feeling that he dislikes. Patient also has not been eating as much the past week and has lowered his Lantus to ~20 units before bedtime. Please assess.    ______________________________________________________________________    Prior To Admission (PTA) med list completed and updated in EMR.       PTA Med List   Medication Sig Note Last Dose   ? albuterol (PROAIR HFA;PROVENTIL HFA;VENTOLIN HFA) 90 mcg/actuation inhaler Inhale 2 puffs every 4-6 hours by inhalation route as needed. 12/6/2020: Pt states he dislikes the \"hyper\" feeling this gives him. 12/5/2020 at pm   ? amLODIPine (NORVASC) 10 MG tablet Take 10 mg by mouth daily.  12/6/2020 at am   ? ascorbic acid, vitamin C, (ASCORBIC ACID WITH JAYDEN HIPS) 1000 MG tablet Take 1,000 mg by mouth daily.  12/6/2020 at am   ? aspirin 81 MG EC tablet Take 81 mg by mouth daily.  12/6/2020 at am   ? atenoloL (TENORMIN) 100 MG tablet Take 100 mg by mouth daily.  12/6/2020 at am   ? cholecalciferol, vitamin D3, 1,000 unit (25 mcg) tablet Take 1,000 Units by mouth daily.  12/6/2020 at am   ? doxazosin " (CARDURA) 8 MG tablet Take 8 mg by mouth at bedtime.  12/5/2020 at pm   ? finasteride (PROSCAR) 5 mg tablet Take 5 mg by mouth daily.  12/6/2020 at am   ? glipiZIDE (GLUCOTROL) 10 MG tablet Take 10 mg by mouth daily before breakfast.  12/6/2020 at am   ? HYDROcodone-acetaminophen (NORCO )  mg per tablet Take 1 tablet by mouth 4 (four) times a day as needed for pain.  12/6/2020 at am   ? insulin glargine (LANTUS SOLOSTAR PEN) 100 unit/mL (3 mL) pen Inject 35 Units under the skin at bedtime. 12/6/2020: Pt states he takes ~20 units since he has not been eating much the past week. 12/5/2020 at pm   ? lisinopriL (PRINIVIL,ZESTRIL) 20 MG tablet Take 1 tablet by mouth every evening.  12/5/2020 at pm   ? lisinopriL-hydrochlorothiazide (PRINZIDE,ZESTORETIC) 20-25 mg per tablet Take 1 tablet by mouth every morning.  12/6/2020 at am   ? metFORMIN (GLUCOPHAGE) 1000 MG tablet Take 1,000 mg by mouth 2 (two) times a day with meals.  12/6/2020 at am   ? multivit-min/FA/lycopen/lutein (CENTRUM SILVER MEN ORAL) Take 1 tablet by mouth daily.  12/6/2020 at am   ? potassium chloride (K-DUR,KLOR-CON) 10 MEQ tablet Take 10 mEq by mouth daily with breakfast.  12/6/2020 at am   ? zinc gluconate 50 mg tablet Take 50 mg by mouth daily.  12/6/2020 at am       Information source(s): Patient  Method of interview communication: phone    Summary of Changes to PTA Med List  New: ALL MEDICATIONS  Discontinued: ---  Changed: ---    Patient was asked about OTC/herbal products specifically.  PTA med list reflects this.    In the past week, patient estimated taking medication this percent of the time:  greater than 90%.    Allergies were reviewed, assessed, and updated with the patient.      Patient did not bring any medications to the hospital and can't retrieve from home. No multi-dose medications are available for use during hospital stay.     The information provided in this note is only as accurate as the sources available at the time  of the update(s).    Thank you for the opportunity to participate in the care of this patient.    Hollie Tsang, Pharmacy Intern  12/6/2020 6:32 PM

## 2021-07-01 NOTE — PROCEDURES
"Procedures by Shaggy Allred RN at 12/9/2020  3:56 PM     Author: Shaggy Allred RN Service: -- Author Type: Registered Nurse    Filed: 12/9/2020  4:04 PM Date of Service: 12/9/2020  3:56 PM Status: Signed    : Shaggy Allred RN (Registered Nurse)     Procedure Orders    1. Insert PICC [[id number]] ordered by Nunu Umanzor CNP at 12/09/20 0818           Procedures    1. PICC INSERTION - TRIPLE LUMEN [[id number] (Custom)]             PICC Line Insertion Procedure Note    Pt. Name: Felipe Cruz  MRN:        [id number]    Procedure: Insertion of a  TRIPLE Lumen  5 fr  Bard SOLO (valved) Power PICC, Lot number [id number]    Indications: Vascular Access    Contraindications : None    Procedure Details   Patient identified with 2 identifiers and \"Time Out\" conducted.  .     Central line insertion bundle followed: hand hygiene performed prior to procedure, site cleansed with cholraprep, hat, mask, sterile gloves,sterile gown worn, patient draped with maximum barrier head to toe drape, sterile field maintained.    The vein was assessed and found to be compressible and of adequate size. 1.5 ml 1% Lidocaine administered sq to the insertion site. A 5 Fr PICC was inserted into the BRACHIAL vein of the right arm with ultrasound guidance. ONE attempt(s) required to access vein.   Catheter threaded without difficulty. Good blood return noted.    Modified Seldinger Technique used for insertion.    The 8 sharps that are included in the PICC insertion kit were accounted for and disposed of in the sharps container prior to breakdown of the sterile field.    Catheter secured with Statlock, biopatch and Tegaderm dressing applied.    Findings:  Total catheter length  41 cm, with 0 cm exposed. Mid upper arm circumference is 35 cm. Catheter was flushed with 30 cc NS. Patient  tolerated procedure well.    Tip placement verified in the distal SVC by [employer]:        CLABSI prevention brochure left at bedside.    Patient's primary " RN notified PICC is ready for use.    Comments:          Shaggy Yuen RN, MSN, RCIS, [facility]  Vascular Access - [facility]

## 2021-07-01 NOTE — PROCEDURES
Procedures by Oscar Holley PA-C at 12/18/2020  1:40 PM     Author: Oscar Holley PA-C Service: Critical Care Medicine Author Type: Physician Assistant    Filed: 12/18/2020  1:57 PM Date of Service: 12/18/2020  1:40 PM Status: Attested    : Oscar Holley PA-C (Physician Assistant) Cosigner: Eric Massey MD at 12/18/2020  7:28 PM     Pre-procedure Diagnoses    1. Acute renal failure, unspecified acute renal failure type (H) [N17.9]           Post-procedure Diagnoses    1. Acute renal failure, unspecified acute renal failure type (H) [N17.9]           Procedures    1. DIALYSIS CATHETER INSERTION HE [UBE799 (Custom)]          Attestation signed by Eric Massey MD at 12/18/2020  7:28 PM    I was present during the entire procedure.    Eric Massey MD  Pulmonary Critical Care                 HEMODIALYSIS INSERTION PROCEDURE NOTE  (NON-OR)    Procedure Date:  12/18/2020   Performing:   Oscar Holley PA-C    Pre-Procedure Diagnosis:  Acute Renal Failure need Hemodialysis   Post-Procedure Diagnosis:  Same as Pre-Procedure Diagnosis    Procedure:  RIGHT femoral dialysis line.   Indications: Acute renal failure, need for renal replacement therapy.     Estimated Blood Loss: 20cc  Complications: none    Procedure Details:   The risks, benefits, complications, treatment options, and expected outcomes were discussed with the patient's next of kin on the universal consent form.  The risks and potential complications of their problem and purposed procedure include but are not limited to infection, bleeding, pain,  the need for additional procedures, and nerve and vessel injury. The patient/alternate (see above) concurred with the proposed plan, giving informed consent.  The site of the procedure was properly noted/marked. The patient was identified as Felipe Cruz with Date of Birth 1945 and the procedure verified as femoral hemodialysis line placement.  A Time Out was held and the above information  confirmed.    In sterile fashion, the line site was prepped with Chlorhexidine.  Strict sterile conditions were maintained,  Cap, mask, and sterile gloves were worn by all participants.  Under Ultrasound guidance, the RIGHT femoral vein was identified.  Vein was entered with needle, aspirating with advancement. Dark non-pulsatile blood aspirated. Using Seldinger technique, wire was placed through needle hub. Small skin incision was made at site of wife, series of 2 dilators was used to dilate the femoral vein. Over guidewire, the last dilator was removed and hemodialysis line placed.     The hemodialysis line was placed in the   RIGHT FEMORAL  VEIN percutaneously,  without  difficulty.  The line was  sutured in place x2, biopatch palced, and occlusive sterile dressing was applied.  The total number of needle stick attempts was 1.      Condition: stable    Oscar Holley, 12/18/2020, 1:41 PM  [facility]  Critical Care Service    Dr. Massey was presented for procedure.

## 2021-07-01 NOTE — CONSULTS
Consults by Alfredo Villegas MD at 12/30/2020  6:21 PM     Author: Alfredo Villegas MD Service: Gastroenterology Author Type: Physician    Filed: 12/31/2020  6:39 AM Date of Service: 12/30/2020  6:21 PM Status: Signed    : Alfredo Villegas MD (Physician)     Consult Orders    1. Inpatient consult to Gastroenterology Reason for Consult? GI, hematochezia; Did you contact the consulting MD? Yes; Consult priority: Today (urgent); Communication for MD: Please have the consultant call me BEFORE seeing the patient; Phone number ... [[phone]] ordered by Oscar Holley PA-C at 12/30/20 1416                                                                      CONSULTING PHYSICIAN   Nicci Gilbert MD     REASON FOR CONSULTATION   Hematochezia     CHIEF COMPLAINT   Felipe Cruz came to the hospital for evaluation of cough and shortness of breath along with fatigue and poor appetite admitted for COVID-19 management.      HISTORY OF PRESENT ILLNESS   Felipe Cruz is a pleasant 75 y.o. male who was admitted to the hospital after one week of cough, shortness of breath, fatigue, and nausea with a positive COVID-19 test for acute hypoxic respiratory failure. The patient is now at [facility] COVID-19 unit intubated for full support.     The GI service was contacted after the patient developed dark maroon stools. The patient had recently been started on blood thinners and had a rectal tube in place. The rectal tube was removed and examination demonstrated a rectal ulcer likely related to the rectal tube. The GI service is now being asked to see the patient for help in the management of the patient's hematochezia.       PAST HISTORY   History reviewed. No pertinent past medical history.   Past Surgical History:   Procedure Laterality Date   ? PICC INSERTION - TRIPLE LUMEN  12/9/2020             Family History Social History   HTN  CAD  CVA ? Occupation: Retired   ? Tobacco: None  ? Alcohol: None  ? Recreational  Drugs: None     MEDICATIONS & ALLERGIES   Medications Prior to Admission   Medication Sig Dispense Refill Last Dose   ? albuterol (PROAIR HFA;PROVENTIL HFA;VENTOLIN HFA) 90 mcg/actuation inhaler Inhale 2 puffs every 4-6 hours by inhalation route as needed.   12/5/2020 at pm   ? amLODIPine (NORVASC) 10 MG tablet Take 10 mg by mouth daily.   12/6/2020 at am   ? ascorbic acid, vitamin C, (ASCORBIC ACID WITH JAYDEN HIPS) 1000 MG tablet Take 1,000 mg by mouth daily.   12/6/2020 at am   ? aspirin 81 MG EC tablet Take 81 mg by mouth daily.   12/6/2020 at am   ? atenoloL (TENORMIN) 100 MG tablet Take 100 mg by mouth daily.   12/6/2020 at am   ? cholecalciferol, vitamin D3, 1,000 unit (25 mcg) tablet Take 1,000 Units by mouth daily.   12/6/2020 at am   ? doxazosin (CARDURA) 8 MG tablet Take 8 mg by mouth at bedtime.   12/5/2020 at pm   ? finasteride (PROSCAR) 5 mg tablet Take 5 mg by mouth daily.   12/6/2020 at am   ? glipiZIDE (GLUCOTROL) 10 MG tablet Take 10 mg by mouth daily before breakfast.   12/6/2020 at am   ? HYDROcodone-acetaminophen (NORCO )  mg per tablet Take 1 tablet by mouth 4 (four) times a day as needed for pain.   12/6/2020 at am   ? insulin glargine (LANTUS SOLOSTAR PEN) 100 unit/mL (3 mL) pen Inject 35 Units under the skin at bedtime.   12/5/2020 at pm   ? lisinopriL (PRINIVIL,ZESTRIL) 20 MG tablet Take 1 tablet by mouth every evening.   12/5/2020 at pm   ? lisinopriL-hydrochlorothiazide (PRINZIDE,ZESTORETIC) 20-25 mg per tablet Take 1 tablet by mouth every morning.   12/6/2020 at am   ? metFORMIN (GLUCOPHAGE) 1000 MG tablet Take 1,000 mg by mouth 2 (two) times a day with meals.   12/6/2020 at am   ? multivit-min/FA/lycopen/lutein (CENTRUM SILVER MEN ORAL) Take 1 tablet by mouth daily.   12/6/2020 at am   ? potassium chloride (K-DUR,KLOR-CON) 10 MEQ tablet Take 10 mEq by mouth daily with breakfast.   12/6/2020 at am   ? zinc gluconate 50 mg tablet Take 50 mg by mouth daily.   12/6/2020 at am     "    ALLERGIES   No Known Allergies      REVIEW OF SYSTEMS   ? no chest pain  ? worsening shortness of breath prior to admission, coughing  ? no fevers/sweats/chills  ? no weight gain or loss  ? nausea   ? generalized abdominal pain   ? no constipation or diarrhea  ? new onset of bloody stools, rectal tube now removed  ? no numbness or weakness  ? no jaundice or dark urine  A comprehensive review of systems was performed and was otherwise noncontributory.     OBJECTIVE   Vitals Blood pressure 142/68, pulse 88, temperature 98.8  F (37.1  C), temperature source Esophageal, resp. rate 28, height 6' 2\" (1.88 m), weight 208 lb 15.9 oz (94.8 kg), SpO2 100 %.           Physical  Exam ? GENERAL: intubated and sedated   ? SKIN: warm and dry, no rashes  ? HEENT: atraumatic, anicteric, moist mucous membranes, neck soft/supple   ? PULMONARY: course breath sounds   ? CARDIOVASCULAR: systolic murmur  ? ABDOMEN: no tenderness to palpation  ? NEUROLOGICAL: moves head to suctioning   ? PSYCHIATRIC: unable to assess         LABORATORY    ELECTROLYTE PANEL   Results from last 7 days   Lab Units 12/31/20  0335 12/30/20  0400 12/29/20  0508   LN-SODIUM mmol/L 136 136 137   LN-POTASSIUM mmol/L 3.8 3.5 4.4   LN-CHLORIDE mmol/L 104 103 107   LN-CO2 mmol/L 17* 19* 16*   LN-BLOOD UREA NITROGEN mg/dL 124* 90* 109*   LN-CREATININE mg/dL 5.21* 3.87* 4.15*   LN-CALCIUM mg/dL 7.9* 7.6* 8.3*        HEMATOLOGY PANEL   Results from last 7 days   Lab Units 12/31/20  0335 12/31/20  0008 12/30/20  1958 12/30/20  1433 12/30/20  1433 12/30/20  0400 12/28/20  1558 12/28/20  1558   LN-HEMOGLOBIN g/dL 7.8* 7.8* 7.5*   < > 7.9* 8.2*   < > 8.8*   LN-MEAN CORPUSCULAR VOLUME fL 102*  --   --   --  96 97   < >  --    LN-WHITE BLOOD CELL COUNT thou/uL 12.9*  --   --   --  14.8* 16.7*   < >  --    LN-PLATELET COUNT thou/uL 240  --   --   --  220 238   < > 223   LN-INR   --   --   --   --   --   --   --  1.16*    < > = values in this interval not displayed.    "   LIVER AND PANCREAS PANEL   Results from last 7 days   Lab Units 12/26/20  0518 12/25/20  0547   LN-ALKALINE PHOSPHATASE U/L 109 103   LN-BILIRUBIN TOTAL mg/dL 0.8 1.0   LN-BILIRUBIN DIRECT mg/dL 0.5 0.6*   LN-PROTEIN TOTAL g/dL 6.0 5.8*   LN-ALT (SGPT) U/L 234* 268*   LN-AST (SGOT) U/L 123* 216*         IMAGING STUDIES        I have reviewed the current diagnostic and laboratory tests.           IMPRESSION   Felipe Cruz is a pleasant 75 y.o. male with COVID-19 and acute respiratory failure now with hematochezia.      RECOMMENDATION   ? Hematochezia   Discontinue all anticoagulation as able.   Follow hemoglobin and transfuse to keep > 7.0.   The rectal tube was removed and should not be reinserted.   If the bleeding continues I would recommend transferring the patient to one of the hospitals with endoscopy capabilities. As of 12/31/2020 [facility] no longer has GI services.           Alfredo Villegas MD  Thank you for the opportunity to participate in the care of this patient.   Please feel free to call me with any questions or concerns.  Phone number [phone].